# Patient Record
Sex: MALE | Race: WHITE | NOT HISPANIC OR LATINO | Employment: OTHER | URBAN - NONMETROPOLITAN AREA
[De-identification: names, ages, dates, MRNs, and addresses within clinical notes are randomized per-mention and may not be internally consistent; named-entity substitution may affect disease eponyms.]

---

## 2021-01-28 ENCOUNTER — IMMUNIZATION (OUTPATIENT)
Dept: VACCINE CLINIC | Facility: HOSPITAL | Age: 86
End: 2021-01-28

## 2021-01-28 PROCEDURE — 0001A: CPT | Performed by: FAMILY MEDICINE

## 2021-01-28 PROCEDURE — 91300 HC SARSCOV02 VAC 30MCG/0.3ML IM: CPT | Performed by: FAMILY MEDICINE

## 2021-02-19 ENCOUNTER — IMMUNIZATION (OUTPATIENT)
Dept: VACCINE CLINIC | Facility: HOSPITAL | Age: 86
End: 2021-02-19

## 2021-02-19 PROCEDURE — 0002A: CPT | Performed by: INTERNAL MEDICINE

## 2021-02-19 PROCEDURE — 91300 HC SARSCOV02 VAC 30MCG/0.3ML IM: CPT | Performed by: INTERNAL MEDICINE

## 2021-04-12 ENCOUNTER — OFFICE VISIT (OUTPATIENT)
Dept: FAMILY MEDICINE CLINIC | Facility: CLINIC | Age: 86
End: 2021-04-12

## 2021-04-12 VITALS
HEIGHT: 72 IN | TEMPERATURE: 97.7 F | SYSTOLIC BLOOD PRESSURE: 120 MMHG | OXYGEN SATURATION: 98 % | HEART RATE: 81 BPM | BODY MASS INDEX: 28.15 KG/M2 | DIASTOLIC BLOOD PRESSURE: 78 MMHG | WEIGHT: 207.8 LBS

## 2021-04-12 DIAGNOSIS — R35.0 URINARY FREQUENCY: ICD-10-CM

## 2021-04-12 DIAGNOSIS — Z13.6 ENCOUNTER FOR LIPID SCREENING FOR CARDIOVASCULAR DISEASE: ICD-10-CM

## 2021-04-12 DIAGNOSIS — E03.8 OTHER SPECIFIED HYPOTHYROIDISM: ICD-10-CM

## 2021-04-12 DIAGNOSIS — J43.8 OTHER EMPHYSEMA (HCC): Primary | ICD-10-CM

## 2021-04-12 DIAGNOSIS — Z12.5 ENCOUNTER FOR SPECIAL SCREENING EXAMINATION FOR NEOPLASM OF PROSTATE: ICD-10-CM

## 2021-04-12 DIAGNOSIS — Z13.220 ENCOUNTER FOR LIPID SCREENING FOR CARDIOVASCULAR DISEASE: ICD-10-CM

## 2021-04-12 PROCEDURE — 99204 OFFICE O/P NEW MOD 45 MIN: CPT | Performed by: FAMILY MEDICINE

## 2021-04-12 RX ORDER — BUDESONIDE AND FORMOTEROL FUMARATE DIHYDRATE 160; 4.5 UG/1; UG/1
2 AEROSOL RESPIRATORY (INHALATION)
COMMUNITY
End: 2021-04-20 | Stop reason: SDUPTHER

## 2021-04-12 RX ORDER — LEVOTHYROXINE SODIUM 0.07 MG/1
75 TABLET ORAL DAILY
COMMUNITY
End: 2021-06-01 | Stop reason: SDUPTHER

## 2021-04-12 RX ORDER — ALBUTEROL SULFATE 90 UG/1
AEROSOL, METERED RESPIRATORY (INHALATION)
COMMUNITY
Start: 2021-02-27 | End: 2021-08-03 | Stop reason: SDUPTHER

## 2021-04-12 RX ORDER — LANOLIN ALCOHOL/MO/W.PET/CERES
3 CREAM (GRAM) TOPICAL NIGHTLY
COMMUNITY
End: 2022-06-13

## 2021-04-12 RX ORDER — MULTIPLE VITAMINS W/ MINERALS TAB 9MG-400MCG
1 TAB ORAL DAILY
COMMUNITY
End: 2022-04-18

## 2021-04-13 ENCOUNTER — TELEPHONE (OUTPATIENT)
Dept: FAMILY MEDICINE CLINIC | Facility: CLINIC | Age: 86
End: 2021-04-13

## 2021-04-13 LAB
ALBUMIN SERPL-MCNC: 4.5 G/DL (ref 3.5–5.2)
ALBUMIN/GLOB SERPL: 1.8 G/DL
ALP SERPL-CCNC: 62 U/L (ref 39–117)
ALT SERPL-CCNC: 18 U/L (ref 1–41)
AST SERPL-CCNC: 24 U/L (ref 1–40)
BASOPHILS # BLD AUTO: 0.08 10*3/MM3 (ref 0–0.2)
BASOPHILS NFR BLD AUTO: 0.8 % (ref 0–1.5)
BILIRUB SERPL-MCNC: 0.3 MG/DL (ref 0–1.2)
BUN SERPL-MCNC: 19 MG/DL (ref 8–23)
BUN/CREAT SERPL: 15.6 (ref 7–25)
CALCIUM SERPL-MCNC: 9.3 MG/DL (ref 8.6–10.5)
CHLORIDE SERPL-SCNC: 104 MMOL/L (ref 98–107)
CHOLEST SERPL-MCNC: 139 MG/DL (ref 0–200)
CO2 SERPL-SCNC: 28.1 MMOL/L (ref 22–29)
CREAT SERPL-MCNC: 1.22 MG/DL (ref 0.76–1.27)
EOSINOPHIL # BLD AUTO: 0.21 10*3/MM3 (ref 0–0.4)
EOSINOPHIL NFR BLD AUTO: 2 % (ref 0.3–6.2)
ERYTHROCYTE [DISTWIDTH] IN BLOOD BY AUTOMATED COUNT: 12.5 % (ref 12.3–15.4)
GLOBULIN SER CALC-MCNC: 2.5 GM/DL
GLUCOSE SERPL-MCNC: 94 MG/DL (ref 65–99)
HCT VFR BLD AUTO: 46.4 % (ref 37.5–51)
HDLC SERPL-MCNC: 49 MG/DL (ref 40–60)
HGB BLD-MCNC: 15.1 G/DL (ref 13–17.7)
IMM GRANULOCYTES # BLD AUTO: 0.04 10*3/MM3 (ref 0–0.05)
IMM GRANULOCYTES NFR BLD AUTO: 0.4 % (ref 0–0.5)
IMP & REVIEW OF LAB RESULTS: NORMAL
LDLC SERPL CALC-MCNC: 62 MG/DL (ref 0–100)
LYMPHOCYTES # BLD AUTO: 2.86 10*3/MM3 (ref 0.7–3.1)
LYMPHOCYTES NFR BLD AUTO: 27.6 % (ref 19.6–45.3)
MCH RBC QN AUTO: 30.8 PG (ref 26.6–33)
MCHC RBC AUTO-ENTMCNC: 32.5 G/DL (ref 31.5–35.7)
MCV RBC AUTO: 94.5 FL (ref 79–97)
MONOCYTES # BLD AUTO: 1.27 10*3/MM3 (ref 0.1–0.9)
MONOCYTES NFR BLD AUTO: 12.2 % (ref 5–12)
NEUTROPHILS # BLD AUTO: 5.92 10*3/MM3 (ref 1.7–7)
NEUTROPHILS NFR BLD AUTO: 57 % (ref 42.7–76)
NRBC BLD AUTO-RTO: 0 /100 WBC (ref 0–0.2)
PLATELET # BLD AUTO: 273 10*3/MM3 (ref 140–450)
POTASSIUM SERPL-SCNC: 4.6 MMOL/L (ref 3.5–5.2)
PROT SERPL-MCNC: 7 G/DL (ref 6–8.5)
PSA SERPL-MCNC: 2 NG/ML (ref 0–4)
RBC # BLD AUTO: 4.91 10*6/MM3 (ref 4.14–5.8)
SODIUM SERPL-SCNC: 140 MMOL/L (ref 136–145)
T4 FREE SERPL-MCNC: 1.42 NG/DL (ref 0.93–1.7)
TRIGL SERPL-MCNC: 165 MG/DL (ref 0–150)
TSH SERPL DL<=0.005 MIU/L-ACNC: 1.75 UIU/ML (ref 0.27–4.2)
VLDLC SERPL CALC-MCNC: 28 MG/DL (ref 5–40)
WBC # BLD AUTO: 10.38 10*3/MM3 (ref 3.4–10.8)

## 2021-04-13 NOTE — TELEPHONE ENCOUNTER
Well, I'm glad he has one! Keep it for right now. We still might need him. We'll do the workup that I ordered and get it rolling and then decide if we need to do anything more. I want him to not cancel yet because it is so hard to get an appointment.

## 2021-04-13 NOTE — TELEPHONE ENCOUNTER
Caller: DILAN JOVEL    Relationship: Emergency Contact WIFE    Best call back number: [phone]    Additional notes: PATIENT'S WIFE WOULD LIKE TO KNOW IF PATIENT NEEDS TO KEEP APPOINTMENT WITH DR HELMS ON 5/5/21. [facility] HAD SUGGESTED PATIENT MAKE AN APPOINTMENT WITH DR HELMS WHEN PATIENT WAS SEEN AROUND 2/27/21 AND HAD A BLOOD OXYGEN TEST PERFORMED ON HIM DUE TO TROUBLE BREATHING AND OXYGEN WAS LOW. PATIENT HAS AN APPOINTMENT SCHEDULED FOR A BREATHING TEST AT THE [facility] ON 4/28/21 THAT PCP SCHEDULED; AT THE TIME PATIENT WAS SEEN AT [facility] IN February PATIENT'S PREVIOUS PCP DR GERI GOULD'S HAD LEFT HIS PRACTICE. PATIENT WOULD LIKE TO KNOW IF HE NEEDS TO KEEP BOTH APPOINTMENTS WITH THE HOSPITAL FOR THE BREATHING TEST AND WITH DR HELMS FOR THE BLOOD OXYGEN TEST OR JUST THE APPOINTMENT AT THE HOSPITAL THAT CURRENT PCP HAS SET UP.

## 2021-04-20 ENCOUNTER — TELEPHONE (OUTPATIENT)
Dept: FAMILY MEDICINE CLINIC | Facility: CLINIC | Age: 86
End: 2021-04-20

## 2021-04-20 NOTE — TELEPHONE ENCOUNTER
Caller: RaymundoKevin    Relationship: Self    Best call back number: [phone]     Medication needed:   Requested Prescriptions     Pending Prescriptions Disp Refills   • budesonide-formoterol (SYMBICORT) 160-4.5 MCG/ACT inhaler       Sig: Inhale 2 puffs 2 (Two) Times a Day.       When do you need the refill by: ASAP    What additional details did the patient provide when requesting the medication: ALMOST OUT    Does the patient have less than a 3 day supply:  [] Yes  [x] No    What is the patient's preferred pharmacy: [facility]       THE PATIENT STATES HE NORMALLY GETS THIS MEDICATION FROM [facility] AND STATES THIS GETS MAILED TO HIS HOME.     PLEASE CALL THE PATIENT AND ADVISE -[phone].

## 2021-04-23 RX ORDER — BUDESONIDE AND FORMOTEROL FUMARATE DIHYDRATE 160; 4.5 UG/1; UG/1
2 AEROSOL RESPIRATORY (INHALATION)
Qty: 30.6 G | Refills: 1 | Status: SHIPPED | OUTPATIENT
Start: 2021-04-23 | End: 2021-04-23 | Stop reason: SDUPTHER

## 2021-04-23 RX ORDER — BUDESONIDE AND FORMOTEROL FUMARATE DIHYDRATE 160; 4.5 UG/1; UG/1
2 AEROSOL RESPIRATORY (INHALATION)
Qty: 30.6 G | Refills: 1 | Status: SHIPPED | OUTPATIENT
Start: 2021-04-23 | End: 2021-06-01 | Stop reason: SDUPTHER

## 2021-04-23 NOTE — TELEPHONE ENCOUNTER
This will not let me e-scribe it to the [facility]. How can we send it there?      Print & sign & I will fax.

## 2021-04-28 ENCOUNTER — HOSPITAL ENCOUNTER (OUTPATIENT)
Dept: RESPIRATORY THERAPY | Facility: HOSPITAL | Age: 86
Discharge: HOME | End: 2021-04-28

## 2021-04-28 ENCOUNTER — LAB (OUTPATIENT)
Dept: LAB | Facility: HOSPITAL | Age: 86
End: 2021-04-28

## 2021-04-28 DIAGNOSIS — J43.8 OTHER EMPHYSEMA (HCC): ICD-10-CM

## 2021-04-28 LAB — SARS-COV-2 RDRP RESP QL NAA+PROBE: NORMAL

## 2021-04-28 PROCEDURE — 94729 DIFFUSING CAPACITY: CPT | Performed by: INTERNAL MEDICINE

## 2021-04-28 PROCEDURE — C9803 HOPD COVID-19 SPEC COLLECT: HCPCS

## 2021-04-28 PROCEDURE — 87635 SARS-COV-2 COVID-19 AMP PRB: CPT

## 2021-04-28 PROCEDURE — 94726 PLETHYSMOGRAPHY LUNG VOLUMES: CPT

## 2021-04-28 PROCEDURE — 94060 EVALUATION OF WHEEZING: CPT

## 2021-04-28 PROCEDURE — 94726 PLETHYSMOGRAPHY LUNG VOLUMES: CPT | Performed by: INTERNAL MEDICINE

## 2021-04-28 PROCEDURE — 94640 AIRWAY INHALATION TREATMENT: CPT

## 2021-04-28 PROCEDURE — 94060 EVALUATION OF WHEEZING: CPT | Performed by: INTERNAL MEDICINE

## 2021-04-28 PROCEDURE — 94729 DIFFUSING CAPACITY: CPT

## 2021-04-28 RX ORDER — ALBUTEROL SULFATE 2.5 MG/3ML
2.5 SOLUTION RESPIRATORY (INHALATION) ONCE
Status: COMPLETED | OUTPATIENT
Start: 2021-04-28 | End: 2021-04-28

## 2021-04-28 RX ADMIN — ALBUTEROL SULFATE 2.5 MG: 2.5 SOLUTION RESPIRATORY (INHALATION) at 15:40

## 2021-04-30 ENCOUNTER — TELEPHONE (OUTPATIENT)
Dept: FAMILY MEDICINE CLINIC | Facility: CLINIC | Age: 86
End: 2021-04-30

## 2021-04-30 NOTE — TELEPHONE ENCOUNTER
----- Message from Elissa Geronimo MD sent at 4/30/2021  4:13 AM EDT -----  Please call. Let him know negative.      Patient notified.

## 2021-05-03 ENCOUNTER — TELEPHONE (OUTPATIENT)
Dept: FAMILY MEDICINE CLINIC | Facility: CLINIC | Age: 86
End: 2021-05-03

## 2021-05-03 DIAGNOSIS — R30.0 DYSURIA: Primary | ICD-10-CM

## 2021-05-03 PROCEDURE — 81003 URINALYSIS AUTO W/O SCOPE: CPT | Performed by: FAMILY MEDICINE

## 2021-05-04 ENCOUNTER — TELEPHONE (OUTPATIENT)
Dept: FAMILY MEDICINE CLINIC | Facility: CLINIC | Age: 86
End: 2021-05-04

## 2021-05-04 LAB
BILIRUB BLD-MCNC: NEGATIVE MG/DL
CLARITY, POC: ABNORMAL
COLOR UR: YELLOW
GLUCOSE UR STRIP-MCNC: NEGATIVE MG/DL
KETONES UR QL: NEGATIVE
LEUKOCYTE EST, POC: NEGATIVE
NITRITE UR-MCNC: NEGATIVE MG/ML
PH UR: 6 [PH] (ref 5–8)
PROT UR STRIP-MCNC: ABNORMAL MG/DL
RBC # UR STRIP: NEGATIVE /UL
SP GR UR: 1.03 (ref 1–1.03)
UROBILINOGEN UR QL: NORMAL

## 2021-05-05 ENCOUNTER — OFFICE VISIT (OUTPATIENT)
Dept: PULMONOLOGY | Facility: CLINIC | Age: 86
End: 2021-05-05

## 2021-05-05 VITALS
SYSTOLIC BLOOD PRESSURE: 138 MMHG | BODY MASS INDEX: 28.15 KG/M2 | HEART RATE: 75 BPM | WEIGHT: 207.8 LBS | TEMPERATURE: 97.5 F | DIASTOLIC BLOOD PRESSURE: 74 MMHG | OXYGEN SATURATION: 94 % | HEIGHT: 72 IN

## 2021-05-05 DIAGNOSIS — J44.9 CHRONIC OBSTRUCTIVE PULMONARY DISEASE, UNSPECIFIED COPD TYPE (HCC): Primary | ICD-10-CM

## 2021-05-05 DIAGNOSIS — Z87.891 FORMER CIGARETTE SMOKER: ICD-10-CM

## 2021-05-05 PROCEDURE — 99203 OFFICE O/P NEW LOW 30 MIN: CPT | Performed by: INTERNAL MEDICINE

## 2021-05-05 NOTE — PROGRESS NOTES
Chief complaint:   Chief Complaint   Patient presents with   • COPD     new pt       History of present illness:   Mr. Fonseca is a very pleasant 86-year-old gentleman with a past medical history of COPD and hypothyroidism.  He is currently on albuterol inhaler and Symbicort inhaler.  He is currently saturating 94% on room air.  He presents to pulmonary outpatient clinic for further evaluation and management of his COPD.Pulmonary function test that was performed on 4/28/2021 showed: Flow volume loop was assessed.  Spirometry showed severe obstruction.  There is no significant bronchodilator response.  Diffusing capacity, uncorrected for hemoglobin, is moderately reduced.  Lung volumes are normal.  Significant air trapping noted.  Patient reports feeling shortness of breath from last 16 years.  Symptom of shortness of breath started gradually.  He denies any symptoms of chest pain, wheezing or coughing.  He denies any fever chills night sweats.  He reports shortness of breath on activity occasionally and shortness of breath decreases on rest.  He denies any history of blood clot in the leg or in the lung.  He is a former smoker.  He has smoking history of 66-pack-year and quit smoking 25 years ago.    Review of Systems:   Positive for shortness of breath on exertion.  Denies any cough, chest pain, palpitation, swelling of lower extremities    Past medical history, past surgical history, social history and family history:  •  has a past medical history of Emphysema lung (CMS/HCC), Gastritis, Heartburn, Macular degeneration, Reflux esophagitis, and Thyroid disease.  •  has a past surgical history that includes Intracapsular cataract extraction.  •  reports that he quit smoking about 25 years ago. His smoking use included cigarettes. He started smoking about 69 years ago. He has a 66.00 pack-year smoking history. He has never used smokeless tobacco. He reports current alcohol use. He reports that he does not use  "drugs.  • family history includes Asthma in his brother; Cancer in his brother and father; Hypertension in his mother; Other in his mother.     Medication and allergies:  • Active medication:  Current Outpatient Medications on File Prior to Visit   Medication Sig   • albuterol sulfate  (90 Base) MCG/ACT inhaler    • budesonide-formoterol (SYMBICORT) 160-4.5 MCG/ACT inhaler Inhale 2 puffs 2 (Two) Times a Day.   • levothyroxine (SYNTHROID, LEVOTHROID) 75 MCG tablet Take 75 mcg by mouth Daily.   • melatonin 3 MG tablet Take  by mouth.   • multivitamin with minerals tablet tablet Take 1 tablet by mouth Daily.     No current facility-administered medications on file prior to visit.        Allergies:    Patient has no known allergies.      Vital Signs    height is 182.9 cm (72\") and weight is 94.3 kg (207 lb 12.8 oz). His temperature is 97.5 °F (36.4 °C). His blood pressure is 138/74 and his pulse is 75. His oxygen saturation is 94%.      Physical Exam:  Patient is oriented to time place and person.  No respiratory distress.  Patient can talk in full sentences.  No use of accessory muscles.  Normal S1 and S2.  No murmur heard.  Bilateral air entry equal.  No wheezing.  No crackles.  No rhonchi.  Abdomen soft.  Bowel sounds are present.  No cranial nerve deficit.       Result review:   Flow volume loop was assessed.  Spirometry showed severe obstruction.  There is no significant bronchodilator response.  Diffusing capacity, uncorrected for hemoglobin, is moderately reduced.  Lung volumes are normal.  Significant air trapping noted.      Assessment and plan:   Diagnosis Plan   1. Chronic obstructive pulmonary disease, unspecified COPD type (CMS/[facility])  tiotropium bromide monohydrate (SPIRIVA RESPIMAT) 2.5 MCG/ACT aerosol solution inhaler  On albuterol inhaler  On Symbicort inhaler   2. Former cigarette smoker              Follow up in 3 months and as needed.       Thank you for involving us in the care of the " patient.  Hang Reed MD  Pulmonary and Critical Care Medicine

## 2021-06-01 ENCOUNTER — OFFICE VISIT (OUTPATIENT)
Dept: FAMILY MEDICINE CLINIC | Facility: CLINIC | Age: 86
End: 2021-06-01

## 2021-06-01 VITALS
SYSTOLIC BLOOD PRESSURE: 126 MMHG | BODY MASS INDEX: 27.98 KG/M2 | WEIGHT: 206.6 LBS | OXYGEN SATURATION: 95 % | HEART RATE: 73 BPM | TEMPERATURE: 97.9 F | HEIGHT: 72 IN | DIASTOLIC BLOOD PRESSURE: 70 MMHG

## 2021-06-01 DIAGNOSIS — J44.9 CHRONIC OBSTRUCTIVE PULMONARY DISEASE, UNSPECIFIED COPD TYPE (HCC): ICD-10-CM

## 2021-06-01 DIAGNOSIS — K21.9 GASTROESOPHAGEAL REFLUX DISEASE WITHOUT ESOPHAGITIS: ICD-10-CM

## 2021-06-01 DIAGNOSIS — R07.2 PRECORDIAL PAIN: Primary | ICD-10-CM

## 2021-06-01 DIAGNOSIS — E03.8 OTHER SPECIFIED HYPOTHYROIDISM: ICD-10-CM

## 2021-06-01 PROCEDURE — 93000 ELECTROCARDIOGRAM COMPLETE: CPT | Performed by: FAMILY MEDICINE

## 2021-06-01 PROCEDURE — 99214 OFFICE O/P EST MOD 30 MIN: CPT | Performed by: FAMILY MEDICINE

## 2021-06-01 RX ORDER — BUDESONIDE AND FORMOTEROL FUMARATE DIHYDRATE 160; 4.5 UG/1; UG/1
2 AEROSOL RESPIRATORY (INHALATION)
Qty: 30.6 G | Refills: 1 | Status: SHIPPED | OUTPATIENT
Start: 2021-06-01 | End: 2021-12-14

## 2021-06-01 RX ORDER — OMEPRAZOLE 40 MG/1
40 CAPSULE, DELAYED RELEASE ORAL DAILY
Qty: 90 CAPSULE | Refills: 1 | Status: SHIPPED | OUTPATIENT
Start: 2021-06-01 | End: 2022-06-13

## 2021-06-01 RX ORDER — LEVOTHYROXINE SODIUM 0.07 MG/1
75 TABLET ORAL DAILY
Qty: 90 TABLET | Refills: 1 | Status: SHIPPED | OUTPATIENT
Start: 2021-06-01 | End: 2022-08-19 | Stop reason: SDUPTHER

## 2021-06-01 NOTE — PATIENT INSTRUCTIONS
Stop the spiriva and symbicort. Try the trelegy by itself. When it runs out, try the bretrzi. When that runs out go back to the spiriva and symbicort. Which of the three options is best?    EKG is okay.     [facility] to [facility]

## 2021-06-01 NOTE — PROGRESS NOTES
"Kevin Fonseca     VITALS: Blood pressure 126/70, pulse 73, temperature 97.9 °F (36.6 °C), temperature source Temporal, height 182.9 cm (72.01\"), weight 93.7 kg (206 lb 9.6 oz), SpO2 95 %.    Subjective  Chief Complaint  Emphysema (6w FU) and COPD    Subjective          History of Present Illness:  Patient is a 86 y.o.  male with medical conditions significant for hypothyroidism who presents to clinic secondary to medical followup.  He is complaining of some chest pain today.  He denies any shortness of breath.  No nausea or vomiting.  No sweats or chills.    No complaints about any of the medications.    The following portions of the patient's history were reviewed and updated as appropriate: allergies, current medications, past family history, past medical history, past social history, past surgical history and problem list.    Past Medical History  Past Medical History:   Diagnosis Date   • Emphysema lung (CMS/HCC)    • Gastritis    • Heartburn    • Macular degeneration    • Reflux esophagitis    • Thyroid disease        Surgical History  Past Surgical History:   Procedure Laterality Date   • INTRACAPSULAR CATARACT EXTRACTION      Dr. Lesly Gray. Dr. Neto Light.       Family History  Family History   Problem Relation Age of Onset   • Hypertension Mother    • Other Mother    • Cancer Father    • Cancer Brother    • Asthma Brother        Social History  Social History     Socioeconomic History   • Marital status:      Spouse name: Not on file   • Number of children: Not on file   • Years of education: Not on file   • Highest education level: Not on file   Tobacco Use   • Smoking status: Former Smoker     Packs/day: 1.50     Years: 44.00     Pack years: 66.00     Types: Cigarettes     Start date:      Quit date:      Years since quittin.4   • Smokeless tobacco: Never Used   Substance and Sexual Activity   • Alcohol use: Yes     Comment: 2 week   • Drug use: Never   • Sexual activity: Defer " "      Objective   Vital Signs:   /70 (BP Location: Right arm, Patient Position: Sitting, Cuff Size: Adult)   Pulse 73   Temp 97.9 °F (36.6 °C) (Temporal)   Ht 182.9 cm (72.01\")   Wt 93.7 kg (206 lb 9.6 oz)   SpO2 95%   BMI 28.01 kg/m²     Physical Exam     Gen: Patient in NAD. Pleasant and answers appropriately. A&Ox3.    Skin: Warm and dry with normal turgor. No purpura, rashes, or unusual pigmentation noted. Hair is normal in appearance and distribution.    HEENT: NC/AT. No lesions noted. Conjunctiva clear, sclera nonicteric. PERRL. EOMI without nystagmus or strabismus. Fundi appear benign. No hemorrhages or exudates of eyes. Auditory canals are patent bilaterally without lesions. TMs intact,  nonerythematous, nonbulging without lesions. Nasal mucosa pink, nonerythematous, and nonedematous. Frontal and maxillary sinuses are nontender. O/P nonerythematous and moist without exudate.    Neck: Supple without lymph nodes palpated. FROM.     Lungs: Decreased B/L without rales, rhonchi, crackles, or wheezes.    Heart: RRR. S1 and S2 normal. No S3 or S4. No MRGT.    Abd: Soft, nontender,nondistended. (+)BSx4 quadrants.     Extrem: No CCE. Radial pulses 2+/4 and equal B/L. FROMx4. No bone, joint, or muscle tenderness noted.    Neuro: No focal motor/sensory deficits.      ECG 12 Lead    Date/Time: 6/1/2021 3:53 PM  Performed by: Elissa Geronimo MD  Authorized by: Elissa Geronimo MD   Comparison: not compared with previous ECG   Previous ECG: no previous ECG available  Rhythm: sinus rhythm  Rate: normal  Conduction: 1st degree AV block  ST Segments: ST segments normal  T Waves: T waves normal  QRS axis: left    Clinical impression: non-specific ECG  Comments: Sinus rhythm without any ST or T acute changes.  First-degree AV block.  LAD.                 Assessment and Plan    Kevin Fonseca is a 86 y.o. here for medical followup.    Problem List Items Addressed This Visit     None      Visit Diagnoses     " Precordial pain    -  Primary    Relevant Orders    ECG 12 Lead    Chronic obstructive pulmonary disease, unspecified COPD type (CMS/[facility])        Relevant Medications    tiotropium bromide monohydrate (SPIRIVA RESPIMAT) 2.5 MCG/ACT aerosol solution inhaler    budesonide-formoterol (SYMBICORT) 160-4.5 MCG/ACT inhaler    Other specified hypothyroidism        Relevant Medications    levothyroxine (SYNTHROID, LEVOTHROID) 75 MCG tablet    Gastroesophageal reflux disease without esophagitis        Relevant Medications    omeprazole (priLOSEC) 40 MG capsule            Patient's Body mass index is 28.01 kg/m². indicating that he is overweight (BMI 25-29.9). Obesity-related health conditions include the following: GERD. Obesity is unchanged. BMI is is above average; BMI management plan is completed. We discussed portion control and increasing exercise..              Follow Up   Return in about 2 months (around 8/1/2021).  Findings and plans discussed with patient who verbalizes understanding and agreement. Will followup with patient once results are in. Patient was given instructions and counseling regarding his condition or for health maintenance advice. Please see specific information pulled into the AVS if appropriate.       Scribed for Elissa Geronimo MD by Merrill Saeed.  06/01/21   16:52 EDT    I have personally performed the services described in this document as scribed by the above individual, and it is both accurate and complete.  Elissa Geronimo MD  6/21/2021  06:11 EDT

## 2021-06-03 DIAGNOSIS — K21.9 GASTROESOPHAGEAL REFLUX DISEASE WITHOUT ESOPHAGITIS: ICD-10-CM

## 2021-06-03 RX ORDER — OMEPRAZOLE 40 MG/1
40 CAPSULE, DELAYED RELEASE ORAL DAILY
Qty: 90 CAPSULE | Refills: 1 | OUTPATIENT
Start: 2021-06-03

## 2021-06-03 NOTE — TELEPHONE ENCOUNTER
Caller: Kevin Fonseca    Relationship: Self    Best call back number: [phone]  Medication needed:   Requested Prescriptions     Pending Prescriptions Disp Refills   • omeprazole (priLOSEC) 40 MG capsule 90 capsule 1     Sig: Take 1 capsule by mouth Daily.       When do you need the refill by: ASAP    What additional details did the patient provide when requesting the medication:   Does the patient have less than a 3 day supply:  [x] Yes  [] No    What is the patient's preferred pharmacy:      [facility] [address] [address] [facility] [address] AT [facility] [address] - [phone] [facility] [phone] FX

## 2021-08-02 ENCOUNTER — OFFICE VISIT (OUTPATIENT)
Dept: FAMILY MEDICINE CLINIC | Facility: CLINIC | Age: 86
End: 2021-08-02

## 2021-08-02 VITALS
SYSTOLIC BLOOD PRESSURE: 126 MMHG | OXYGEN SATURATION: 94 % | BODY MASS INDEX: 28.04 KG/M2 | TEMPERATURE: 97.1 F | HEIGHT: 72 IN | DIASTOLIC BLOOD PRESSURE: 64 MMHG | HEART RATE: 72 BPM | WEIGHT: 207 LBS

## 2021-08-02 DIAGNOSIS — Z13.220 ENCOUNTER FOR LIPID SCREENING FOR CARDIOVASCULAR DISEASE: ICD-10-CM

## 2021-08-02 DIAGNOSIS — E03.8 OTHER SPECIFIED HYPOTHYROIDISM: ICD-10-CM

## 2021-08-02 DIAGNOSIS — Z13.6 ENCOUNTER FOR LIPID SCREENING FOR CARDIOVASCULAR DISEASE: ICD-10-CM

## 2021-08-02 DIAGNOSIS — J43.8 OTHER EMPHYSEMA (HCC): ICD-10-CM

## 2021-08-02 DIAGNOSIS — K21.9 GASTROESOPHAGEAL REFLUX DISEASE WITHOUT ESOPHAGITIS: ICD-10-CM

## 2021-08-02 DIAGNOSIS — R07.2 PRECORDIAL PAIN: ICD-10-CM

## 2021-08-02 DIAGNOSIS — J44.9 CHRONIC OBSTRUCTIVE PULMONARY DISEASE, UNSPECIFIED COPD TYPE (HCC): ICD-10-CM

## 2021-08-02 DIAGNOSIS — Z00.00 ENCOUNTER FOR SUBSEQUENT ANNUAL WELLNESS VISIT IN MEDICARE PATIENT: Primary | ICD-10-CM

## 2021-08-02 PROCEDURE — 1160F RVW MEDS BY RX/DR IN RCRD: CPT | Performed by: FAMILY MEDICINE

## 2021-08-02 PROCEDURE — 96160 PT-FOCUSED HLTH RISK ASSMT: CPT | Performed by: FAMILY MEDICINE

## 2021-08-02 PROCEDURE — 1170F FXNL STATUS ASSESSED: CPT | Performed by: FAMILY MEDICINE

## 2021-08-02 PROCEDURE — G0439 PPPS, SUBSEQ VISIT: HCPCS | Performed by: FAMILY MEDICINE

## 2021-08-02 PROCEDURE — 99214 OFFICE O/P EST MOD 30 MIN: CPT | Performed by: FAMILY MEDICINE

## 2021-08-02 NOTE — PROGRESS NOTES
"Kevin Fonseca     VITALS: Blood pressure 126/64, pulse 72, temperature 97.1 °F (36.2 °C), height 182.9 cm (72.01\"), weight 93.9 kg (207 lb), SpO2 94 %.    Subjective  Chief Complaint  Emphysema and Medicare Wellness-subsequent    Subjective          History of Present Illness:  Patient is a 86 y.o.  male with medical conditions significant for GERD, emphysema, and hypothyroidism who presents to clinic secondary to medical followup.     The patient is accompanied by his wife, Faby, today. At his last visit, he was evaluated for precordial pain. He also was started on Spiriva and Symbicort for his COPD. Since his last visit, he has seen Dr. Mistry. He states that the supplemental oxygen is helping his breathing at nighttime and he will occasionally have a minor \"tinge.\" He states that his energy level is pretty well and he denies having difficulty breathing during the day. Faby states that the patient is doing pretty well; however, he does have a significant amount of anxiety regarding his breathing as well as his vision. Faby states that the patient sees Dr. Mistry on 08/06/2021 or 08/09/2021 regarding his right eye. She reports that he had surgery on his right eye in 09/2020 and it helped to restore his vision enough to maneuver around. He states that he is unable to read the newspaper now. He reports at one time that he went into a walk-in clinic in [place], [place] secondary to having difficulty breathing and he was prescribed albuterol at that time.     He states that Dr. Blanton's name was on his Synthroid bottle with no refills [facility]. Faby states that the [facility] sent the [facility] a letter that he needs to follow up with Dr. Blanton as well as [facility]. [facility]    He reports that he continues to have sleep issues. He states that he utilizes the supplemental oxygen, and goes to bed around 9:00 PM. He then wakes up at " "approximately 12:00 to 3:00 AM and is unable to return to sleep. He denies taking the melatonin.     No complaints about any of the medications.    The following portions of the patient's history were reviewed and updated as appropriate: allergies, current medications, past family history, past medical history, past social history, past surgical history and problem list.    Past Medical History  Past Medical History:   Diagnosis Date   • Emphysema lung (CMS/HCC)    • Gastritis    • Heartburn    • Macular degeneration    • Reflux esophagitis    • Thyroid disease        Surgical History  Past Surgical History:   Procedure Laterality Date   • INTRACAPSULAR CATARACT EXTRACTION      Dr. Lesly Gray. Dr. Neto Light.       Family History  Family History   Problem Relation Age of Onset   • Hypertension Mother    • Other Mother    • Cancer Father    • Cancer Brother    • Asthma Brother        Social History  Social History     Socioeconomic History   • Marital status:      Spouse name: Not on file   • Number of children: Not on file   • Years of education: Not on file   • Highest education level: Not on file   Tobacco Use   • Smoking status: Former Smoker     Packs/day: 1.50     Years: 44.00     Pack years: 66.00     Types: Cigarettes     Start date:      Quit date:      Years since quittin.6   • Smokeless tobacco: Never Used   Substance and Sexual Activity   • Alcohol use: Yes     Comment: 2 week   • Drug use: Never   • Sexual activity: Defer       Objective   Vital Signs:   /64 (BP Location: Right arm, Patient Position: Sitting, Cuff Size: Adult)   Pulse 72   Temp 97.1 °F (36.2 °C)   Ht 182.9 cm (72.01\")   Wt 93.9 kg (207 lb)   SpO2 94%   BMI 28.07 kg/m²     Physical Exam     Gen: Patient in NAD. Pleasant and answers appropriately. A&Ox3.    Skin: Warm and dry with normal turgor. No purpura, rashes, or unusual pigmentation noted. Hair is normal in appearance and " distribution.    HEENT: NC/AT. No lesions noted. Conjunctiva clear, sclera nonicteric. PERRL. EOMI without nystagmus or strabismus. Fundi appear benign. No hemorrhages or exudates of eyes. Auditory canals are patent bilaterally without lesions. TMs intact,  nonerythematous, nonbulging without lesions. Nasal mucosa pink, nonerythematous, and nonedematous. Frontal and maxillary sinuses are nontender. O/P nonerythematous and moist without exudate.    Neck: Supple without lymph nodes palpated. FROM.     Lungs: Decreased B/L without rales, rhonchi, crackles, or wheezes.    Heart: RRR. S1 and S2 normal. No S3 or S4. No MRGT.    Abd: Soft, nontender,nondistended. (+)BSx4 quadrants.     Extrem: No CCE. Radial pulses 2+/4 and equal B/L. FROMx4. No bone, joint, or muscle tenderness noted.    Neuro: No focal motor/sensory deficits.    Procedures       Assessment and Plan    Kevin Fonseca is a 86 y.o. here for medical followup.    Problem List Items Addressed This Visit     None      Visit Diagnoses     Other specified hypothyroidism    -  Primary    Relevant Orders    T4, Free    TSH    Comprehensive Metabolic Panel    Chronic obstructive pulmonary disease, unspecified COPD type (CMS/HCC)        Relevant Orders    T4, Free    TSH    Comprehensive Metabolic Panel  I advised him to take 3 to 5 mg melatonin at night.    Precordial pain        Relevant Orders    T4, Free    TSH    Comprehensive Metabolic Panel    Gastroesophageal reflux disease without esophagitis        Relevant Orders    T4, Free    TSH    Comprehensive Metabolic Panel    Other emphysema (CMS/HCC)        Relevant Orders    T4, Free    TSH    Comprehensive Metabolic Panel    Encounter for lipid screening for cardiovascular disease        Relevant Orders    T4, Free    TSH    Comprehensive Metabolic Panel            Patient's Body mass index is 28.07 kg/m². indicating that he is overweight (BMI 25-29.9). Obesity-related health conditions include the following: none.  Obesity is unchanged. BMI is is above average; BMI management plan is completed. We discussed portion control and increasing exercise..         Follow Up   Return in about 3 months (around 11/2/2021).  Findings and plans discussed with patient who verbalizes understanding and agreement. Will followup with patient once results are in. Patient was given instructions and counseling regarding his condition or for health maintenance advice. Please see specific information pulled into the AVS if appropriate.       Scribed for Elissa Geronimo MD by Merrill Saeed.  08/02/21   18:20 EDT    I have personally performed the services described in this document as transcribed by the above individual, and it is both accurate and complete.  Elissa Geronimo MD  8/23/2021  04:52 EDT

## 2021-08-03 ENCOUNTER — OFFICE VISIT (OUTPATIENT)
Dept: PULMONOLOGY | Facility: CLINIC | Age: 86
End: 2021-08-03

## 2021-08-03 VITALS
BODY MASS INDEX: 28.04 KG/M2 | TEMPERATURE: 97.7 F | SYSTOLIC BLOOD PRESSURE: 115 MMHG | HEART RATE: 75 BPM | DIASTOLIC BLOOD PRESSURE: 72 MMHG | OXYGEN SATURATION: 95 % | WEIGHT: 207 LBS | HEIGHT: 72 IN

## 2021-08-03 DIAGNOSIS — G47.34 NOCTURNAL HYPOXIA: ICD-10-CM

## 2021-08-03 DIAGNOSIS — J44.9 CHRONIC OBSTRUCTIVE PULMONARY DISEASE, UNSPECIFIED COPD TYPE (HCC): Primary | ICD-10-CM

## 2021-08-03 DIAGNOSIS — Z87.891 FORMER CIGARETTE SMOKER: ICD-10-CM

## 2021-08-03 LAB
ALBUMIN SERPL-MCNC: 4.1 G/DL (ref 3.5–5.2)
ALBUMIN/GLOB SERPL: 1.5 G/DL
ALP SERPL-CCNC: 61 U/L (ref 39–117)
ALT SERPL-CCNC: 15 U/L (ref 1–41)
AST SERPL-CCNC: 22 U/L (ref 1–40)
BILIRUB SERPL-MCNC: 0.3 MG/DL (ref 0–1.2)
BUN SERPL-MCNC: 19 MG/DL (ref 8–23)
BUN/CREAT SERPL: 16.8 (ref 7–25)
CALCIUM SERPL-MCNC: 9.1 MG/DL (ref 8.6–10.5)
CHLORIDE SERPL-SCNC: 103 MMOL/L (ref 98–107)
CO2 SERPL-SCNC: 26 MMOL/L (ref 22–29)
CREAT SERPL-MCNC: 1.13 MG/DL (ref 0.76–1.27)
GLOBULIN SER CALC-MCNC: 2.8 GM/DL
GLUCOSE SERPL-MCNC: 97 MG/DL (ref 65–99)
POTASSIUM SERPL-SCNC: 4.4 MMOL/L (ref 3.5–5.2)
PROT SERPL-MCNC: 6.9 G/DL (ref 6–8.5)
SODIUM SERPL-SCNC: 138 MMOL/L (ref 136–145)
T4 FREE SERPL-MCNC: 1.37 NG/DL (ref 0.93–1.7)
TSH SERPL DL<=0.005 MIU/L-ACNC: 1.62 UIU/ML (ref 0.27–4.2)

## 2021-08-03 PROCEDURE — 94618 PULMONARY STRESS TESTING: CPT | Performed by: PHYSICIAN ASSISTANT

## 2021-08-03 PROCEDURE — 99214 OFFICE O/P EST MOD 30 MIN: CPT | Performed by: PHYSICIAN ASSISTANT

## 2021-08-03 RX ORDER — ALBUTEROL SULFATE 90 UG/1
2 AEROSOL, METERED RESPIRATORY (INHALATION) EVERY 4 HOURS PRN
Qty: 18 G | Refills: 6 | Status: SHIPPED | OUTPATIENT
Start: 2021-08-03 | End: 2022-04-18 | Stop reason: SDUPTHER

## 2021-08-03 NOTE — PROGRESS NOTES
"Chief Complaint  COPD    Subjective          Kevin Fonseca presents to [facility]  History of Present Illness    Patient presents today for follow-up of COPD, nocturnal hypoxia, and former smoking history.  History includes 66 pack-year use with cessation approximately 25 years ago.  He reported that several months ago, he had an episode of shortness of breath and concerned if his oxygen level had dropped, and thus sought urgent care evaluation.  He states that he was prescribed and albuterol rescue inhaler at that time.  He notes occasional episodes of shortness of breath, reduced in frequency since starting the Symbicort and Spiriva inhalers.  He has some difficulty with \"catching\" the medication at times.  His PCP attempted to obtain a spacer device, but was not available at the time.  He continues to use Symbicort and Spiriva as prescribed.  He reports very rarely requiring the rescue inhaler since the previous visit.  Primary care team completed overnight oxygen assessment, and has started him on 2 L/min at nighttime for nocturnal hypoxia.  Denies any acute worsening of symptoms currently.  Has not noticed any specific aggravating factors.      Objective   Vital Signs:   /72   Pulse 75   Temp 97.7 °F (36.5 °C) (Temporal)   Ht 182.9 cm (72\")   Wt 93.9 kg (207 lb)   SpO2 95%   BMI 28.07 kg/m²         Physical Exam  Vitals reviewed.   Constitutional:       General: He is not in acute distress.     Appearance: He is well-developed. He is not diaphoretic.   HENT:      Head: Normocephalic and atraumatic.   Neck:      Thyroid: No thyromegaly.   Cardiovascular:      Rate and Rhythm: Normal rate and regular rhythm.      Heart sounds: Normal heart sounds, S1 normal and S2 normal.   Pulmonary:      Effort: Pulmonary effort is normal.      Breath sounds: No wheezing, rhonchi or rales.   Musculoskeletal:         General: Swelling (1+ lower extremity edema " bilaterally) present. Normal range of motion.   Skin:     General: Skin is warm and dry.   Neurological:      Mental Status: He is alert and oriented to person, place, and time.   Psychiatric:         Behavior: Behavior normal.            Result Review :   The following data was reviewed by: Nereyda Velasquez PA-C on 08/03/2021:  Data reviewed: Recent PFT.   No imaging available.     PFTreport from April 2021:       Assessment and Plan    Diagnoses and all orders for this visit:    1. Chronic obstructive pulmonary disease, unspecified COPD type (CMS/HCC) (Primary)    2. Former cigarette smoker    3. Nocturnal hypoxia    Other orders  -     albuterol sulfate  (90 Base) MCG/ACT inhaler; Inhale 2 puffs Every 4 (Four) Hours As Needed for Wheezing or Shortness of Air.  Dispense: 18 g; Refill: 6         COPD, Former smoker:  · Continue albuterol inhaler 2 puffs every 4 hours as needed for shortness of breath, wheezing.  · Continue Symbicort 160 mcg 2 puffs twice daily  · Continue Spiriva Respimat 2.5 mcg 2 puffs daily  · Provided a spacer device, and instructions for use.  · Completed walk oximetry test in office.  Saturation initially noted at 95% on room air.  Saturation maintained in the 90's on room air.   Does not qualify for ambulatory supplemental oxygen at this time.      Nocturnal hypoxia:  · Compliant with 2 L/min at nighttime        Follow Up   Return in about 4 months (around 12/3/2021), or as needed, for Next scheduled follow up.  Patient was given instructions and counseling regarding his condition or for health maintenance advice. Please see specific information pulled into the AVS if appropriate.

## 2021-08-23 NOTE — PROGRESS NOTES
The ABCs of the Annual Wellness Visit  Subsequent Medicare Wellness Visit    Chief Complaint   Patient presents with   • Medicare Wellness-subsequent       Subjective   History of Present Illness:  Kevin Fonseca is a 86 y.o. male who presents for a Subsequent Medicare Wellness Visit.    HEALTH RISK ASSESSMENT    Recent Hospitalizations:  No hospitalization(s) within the last year.    Current Medical Providers:  Patient Care Team:  Elissa Geronimo MD as PCP - General (Family Medicine)    Smoking Status:  Social History     Tobacco Use   Smoking Status Former Smoker   • Packs/day: 1.50   • Types: Cigarettes   • Start date:    • Quit date:    • Years since quittin.6   Smokeless Tobacco Never Used       Alcohol Consumption:  Social History     Substance and Sexual Activity   Alcohol Use Yes    Comment: 2 week       Depression Screen:   PHQ-2/PHQ-9 Depression Screening 2021   Little interest or pleasure in doing things 0   Feeling down, depressed, or hopeless 0   Total Score 0       Fall Risk Screen:  MELY Fall Risk Assessment was completed, and patient is at MODERATE risk for falls. Assessment completed on:2021    Health Habits and Functional and Cognitive Screening:  Functional & Cognitive Status 2021   Do you have difficulty preparing food and eating? No   Do you have difficulty bathing yourself, getting dressed or grooming yourself? No   Do you have difficulty using the toilet? No   Do you have difficulty moving around from place to place? No   Do you have trouble with steps or getting out of a bed or a chair? Yes   Current Diet Well Balanced Diet   Dental Exam Not up to date   Eye Exam Up to date   Exercise (times per week) 0 times per week   Current Exercises Include No Regular Exercise   Do you need help using the phone?  No   Are you deaf or do you have serious difficulty hearing?  No   Do you need help with transportation? No   Do you need help shopping? No   Do you need help  preparing meals?  No   Do you need help with housework?  Yes   Do you need help with laundry? Yes   Do you need help taking your medications? No   Do you need help managing money? No   Do you ever drive or ride in a car without wearing a seat belt? No         Does the patient have evidence of cognitive impairment? No    Asprin use counseling:Does not need ASA (and currently is not on it)    Age-appropriate Screening Schedule:  Refer to the list below for future screening recommendations based on patient's age, sex and/or medical conditions. Orders for these recommended tests are listed in the plan section. The patient has been provided with a written plan.    Health Maintenance   Topic Date Due   • TDAP/TD VACCINES (1 - Tdap) Never done   • ZOSTER VACCINE (1 of 2) Never done   • INFLUENZA VACCINE  10/01/2021          The following portions of the patient's history were reviewed and updated as appropriate: allergies, current medications, past family history, past medical history, past social history, past surgical history and problem list.    Outpatient Medications Prior to Visit   Medication Sig Dispense Refill   • budesonide-formoterol (SYMBICORT) 160-4.5 MCG/ACT inhaler Inhale 2 puffs 2 (Two) Times a Day. 30.6 g 1   • levothyroxine (SYNTHROID, LEVOTHROID) 75 MCG tablet Take 1 tablet by mouth Daily. 90 tablet 1   • melatonin 3 MG tablet Take  by mouth.     • omeprazole (priLOSEC) 40 MG capsule Take 1 capsule by mouth Daily. 90 capsule 1   • tiotropium bromide monohydrate (SPIRIVA RESPIMAT) 2.5 MCG/ACT aerosol solution inhaler Inhale 2 puffs Daily. 12 g 1   • multivitamin with minerals tablet tablet Take 1 tablet by mouth Daily.     • albuterol sulfate  (90 Base) MCG/ACT inhaler        No facility-administered medications prior to visit.       Patient Active Problem List   Diagnosis   • Chronic obstructive pulmonary disease (CMS/HCC)   • Former cigarette smoker   • Nocturnal hypoxia       Advanced Care  "Planning:  ACP discussion was held with the patient during this visit. Patient does not have an advance directive, information provided.    Compared to one year ago, the patient feels his physical health is the same.  Compared to one year ago, the patient feels his mental health is the same.    Reviewed chart for potential of high risk medication in the elderly: yes  Reviewed chart for potential of harmful drug interactions in the elderly:yes    Objective         Vitals:    08/02/21 1534   BP: 126/64   BP Location: Right arm   Patient Position: Sitting   Cuff Size: Adult   Pulse: 72   Temp: 97.1 °F (36.2 °C)   SpO2: 94%   Weight: 93.9 kg (207 lb)   Height: 182.9 cm (72.01\")       Body mass index is 28.07 kg/m².  Discussed the patient's BMI with him. The BMI is above average; BMI management plan is completed.    Physical Exam  See previous note.  Lab Results   Component Value Date    GLU 97 08/02/2021        Assessment/Plan   Medicare Risks and Personalized Health Plan  CMS Preventative Services Quick Reference  Advance Directive Discussion  Cardiovascular risk  Depression/Dysphoria  Fall Risk  Immunizations Discussed/Encouraged (specific immunizations; COVID19 )  Prostate Cancer Screening     The above risks/problems have been discussed with the patient.  Pertinent information has been shared with the patient in the After Visit Summary.  Follow up plans and orders are seen below in the Assessment/Plan Section.    Diagnoses and all orders for this visit:    1. Encounter for subsequent annual wellness visit in Medicare patient (Primary)    2. Other specified hypothyroidism  -     Cancel: Comprehensive Metabolic Panel; Future  -     Cancel: TSH; Future  -     Cancel: T4, Free; Future  -     T4, Free  -     TSH  -     Cancel: Comprehensive Metabolic Panel; Future  -     Comprehensive Metabolic Panel    3. Chronic obstructive pulmonary disease, unspecified COPD type (CMS/[facility])  -     T4, Free  -     TSH  -     Cancel: " Comprehensive Metabolic Panel; Future  -     Comprehensive Metabolic Panel    4. Precordial pain  -     T4, Free  -     TSH  -     Cancel: Comprehensive Metabolic Panel; Future  -     Comprehensive Metabolic Panel    5. Gastroesophageal reflux disease without esophagitis  -     T4, Free  -     TSH  -     Cancel: Comprehensive Metabolic Panel; Future  -     Comprehensive Metabolic Panel    6. Other emphysema (CMS/HCC)  -     T4, Free  -     TSH  -     Cancel: Comprehensive Metabolic Panel; Future  -     Comprehensive Metabolic Panel    7. Encounter for lipid screening for cardiovascular disease  -     T4, Free  -     TSH  -     Cancel: Comprehensive Metabolic Panel; Future  -     Comprehensive Metabolic Panel      Follow Up:  Return in about 3 months (around 11/2/2021).     An After Visit Summary and PPPS were given to the patient.

## 2021-09-22 ENCOUNTER — TELEPHONE (OUTPATIENT)
Dept: FAMILY MEDICINE CLINIC | Facility: CLINIC | Age: 86
End: 2021-09-22

## 2021-09-22 NOTE — TELEPHONE ENCOUNTER
It is a good thing. I wonder if it is the anticholinergic though that is doing it. See how it goes for the next couple of days. If it continues to happen or gets worse, stop it and see if it gets better. If it gets better then we have to weigh how worth it is for him (plus I may have some other things up my sleeve). If it doesn't get better, then it's not the sample and he can continue the medication.

## 2021-09-22 NOTE — TELEPHONE ENCOUNTER
Caller: DILAN JOVEL    Relationship: Emergency Contact    Best call back number: [phone]    What medications are you currently taking:   Current Outpatient Medications on File Prior to Visit   Medication Sig Dispense Refill   • albuterol sulfate  (90 Base) MCG/ACT inhaler Inhale 2 puffs Every 4 (Four) Hours As Needed for Wheezing or Shortness of Air. 18 g 6   • budesonide-formoterol (SYMBICORT) 160-4.5 MCG/ACT inhaler Inhale 2 puffs 2 (Two) Times a Day. 30.6 g 1   • levothyroxine (SYNTHROID, LEVOTHROID) 75 MCG tablet Take 1 tablet by mouth Daily. 90 tablet 1   • melatonin 3 MG tablet Take  by mouth.     • multivitamin with minerals tablet tablet Take 1 tablet by mouth Daily.     • omeprazole (priLOSEC) 40 MG capsule Take 1 capsule by mouth Daily. 90 capsule 1   • tiotropium bromide monohydrate (SPIRIVA RESPIMAT) 2.5 MCG/ACT aerosol solution inhaler Inhale 2 puffs Daily. 12 g 1     No current facility-administered medications on file prior to visit.          When did you start taking these medications: NA    Which medication are you concerned about:   tiotropium bromide monohydrate (SPIRIVA RESPIMAT) 2.5 MCG/ACT aerosol solution inhaler         Who prescribed you this medication: TORIN MARCOS    What are your concerns: PATIENT STATED THAT THE SIDE EFFECTS COULD CAUSE PROBLEMS WITH URINATION.  PATIENT URINATED THIS MORNING AND IT STOPPED 6-7 TIMES.     PATIENT WOULD LIKE TO KNOW IF HE SHOULD CONTINUE TO USE IT.    PATIENT IS SCHEDULED TO SEE DOCTOR HEMANT     9/23/2021 Status: [facility]    Time: 9:30 AM Length: 15   Visit Type: OFFICE VISIT [1004] Copay: $0.00   Provider: Elissa Marcos MD Department: [facility]       CSN:  [id number]   Referral Number:   Referral Status:     Notes: URINATION STOPPED STARTED 6-7 TIMES, MEDICATION CONCERN       How long have you had these concerns: NA

## 2021-09-22 NOTE — TELEPHONE ENCOUNTER
He says he cant say for sure he has nothing to compare it to but he does feel like it is. After he takes a dose of it the phlegm comes out of the lung which he feels is a good thing.

## 2021-09-23 ENCOUNTER — OFFICE VISIT (OUTPATIENT)
Dept: FAMILY MEDICINE CLINIC | Facility: CLINIC | Age: 86
End: 2021-09-23

## 2021-09-23 VITALS
OXYGEN SATURATION: 96 % | BODY MASS INDEX: 28.25 KG/M2 | DIASTOLIC BLOOD PRESSURE: 62 MMHG | SYSTOLIC BLOOD PRESSURE: 120 MMHG | HEART RATE: 72 BPM | TEMPERATURE: 96.9 F | WEIGHT: 208.6 LBS | HEIGHT: 72 IN

## 2021-09-23 DIAGNOSIS — J44.9 CHRONIC OBSTRUCTIVE PULMONARY DISEASE, UNSPECIFIED COPD TYPE (HCC): ICD-10-CM

## 2021-09-23 DIAGNOSIS — R39.198 DIFFICULTY URINATING: Primary | ICD-10-CM

## 2021-09-23 LAB
BILIRUB BLD-MCNC: NEGATIVE MG/DL
CLARITY, POC: CLEAR
COLOR UR: YELLOW
GLUCOSE UR STRIP-MCNC: NEGATIVE MG/DL
KETONES UR QL: NEGATIVE
LEUKOCYTE EST, POC: NEGATIVE
NITRITE UR-MCNC: NEGATIVE MG/ML
PH UR: 6 [PH] (ref 5–8)
PROT UR STRIP-MCNC: NEGATIVE MG/DL
RBC # UR STRIP: NEGATIVE /UL
SP GR UR: 1.02 (ref 1–1.03)
UROBILINOGEN UR QL: NORMAL

## 2021-09-23 PROCEDURE — 99214 OFFICE O/P EST MOD 30 MIN: CPT | Performed by: FAMILY MEDICINE

## 2021-09-23 PROCEDURE — 81003 URINALYSIS AUTO W/O SCOPE: CPT | Performed by: FAMILY MEDICINE

## 2021-10-05 ENCOUNTER — TELEPHONE (OUTPATIENT)
Dept: FAMILY MEDICINE CLINIC | Facility: CLINIC | Age: 86
End: 2021-10-05

## 2021-10-05 NOTE — TELEPHONE ENCOUNTER
Wife called reports you wanted patient to try a new inhaler when you got some in,she thinks it started with a B,is it Breo?

## 2021-10-11 ENCOUNTER — FLU SHOT (OUTPATIENT)
Dept: FAMILY MEDICINE CLINIC | Facility: CLINIC | Age: 86
End: 2021-10-11

## 2021-10-11 DIAGNOSIS — Z23 IMMUNIZATION DUE: Primary | ICD-10-CM

## 2021-10-11 PROCEDURE — G0008 ADMIN INFLUENZA VIRUS VAC: HCPCS | Performed by: FAMILY MEDICINE

## 2021-10-11 PROCEDURE — 90662 IIV NO PRSV INCREASED AG IM: CPT | Performed by: FAMILY MEDICINE

## 2021-10-11 NOTE — TELEPHONE ENCOUNTER
Yes, but we do not have any, do we? Is there any in the sample drawer that someone has not picked up?

## 2021-10-11 NOTE — TELEPHONE ENCOUNTER
Yes, but we do not have any, do we? Is there any in the sample drawer that someone has not picked up?      I found one that had not alejandro picked up,left a detailed message for them to pick it up.

## 2021-10-25 ENCOUNTER — TELEPHONE (OUTPATIENT)
Dept: FAMILY MEDICINE CLINIC | Facility: CLINIC | Age: 86
End: 2021-10-25

## 2021-10-25 NOTE — TELEPHONE ENCOUNTER
Caller: DILAN JOVEL    Relationship: Emergency Contact    Best call back number: [phone]    What is the best time to reach you: anytime    Who are you requesting to speak with (clinical staff, provider,  specific staff member): Dr. Geronimo    Do you know the name of the person who called:     What was the call regarding: patient has 3 puffs left of the Breo and would like to know if there are any more samples that they can  until his next appointment     Do you require a callback: YES

## 2021-10-25 NOTE — TELEPHONE ENCOUNTER
Caller: FABY JOVEL    Relationship: Emergency Contact    Best call back number: [phone]    What is the best time to reach you: anytime    Who are you requesting to speak with (clinical staff, provider,  specific staff member): Dr. Geronimo    Do you know the name of the person who called:     What was the call regarding: patient has 3 puffs left of the Breo and would like to know if there are any more samples that they can  until his next appointment     Do you require a callback: YES    Spoke with Faby,we are out at present of the sample,he has 3 days left ,she will check back & if not available then will see what the provider suggests.

## 2021-10-28 ENCOUNTER — TELEPHONE (OUTPATIENT)
Dept: FAMILY MEDICINE CLINIC | Facility: CLINIC | Age: 86
End: 2021-10-28

## 2021-11-04 ENCOUNTER — OFFICE VISIT (OUTPATIENT)
Dept: FAMILY MEDICINE CLINIC | Facility: CLINIC | Age: 86
End: 2021-11-04

## 2021-11-04 VITALS
TEMPERATURE: 97.1 F | WEIGHT: 212.2 LBS | SYSTOLIC BLOOD PRESSURE: 120 MMHG | OXYGEN SATURATION: 96 % | HEIGHT: 72 IN | HEART RATE: 72 BPM | DIASTOLIC BLOOD PRESSURE: 60 MMHG | BODY MASS INDEX: 28.74 KG/M2

## 2021-11-04 DIAGNOSIS — R07.2 PRECORDIAL PAIN: ICD-10-CM

## 2021-11-04 DIAGNOSIS — R39.198 DIFFICULTY URINATING: Primary | ICD-10-CM

## 2021-11-04 DIAGNOSIS — J44.9 CHRONIC OBSTRUCTIVE PULMONARY DISEASE, UNSPECIFIED COPD TYPE (HCC): ICD-10-CM

## 2021-11-04 DIAGNOSIS — E03.8 OTHER SPECIFIED HYPOTHYROIDISM: ICD-10-CM

## 2021-11-04 LAB
BILIRUB BLD-MCNC: NEGATIVE MG/DL
CLARITY, POC: CLEAR
COLOR UR: YELLOW
EXPIRATION DATE: ABNORMAL
GLUCOSE UR STRIP-MCNC: NEGATIVE MG/DL
KETONES UR QL: NEGATIVE
LEUKOCYTE EST, POC: NEGATIVE
Lab: ABNORMAL
NITRITE UR-MCNC: NEGATIVE MG/ML
PH UR: 6 [PH] (ref 5–8)
PROT UR STRIP-MCNC: ABNORMAL MG/DL
RBC # UR STRIP: NEGATIVE /UL
SP GR UR: 1.02 (ref 1–1.03)
UROBILINOGEN UR QL: NORMAL

## 2021-11-04 PROCEDURE — 99214 OFFICE O/P EST MOD 30 MIN: CPT | Performed by: FAMILY MEDICINE

## 2021-11-04 PROCEDURE — 81003 URINALYSIS AUTO W/O SCOPE: CPT | Performed by: FAMILY MEDICINE

## 2021-11-04 NOTE — PROGRESS NOTES
"Kevin Fonseca     VITALS: Blood pressure 120/60, pulse 72, temperature 97.1 °F (36.2 °C), height 182.9 cm (72.01\"), weight 96.3 kg (212 lb 3.2 oz), SpO2 96 %.    Subjective  Chief Complaint  Shortness of Breath, Fatigue, and Difficulty Urinating    Subjective          History of Present Illness:  Patient is a 86 y.o.  male with medical conditions significant for COPD and hypothyroidism who presents to clinic secondary to medical followup. He continues to have shortness of breath, fatigue, and also difficulty urinating.    The patient states that he has tried 3 new medications, but they did not help with his current symptoms. He reports that he has been having intermittent swelling in his bilateral lower extremities and ankles. He states that his left lower extremity is the worst it has ever been. He states that his feet will occasionally be blue.     He reports that he has been using lotion on his body after bathing and occasionally throughout the day for dry skin. He states that it is mainly on his back and it does help somewhat.     He states that he is experiencing itching on his scrotum and in quires what he can do to improve this symptom.     He has had his influenza vaccine this year.     He states that his COPD is doing well; however, he does suspect that the inhalers are triggering anxiety. He is currently on Wixela inhaler and not using Symbicort.     No complaints about any of the medications.    The following portions of the patient's history were reviewed and updated as appropriate: allergies, current medications, past family history, past medical history, past social history, past surgical history and problem list.    Past Medical History  Past Medical History:   Diagnosis Date   • Emphysema lung (HCC)    • Gastritis    • Heartburn    • Macular degeneration    • Reflux esophagitis    • Thyroid disease        Surgical History  Past Surgical History:   Procedure Laterality Date   • INTRACAPSULAR CATARACT " "EXTRACTION      Dr. Lesly Gray. Dr. Neto Light.       Family History  Family History   Problem Relation Age of Onset   • Hypertension Mother    • Other Mother    • Cancer Father    • Cancer Brother    • Asthma Brother        Social History  Social History     Socioeconomic History   • Marital status:    Tobacco Use   • Smoking status: Former Smoker     Packs/day: 1.50     Types: Cigarettes     Start date:      Quit date:      Years since quittin.8   • Smokeless tobacco: Never Used   Vaping Use   • Vaping Use: Never used   Substance and Sexual Activity   • Alcohol use: Yes     Comment: 2 week   • Drug use: Never   • Sexual activity: Defer       Objective   Vital Signs:   /60 (BP Location: Right arm, Patient Position: Sitting, Cuff Size: Adult)   Pulse 72   Temp 97.1 °F (36.2 °C)   Ht 182.9 cm (72.01\")   Wt 96.3 kg (212 lb 3.2 oz)   SpO2 96%   BMI 28.77 kg/m²     Physical Exam     Gen: Patient in NAD. Pleasant and answers appropriately. A&Ox3.    Skin: Warm and dry with normal turgor. No purpura, rashes, or unusual pigmentation noted. Hair is normal in appearance and distribution.    HEENT: NC/AT. No lesions noted. Conjunctiva clear, sclera nonicteric. PERRL. EOMI without nystagmus or strabismus. Fundi appear benign. No hemorrhages or exudates of eyes. Auditory canals are patent bilaterally without lesions. TMs intact,  nonerythematous, nonbulging without lesions. Nasal mucosa pink, nonerythematous, and nonedematous. Frontal and maxillary sinuses are nontender. O/P nonerythematous and moist without exudate.    Neck: Supple without lymph nodes palpated. FROM.     Lungs: Decreased B/L without rales, rhonchi, crackles, or wheezes.    Heart: RRR. S1 and S2 normal. No S3 or S4. No MRGT.    Abd: Soft, nontender,nondistended. (+)BSx4 quadrants.     Extrem: No CC.  Trace edema bilateral lower extremities.  Radial pulses 2+/4 and equal B/L. FROMx4. No bone, joint, or muscle tenderness " noted.    Neuro: No focal motor/sensory deficits.    Procedures       Assessment and Plan    Kevin Fonseca is a 86 y.o. here for medical followup.    Problem List Items Addressed This Visit        Pulmonary and Pneumonias    Chronic obstructive pulmonary disease (HCC)    Relevant Orders    COVID-19,APTIMA PANTHER([facility]),[facility], NP/OP SWAB IN UTM/VTM/[facility] TRANSPORT MEDIA,24 HR TAT - Swab, Nasal Cavity    Pulmonary Function Test      Other Visit Diagnoses     Difficulty urinating    -  Primary    Relevant Orders    POCT urinalysis dipstick, automated (Completed)    T4, Free    TSH    Comprehensive Metabolic Panel    Other specified hypothyroidism        Relevant Orders    T4, Free    TSH    Comprehensive Metabolic Panel    Precordial pain        Relevant Orders    Holter Monitor - 72 Hour Up To 15 Days    Ambulatory Referral to Cardiology            Patient's Body mass index is 28.77 kg/m². indicating that he is overweight (BMI 25-29.9). Obesity-related health conditions include the following: GERD. Obesity is unchanged. BMI is is above average; BMI management plan is completed. We discussed portion control and increasing exercise..              Follow Up   Return in about 2 months (around 1/4/2022).  Findings and plans discussed with patient who verbalizes understanding and agreement. Will followup with patient once results are in. Patient was given instructions and counseling regarding his condition or for health maintenance advice. Please see specific information pulled into the AVS if appropriate.       Transcribed from ambient dictation for Elissa Geronimo MD by Nickie Larson.   11/04/21   18:37 EDT    Patient verbalized consent to the visit recording.  I have personally performed the services described in this document as transcribed by the above individual, and it is both accurate and complete.  Elissa Geronimo MD  11/22/2021  07:53 EST

## 2021-11-05 ENCOUNTER — HOSPITAL ENCOUNTER (OUTPATIENT)
Dept: RESPIRATORY THERAPY | Facility: HOSPITAL | Age: 86
Discharge: HOME | End: 2021-11-05

## 2021-11-05 DIAGNOSIS — R07.2 PRECORDIAL PAIN: ICD-10-CM

## 2021-11-05 LAB
ALBUMIN SERPL-MCNC: 4.2 G/DL (ref 3.5–5.2)
ALBUMIN/GLOB SERPL: 1.6 G/DL
ALP SERPL-CCNC: 59 U/L (ref 39–117)
ALT SERPL-CCNC: 14 U/L (ref 1–41)
AST SERPL-CCNC: 20 U/L (ref 1–40)
BILIRUB SERPL-MCNC: 0.3 MG/DL (ref 0–1.2)
BUN SERPL-MCNC: 27 MG/DL (ref 8–23)
BUN/CREAT SERPL: 19.4 (ref 7–25)
CALCIUM SERPL-MCNC: 9.5 MG/DL (ref 8.6–10.5)
CHLORIDE SERPL-SCNC: 101 MMOL/L (ref 98–107)
CO2 SERPL-SCNC: 27.1 MMOL/L (ref 22–29)
CREAT SERPL-MCNC: 1.39 MG/DL (ref 0.76–1.27)
GLOBULIN SER CALC-MCNC: 2.6 GM/DL
GLUCOSE SERPL-MCNC: 100 MG/DL (ref 65–99)
POTASSIUM SERPL-SCNC: 4.5 MMOL/L (ref 3.5–5.2)
PROT SERPL-MCNC: 6.8 G/DL (ref 6–8.5)
SODIUM SERPL-SCNC: 137 MMOL/L (ref 136–145)
T4 FREE SERPL-MCNC: 1.37 NG/DL (ref 0.93–1.7)
TSH SERPL DL<=0.005 MIU/L-ACNC: 2.87 UIU/ML (ref 0.27–4.2)

## 2021-11-11 ENCOUNTER — TELEPHONE (OUTPATIENT)
Dept: FAMILY MEDICINE CLINIC | Facility: CLINIC | Age: 86
End: 2021-11-11

## 2021-11-11 NOTE — TELEPHONE ENCOUNTER
Caller: DILAN JOVEL    Relationship: Emergency Contact    Best call back number:    [phone]     What is the best time to reach you:     ANY TIME     Who are you requesting to speak with (clinical staff, provider,  specific staff member):     DR MARCOS OR NURSE    Do you know the name of the person who called:     N/A    What was the call regarding:     CALLER STATED PATIENT/ HAD APPOINTMENT WITH DR MARCOS ON 11/4/21 WHEN PATIENT WAS ASKED TO STOP USING ALL INHALERS    11/5/21 - PATIENT WAS GIVEN HEART MONITOR TO WEAR UNTIL Monday, 11/8/21 11/8/21 - DR MARCOS GAVE PATIENT A SAMPLE BREZTRI INHALER TO USE    BREZTRI INHALER WILL BE FINISHED ON Monday, 11/15/21    CALLER ASKED IF PATIENT WILL RECEIVE ANOTHER SAMPLE BREZTRI INHALER OR IF HE MAY USE A SYMBICORT INHALER [facility]    11/24/21 - PATIENT HAS AN APPOINTMENT WITH PULMONOLOGIST    PATIENT ASKED WHAT IS THE REASON FOR THIS APPOINTMENT?    IS IT FOR COPD OR ARRHYTHMIA    Do you require a callback:     YES, PLEASE CONTACT CALLER AT THE TELEPHONE NUMBER INCLUDED ABOVE    DR MARCOS

## 2021-11-11 NOTE — TELEPHONE ENCOUNTER
Caller: DILAN JOVEL    Relationship: Emergency Contact    Best call back number:    [phone]     What is the best time to reach you:     ANY TIME     Who are you requesting to speak with (clinical staff, provider,  specific staff member):     DR MARCOS OR NURSE    Do you know the name of the person who called:     N/A    What was the call regarding:     CALLER STATED PATIENT/ HAD APPOINTMENT WITH DR MARCOS ON 11/4/21 WHEN PATIENT WAS ASKED TO STOP USING ALL INHALERS    11/5/21 - PATIENT WAS GIVEN HEART MONITOR TO WEAR UNTIL Monday, 11/8/21 11/8/21 - DR MARCOS GAVE PATIENT A SAMPLE BREZTRI INHALER TO USE    BREZTRI INHALER WILL BE FINISHED ON Monday, 11/15/21    CALLER ASKED IF PATIENT WILL RECEIVE ANOTHER SAMPLE BREZTRI INHALER OR IF HE MAY USE A SYMBICORT INHALER [facility]    11/24/21 - PATIENT HAS AN APPOINTMENT WITH PULMONOLOGIST    PATIENT ASKED WHAT IS THE REASON FOR THIS APPOINTMENT?    IS IT FOR COPD OR ARRHYTHMIA    Do you require a callback:     YES, PLEASE CONTACT CALLER AT THE TELEPHONE NUMBER INCLUDED ABOVE    DR MARCOS          Left a message to return call.      Samples x 2 provided to wife.

## 2021-11-21 DIAGNOSIS — R79.89 ELEVATED SERUM CREATININE: Primary | ICD-10-CM

## 2021-11-22 ENCOUNTER — TELEPHONE (OUTPATIENT)
Dept: FAMILY MEDICINE CLINIC | Facility: CLINIC | Age: 86
End: 2021-11-22

## 2021-11-22 NOTE — TELEPHONE ENCOUNTER
----- Message from Elissa Geronimo MD sent at 11/21/2021 11:02 PM EST -----  Please call Kevin. Labs are okay except his kidney function is a little stressed out. Is he taking at least 64 oz of water a day? Coffee? Pop? I'd like him to stay really hydrated and get his kidney function rechecked sometime in the 2nd or 3rd week of December. No fasting necessary. Order already in the chart.      Let patients wife (eladio) know. Said she would tell him about drinking more water and he would be in to get labs.-Thanks AB

## 2021-11-22 NOTE — TELEPHONE ENCOUNTER
Patients spouse verbalized understanding. Patient says that he will be out of his Breztri Inhaler on Monday. Patient is wanting to know if he can come back to the office to get more samples. Please advise.

## 2021-11-22 NOTE — TELEPHONE ENCOUNTER
----- Message from Elissa Geronimo MD sent at 11/21/2021 11:27 PM EST -----  Please call Kevin. Holter monitor does show some irregularities in the first beat of his heart. Keep the 12/16 appointment with Dr. Blanton - he is going to continue the workup.

## 2021-11-22 NOTE — TELEPHONE ENCOUNTER
Is the [facility] sample helping? I'm not sure this is pulmonary anymore.

## 2021-11-24 ENCOUNTER — APPOINTMENT (OUTPATIENT)
Dept: RESPIRATORY THERAPY | Facility: HOSPITAL | Age: 86
End: 2021-11-24

## 2021-12-06 ENCOUNTER — HOSPITAL ENCOUNTER (OUTPATIENT)
Dept: RESPIRATORY THERAPY | Facility: HOSPITAL | Age: 86
Discharge: HOME | End: 2021-12-06

## 2021-12-06 ENCOUNTER — LAB (OUTPATIENT)
Dept: LAB | Facility: HOSPITAL | Age: 86
End: 2021-12-06

## 2021-12-06 DIAGNOSIS — J44.9 CHRONIC OBSTRUCTIVE PULMONARY DISEASE, UNSPECIFIED COPD TYPE (HCC): ICD-10-CM

## 2021-12-06 LAB
FLUAV RNA RESP QL NAA+PROBE: NOT DETECTED
FLUBV RNA RESP QL NAA+PROBE: NOT DETECTED
SARS-COV-2 RNA RESP QL NAA+PROBE: NOT DETECTED

## 2021-12-06 PROCEDURE — 87636 SARSCOV2 & INF A&B AMP PRB: CPT | Performed by: FAMILY MEDICINE

## 2021-12-06 PROCEDURE — 94010 BREATHING CAPACITY TEST: CPT | Performed by: INTERNAL MEDICINE

## 2021-12-06 PROCEDURE — C9803 HOPD COVID-19 SPEC COLLECT: HCPCS | Performed by: FAMILY MEDICINE

## 2021-12-06 PROCEDURE — 94010 BREATHING CAPACITY TEST: CPT

## 2021-12-07 ENCOUNTER — LAB (OUTPATIENT)
Dept: FAMILY MEDICINE CLINIC | Facility: CLINIC | Age: 86
End: 2021-12-07

## 2021-12-07 DIAGNOSIS — J44.9 CHRONIC OBSTRUCTIVE PULMONARY DISEASE, UNSPECIFIED COPD TYPE (HCC): Primary | ICD-10-CM

## 2021-12-08 LAB
BUN SERPL-MCNC: 21 MG/DL (ref 8–23)
BUN/CREAT SERPL: 14.7 (ref 7–25)
CALCIUM SERPL-MCNC: 9 MG/DL (ref 8.6–10.5)
CHLORIDE SERPL-SCNC: 101 MMOL/L (ref 98–107)
CO2 SERPL-SCNC: 26.6 MMOL/L (ref 22–29)
CREAT SERPL-MCNC: 1.43 MG/DL (ref 0.76–1.27)
GLUCOSE SERPL-MCNC: 95 MG/DL (ref 65–99)
POTASSIUM SERPL-SCNC: 5.3 MMOL/L (ref 3.5–5.2)
SODIUM SERPL-SCNC: 136 MMOL/L (ref 136–145)

## 2021-12-14 ENCOUNTER — OFFICE VISIT (OUTPATIENT)
Dept: PULMONOLOGY | Facility: CLINIC | Age: 86
End: 2021-12-14

## 2021-12-14 VITALS
OXYGEN SATURATION: 98 % | DIASTOLIC BLOOD PRESSURE: 76 MMHG | SYSTOLIC BLOOD PRESSURE: 142 MMHG | HEIGHT: 72 IN | WEIGHT: 212 LBS | TEMPERATURE: 97.5 F | HEART RATE: 65 BPM | BODY MASS INDEX: 28.71 KG/M2

## 2021-12-14 DIAGNOSIS — G47.34 NOCTURNAL HYPOXIA: ICD-10-CM

## 2021-12-14 DIAGNOSIS — J44.9 CHRONIC OBSTRUCTIVE PULMONARY DISEASE, UNSPECIFIED COPD TYPE (HCC): Primary | ICD-10-CM

## 2021-12-14 DIAGNOSIS — Z87.891 FORMER CIGARETTE SMOKER: ICD-10-CM

## 2021-12-14 PROCEDURE — 99214 OFFICE O/P EST MOD 30 MIN: CPT | Performed by: PHYSICIAN ASSISTANT

## 2021-12-14 NOTE — PROGRESS NOTES
"Chief Complaint  COPD    Subjective          Kevin Fonseca presents to [facility]  History of Present Illness     Patient presents today for follow-up of COPD.  He completed walk oximetry test at the previous visit but did not qualify for supplemental oxygen.  He notes chronic shortness of breath.  He has tried multiple inhalers without success.  This includes Symbicort, Wixela, Breo, Spiriva Respimat.  He was unable to tolerate Spiriva due to decreased urination.  He does not have this issue with the breztri inhaler, which has the anticholinergic agent.  He seems to do best with breztri, but has currently been receiving samples due to [facility].  No acute worsening of symptoms today.  I answered several questions today regarding use of the spacer device.    Objective   Vital Signs:   /76   Pulse 65   Temp 97.5 °F (36.4 °C) (Temporal)   Ht 182.9 cm (72\")   Wt 96.2 kg (212 lb)   SpO2 98%   BMI 28.75 kg/m²     Physical Exam  Vitals reviewed.   Constitutional:       General: He is not in acute distress.     Appearance: He is well-developed. He is not diaphoretic.   HENT:      Head: Normocephalic and atraumatic.   Neck:      Thyroid: No thyromegaly.   Cardiovascular:      Rate and Rhythm: Normal rate and regular rhythm.      Heart sounds: Normal heart sounds, S1 normal and S2 normal.   Pulmonary:      Effort: Pulmonary effort is normal.      Breath sounds: No wheezing, rhonchi or rales.   Neurological:      Mental Status: He is alert and oriented to person, place, and time.   Psychiatric:         Behavior: Behavior normal.        Result Review :   The following data was reviewed by: Nereyda Velasquez PA-C on 12/14/2021:    Data reviewed: Radiologic studies PFT April 202, PFT December 2021   FEV1 previously 42%, now 40%.      Assessment and Plan    Diagnoses and all orders for this visit:    1. Chronic obstructive pulmonary disease, unspecified COPD " type (HCC) (Primary)    2. Former cigarette smoker    3. Nocturnal hypoxia      COPD, Former smoker:  · Continue albuterol inhaler 2 puffs every 4 hours as needed for shortness of breath, wheezing.  · Continues on breztri 2 puffs daily, provided by samples   [facility] would likely not cover this inhaler.  · Reviewed spacer device instructions.    · Does not qualify for low dose CT screening due to years since cessation.      [facility] if [facility] medications would be a better option.   Advised to contact our office of PCP as needed if more samples are needed.        Nocturnal hypoxia:  · Compliant with 2 L/min at nighttime        Follow Up   Return in about 4 months (around 4/14/2022), or as needed, for Next scheduled follow up.  Patient was given instructions and counseling regarding his condition or for health maintenance advice. Please see specific information pulled into the AVS if appropriate.

## 2021-12-16 ENCOUNTER — OFFICE VISIT (OUTPATIENT)
Dept: CARDIOLOGY | Facility: CLINIC | Age: 86
End: 2021-12-16

## 2021-12-16 VITALS
WEIGHT: 204.6 LBS | HEIGHT: 72 IN | HEART RATE: 63 BPM | SYSTOLIC BLOOD PRESSURE: 129 MMHG | TEMPERATURE: 96.8 F | BODY MASS INDEX: 27.71 KG/M2 | RESPIRATION RATE: 16 BRPM | DIASTOLIC BLOOD PRESSURE: 68 MMHG

## 2021-12-16 DIAGNOSIS — R06.09 DYSPNEA ON EXERTION: ICD-10-CM

## 2021-12-16 DIAGNOSIS — G47.33 OBSTRUCTIVE SLEEP APNEA SYNDROME: ICD-10-CM

## 2021-12-16 DIAGNOSIS — I49.5 SICK SINUS SYNDROME (HCC): ICD-10-CM

## 2021-12-16 DIAGNOSIS — I45.5 SINUS PAUSE: Primary | ICD-10-CM

## 2021-12-16 PROCEDURE — 93000 ELECTROCARDIOGRAM COMPLETE: CPT | Performed by: INTERNAL MEDICINE

## 2021-12-16 PROCEDURE — 99204 OFFICE O/P NEW MOD 45 MIN: CPT | Performed by: INTERNAL MEDICINE

## 2021-12-16 RX ORDER — CHLORAL HYDRATE 500 MG
1576 CAPSULE ORAL
COMMUNITY

## 2021-12-16 NOTE — PROGRESS NOTES
Elissa Geronimo MD  Kevin Fonseca  1934  2021    Patient Active Problem List   Diagnosis   • Chronic obstructive pulmonary disease (HCC)   • Former cigarette smoker   • Nocturnal hypoxia       Dear Elissa Geronimo MD:    Subjective     Kevin Fonseca is a 87 y.o. male with the problems as listed above, presents    Chief complaint: Referred for cardiac evaluation due to some pauses noted on the recent Holter monitor and reportedly some chest pains.    History of Present Illness: Ms. Fonseca is a pleasant 87-year-old gentleman with no history of known coronary artery disease, has history of COPD, has been referred to us apparently for complaints of chest pains.  However on further questioning Mr. Fonseca denies any complaints of chest pains.  He has chronic dyspnea with mild exertion which he attributes to COPD.  He denies any PND or significant orthopnea.  He has some intermittent bilateral leg edema.  He had a Holter monitor done recently on 2021 that revealed predominantly sinus rhythm with rare PACs and PVCs.  He had sinus pauses up to 3.17 seconds occurring mostly during the sleeping hours.  Patient did not report any symptoms during the monitoring.  On further questioning he denies any complaints of dizziness or syncope at any time in the recent or remote past.  Patient has history of snoring and briefly quits breathing according to his wife.    Kevin Fonseca  HOLTER MONITOR >7 DAYS UP TO 15 DAYS HOOK-UP & INTERP (CLINIC)  Order# [id number]  Reading physician: Pilar Lino MD Ordering physician: Elissa Geronimo MD Study date: 21     Patient Name   Kevin Fonseca MRN   [id number] Legal Sex   Male  (Age)   1934 (87 y.o.)     Interpretation Summary    · The predominant rhythm noted during the testing period was sinus rhythm with first-degree AV block.  · Average HR: 67. Min HR: 59. Max HR: 91.  · Premature atrial contractions occured rarely. There was no evidence of atrial  arrhythmias.  · Premature ventricular contractions occured rarely.  · Multiple sinus pauses were seen. Longest =3.17 seconds..  · No symptoms reported during the monitoring period       No Known Allergies:      Current Outpatient Medications:   •  albuterol sulfate  (90 Base) MCG/ACT inhaler, Inhale 2 puffs Every 4 (Four) Hours As Needed for Wheezing or Shortness of Air., Disp: 18 g, Rfl: 6  •  Budeson-Glycopyrrol-Formoterol (BREZTRI) 160-9-4.8 MCG/ACT aerosol inhaler, Inhale 2 puffs 2 (Two) Times a Day., Disp: , Rfl:   •  levothyroxine (SYNTHROID, LEVOTHROID) 75 MCG tablet, Take 1 tablet by mouth Daily., Disp: 90 tablet, Rfl: 1  •  melatonin 3 MG tablet, Take 3 mg by mouth Every Night., Disp: , Rfl:   •  Omega-3 Fatty Acids (fish oil) 1000 MG capsule capsule, Take 1,576 mg by mouth Daily With Breakfast., Disp: , Rfl:   •  omeprazole (priLOSEC) 40 MG capsule, Take 1 capsule by mouth Daily. (Patient taking differently: Take 40 mg by mouth As Needed.), Disp: 90 capsule, Rfl: 1  •  vitamin D3 125 MCG (5000 UT) capsule capsule, Take 5,000 Units by mouth Daily., Disp: , Rfl:   •  multivitamin with minerals tablet tablet, Take 1 tablet by mouth Daily., Disp: , Rfl:     Past Medical History:   Diagnosis Date   • Emphysema lung (HCC)    • Gastritis    • Heartburn    • Macular degeneration    • Reflux esophagitis    • Thyroid disease      Past Surgical History:   Procedure Laterality Date   • INTRACAPSULAR CATARACT EXTRACTION      Dr. Lesly Gray. Dr. Neto Light.     Family History   Problem Relation Age of Onset   • Hypertension Mother    • Other Mother    • Cancer Father    • Cancer Brother    • Asthma Brother      Social History     Tobacco Use   • Smoking status: Former Smoker     Packs/day: 1.50     Types: Cigarettes     Start date:      Quit date:      Years since quittin.9   • Smokeless tobacco: Never Used   Vaping Use   • Vaping Use: Never used   Substance Use Topics   • Alcohol use: Yes  "    Comment: 2 week   • Drug use: Never       Review of Systems   Constitutional: Positive for weight loss.   HENT: Negative.    Eyes: Positive for visual disturbance.   Cardiovascular: Positive for leg swelling.   Respiratory: Positive for cough, shortness of breath and wheezing.    Endocrine: Positive for cold intolerance and polyuria.   Musculoskeletal: Positive for neck pain and stiffness.   Gastrointestinal: Positive for constipation and heartburn.   Genitourinary: Positive for urgency.   Psychiatric/Behavioral: The patient has insomnia and is nervous/anxious.      Objective   Blood pressure 129/68, pulse 63, temperature 96.8 °F (36 °C), resp. rate 16, height 182.9 cm (72\"), weight 92.8 kg (204 lb 9.6 oz).  Body mass index is 27.75 kg/m².    Vitals reviewed.   Constitutional:       Appearance: Well-developed.   Eyes:      Conjunctiva/sclera: Conjunctivae normal.   HENT:      Head: Normocephalic.   Neck:      Thyroid: No thyromegaly.      Vascular: No JVD.      Trachea: No tracheal deviation.   Pulmonary:      Effort: No respiratory distress.      Breath sounds: Normal breath sounds. No wheezing. No rales.   Cardiovascular:      PMI at left midclavicular line. Normal rate. Regular rhythm. Normal S1. Normal S2.      Murmurs: There is no murmur.      No gallop. No click. No rub.   Pulses:     Intact distal pulses.   Edema:     Peripheral edema absent.   Abdominal:      General: Bowel sounds are normal.      Palpations: Abdomen is soft. There is no abdominal mass.      Tenderness: There is no abdominal tenderness.   Musculoskeletal:      Cervical back: Normal range of motion and neck supple. Skin:     General: Skin is warm and dry.   Neurological:      Mental Status: Alert and oriented to person, place, and time.      Cranial Nerves: No cranial nerve deficit.       Lab Results   Component Value Date     12/07/2021    K 5.3 (H) 12/07/2021     12/07/2021    CO2 26.6 12/07/2021    BUN 21 12/07/2021    " CREATININE 1.43 (H) 12/07/2021    GLUCOSE 95 12/07/2021    CALCIUM 9.0 12/07/2021    AST 20 11/04/2021    ALT 14 11/04/2021    ALKPHOS 59 11/04/2021    LABIL2 1.6 11/04/2021     No results found for: CKTOTAL  Lab Results   Component Value Date    WBC 10.38 04/12/2021    HGB 15.1 04/12/2021    HCT 46.4 04/12/2021     04/12/2021     No results found for: INR  No results found for: MG  Lab Results   Component Value Date    TSH 2.870 11/04/2021    PSA 2.000 04/12/2021    CHLPL 139 04/12/2021    TRIG 165 (H) 04/12/2021    HDL 49 04/12/2021    LDL 62 04/12/2021          ECG 12 Lead    Date/Time: 12/16/2021 2:15 PM  Performed by: Raffy Blanton MD  Authorized by: Raffy Blanton MD   Comparison: compared with previous ECG from 11/5/2021  Comparison to previous ECG: Patient was in junctional rhythm on the previous EKG but is currently in sinus rhythm.  Rhythm: sinus rhythm  Conduction: left anterior fascicular block and 1st degree AV block              Assessment/Plan :   Diagnosis Plan   1. Sinus pause     2. Sick sinus syndrome (HCC)     3. Dyspnea on exertion  Adult Transthoracic Echo    4. Obstructive sleep apnea syndrome  Home Sleep Study     Recommendations:  Orders Placed This Encounter   Procedures   • ECG 12 Lead   • Adult Transthoracic Echo Complete w/ Color, Spectral and Contrast if necessary per protocol   • Home Sleep Study      1. With regards to sinus pauses, since patient is totally asymptomatic, we will continue to monitor this clinically.  2. Patient potentially may have some sleep apnea.  Hence will order a home sleep study.  3. We will obtain an echo Doppler study to evaluate his LV function and function to rule out any cardiac cause for his dyspnea.  4. Since patient is denying any chest pains, will not do any ischemic evaluation at this time.    Return in about 4 weeks (around 1/13/2022).    As always, I appreciate very much the opportunity to participate in the cardiovascular care of your  patients.      With Best Regards,    Raffy Blanton MD, [facility]    Dragon disclaimer:  Much of this encounter note is an electronic transcription/translation of spoken language to printed text. The electronic translation of spoken language may permit erroneous, or at times, nonsensical words or phrases to be inadvertently transcribed; Although I have reviewed the note for such errors, some may still exist.

## 2021-12-20 ENCOUNTER — TELEPHONE (OUTPATIENT)
Dept: FAMILY MEDICINE CLINIC | Facility: CLINIC | Age: 86
End: 2021-12-20

## 2021-12-20 NOTE — TELEPHONE ENCOUNTER
[facility] called after I had faxed the rX for the Breztri it is non formulary,cannot be Paed ,they cannot get it,i talked to you about their recommendations which were not feasible for him due to previous side effects.

## 2021-12-28 ENCOUNTER — TELEPHONE (OUTPATIENT)
Dept: FAMILY MEDICINE CLINIC | Facility: CLINIC | Age: 86
End: 2021-12-28

## 2021-12-28 NOTE — TELEPHONE ENCOUNTER
Caller: DILAN JOVEL    Relationship: Emergency Contact    Best call back number: [phone]    Which medication are you concerned about: Budeson-Glycopyrrol-Formoterol (BREZTRI) 160-9-4.8 MCG/ACT aerosol inhaler    Who prescribed you this medication: DR. MARCOS    What are your concerns:   DILAN STATED THIS MEDICATION IS NOT COVERED BY THE PATIENT'S INSURANCE AND SHE WOULD LIKE TO KNOW IF ANY SAMPLES ARE AVAILABLE THAT THE PATIENT COULD  AT THE OFFICE. PATIENT WILL RUN OUT OF THIS MEDICATION ON 1/3/21

## 2021-12-28 NOTE — TELEPHONE ENCOUNTER
Caller: FABY JOVEL    Relationship: Emergency Contact    Best call back number: [phone]    Which medication are you concerned about: Budeson-Glycopyrrol-Formoterol (BREZTRI) 160-9-4.8 MCG/ACT aerosol inhaler    Who prescribed you this medication: DR. MARCOS    What are your concerns:   FABY STATED THIS MEDICATION IS NOT COVERED BY THE PATIENT'S INSURANCE AND SHE WOULD LIKE TO KNOW IF ANY SAMPLES ARE AVAILABLE THAT THE PATIENT COULD  AT THE OFFICE. PATIENT WILL RUN OUT OF THIS MEDICATION ON 1/3/21    Faby notified that 2 samples are available to .

## 2022-01-07 ENCOUNTER — APPOINTMENT (OUTPATIENT)
Dept: CARDIOLOGY | Facility: HOSPITAL | Age: 87
End: 2022-01-07

## 2022-01-11 ENCOUNTER — OFFICE VISIT (OUTPATIENT)
Dept: FAMILY MEDICINE CLINIC | Facility: CLINIC | Age: 87
End: 2022-01-11

## 2022-01-11 VITALS
HEIGHT: 72 IN | TEMPERATURE: 97.3 F | OXYGEN SATURATION: 98 % | HEART RATE: 78 BPM | SYSTOLIC BLOOD PRESSURE: 120 MMHG | BODY MASS INDEX: 28.01 KG/M2 | DIASTOLIC BLOOD PRESSURE: 76 MMHG | WEIGHT: 206.8 LBS

## 2022-01-11 DIAGNOSIS — R79.89 ELEVATED SERUM CREATININE: ICD-10-CM

## 2022-01-11 DIAGNOSIS — E55.9 VITAMIN D DEFICIENCY: Primary | ICD-10-CM

## 2022-01-11 DIAGNOSIS — E03.8 OTHER SPECIFIED HYPOTHYROIDISM: ICD-10-CM

## 2022-01-11 PROCEDURE — 99214 OFFICE O/P EST MOD 30 MIN: CPT | Performed by: FAMILY MEDICINE

## 2022-01-11 NOTE — PROGRESS NOTES
"Kevin Fonseca     VITALS: Blood pressure 120/76, pulse 78, temperature 97.3 °F (36.3 °C), height 182.9 cm (72.01\"), weight 93.8 kg (206 lb 12.8 oz), SpO2 98 %.    Subjective  Chief Complaint  Thyroid Problem    Subjective          History of Present Illness:  Patient is a 87 y.o.  male with medical conditions significant for COPD and hypothyroidism who presents to clinic secondary to medical follow-up.    No complaints about any of the medications.    The following portions of the patient's history were reviewed and updated as appropriate: allergies, current medications, past family history, past medical history, past social history, past surgical history and problem list.    Past Medical History  Past Medical History:   Diagnosis Date   • Emphysema lung (HCC)    • Gastritis    • Heartburn    • Macular degeneration    • Reflux esophagitis    • Thyroid disease        Surgical History  Past Surgical History:   Procedure Laterality Date   • INTRACAPSULAR CATARACT EXTRACTION      Dr. Lesly Gray. Dr. Neto Light.       Family History  Family History   Problem Relation Age of Onset   • Hypertension Mother    • Other Mother    • Cancer Father    • Cancer Brother    • Asthma Brother        Social History  Social History     Socioeconomic History   • Marital status:    Tobacco Use   • Smoking status: Former Smoker     Packs/day: 1.50     Types: Cigarettes     Start date:      Quit date:      Years since quittin.0   • Smokeless tobacco: Never Used   Vaping Use   • Vaping Use: Never used   Substance and Sexual Activity   • Alcohol use: Yes     Comment: 2 week   • Drug use: Never   • Sexual activity: Defer       Objective   Vital Signs:   /76 (BP Location: Right arm, Patient Position: Sitting, Cuff Size: Adult)   Pulse 78   Temp 97.3 °F (36.3 °C)   Ht 182.9 cm (72.01\")   Wt 93.8 kg (206 lb 12.8 oz)   SpO2 98%   BMI 28.04 kg/m²     Physical Exam     Gen: Patient in NAD. Pleasant and answers " appropriately. A&Ox3.    Skin: Warm and dry with normal turgor. No purpura, rashes, or unusual pigmentation noted. Hair is normal in appearance and distribution.    HEENT: NC/AT. No lesions noted. Conjunctiva clear, sclera nonicteric. PERRL. EOMI without nystagmus or strabismus. Fundi appear benign. No hemorrhages or exudates of eyes. Auditory canals are patent bilaterally without lesions. TMs intact,  nonerythematous, nonbulging without lesions. Nasal mucosa pink, nonerythematous, and nonedematous. Frontal and maxillary sinuses are nontender. O/P nonerythematous and moist without exudate.    Neck: Supple without lymph nodes palpated. FROM.     Lungs: Decreased B/L without rales, rhonchi, crackles, or wheezes.    Heart: RRR. S1 and S2 normal. No S3 or S4. No MRGT.    Abd: Soft, nontender,nondistended. (+)BSx4 quadrants.     Extrem: No CCE. Radial pulses 2+/4 and equal B/L. FROMx4.     Neuro: No focal motor/sensory deficits.    Procedures       Assessment and Plan    Kevin Fonseca is a 87 y.o. here for medical followup.    Problem List Items Addressed This Visit     None      Visit Diagnoses     Vitamin D deficiency    -  Primary    Relevant Orders    Vitamin D 25 Hydroxy    Elevated serum creatinine        Relevant Orders    Comprehensive Metabolic Panel      Hypothyroidism  Patient currently on 75 mcg orally of Synthroid daily.    Patient's Body mass index is 28.04 kg/m². indicating that he is overweight (BMI 25-29.9). Obesity-related health conditions include the following: GERD. Obesity is unchanged. BMI is is above average; BMI management plan is completed. We discussed portion control and increasing exercise..              Follow Up   Return in about 3 months (around 4/11/2022).  Findings and plans discussed with patient who verbalizes understanding and agreement. Will followup with patient once results are in. Patient was given instructions and counseling regarding his condition or for health maintenance advice.  Please see specific information pulled into the AVS if appropriate.     Transcribed from ambient dictation for Elissa Geronimo MD by Lachelle Aviles  01/11/22   18:10 EST    Patient verbalized consent to the visit recording.  I have personally performed the services described in this document as transcribed by the above individual, and it is both accurate and complete.  Elissa Geronimo MD  1/31/2022  06:16 EST

## 2022-01-19 ENCOUNTER — TELEPHONE (OUTPATIENT)
Dept: FAMILY MEDICINE CLINIC | Facility: CLINIC | Age: 87
End: 2022-01-19

## 2022-01-19 ENCOUNTER — LAB (OUTPATIENT)
Dept: FAMILY MEDICINE CLINIC | Facility: CLINIC | Age: 87
End: 2022-01-19

## 2022-01-19 DIAGNOSIS — E55.9 VITAMIN D DEFICIENCY: Primary | ICD-10-CM

## 2022-01-19 NOTE — TELEPHONE ENCOUNTER
Caller: DILAN JOVEL    Relationship to patient: Emergency Contact    Best call back number: [phone]    Patient is needing: DILAN STATED THAT SHE WANTED TO SEE ABOUT GETTING PATIENT'S INHALER FROM OFFICE    PLEASE ADVISE       Spoke with Wife she will  samples.

## 2022-01-19 NOTE — TELEPHONE ENCOUNTER
Caller: DILAN JOVEL    Relationship to patient: Emergency Contact    Best call back number: [phone]    Patient is needing: DILAN STATED THAT SHE WANTED TO SEE ABOUT GETTING PATIENT'S INHALER FROM OFFICE    PLEASE ADVISE

## 2022-01-20 LAB
25(OH)D3+25(OH)D2 SERPL-MCNC: 35.5 NG/ML (ref 30–100)
ALBUMIN SERPL-MCNC: 4 G/DL (ref 3.5–5.2)
ALBUMIN/GLOB SERPL: 1.4 G/DL
ALP SERPL-CCNC: 64 U/L (ref 39–117)
ALT SERPL-CCNC: 16 U/L (ref 1–41)
AST SERPL-CCNC: 21 U/L (ref 1–40)
BILIRUB SERPL-MCNC: 0.4 MG/DL (ref 0–1.2)
BUN SERPL-MCNC: 18 MG/DL (ref 8–23)
BUN/CREAT SERPL: 15.4 (ref 7–25)
CALCIUM SERPL-MCNC: 9.1 MG/DL (ref 8.6–10.5)
CHLORIDE SERPL-SCNC: 101 MMOL/L (ref 98–107)
CO2 SERPL-SCNC: 27.3 MMOL/L (ref 22–29)
CREAT SERPL-MCNC: 1.17 MG/DL (ref 0.76–1.27)
GLOBULIN SER CALC-MCNC: 2.8 GM/DL
GLUCOSE SERPL-MCNC: 99 MG/DL (ref 65–99)
POTASSIUM SERPL-SCNC: 5 MMOL/L (ref 3.5–5.2)
PROT SERPL-MCNC: 6.8 G/DL (ref 6–8.5)
SODIUM SERPL-SCNC: 137 MMOL/L (ref 136–145)

## 2022-01-21 ENCOUNTER — HOSPITAL ENCOUNTER (OUTPATIENT)
Dept: CARDIOLOGY | Facility: HOSPITAL | Age: 87
Discharge: HOME | End: 2022-01-21
Admitting: INTERNAL MEDICINE

## 2022-01-21 DIAGNOSIS — R06.09 DYSPNEA ON EXERTION: ICD-10-CM

## 2022-01-21 PROCEDURE — 93306 TTE W/DOPPLER COMPLETE: CPT | Performed by: INTERNAL MEDICINE

## 2022-01-21 PROCEDURE — 93306 TTE W/DOPPLER COMPLETE: CPT

## 2022-01-23 LAB
BH CV ECHO MEAS - ACS: 1.3 CM
BH CV ECHO MEAS - AO MAX PG (FULL): 16.9 MMHG
BH CV ECHO MEAS - AO MAX PG: 21.5 MMHG
BH CV ECHO MEAS - AO MEAN PG (FULL): 9.7 MMHG
BH CV ECHO MEAS - AO MEAN PG: 12.7 MMHG
BH CV ECHO MEAS - AO ROOT AREA (BSA CORRECTED): 1.3
BH CV ECHO MEAS - AO ROOT AREA: 5.7 CM^2
BH CV ECHO MEAS - AO ROOT DIAM: 2.7 CM
BH CV ECHO MEAS - AO V2 MAX: 232 CM/SEC
BH CV ECHO MEAS - AO V2 MEAN: 169.7 CM/SEC
BH CV ECHO MEAS - AO V2 VTI: 62.9 CM
BH CV ECHO MEAS - AVA(I,A): 2 CM^2
BH CV ECHO MEAS - AVA(I,D): 2 CM^2
BH CV ECHO MEAS - AVA(V,A): 1.9 CM^2
BH CV ECHO MEAS - AVA(V,D): 1.9 CM^2
BH CV ECHO MEAS - BSA(HAYCOCK): 2.2 M^2
BH CV ECHO MEAS - BSA: 2.2 M^2
BH CV ECHO MEAS - BZI_BMI: 27.9 KILOGRAMS/M^2
BH CV ECHO MEAS - BZI_METRIC_HEIGHT: 182.9 CM
BH CV ECHO MEAS - BZI_METRIC_WEIGHT: 93.4 KG
BH CV ECHO MEAS - EDV(CUBED): 91.1 ML
BH CV ECHO MEAS - EDV(MOD-SP4): 64.5 ML
BH CV ECHO MEAS - EDV(TEICH): 92.4 ML
BH CV ECHO MEAS - EF(CUBED): 24.4 %
BH CV ECHO MEAS - EF(MOD-SP4): 47.3 %
BH CV ECHO MEAS - EF(TEICH): 19.7 %
BH CV ECHO MEAS - ESV(CUBED): 68.9 ML
BH CV ECHO MEAS - ESV(MOD-SP4): 34 ML
BH CV ECHO MEAS - ESV(TEICH): 74.2 ML
BH CV ECHO MEAS - FS: 8.9 %
BH CV ECHO MEAS - IVS/LVPW: 0.83
BH CV ECHO MEAS - IVSD: 1 CM
BH CV ECHO MEAS - LA DIMENSION: 4 CM
BH CV ECHO MEAS - LA/AO: 1.5
BH CV ECHO MEAS - LV DIASTOLIC VOL/BSA (35-75): 29.9 ML/M^2
BH CV ECHO MEAS - LV MASS(C)D: 175 GRAMS
BH CV ECHO MEAS - LV MASS(C)DI: 81.1 GRAMS/M^2
BH CV ECHO MEAS - LV MAX PG: 4.7 MMHG
BH CV ECHO MEAS - LV MEAN PG: 3 MMHG
BH CV ECHO MEAS - LV SYSTOLIC VOL/BSA (12-30): 15.8 ML/M^2
BH CV ECHO MEAS - LV V1 MAX: 108 CM/SEC
BH CV ECHO MEAS - LV V1 MEAN: 75.9 CM/SEC
BH CV ECHO MEAS - LV V1 VTI: 29.7 CM
BH CV ECHO MEAS - LVIDD: 4.5 CM
BH CV ECHO MEAS - LVIDS: 4.1 CM
BH CV ECHO MEAS - LVLD AP4: 8.2 CM
BH CV ECHO MEAS - LVLS AP4: 7.6 CM
BH CV ECHO MEAS - LVOT AREA (M): 4.2 CM^2
BH CV ECHO MEAS - LVOT AREA: 4.2 CM^2
BH CV ECHO MEAS - LVOT DIAM: 2.3 CM
BH CV ECHO MEAS - LVPWD: 1.2 CM
BH CV ECHO MEAS - MV A MAX VEL: 39.2 CM/SEC
BH CV ECHO MEAS - MV E MAX VEL: 181 CM/SEC
BH CV ECHO MEAS - MV E/A: 4.6
BH CV ECHO MEAS - PA ACC TIME: 0.13 SEC
BH CV ECHO MEAS - PA PR(ACCEL): 21.9 MMHG
BH CV ECHO MEAS - RAP SYSTOLE: 10 MMHG
BH CV ECHO MEAS - RVSP: 37.5 MMHG
BH CV ECHO MEAS - SI(AO): 167 ML/M^2
BH CV ECHO MEAS - SI(CUBED): 10.3 ML/M^2
BH CV ECHO MEAS - SI(LVOT): 57.2 ML/M^2
BH CV ECHO MEAS - SI(MOD-SP4): 14.1 ML/M^2
BH CV ECHO MEAS - SI(TEICH): 8.4 ML/M^2
BH CV ECHO MEAS - SV(AO): 360.3 ML
BH CV ECHO MEAS - SV(CUBED): 22.2 ML
BH CV ECHO MEAS - SV(LVOT): 123.4 ML
BH CV ECHO MEAS - SV(MOD-SP4): 30.5 ML
BH CV ECHO MEAS - SV(TEICH): 18.2 ML
BH CV ECHO MEAS - TR MAX VEL: 262 CM/SEC
MAXIMAL PREDICTED HEART RATE: 133 BPM
STRESS TARGET HR: 113 BPM

## 2022-01-25 ENCOUNTER — OFFICE VISIT (OUTPATIENT)
Dept: CARDIOLOGY | Facility: CLINIC | Age: 87
End: 2022-01-25

## 2022-01-25 VITALS
BODY MASS INDEX: 27.9 KG/M2 | WEIGHT: 206 LBS | DIASTOLIC BLOOD PRESSURE: 65 MMHG | OXYGEN SATURATION: 94 % | HEIGHT: 72 IN | SYSTOLIC BLOOD PRESSURE: 126 MMHG | TEMPERATURE: 97 F | HEART RATE: 68 BPM

## 2022-01-25 DIAGNOSIS — I45.5 SINUS PAUSE: Primary | ICD-10-CM

## 2022-01-25 PROCEDURE — 99213 OFFICE O/P EST LOW 20 MIN: CPT | Performed by: PHYSICIAN ASSISTANT

## 2022-01-25 NOTE — PROGRESS NOTES
Elissa Geronimo MD  Kevin Fonseca  1934  01/25/2022    Patient Active Problem List   Diagnosis   • Chronic obstructive pulmonary disease (HCC)   • Former cigarette smoker   • Nocturnal hypoxia   • Sinus pause       Dear Elissa Geronimo MD:    Subjective     History of Present Illness:    Chief Complaint   Patient presents with   • Med Management     verbal.    • Results     echo. sleep study.    • Shortness of Breath   • Dizziness       Kevin Fonseca is a pleasant 87 y.o. male with a past medical history significant for no known coronary artery disease but does have history of COPD secondary to tobacco abuse in the past but no longer smokes tobacco.  He was originally referred to our office for chest pains.  He comes in today for follow-up regarding echocardiogram and sleep study.    Jack's echocardiogram revealed normal LVEF with only mild aortic stenosis and mild mitral valve regurgitation.  Sleep study did reveal possible mild/borderline sleep apnea.  Clinically he still denies any further episodes of chest pains since he was last seen he reports his last episode was roughly 4 months ago and believes this was GI related.  He does still deny any dizziness, syncope, or near syncope.  He did have monitor showing short 3-second pauses but has been asymptomatic.    No Known Allergies:      Current Outpatient Medications:   •  albuterol sulfate  (90 Base) MCG/ACT inhaler, Inhale 2 puffs Every 4 (Four) Hours As Needed for Wheezing or Shortness of Air., Disp: 18 g, Rfl: 6  •  Budeson-Glycopyrrol-Formoterol (BREZTRI) 160-9-4.8 MCG/ACT aerosol inhaler, Inhale 2 puffs 2 (Two) Times a Day., Disp: 10.7 g, Rfl: 1  •  levothyroxine (SYNTHROID, LEVOTHROID) 75 MCG tablet, Take 1 tablet by mouth Daily., Disp: 90 tablet, Rfl: 1  •  melatonin 3 MG tablet, Take 3 mg by mouth Every Night., Disp: , Rfl:   •  multivitamin with minerals tablet tablet, Take 1 tablet by mouth Daily., Disp: , Rfl:   •  Omega-3 Fatty Acids (fish  "oil) 1000 MG capsule capsule, Take 1,576 mg by mouth Daily With Breakfast., Disp: , Rfl:   •  omeprazole (priLOSEC) 40 MG capsule, Take 1 capsule by mouth Daily. (Patient taking differently: Take 40 mg by mouth As Needed.), Disp: 90 capsule, Rfl: 1  •  vitamin D3 125 MCG (5000 UT) capsule capsule, Take 5,000 Units by mouth Daily., Disp: , Rfl:     The following portions of the patient's history were reviewed and updated as appropriate: allergies, current medications, past family history, past medical history, past social history, past surgical history and problem list.    Social History     Tobacco Use   • Smoking status: Former Smoker     Packs/day: 1.50     Types: Cigarettes     Start date:      Quit date:      Years since quittin.0   • Smokeless tobacco: Never Used   Vaping Use   • Vaping Use: Never used   Substance Use Topics   • Alcohol use: Yes     Comment: 2 week   • Drug use: Never         Objective   Vitals:    22 1418   BP: 126/65   BP Location: Left arm   Patient Position: Sitting   Cuff Size: Adult   Pulse: 68   Temp: 97 °F (36.1 °C)   TempSrc: Infrared   SpO2: 94%   Weight: 93.4 kg (206 lb)   Height: 182.9 cm (72\")     Body mass index is 27.94 kg/m².    Constitutional:       General: Not in acute distress.     Appearance: Healthy appearance. Well-developed and not in distress. Not diaphoretic.   Eyes:      Conjunctiva/sclera: Conjunctivae normal.      Pupils: Pupils are equal, round, and reactive to light.   HENT:      Head: Normocephalic and atraumatic.   Neck:      Vascular: No carotid bruit or JVD.   Pulmonary:      Effort: Pulmonary effort is normal. No respiratory distress.      Breath sounds: Normal breath sounds.   Cardiovascular:      Normal rate. Regular rhythm.   Skin:     General: Skin is cool.   Neurological:      Mental Status: Alert, oriented to person, place, and time and oriented to person, place and time.         Lab Results   Component Value Date     2022 "    K 5.0 01/19/2022     01/19/2022    CO2 27.3 01/19/2022    BUN 18 01/19/2022    CREATININE 1.17 01/19/2022    GLUCOSE 99 01/19/2022    CALCIUM 9.1 01/19/2022    AST 21 01/19/2022    ALT 16 01/19/2022    ALKPHOS 64 01/19/2022    LABIL2 1.4 01/19/2022     No results found for: CKTOTAL  Lab Results   Component Value Date    WBC 10.38 04/12/2021    HGB 15.1 04/12/2021    HCT 46.4 04/12/2021     04/12/2021     No results found for: INR  No results found for: MG  Lab Results   Component Value Date    TSH 2.870 11/04/2021    PSA 2.000 04/12/2021    CHLPL 139 04/12/2021    TRIG 165 (H) 04/12/2021    HDL 49 04/12/2021    LDL 62 04/12/2021      No results found for: BNP    During this visit the following were done:  Labs Reviewed []    Labs Ordered []    Radiology Reports Reviewed []    Radiology Ordered []    PCP Records Reviewed []    Referring Provider Records Reviewed []    ER Records Reviewed []    Hospital Records Reviewed []    History Obtained From Family []    Radiology Images Reviewed []    Other Reviewed []    Records Requested []       Procedures    Assessment/Plan    Diagnosis Plan   1. Sinus pause              Recommendations:  1. Precordial pain  1. Patient still reports to be asymptomatic with last episode chest pain being 4 months ago.  We will not proceed with ischemic evaluation at this time however if he does develop symptoms I did ask him to contact our office and we can certainly start ischemic work-up.  2. Sinus pauses  1. Completely asymptomatic thus no treatment necessary at this time.  However, with his borderline sleep apnea I do think he could have some potential benefit in BiPAP/CPAP. I did offer referral to sleep medicine specialist however he like to speak with PCP first which I do think is reasonable given sleep apnea was only found to be mild.  He did report that he normally wears oxygen at night however during the testing he did not wear his oxygen.      Return in about 3  months (around 4/25/2022).    As always, I appreciate very much the opportunity to participate in the cardiovascular care of your patients.      With Best Regards,    Benjamin Montez PA-C

## 2022-02-07 ENCOUNTER — TELEPHONE (OUTPATIENT)
Dept: FAMILY MEDICINE CLINIC | Facility: CLINIC | Age: 87
End: 2022-02-07

## 2022-02-07 NOTE — TELEPHONE ENCOUNTER
----- Message from Elissa Geronimo MD sent at 2/6/2022 11:41 PM EST -----  Labs stable. Okay to either call or send letter to patient. Thanks.

## 2022-02-08 ENCOUNTER — TELEPHONE (OUTPATIENT)
Dept: FAMILY MEDICINE CLINIC | Facility: CLINIC | Age: 87
End: 2022-02-08

## 2022-02-08 NOTE — TELEPHONE ENCOUNTER
Caller: DILAN JOVEL    Relationship: Emergency Contact    Best call back number: [phone]    What is the best time to reach you: ANY TIME    Who are you requesting to speak with (clinical staff, provider,  specific staff member): MARQUES    Do you know the name of the person who called: DILAN    What was the call regarding: PATIENT NEEDS MORE SAMPLES OF THE BREZTRI. PLEASE CALL DILAN BACK     Do you require a callback: YES

## 2022-03-10 ENCOUNTER — TELEPHONE (OUTPATIENT)
Dept: FAMILY MEDICINE CLINIC | Facility: CLINIC | Age: 87
End: 2022-03-10

## 2022-03-10 NOTE — TELEPHONE ENCOUNTER
Caller: DILAN JOVEL    Relationship: Emergency Contact    Best call back number: [phone]    What is the best time to reach you: ANY    Who are you requesting to speak with (clinical staff, provider,  specific staff member): CLINICAL     What was the call regarding: PATIENT RECEIVED A SAMPLE OF THE MEDICATION BREZTRI. THEY WOULD LIKE TO GET ANOTHER SAMPLE OF THIS. PLEASE CALL PATIENT TO CONFIRM IF THIS IS POSSIBLE.    Do you require a callback: YES

## 2022-03-10 NOTE — TELEPHONE ENCOUNTER
Caller: DILAN JOVEL    Relationship: Emergency Contact    Best call back number: [phone]    What is the best time to reach you: ANY    Who are you requesting to speak with (clinical staff, provider,  specific staff member): CLINICAL     What was the call regarding: PATIENT RECEIVED A SAMPLE OF THE MEDICATION BREZTRI. THEY WOULD LIKE TO GET ANOTHER SAMPLE OF THIS. PLEASE CALL PATIENT TO CONFIRM IF THIS IS POSSIBLE.    Do you require a callback: YES      Left a detailed message that samples are available.

## 2022-03-28 ENCOUNTER — TELEPHONE (OUTPATIENT)
Dept: FAMILY MEDICINE CLINIC | Facility: CLINIC | Age: 87
End: 2022-03-28

## 2022-03-28 NOTE — TELEPHONE ENCOUNTER
THE PATIENTS WIFE IS CALLING IS SHE WOULD LIKE TO KNOW IF THE OFFICE HAS ANY SAMPLES OF BREZTRI THAT THE PATIENT CAN HAVE SHE WOULD LIKE A CALL BACK FROM MARQUES AT THE OFFICE       CALL [phone]

## 2022-04-12 ENCOUNTER — OFFICE VISIT (OUTPATIENT)
Dept: FAMILY MEDICINE CLINIC | Facility: CLINIC | Age: 87
End: 2022-04-12

## 2022-04-12 VITALS
HEIGHT: 72 IN | SYSTOLIC BLOOD PRESSURE: 128 MMHG | TEMPERATURE: 97.3 F | WEIGHT: 202.6 LBS | HEART RATE: 68 BPM | OXYGEN SATURATION: 99 % | DIASTOLIC BLOOD PRESSURE: 64 MMHG | BODY MASS INDEX: 27.44 KG/M2

## 2022-04-12 DIAGNOSIS — Z13.6 ENCOUNTER FOR LIPID SCREENING FOR CARDIOVASCULAR DISEASE: ICD-10-CM

## 2022-04-12 DIAGNOSIS — Z13.220 ENCOUNTER FOR LIPID SCREENING FOR CARDIOVASCULAR DISEASE: ICD-10-CM

## 2022-04-12 DIAGNOSIS — E03.8 OTHER SPECIFIED HYPOTHYROIDISM: ICD-10-CM

## 2022-04-12 DIAGNOSIS — R79.89 ELEVATED SERUM CREATININE: Primary | ICD-10-CM

## 2022-04-12 PROCEDURE — 99214 OFFICE O/P EST MOD 30 MIN: CPT | Performed by: FAMILY MEDICINE

## 2022-04-18 ENCOUNTER — OFFICE VISIT (OUTPATIENT)
Dept: PULMONOLOGY | Facility: CLINIC | Age: 87
End: 2022-04-18

## 2022-04-18 VITALS
OXYGEN SATURATION: 98 % | BODY MASS INDEX: 27.36 KG/M2 | DIASTOLIC BLOOD PRESSURE: 62 MMHG | HEIGHT: 72 IN | SYSTOLIC BLOOD PRESSURE: 108 MMHG | TEMPERATURE: 97.5 F | HEART RATE: 62 BPM | WEIGHT: 202 LBS

## 2022-04-18 DIAGNOSIS — G47.34 NOCTURNAL HYPOXIA: ICD-10-CM

## 2022-04-18 DIAGNOSIS — J44.9 CHRONIC OBSTRUCTIVE PULMONARY DISEASE, UNSPECIFIED COPD TYPE: Primary | ICD-10-CM

## 2022-04-18 DIAGNOSIS — Z87.891 FORMER CIGARETTE SMOKER: ICD-10-CM

## 2022-04-18 PROCEDURE — 99214 OFFICE O/P EST MOD 30 MIN: CPT | Performed by: PHYSICIAN ASSISTANT

## 2022-04-18 RX ORDER — ALBUTEROL SULFATE 90 UG/1
2 AEROSOL, METERED RESPIRATORY (INHALATION) EVERY 4 HOURS PRN
Qty: 18 G | Refills: 8 | Status: SHIPPED | OUTPATIENT
Start: 2022-04-18

## 2022-04-18 NOTE — PROGRESS NOTES
"Chief Complaint  COPD    Subjective          Kevin Fonseca presents to [facility] PULMONARY & CRITICAL CARE MEDICINE  History of Present Illness     Patient presents today for follow-up of COPD, former smoking history, nocturnal hypoxemia  He remains compliant with nocturnal supplemental oxygen use.  He has been notable for the most benefit from breztri inhaler, but was [facility] and requires supply via samples.  Seems to be doing fairly stable from a respiratory standpoint at this time, and has no acute complaints.  Does not often require the albuterol.  He states that he has been doing well with the spacer device for assistance, and has been clean spacer regularly with assistance from his wife.      Objective   Vital Signs:   /62 (BP Location: Left arm, Patient Position: Sitting)   Pulse 62   Temp 97.5 °F (36.4 °C)   Ht 182.9 cm (72.01\")   Wt 91.6 kg (202 lb)   SpO2 98%   BMI 27.39 kg/m²     BMI is above normal parameters. Recommendations: none.       Physical Exam  Vitals reviewed.   Constitutional:       General: He is not in acute distress.     Appearance: He is well-developed. He is not diaphoretic.   HENT:      Head: Normocephalic and atraumatic.   Cardiovascular:      Rate and Rhythm: Normal rate and regular rhythm.      Heart sounds: Normal heart sounds, S1 normal and S2 normal.   Pulmonary:      Effort: Pulmonary effort is normal.      Breath sounds: No wheezing, rhonchi or rales.   Neurological:      Mental Status: He is alert and oriented to person, place, and time.   Psychiatric:         Behavior: Behavior normal.        Result Review :   The following data was reviewed by: Nereyda Velasquez PA-C on 04/18/2022:    Reviewed previous PFT from December 2021.    Reviewed the echocardiogram from January 2020.      Assessment and Plan    Diagnoses and all orders for this visit:    1. Chronic obstructive pulmonary disease, unspecified COPD type (HCC) " (Primary)    2. Nocturnal hypoxia    3. Former cigarette smoker    Other orders  -     albuterol sulfate  (90 Base) MCG/ACT inhaler; Inhale 2 puffs Every 4 (Four) Hours As Needed for Wheezing or Shortness of Air.  Dispense: 18 g; Refill: 8  -     Budeson-Glycopyrrol-Formoterol (BREZTRI) 160-9-4.8 MCG/ACT aerosol inhaler; Inhale 2 puffs 2 (Two) Times a Day.  Dispense: 10.7 g; Refill: 1          COPD, Former smoker:  · Continue albuterol inhaler 2 puffs every 4 hours as needed for shortness of breath, wheezing.  · Continue breztri 2 puffs daily, provided by samples   pre[facility] not [facility], however they are now allowing for prior authorization request completed by the pharmacy.  I [facility].  Patient has been unable to tolerate the preferred options and others after attempted efforts. These were difficult to use, and did not provide appropriate relief.   Failed options include Stiolto, Breo ellipta, Spiriva, Wixela. Mometasone is not an appropriate alternative.   He has proven that Breztri is effective and able to maintain compliance with use.   · Does not qualify for low dose CT screening due to years since cessation.   · Awaiting imaging at this time as symptoms remain stable, will reconsider as needed.       Patient's symptoms were not appropriately maintained with previous inhalers of Symbicort and Spiriva respimat (similar to that of Stiolto).   Powder inhalers are not well tolerated, due to poor inspiratory effort. Thus, he may not be receiving the full medication dose and can further lead to recurrent exacerbations.   As Breztri provides alternative ICS/LAMA/LABA options in comparison to those of the formularies, and thus was better tolerated during previous trial.   Of the many tried options, Breztri is the most beneficial thus far.     Through PCP, he is going to start a trial of Stiolto and Mometasone (by Dr. Geronimo).   If not well tolerated, can apply for PA  for Breztri (through [facility] provider).         Nocturnal hypoxia:  · Compliant with 2 L/min at nighttime        Follow Up   Return in about 4 months (around 8/18/2022), or if symptoms worsen or fail to improve, for Next scheduled follow up.  Patient was given instructions and counseling regarding his condition or for health maintenance advice. Please see specific information pulled into the AVS if appropriate.

## 2022-04-20 ENCOUNTER — LAB (OUTPATIENT)
Dept: FAMILY MEDICINE CLINIC | Facility: CLINIC | Age: 87
End: 2022-04-20

## 2022-04-20 DIAGNOSIS — E03.9 HYPOTHYROIDISM, UNSPECIFIED TYPE: ICD-10-CM

## 2022-04-20 DIAGNOSIS — Z13.220 ENCOUNTER FOR LIPID SCREENING FOR CARDIOVASCULAR DISEASE: Primary | ICD-10-CM

## 2022-04-20 DIAGNOSIS — Z13.6 ENCOUNTER FOR LIPID SCREENING FOR CARDIOVASCULAR DISEASE: Primary | ICD-10-CM

## 2022-04-20 DIAGNOSIS — J43.8 OTHER EMPHYSEMA: ICD-10-CM

## 2022-04-20 DIAGNOSIS — Z00.00 ENCOUNTER FOR SUBSEQUENT ANNUAL WELLNESS VISIT IN MEDICARE PATIENT: ICD-10-CM

## 2022-04-20 DIAGNOSIS — Z87.891 FORMER CIGARETTE SMOKER: ICD-10-CM

## 2022-04-21 LAB
ALBUMIN SERPL-MCNC: 4.4 G/DL (ref 3.5–5.2)
ALBUMIN/GLOB SERPL: 1.8 G/DL
ALP SERPL-CCNC: 59 U/L (ref 39–117)
ALT SERPL-CCNC: 20 U/L (ref 1–41)
AST SERPL-CCNC: 24 U/L (ref 1–40)
BASOPHILS # BLD AUTO: 0.06 10*3/MM3 (ref 0–0.2)
BASOPHILS NFR BLD AUTO: 0.8 % (ref 0–1.5)
BILIRUB SERPL-MCNC: 0.6 MG/DL (ref 0–1.2)
BUN SERPL-MCNC: 18 MG/DL (ref 8–23)
BUN/CREAT SERPL: 17.8 (ref 7–25)
CALCIUM SERPL-MCNC: 8.8 MG/DL (ref 8.6–10.5)
CHLORIDE SERPL-SCNC: 99 MMOL/L (ref 98–107)
CHOLEST SERPL-MCNC: 133 MG/DL (ref 0–200)
CO2 SERPL-SCNC: 24.4 MMOL/L (ref 22–29)
CREAT SERPL-MCNC: 1.01 MG/DL (ref 0.76–1.27)
EGFRCR SERPLBLD CKD-EPI 2021: 72 ML/MIN/1.73
EOSINOPHIL # BLD AUTO: 0.22 10*3/MM3 (ref 0–0.4)
EOSINOPHIL NFR BLD AUTO: 2.8 % (ref 0.3–6.2)
ERYTHROCYTE [DISTWIDTH] IN BLOOD BY AUTOMATED COUNT: 13 % (ref 12.3–15.4)
GLOBULIN SER CALC-MCNC: 2.5 GM/DL
GLUCOSE SERPL-MCNC: 82 MG/DL (ref 65–99)
HCT VFR BLD AUTO: 39.5 % (ref 37.5–51)
HDLC SERPL-MCNC: 54 MG/DL (ref 40–60)
HGB BLD-MCNC: 12.8 G/DL (ref 13–17.7)
IMM GRANULOCYTES # BLD AUTO: 0.02 10*3/MM3 (ref 0–0.05)
IMM GRANULOCYTES NFR BLD AUTO: 0.3 % (ref 0–0.5)
IMP & REVIEW OF LAB RESULTS: NORMAL
LDLC SERPL CALC-MCNC: 67 MG/DL (ref 0–100)
LYMPHOCYTES # BLD AUTO: 2.28 10*3/MM3 (ref 0.7–3.1)
LYMPHOCYTES NFR BLD AUTO: 29.5 % (ref 19.6–45.3)
MCH RBC QN AUTO: 29.2 PG (ref 26.6–33)
MCHC RBC AUTO-ENTMCNC: 32.4 G/DL (ref 31.5–35.7)
MCV RBC AUTO: 90 FL (ref 79–97)
MONOCYTES # BLD AUTO: 0.92 10*3/MM3 (ref 0.1–0.9)
MONOCYTES NFR BLD AUTO: 11.9 % (ref 5–12)
NEUTROPHILS # BLD AUTO: 4.24 10*3/MM3 (ref 1.7–7)
NEUTROPHILS NFR BLD AUTO: 54.7 % (ref 42.7–76)
NRBC BLD AUTO-RTO: 0 /100 WBC (ref 0–0.2)
PLATELET # BLD AUTO: 261 10*3/MM3 (ref 140–450)
POTASSIUM SERPL-SCNC: 4.7 MMOL/L (ref 3.5–5.2)
PROT SERPL-MCNC: 6.9 G/DL (ref 6–8.5)
RBC # BLD AUTO: 4.39 10*6/MM3 (ref 4.14–5.8)
SODIUM SERPL-SCNC: 138 MMOL/L (ref 136–145)
T4 FREE SERPL-MCNC: 1.71 NG/DL (ref 0.93–1.7)
TRIGL SERPL-MCNC: 53 MG/DL (ref 0–150)
TSH SERPL DL<=0.005 MIU/L-ACNC: 2.13 UIU/ML (ref 0.27–4.2)
VLDLC SERPL CALC-MCNC: 12 MG/DL (ref 5–40)
WBC # BLD AUTO: 7.74 10*3/MM3 (ref 3.4–10.8)

## 2022-04-25 ENCOUNTER — OFFICE VISIT (OUTPATIENT)
Dept: CARDIOLOGY | Facility: CLINIC | Age: 87
End: 2022-04-25

## 2022-04-25 VITALS
WEIGHT: 202 LBS | HEIGHT: 72 IN | TEMPERATURE: 97 F | HEART RATE: 65 BPM | SYSTOLIC BLOOD PRESSURE: 120 MMHG | DIASTOLIC BLOOD PRESSURE: 63 MMHG | OXYGEN SATURATION: 95 % | BODY MASS INDEX: 27.36 KG/M2

## 2022-04-25 DIAGNOSIS — I45.5 SINUS PAUSE: Primary | ICD-10-CM

## 2022-04-25 PROCEDURE — 99213 OFFICE O/P EST LOW 20 MIN: CPT | Performed by: PHYSICIAN ASSISTANT

## 2022-04-25 NOTE — PROGRESS NOTES
Elissa Geronimo MD  Kevin Fonseca  1934  04/25/2022    Patient Active Problem List   Diagnosis   • Chronic obstructive pulmonary disease (HCC)   • Former cigarette smoker   • Nocturnal hypoxia   • Sinus pause       Dear Elissa Geronimo MD:    Subjective     History of Present Illness:    Chief Complaint   Patient presents with   • Med Management     Verbal.        Kevin Fonseca is a pleasant 87 y.o. male with a past medical history significant for sinus pauses with longest being 3.17 seconds. He denies history of CAD. He does have history of COPD secondary to tobacco abuse but hasn't smoked in 25 years. He comes in today for cardiology follow up.     Jack reports he has done well since he was last seen with no complaints with open questions.  Upon further questioning he still denies any symptoms concerning for sick sinus syndrome such as syncope, near syncope, weakness, fatigue.  He reports to [facility] for well visit ADLs without any limitations.  He also reports his breathing has been stable and denies any chest pains today.    No Known Allergies:      Current Outpatient Medications:   •  albuterol sulfate  (90 Base) MCG/ACT inhaler, Inhale 2 puffs Every 4 (Four) Hours As Needed for Wheezing or Shortness of Air., Disp: 18 g, Rfl: 8  •  Budeson-Glycopyrrol-Formoterol (BREZTRI) 160-9-4.8 MCG/ACT aerosol inhaler, Inhale 2 puffs 2 (Two) Times a Day., Disp: 10.7 g, Rfl: 1  •  levothyroxine (SYNTHROID, LEVOTHROID) 75 MCG tablet, Take 1 tablet by mouth Daily., Disp: 90 tablet, Rfl: 1  •  melatonin 3 MG tablet, Take 3 mg by mouth Every Night., Disp: , Rfl:   •  Omega-3 Fatty Acids (fish oil) 1000 MG capsule capsule, Take 1,576 mg by mouth Daily With Breakfast., Disp: , Rfl:   •  omeprazole (priLOSEC) 40 MG capsule, Take 1 capsule by mouth Daily. (Patient taking differently: Take 40 mg by mouth As Needed.), Disp: 90 capsule, Rfl: 1  •  vitamin D3 125 MCG (5000 UT) capsule capsule, Take 5,000 Units by mouth Daily.,  "Disp: , Rfl:     The following portions of the patient's history were reviewed and updated as appropriate: allergies, current medications, past family history, past medical history, past social history, past surgical history and problem list.    Social History     Tobacco Use   • Smoking status: Former Smoker     Packs/day: 1.50     Types: Cigarettes     Start date:      Quit date:      Years since quittin.3   • Smokeless tobacco: Never Used   Vaping Use   • Vaping Use: Never used   Substance Use Topics   • Alcohol use: Not Currently     Comment: 2 week   • Drug use: Never         Objective   Vitals:    22 1407   BP: 120/63   BP Location: Left arm   Patient Position: Sitting   Cuff Size: Adult   Pulse: 65   Temp: 97 °F (36.1 °C)   TempSrc: Infrared   SpO2: 95%   Weight: 91.6 kg (202 lb)   Height: 182.9 cm (72\")     Body mass index is 27.4 kg/m².    Constitutional:       General: Not in acute distress.     Appearance: Healthy appearance. Well-developed and not in distress. Not diaphoretic.   Eyes:      Conjunctiva/sclera: Conjunctivae normal.      Pupils: Pupils are equal, round, and reactive to light.   HENT:      Head: Normocephalic and atraumatic.   Neck:      Vascular: No carotid bruit or JVD.   Pulmonary:      Effort: Pulmonary effort is normal. No respiratory distress.      Breath sounds: Normal breath sounds.   Cardiovascular:      Normal rate. Regular rhythm.   Skin:     General: Skin is cool.   Neurological:      Mental Status: Alert, oriented to person, place, and time and oriented to person, place and time.         Lab Results   Component Value Date     2022    K 4.7 2022    CL 99 2022    CO2 24.4 2022    BUN 18 2022    CREATININE 1.01 2022    GLUCOSE 82 2022    CALCIUM 8.8 2022    AST 24 2022    ALT 20 2022    ALKPHOS 59 2022    LABIL2 1.8 2022     No results found for: CKTOTAL  Lab Results   Component Value " Date    WBC 7.74 04/20/2022    HGB 12.8 (L) 04/20/2022    HCT 39.5 04/20/2022     04/20/2022     No results found for: INR  No results found for: MG  Lab Results   Component Value Date    TSH 2.130 04/20/2022    PSA 2.000 04/12/2021    CHLPL 133 04/20/2022    TRIG 53 04/20/2022    HDL 54 04/20/2022    LDL 67 04/20/2022      No results found for: BNP    During this visit the following were done:  Labs Reviewed []    Labs Ordered []    Radiology Reports Reviewed []    Radiology Ordered []    PCP Records Reviewed []    Referring Provider Records Reviewed []    ER Records Reviewed []    Hospital Records Reviewed []    History Obtained From Family []    Radiology Images Reviewed []    Other Reviewed []    Records Requested []       Procedures    Assessment/Plan    Diagnosis Plan   1. Sinus pause              Recommendations:  1. Sinus pauses  1. Patient completely asymptomatic able to form all ADLs without any limitations.  He denies any syncope, near syncope, or fatigue or weakness.  Unless patient develops symptoms no further work-up is indicated.  2. COPD  1. Follows with pulmonology no longer smokes.      Return in about 1 year (around 4/25/2023).  Or sooner if he develops any symptoms whatsoever.    As always, I appreciate very much the opportunity to participate in the cardiovascular care of your patients.      With Best Regards,    Benjamin Montez PA-C

## 2022-04-27 ENCOUNTER — TELEPHONE (OUTPATIENT)
Dept: FAMILY MEDICINE CLINIC | Facility: CLINIC | Age: 87
End: 2022-04-27

## 2022-04-27 NOTE — TELEPHONE ENCOUNTER
----- Message from Elissa Geronimo MD sent at 4/27/2022  1:48 PM EDT -----  Please call Kevin. Labs are great except he has lost some blood from this year compared to last year. Has he noticed darker stools? Will he do a Hemeoccult for me?      Spoke with Faby she discussed with him he denies any dark tarry stools or BRBPR,mailed Hemeoccult cards & directions for collection.

## 2022-05-10 ENCOUNTER — CLINICAL SUPPORT (OUTPATIENT)
Dept: FAMILY MEDICINE CLINIC | Facility: CLINIC | Age: 87
End: 2022-05-10

## 2022-05-10 DIAGNOSIS — Z12.11 COLON CANCER SCREENING: Primary | ICD-10-CM

## 2022-05-10 LAB
DEVELOPER EXPIRATION DATE: ABNORMAL
DEVELOPER LOT NUMBER: ABNORMAL
EXPIRATION DATE: ABNORMAL
FECAL OCCULT BLOOD SCREEN, POC: NEGATIVE
Lab: ABNORMAL
NEGATIVE CONTROL: NEGATIVE
POSITIVE CONTROL: POSITIVE

## 2022-05-10 PROCEDURE — 82270 OCCULT BLOOD FECES: CPT | Performed by: FAMILY MEDICINE

## 2022-05-12 ENCOUNTER — TELEPHONE (OUTPATIENT)
Dept: FAMILY MEDICINE CLINIC | Facility: CLINIC | Age: 87
End: 2022-05-12

## 2022-05-12 NOTE — TELEPHONE ENCOUNTER
Caller: FABY JOVEL    Relationship: Emergency Contact    Best call back number: [phone]    What was the call regarding:   PATIENT'S (WIFE) FABY STATED THAT PATIENT FINISHED HIS TWO WEEK SUPPLY OF SAMPLES OF MEDICATION (MYRBETRIQ) AND PATIENT (WIFE) FABY WOULD LIKE A CALL BACK REGARDING WHAT THE NEXT STEP WOULD BE     Do you require a callback: YES       Spoke with wife Faby he reports he cannot tell a big difference may have made his night trips to the bathroom a little less but only for a couple of nights.   details independent negative

## 2022-05-12 NOTE — TELEPHONE ENCOUNTER
Caller: DILAN JOVEL    Relationship: Emergency Contact    Best call back number: [phone]    What was the call regarding:   PATIENT'S (WIFE) DILAN STATED THAT PATIENT FINISHED HIS TWO WEEK SUPPLY OF SAMPLES OF MEDICATION (MYRBETRIQ) AND PATIENT (WIFE) DILAN WOULD LIKE A CALL BACK REGARDING WHAT THE NEXT STEP WOULD BE     Do you require a callback: YES

## 2022-05-17 ENCOUNTER — TELEPHONE (OUTPATIENT)
Dept: FAMILY MEDICINE CLINIC | Facility: CLINIC | Age: 87
End: 2022-05-17

## 2022-05-17 NOTE — TELEPHONE ENCOUNTER
Did it give him any side effects? I gave him the lowest dose; he might need the next dose up for any effect. Can try some samples of the 50 mg to see if it will work.    Spoke with Faby he reports no side effects will  new samples.

## 2022-06-13 ENCOUNTER — OFFICE VISIT (OUTPATIENT)
Dept: FAMILY MEDICINE CLINIC | Facility: CLINIC | Age: 87
End: 2022-06-13

## 2022-06-13 VITALS
WEIGHT: 202.8 LBS | HEIGHT: 72 IN | TEMPERATURE: 97.5 F | DIASTOLIC BLOOD PRESSURE: 54 MMHG | OXYGEN SATURATION: 97 % | BODY MASS INDEX: 27.47 KG/M2 | HEART RATE: 69 BPM | SYSTOLIC BLOOD PRESSURE: 125 MMHG

## 2022-06-13 DIAGNOSIS — R35.0 URINARY FREQUENCY: Primary | ICD-10-CM

## 2022-06-13 DIAGNOSIS — I73.89 OTHER SPECIFIED PERIPHERAL VASCULAR DISEASES: ICD-10-CM

## 2022-06-13 DIAGNOSIS — I49.5 SICK SINUS SYNDROME: ICD-10-CM

## 2022-06-13 DIAGNOSIS — E03.9 HYPOTHYROIDISM, UNSPECIFIED TYPE: ICD-10-CM

## 2022-06-13 DIAGNOSIS — J44.9 CHRONIC OBSTRUCTIVE PULMONARY DISEASE, UNSPECIFIED COPD TYPE: ICD-10-CM

## 2022-06-13 DIAGNOSIS — H35.3233 EXUDATIVE AGE-RELATED MACULAR DEGENERATION, BILATERAL, WITH INACTIVE SCAR: ICD-10-CM

## 2022-06-13 PROCEDURE — 99214 OFFICE O/P EST MOD 30 MIN: CPT | Performed by: FAMILY MEDICINE

## 2022-06-13 RX ORDER — TAMSULOSIN HYDROCHLORIDE 0.4 MG/1
1 CAPSULE ORAL DAILY
Qty: 30 CAPSULE | Refills: 2 | Status: SHIPPED | OUTPATIENT
Start: 2022-06-13 | End: 2022-11-03

## 2022-06-24 ENCOUNTER — TELEPHONE (OUTPATIENT)
Dept: FAMILY MEDICINE CLINIC | Facility: CLINIC | Age: 87
End: 2022-06-24

## 2022-06-24 NOTE — TELEPHONE ENCOUNTER
Caller: DILAN JOVEL    Relationship: Emergency Contact    Best call back number:     What is the best time to reach you: ANYTIME    Who are you requesting to speak with (clinical staff, provider,  specific staff member):     Do you know the name of the person who called: DILAN    What was the call regarding: PATIENTS SPOUSE WAS INQUIRING ABOUT SAMPLES OF Budeson-Glycopyrrol-Formoterol (BREZTRI) 160-9-4.8 MCG/ACT aerosol inhaler    Do you require a callback: YES

## 2022-07-04 PROBLEM — I73.89 OTHER SPECIFIED PERIPHERAL VASCULAR DISEASES: Status: ACTIVE | Noted: 2022-07-04

## 2022-07-04 PROBLEM — E03.9 HYPOTHYROIDISM: Status: ACTIVE | Noted: 2022-07-04

## 2022-07-04 PROBLEM — G47.33 OSA (OBSTRUCTIVE SLEEP APNEA): Status: ACTIVE | Noted: 2022-07-04

## 2022-07-04 PROBLEM — H35.3233 EXUDATIVE AGE-RELATED MACULAR DEGENERATION, BILATERAL, WITH INACTIVE SCAR: Status: ACTIVE | Noted: 2022-07-04

## 2022-07-04 PROBLEM — I49.5 SICK SINUS SYNDROME: Status: ACTIVE | Noted: 2022-07-04

## 2022-08-17 ENCOUNTER — OFFICE VISIT (OUTPATIENT)
Dept: PULMONOLOGY | Facility: CLINIC | Age: 87
End: 2022-08-17

## 2022-08-17 VITALS
SYSTOLIC BLOOD PRESSURE: 110 MMHG | WEIGHT: 205 LBS | BODY MASS INDEX: 27.77 KG/M2 | HEIGHT: 72 IN | DIASTOLIC BLOOD PRESSURE: 66 MMHG | TEMPERATURE: 97.5 F | HEART RATE: 65 BPM | OXYGEN SATURATION: 96 %

## 2022-08-17 DIAGNOSIS — Z87.891 FORMER CIGARETTE SMOKER: ICD-10-CM

## 2022-08-17 DIAGNOSIS — J44.9 CHRONIC OBSTRUCTIVE PULMONARY DISEASE, UNSPECIFIED COPD TYPE: Primary | ICD-10-CM

## 2022-08-17 DIAGNOSIS — G47.34 NOCTURNAL HYPOXIA: ICD-10-CM

## 2022-08-17 PROCEDURE — 99214 OFFICE O/P EST MOD 30 MIN: CPT | Performed by: PHYSICIAN ASSISTANT

## 2022-08-17 NOTE — PROGRESS NOTES
"Chief Complaint  COPD    Subjective        Kevin Fonseca presents to [facility] PULMONARY & CRITICAL CARE MEDICINE  History of Present Illness    Mr. Fonseca presents today for follow up of COPD, former smoking history, and nocturnal hypoxia. He has not yet qualified for daytime supplies. He had previously switched back to use of the [facility] required medications of Stiolto and asthmanex, but again notes overall symptom worsening as compared to use of Breztri. He is only able to resume near regular use Breztri by the use of samples, which he has received recently from his PCP. However, symptom worsening could be entirely reduced with uninterrupted use.  With Breztri, he still notes some shortness of breath at baseline, primarily on exertion, but otherwise notes significant benefit with use. He has minimal phlegm production at baseline.   He continues with oxygen use at nighttime.   He is accompanied by his wife today.       Objective   Vital Signs:  /66 (BP Location: Left arm, Patient Position: Sitting)   Pulse 65   Temp 97.5 °F (36.4 °C)   Ht 182.9 cm (72\")   Wt 93 kg (205 lb)   SpO2 96%   BMI 27.80 kg/m²   Estimated body mass index is 27.8 kg/m² as calculated from the following:    Height as of this encounter: 182.9 cm (72\").    Weight as of this encounter: 93 kg (205 lb).        Physical Exam  Vitals reviewed.   Constitutional:       General: He is not in acute distress.     Appearance: He is well-developed. He is not diaphoretic.   HENT:      Head: Normocephalic and atraumatic.   Cardiovascular:      Rate and Rhythm: Normal rate and regular rhythm.      Heart sounds: Normal heart sounds, S1 normal and S2 normal.   Pulmonary:      Effort: Pulmonary effort is normal.      Breath sounds: No wheezing, rhonchi or rales.   Neurological:      Mental Status: He is alert and oriented to person, place, and time.   Psychiatric:         Behavior: Behavior normal.        Result Review :  The following data " was reviewed by: Nereyda Velasquez PA-C on 08/17/2022:    Reviewed the PFT from 2021.     Reviewed previous echocardiogram.        Assessment and Plan   Diagnoses and all orders for this visit:    1. Chronic obstructive pulmonary disease, unspecified COPD type (HCC) (Primary)    2. Nocturnal hypoxia    3. Former cigarette smoker      COPD, Former smoker:  · Continue albuterol inhaler 2 puffs every 4 hours as needed for shortness of breath, wheezing.  · Continue breztri 2 puffs daily, provided by samples  Spacer used for inhalation assistance.   When unavailable - resume stiolto/asthmanex combination.     Patient has been unable to tolerate the preferred options and others after attempted efforts. These were difficult to use, and did not provide appropriate relief.   Failed options include Stiolto, Breo ellipta, Spiriva, Wixela. Mometasone is not an appropriate alternative.   He has proven that Breztri is effective and able to maintain compliance with use.     · Does not qualify for low dose CT screening due to years since cessation.   · Awaiting imaging at this time as symptoms remain stable, will reconsider as needed.  He agreed with this plan.        Request for prescription approval:  Patient's symptoms were not appropriately maintained with previous inhalers of Symbicort, Spiriva respimat (similar to that of Stiolto).   Then, due to [facility] preference, he did completed trials of both Stiolto and high dose asthmanex.  When using this combination, he notes increased shortness of breath at baseline and more difficulty tolerating daily activities. This can also lead to increased anxiety.     He has tried Breztri intermittently over the past several months as provided by samples from his local PCP's office and our local pulmonology office as well.   When using this 3 in 1 option (which is also MDI form), he is able to appropriately inhale the medication, and notes improvement of baseline symptoms. This allows him to better  tolerate mild-moderate exertion, and other tasks of daily living.   I am requesting that this be approved and filled by the [facility], so that he may obtain it and use it regularly.   This will further avoid waxing/waning symptoms that could lead to a severe exacerbation. We often run out of Breztri samples, which does not allow for the regular use that he needs.   As Breztri provides alternative ICS/LAMA/LABA options in comparison to the preferred formularies, which can also explain why one inhaler may be more beneficial than another.    Of the many tried options, Breztri is the most beneficial thus far.      [facility] at [phone] (Attn: Efren)        Nocturnal hypoxia:  · Compliant with 2 L/min at nighttime        Follow Up   Return in about 5 months (around 1/17/2023), or if symptoms worsen or fail to improve, for Next scheduled follow up.  Patient was given instructions and counseling regarding his condition or for health maintenance advice. Please see specific information pulled into the AVS if appropriate.

## 2022-08-19 ENCOUNTER — OFFICE VISIT (OUTPATIENT)
Dept: FAMILY MEDICINE CLINIC | Facility: CLINIC | Age: 87
End: 2022-08-19

## 2022-08-19 VITALS
TEMPERATURE: 97.1 F | SYSTOLIC BLOOD PRESSURE: 122 MMHG | OXYGEN SATURATION: 97 % | HEIGHT: 72 IN | HEART RATE: 64 BPM | WEIGHT: 197.6 LBS | BODY MASS INDEX: 26.76 KG/M2 | DIASTOLIC BLOOD PRESSURE: 76 MMHG

## 2022-08-19 DIAGNOSIS — Z00.00 ENCOUNTER FOR SUBSEQUENT ANNUAL WELLNESS VISIT (AWV) IN MEDICARE PATIENT: Primary | ICD-10-CM

## 2022-08-19 DIAGNOSIS — E03.8 OTHER SPECIFIED HYPOTHYROIDISM: ICD-10-CM

## 2022-08-19 PROCEDURE — G0439 PPPS, SUBSEQ VISIT: HCPCS | Performed by: FAMILY MEDICINE

## 2022-08-19 PROCEDURE — 84439 ASSAY OF FREE THYROXINE: CPT | Performed by: FAMILY MEDICINE

## 2022-08-19 PROCEDURE — 96160 PT-FOCUSED HLTH RISK ASSMT: CPT | Performed by: FAMILY MEDICINE

## 2022-08-19 PROCEDURE — 80053 COMPREHEN METABOLIC PANEL: CPT | Performed by: FAMILY MEDICINE

## 2022-08-19 PROCEDURE — 84443 ASSAY THYROID STIM HORMONE: CPT | Performed by: FAMILY MEDICINE

## 2022-08-19 PROCEDURE — 85025 COMPLETE CBC W/AUTO DIFF WBC: CPT | Performed by: FAMILY MEDICINE

## 2022-08-19 RX ORDER — LEVOTHYROXINE SODIUM 0.07 MG/1
75 TABLET ORAL DAILY
Qty: 90 TABLET | Refills: 1 | Status: SHIPPED | OUTPATIENT
Start: 2022-08-19 | End: 2022-08-30 | Stop reason: SDUPTHER

## 2022-08-20 LAB
ALBUMIN SERPL-MCNC: 4.3 G/DL (ref 3.5–5.2)
ALBUMIN/GLOB SERPL: 1.9 G/DL
ALP SERPL-CCNC: 55 U/L (ref 39–117)
ALT SERPL W P-5'-P-CCNC: 12 U/L (ref 1–41)
ANION GAP SERPL CALCULATED.3IONS-SCNC: 10 MMOL/L (ref 5–15)
AST SERPL-CCNC: 17 U/L (ref 1–40)
BASOPHILS # BLD AUTO: 0.06 10*3/MM3 (ref 0–0.2)
BASOPHILS NFR BLD AUTO: 0.8 % (ref 0–1.5)
BILIRUB SERPL-MCNC: 0.4 MG/DL (ref 0–1.2)
BUN SERPL-MCNC: 17 MG/DL (ref 8–23)
BUN/CREAT SERPL: 19.8 (ref 7–25)
CALCIUM SPEC-SCNC: 9.1 MG/DL (ref 8.6–10.5)
CHLORIDE SERPL-SCNC: 97 MMOL/L (ref 98–107)
CO2 SERPL-SCNC: 26 MMOL/L (ref 22–29)
CREAT SERPL-MCNC: 0.86 MG/DL (ref 0.76–1.27)
DEPRECATED RDW RBC AUTO: 39.5 FL (ref 37–54)
EGFRCR SERPLBLD CKD-EPI 2021: 83.8 ML/MIN/1.73
EOSINOPHIL # BLD AUTO: 0.3 10*3/MM3 (ref 0–0.4)
EOSINOPHIL NFR BLD AUTO: 3.8 % (ref 0.3–6.2)
ERYTHROCYTE [DISTWIDTH] IN BLOOD BY AUTOMATED COUNT: 12.3 % (ref 12.3–15.4)
GLOBULIN UR ELPH-MCNC: 2.3 GM/DL
GLUCOSE SERPL-MCNC: 81 MG/DL (ref 65–99)
HCT VFR BLD AUTO: 37.6 % (ref 37.5–51)
HGB BLD-MCNC: 12.5 G/DL (ref 13–17.7)
IMM GRANULOCYTES # BLD AUTO: 0.03 10*3/MM3 (ref 0–0.05)
IMM GRANULOCYTES NFR BLD AUTO: 0.4 % (ref 0–0.5)
LYMPHOCYTES # BLD AUTO: 2.25 10*3/MM3 (ref 0.7–3.1)
LYMPHOCYTES NFR BLD AUTO: 28.7 % (ref 19.6–45.3)
MCH RBC QN AUTO: 29.1 PG (ref 26.6–33)
MCHC RBC AUTO-ENTMCNC: 33.2 G/DL (ref 31.5–35.7)
MCV RBC AUTO: 87.6 FL (ref 79–97)
MONOCYTES # BLD AUTO: 0.92 10*3/MM3 (ref 0.1–0.9)
MONOCYTES NFR BLD AUTO: 11.7 % (ref 5–12)
NEUTROPHILS NFR BLD AUTO: 4.27 10*3/MM3 (ref 1.7–7)
NEUTROPHILS NFR BLD AUTO: 54.6 % (ref 42.7–76)
NRBC BLD AUTO-RTO: 0 /100 WBC (ref 0–0.2)
PLATELET # BLD AUTO: 265 10*3/MM3 (ref 140–450)
PMV BLD AUTO: 10.5 FL (ref 6–12)
POTASSIUM SERPL-SCNC: 4.6 MMOL/L (ref 3.5–5.2)
PROT SERPL-MCNC: 6.6 G/DL (ref 6–8.5)
RBC # BLD AUTO: 4.29 10*6/MM3 (ref 4.14–5.8)
SODIUM SERPL-SCNC: 133 MMOL/L (ref 136–145)
T4 FREE SERPL-MCNC: 1.5 NG/DL (ref 0.93–1.7)
TSH SERPL DL<=0.05 MIU/L-ACNC: 1.38 UIU/ML (ref 0.27–4.2)
WBC NRBC COR # BLD: 7.83 10*3/MM3 (ref 3.4–10.8)

## 2022-08-25 NOTE — PROGRESS NOTES
Breztri approved [facility] as he could not tolerate the other preferred options. Prescription sent.     This was mailed on the 27th, and should be received soon.

## 2022-08-30 DIAGNOSIS — E03.8 OTHER SPECIFIED HYPOTHYROIDISM: ICD-10-CM

## 2022-08-30 RX ORDER — LEVOTHYROXINE SODIUM 0.07 MG/1
75 TABLET ORAL DAILY
Qty: 90 TABLET | Refills: 1 | Status: SHIPPED | OUTPATIENT
Start: 2022-08-30 | End: 2023-02-06 | Stop reason: SDUPTHER

## 2022-08-30 RX ORDER — LEVOTHYROXINE SODIUM 0.07 MG/1
75 TABLET ORAL DAILY
Qty: 90 TABLET | Refills: 1 | Status: SHIPPED | OUTPATIENT
Start: 2022-08-30 | End: 2022-08-30 | Stop reason: SDUPTHER

## 2022-08-30 NOTE — TELEPHONE ENCOUNTER
Caller: DILAN JOVEL    Relationship: Emergency Contact    Best call back number: [phone]    Requested Prescriptions:   Requested Prescriptions     Pending Prescriptions Disp Refills   • levothyroxine (SYNTHROID, LEVOTHROID) 75 MCG tablet 90 tablet 1     Sig: Take 1 tablet by mouth Daily.        Pharmacy where request should be sent: [facility]     Additional details provided by patient: THEY REQUESTED THIS PRESCRIPTION ON 8/19 AND STILL HAS NOT RECEIVED IT. PLEASE CALL AND UPDATE. IT WILL TAKE 10 DAYS FOR HIM TO RECEIVE IT ONCE THEY GET THE PRESCRIPTION AND HE ONLY HAS 12 DAYS LEFT.    Does the patient have less than a 3 day supply:  [] Yes  [] No    Jerry Trujillo, PCT   08/30/22 11:26 EDT

## 2022-08-30 NOTE — TELEPHONE ENCOUNTER
Caller: DILAN JOVEL    Relationship: Emergency Contact    Best call back number: [phone]    Requested Prescriptions:   Requested Prescriptions     Signed Prescriptions Disp Refills   • levothyroxine (SYNTHROID, LEVOTHROID) 75 MCG tablet 90 tablet 1     Sig: Take 1 tablet by mouth Daily.     Authorizing Provider: GOMEZ MARCOS     Ordering User: MARQUES MCKEON        Pharmacy where request should be sent: [facility]     Additional details provided by patient: THEY REQUESTED THIS PRESCRIPTION ON 8/19 AND STILL HAS NOT RECEIVED IT. PLEASE CALL AND UPDATE. IT WILL TAKE 10 DAYS FOR HIM TO RECEIVE IT ONCE THEY GET THE PRESCRIPTION AND HE ONLY HAS 12 DAYS LEFT.    Does the patient have less than a 3 day supply:  [] Yes  [] No    Marques Mckeon LPN   08/30/22 12:10 EDT       [facility] & [facility] notified.

## 2022-11-03 ENCOUNTER — OFFICE VISIT (OUTPATIENT)
Dept: FAMILY MEDICINE CLINIC | Facility: CLINIC | Age: 87
End: 2022-11-03

## 2022-11-03 ENCOUNTER — TELEPHONE (OUTPATIENT)
Dept: FAMILY MEDICINE CLINIC | Facility: CLINIC | Age: 87
End: 2022-11-03

## 2022-11-03 VITALS
BODY MASS INDEX: 27.25 KG/M2 | SYSTOLIC BLOOD PRESSURE: 116 MMHG | HEIGHT: 72 IN | WEIGHT: 201.2 LBS | HEART RATE: 91 BPM | DIASTOLIC BLOOD PRESSURE: 72 MMHG | TEMPERATURE: 97.7 F | OXYGEN SATURATION: 97 %

## 2022-11-03 DIAGNOSIS — E03.9 HYPOTHYROIDISM, UNSPECIFIED TYPE: ICD-10-CM

## 2022-11-03 DIAGNOSIS — A69.20 ERYTHEMA MIGRANS (LYME DISEASE): ICD-10-CM

## 2022-11-03 DIAGNOSIS — R60.0 LOCALIZED EDEMA: ICD-10-CM

## 2022-11-03 DIAGNOSIS — R01.1 NEWLY RECOGNIZED HEART MURMUR: ICD-10-CM

## 2022-11-03 DIAGNOSIS — R73.09 ELEVATED GLUCOSE: ICD-10-CM

## 2022-11-03 DIAGNOSIS — L03.115 CELLULITIS OF RIGHT LOWER EXTREMITY: Primary | ICD-10-CM

## 2022-11-03 PROCEDURE — 99214 OFFICE O/P EST MOD 30 MIN: CPT | Performed by: NURSE PRACTITIONER

## 2022-11-03 PROCEDURE — 84443 ASSAY THYROID STIM HORMONE: CPT | Performed by: NURSE PRACTITIONER

## 2022-11-03 PROCEDURE — 83880 ASSAY OF NATRIURETIC PEPTIDE: CPT | Performed by: NURSE PRACTITIONER

## 2022-11-03 PROCEDURE — 85025 COMPLETE CBC W/AUTO DIFF WBC: CPT | Performed by: NURSE PRACTITIONER

## 2022-11-03 PROCEDURE — 80053 COMPREHEN METABOLIC PANEL: CPT | Performed by: NURSE PRACTITIONER

## 2022-11-03 PROCEDURE — 83036 HEMOGLOBIN GLYCOSYLATED A1C: CPT | Performed by: NURSE PRACTITIONER

## 2022-11-03 RX ORDER — DOXYCYCLINE HYCLATE 100 MG/1
100 CAPSULE ORAL 2 TIMES DAILY
Qty: 20 CAPSULE | Refills: 0 | Status: SHIPPED | OUTPATIENT
Start: 2022-11-03 | End: 2022-11-13

## 2022-11-03 NOTE — PROGRESS NOTES
Chief Complaint  foot neuropathy (Feet swelling)    Subjective          Kevin Fonseca is a 87 y.o. male who presents today to [facility] for follow up    HPI:   History of Present Illness    Presents to clinic for c/o swelling in feet X 2-3 years.  Denies swelling elsewhere.  Also complaining of tick bite and sore to left shin x2 weeks and wound to bottom of right foot which he noticed today.  No associated symptoms.  No treatments.  No other issues or concerns at this time.      The following portions of the patient's history were reviewed and updated as appropriate: allergies, current medications, past family history, past medical history, past social history, past surgical history and problem list.    Objective     Allergy:   No Known Allergies     Current Medications:   Current Outpatient Medications   Medication Sig Dispense Refill   • albuterol sulfate  (90 Base) MCG/ACT inhaler Inhale 2 puffs Every 4 (Four) Hours As Needed for Wheezing or Shortness of Air. 18 g 8   • Budeson-Glycopyrrol-Formoterol (BREZTRI) 160-9-4.8 MCG/ACT aerosol inhaler Inhale 2 puffs 2 (Two) Times a Day. 10.7 g 18   • levothyroxine (SYNTHROID, LEVOTHROID) 75 MCG tablet Take 1 tablet by mouth Daily. 90 tablet 1   • Omega-3 Fatty Acids (fish oil) 1000 MG capsule capsule Take 1,576 mg by mouth Daily With Breakfast.     • vitamin D3 125 MCG (5000 UT) capsule capsule Take 5,000 Units by mouth Daily.     • doxycycline (VIBRAMYCIN) 100 MG capsule Take 1 capsule by mouth 2 (Two) Times a Day for 10 days. 20 capsule 0     No current facility-administered medications for this visit.       Past Medical History:  Past Medical History:   Diagnosis Date   • Emphysema lung (HCC)    • Gastritis    • Heartburn    • Macular degeneration    • Reflux esophagitis    • Thyroid disease        Past Surgical History:  Past Surgical History:   Procedure Laterality Date   • INTRACAPSULAR CATARACT EXTRACTION      Dr. Lesly ALMANZAR  History  Chief Complaint   Patient presents with    Tailbone Pain     Patient states he slipped on the icy steps this AM and landed on his tailbone and hit his middle back  Patient states he hasn't been able to pass gas or have a BM sincwe  Patient states his rectum is swollen and he has some swelling in his middle back where he hit his back     Patient is a 40 y/o M with a h/o chronic back pain due to herniated discs presents to the ED with low back pain after falling down 3-4 icy steps around 6 AM   He denies hitting head or LOC  No other injuries  He denies bowel/bladder dysfunction  Patient states he had a BM right before the fall  He denies groin numbness  He does have pain that radiates down b/l leg, right worse than left  No new numbness or weakness  Pain is worse with lying flat on back  He states he has trouble passing gas  He feels the urge to pass gas, but states "it gets sucked back in "  He took vicodin for the pain, but states vicodin normally does not help his pain  He does see pain management for his chronic back pain  History provided by:  Patient  Back Pain   Location:  Lumbar spine  Quality:  Aching  Radiates to:  R thigh and L thigh  Pain severity:  Moderate  Onset quality:  Sudden  Duration:  1 day  Timing:  Constant  Progression:  Worsening  Chronicity:  Chronic  Context: falling    Relieved by:  Nothing  Worsened by:  Lying down  Ineffective treatments: vicodin  Associated symptoms: leg pain    Associated symptoms: no abdominal pain, no abdominal swelling, no bladder incontinence, no bowel incontinence, no chest pain, no dysuria, no fever, no numbness, no paresthesias, no perianal numbness and no weakness    Risk factors: obesity        Prior to Admission Medications   Prescriptions Last Dose Informant Patient Reported? Taking?    HYDROcodone-acetaminophen (NORCO) 7 5-325 mg per tablet   Yes Yes   Sig: Take 1 tablet by mouth every 8 (eight) hours as needed for pain "Marina. Dr. Neto Light.       Social History:  Social History     Socioeconomic History   • Marital status:    Tobacco Use   • Smoking status: Former     Packs/day: 1.50     Types: Cigarettes     Start date:      Quit date:      Years since quittin.8   • Smokeless tobacco: Never   Vaping Use   • Vaping Use: Never used   Substance and Sexual Activity   • Alcohol use: Not Currently     Comment: 2 week   • Drug use: Never   • Sexual activity: Defer       Family History:  Family History   Problem Relation Age of Onset   • Hypertension Mother    • Other Mother    • Cancer Father    • Cancer Brother    • Asthma Brother        Review of Systems:  Review of Systems   Constitutional: Negative for unexpected weight change.   Respiratory: Negative for shortness of breath.        Vital Signs:   /72 (BP Location: Right arm, Patient Position: Sitting, Cuff Size: Adult)   Pulse 91   Temp 97.7 °F (36.5 °C)   Ht 182.9 cm (72\")   Wt 91.3 kg (201 lb 3.2 oz)   SpO2 97%   BMI 27.29 kg/m²  RR: 15    Physical Exam:  Physical Exam  Vitals and nursing note reviewed.   Constitutional:       General: He is not in acute distress.     Appearance: Normal appearance. He is not ill-appearing or toxic-appearing.   HENT:      Head: Normocephalic.   Neck:      Vascular: No carotid bruit or JVD.   Cardiovascular:      Rate and Rhythm: Normal rate and regular rhythm.      Heart sounds: Murmur heard.   Pulmonary:      Effort: Pulmonary effort is normal. No respiratory distress.      Breath sounds: Normal breath sounds.   Abdominal:      General: Bowel sounds are normal. There is no distension.      Palpations: Abdomen is soft.   Musculoskeletal:         General: Swelling (Bilateral lower legs, ankles, feet.  1+ pitting edema to left lower extremity and 2+ pitting edema to right lower extremity) present.   Skin:     General: Skin is warm and dry.      Coloration: Skin is not pale.      Comments: Bull's-eye rash noted " LINZESS 145 MCG CAPS   Yes Yes   Si (one) Capsule daily on an empty stomach, at least 30 mn before first meal   amLODIPine (NORVASC) 2 5 mg tablet   Yes Yes   cloNIDine (CATAPRES) 0 1 mg tablet Not Taking at Unknown time  Yes No   Si (one) Tablet three times daily, as needed   fluticasone (VERAMYST) 27 5 MCG/SPRAY nasal spray   Yes Yes   Sig: into each nostril   gabapentin (NEURONTIN) 300 mg capsule Not Taking at Unknown time  Yes No   Sig: Take by mouth   levETIRAcetam (KEPPRA) 250 mg tablet   No Yes   Sig: Take before bedtime   oxyCODONE (OXYCONTIN) 20 mg 12 hr tablet Not Taking at Unknown time  Yes No   Sig: 10 mg OxyCONTIN 20 MG TB12 take 1 Po TID  Refills: 0  Active    oxyCODONE (ROXICODONE) 30 MG immediate release tablet Not Taking at Unknown time  Yes No   Sig: 15 mg 4 (four) times a day OxyCODONE HCl TABS (15 mg) take 1 tab po qid  Refills: 0  Active    oxyCODONE (ROXICODONE) 5 mg immediate release tablet Not Taking at Unknown time  Yes No   Sig: Take 5 mg by mouth 2 (two) times a day as needed   tiZANidine (ZANAFLEX) 2 mg tablet   Yes Yes   Sig: Take 4 mg by mouth 4 (four) times a day as needed for muscle spasms   venlafaxine (EFFEXOR-XR) 37 5 mg 24 hr capsule   No Yes   Sig: Take 1 capsule (37 5 mg total) by mouth daily      Facility-Administered Medications: None       Past Medical History:   Diagnosis Date    Arthritis     Chronic pain     DJD (degenerative joint disease)     Herniated disc, cervical     Narcolepsy     Seizures (HCC)     Sleep apnea     Sleep apnea        Past Surgical History:   Procedure Laterality Date    IMPLANTATION / PLACEMENT EPIDURAL NEUROSTIMULATOR ELECTRODES      RHINOPLASTY      TONSILLECTOMY         History reviewed  No pertinent family history  I have reviewed and agree with the history as documented      Social History   Substance Use Topics    Smoking status: Never Smoker    Smokeless tobacco: Never Used    Alcohol use No        Review of Systems Constitutional: Negative for chills and fever  Cardiovascular: Negative for chest pain  Gastrointestinal: Negative for abdominal pain, bowel incontinence, diarrhea, nausea and vomiting  Genitourinary: Negative for bladder incontinence and dysuria  Musculoskeletal: Positive for back pain  Negative for neck pain  Skin: Negative for color change, rash and wound  Neurological: Negative for dizziness, weakness, numbness and paresthesias  Psychiatric/Behavioral: Negative for confusion and decreased concentration  All other systems reviewed and are negative  Physical Exam  Physical Exam   Constitutional: He is oriented to person, place, and time  He appears well-developed and well-nourished  He is cooperative  He does not appear ill  No distress  Patient obese with BMI of 35 7  HENT:   Head: Normocephalic and atraumatic  Nose: Nose normal    Mouth/Throat: Oropharynx is clear and moist    Eyes: Pupils are equal, round, and reactive to light  Conjunctivae are normal    Neck: Normal range of motion  No spinous process tenderness and no muscular tenderness present  Cardiovascular: Normal rate, regular rhythm and normal heart sounds  No murmur heard  Pulmonary/Chest: Effort normal and breath sounds normal  He has no wheezes  He has no rhonchi  He has no rales  Abdominal: Soft  Normal appearance and bowel sounds are normal  There is no tenderness  Genitourinary: Rectum normal  Rectal exam shows anal tone normal    Musculoskeletal:        Cervical back: Normal         Thoracic back: Normal         Lumbar back: He exhibits tenderness and bony tenderness  He exhibits no swelling, no edema and no deformity  Negative SLR B/L  Neurological: He is alert and oriented to person, place, and time  He has normal strength and normal reflexes  No sensory deficit  Gait normal    Skin: Skin is warm and dry  No abrasion, no bruising, no ecchymosis and no rash noted  He is not diaphoretic   No to anterior portion of left lower extremity. Small open wound noted to distal plantar aspect of right foot, yellow wound base, surrounding erythema, warmth, tenderness that extends up top of foot to just above right ankle.  No streaking, no drainage.   Neurological:      Mental Status: He is alert and oriented to person, place, and time.   Psychiatric:         Mood and Affect: Mood normal.         Behavior: Behavior normal.         Thought Content: Thought content normal.         Judgment: Judgment normal.                  Assessment and Plan   Diagnoses and all orders for this visit:    1. Cellulitis of right lower extremity (Primary)  -     CBC & Differential  -     Comprehensive metabolic panel; Future  -     Hemoglobin A1c; Future  -     BNP  -     doxycycline (VIBRAMYCIN) 100 MG capsule; Take 1 capsule by mouth 2 (Two) Times a Day for 10 days.  Dispense: 20 capsule; Refill: 0  -     Comprehensive metabolic panel  -     Hemoglobin A1c    2. Localized edema  -     CBC & Differential  -     Comprehensive metabolic panel; Future  -     Hemoglobin A1c; Future  -     BNP  -     Comprehensive metabolic panel  -     Hemoglobin A1c  -     TSH; Future    3. Elevated glucose  -     CBC & Differential  -     Comprehensive metabolic panel; Future  -     Hemoglobin A1c; Future  -     BNP  -     Comprehensive metabolic panel  -     Hemoglobin A1c    4. Erythema migrans (Lyme disease)  -     doxycycline (VIBRAMYCIN) 100 MG capsule; Take 1 capsule by mouth 2 (Two) Times a Day for 10 days.  Dispense: 20 capsule; Refill: 0    5. Newly recognized heart murmur  -     Adult Transthoracic Echo Complete W/ Cont if Necessary Per Protocol; Future  -     TSH; Future    6. Hypothyroidism, unspecified type  -     TSH; Future    1. 1.  Initiate regimen as above.  Skin care as directed.  2. Low-sodium DASH diet and lifestyle modifications to control condition.  Keep extremities elevated.  Compression therapy as directed.  3.  Screening lab  erythema  No pallor  Psychiatric: He has a normal mood and affect  Nursing note and vitals reviewed  Vital Signs  ED Triage Vitals   Temperature Pulse Respirations Blood Pressure SpO2   01/08/19 1619 01/08/19 1619 01/08/19 1619 01/08/19 1619 01/08/19 1619   97 9 °F (36 6 °C) 82 20 137/76 97 %      Temp Source Heart Rate Source Patient Position - Orthostatic VS BP Location FiO2 (%)   01/08/19 1916 01/08/19 1619 01/08/19 1916 01/08/19 1916 --   Temporal Monitor Lying Right arm       Pain Score       01/08/19 1619       9           Vitals:    01/08/19 1619 01/08/19 1916   BP: 137/76 149/87   Pulse: 82 80   Patient Position - Orthostatic VS:  Lying       Visual Acuity      ED Medications  Medications   lidocaine (LIDODERM) 5 % patch 1 patch (1 patch Topical Medication Applied 1/8/19 1925)   ketorolac (TORADOL) injection 30 mg (30 mg Intramuscular Given 1/8/19 1750)   cyclobenzaprine (FLEXERIL) tablet 10 mg (10 mg Oral Given 1/8/19 1750)       Diagnostic Studies  Results Reviewed     None                 CT lumbar spine without contrast   Final Result by Gloria Ford MD (01/08 1838)   1  Right central disc extrusion with narrowing of the right lateral recess at L5-S1 and mild enlargement of the right S1 nerve root concerning for persistent impingement  There has likely been partial resorption of extruded disc material noted on prior    study  Findings are also likely chronic with rim calcification of the residual disc herniation at this level as above  2   Stable bilateral L4 spondylolysis and stable L4 on L5 anterolisthesis  Circumferential disc bulge, unroofing of the disc and facet hypertrophy all resulting in moderate left and moderate to severe right foraminal stenosis with mild central canal    encroachment  Correlate clinically for right greater than left L4 radiculopathy        Workstation performed: [id number]                    Procedures  Procedures       Phone Contacts  ED Phone Contact    ED ordered.  4.  Initiate regimen as above.  Skin care as directed.  5. Patient and his spouse deny him having a history of heart murmur and no documented history of murmurs.  Due to new onset heart murmur and swelling of extremities, will proceed with echo.      Discussed possible differential diagnoses, testing, treatment, recommended non-pharmacological interventions, risks, warning signs to monitor for that would indicate need for follow-up in clinic or ER. If no improvement with these regimens or you have new or worsening symptoms follow-up. Patient verbalizes understanding and agreement with plan of care. Denies further needs or concerns.     Patient was given instructions and counseling regarding her condition and for health maintenance advised.    BMI is >= 25 and <30. (Overweight) The following options were offered after discussion;: weight loss educational material (shared in after visit summary), exercise counseling/recommendations and nutrition counseling/recommendations       I spent 30 minutes caring for patient on this date of service. This time includes time spent by me in the following activities: preparing for the visit, reviewing tests, obtaining and/or reviewing a separately obtained history, performing a medically appropriate examination and/or evaluation, counseling and educating the patient/family/caregiver, ordering medications, tests, or procedures and documenting information in the medical record    Meds ordered during this visit:  New Medications Ordered This Visit   Medications   • doxycycline (VIBRAMYCIN) 100 MG capsule     Sig: Take 1 capsule by mouth 2 (Two) Times a Day for 10 days.     Dispense:  20 capsule     Refill:  0       Patient Instructions:  Patient instructions given for the following visit diagnosis:    ICD-10-CM ICD-9-CM   1. Cellulitis of right lower extremity  L03.115 682.6   2. Localized edema  R60.0 782.3   3. Elevated glucose  R73.09 790.29   4. Erythema migrans (Lyme  Course                               MDM  Number of Diagnoses or Management Options  Fall: new and requires workup  Low back pain: new and requires workup  Diagnosis management comments: Patient with low back pain after a fall, will order CT back since patient has had prior back injuries  No new  neuro symptoms  No new abnormalities on CT scan  ADvised patient to continue current medications and f/u with pain management  Amount and/or Complexity of Data Reviewed  Tests in the radiology section of CPT®: ordered and reviewed    Patient Progress  Patient progress: stable    CritCare Time    Disposition  Final diagnoses:   Fall   Low back pain     Time reflects when diagnosis was documented in both MDM as applicable and the Disposition within this note     Time User Action Codes Description Comment    1/8/2019  7:13 PM Barber Brown Add [E98  XXXA] Fall     1/8/2019  7:13 PM Barber Brown Add [M54 5] Low back pain       ED Disposition     ED Disposition Condition Comment    Discharge  Cletianna Herkimer discharge to home/self care      Condition at discharge: Stable        Follow-up Information     Follow up With Specialties Details Why Contact Info    pain management  In 2 days For recheck           Discharge Medication List as of 1/8/2019  7:15 PM      CONTINUE these medications which have NOT CHANGED    Details   amLODIPine (NORVASC) 2 5 mg tablet Starting Mon 10/22/2018, Historical Med      fluticasone (VERAMYST) 27 5 MCG/SPRAY nasal spray into each nostril, Starting Wed 8/24/2011, Historical Med      HYDROcodone-acetaminophen ([address]) 7 5-325 mg per tablet Take 1 tablet by mouth every 8 (eight) hours as needed for pain, Historical Med      levETIRAcetam (KEPPRA) 250 mg tablet Take before bedtime, Normal      LINZESS 145 MCG CAPS 1 (one) Capsule daily on an empty stomach, at least 30 mn before first meal, Historical Med      tiZANidine (ZANAFLEX) 2 mg tablet Take 4 mg by mouth 4 (four) times a day as disease)  A69.20 088.81   5. Newly recognized heart murmur  R01.1 785.2   6. Hypothyroidism, unspecified type  E03.9 244.9       Follow Up   Return in 1 week (on 11/10/2022) for Recheck, Sooner if needed.        This document has been electronically signed by DENICE Lazaro  November 4, 2022 07:43 EDT    Patient was given instructions and counseling regarding his condition or for health maintenance advice. Please see specific information pulled into the AVS if appropriate.     Part of this note may be an electronic transcription/translation of spoken language to printed text using the Dragon Dictation System.     needed for muscle spasms, Historical Med      venlafaxine (EFFEXOR-XR) 37 5 mg 24 hr capsule Take 1 capsule (37 5 mg total) by mouth daily, Starting Thu 12/20/2018, Normal      cloNIDine (CATAPRES) 0 1 mg tablet 1 (one) Tablet three times daily, as needed, Historical Med      gabapentin (NEURONTIN) 300 mg capsule Take by mouth, Starting Fri 9/9/2011, Historical Med      oxyCODONE (OXYCONTIN) 20 mg 12 hr tablet 10 mg OxyCONTIN 20 MG TB12 take 1 Po TID  Refills: 0  Active , Historical Med      !! oxyCODONE (ROXICODONE) 30 MG immediate release tablet 15 mg 4 (four) times a day OxyCODONE HCl TABS (15 mg) take 1 tab po qid  Refills: 0  Active , Historical Med      !! oxyCODONE (ROXICODONE) 5 mg immediate release tablet Take 5 mg by mouth 2 (two) times a day as needed, Starting Tue 12/11/2018, Historical Med       !! - Potential duplicate medications found  Please discuss with provider  No discharge procedures on file      ED Provider  Electronically Signed by           Dayna Martini PA-C  01/08/19 1892

## 2022-11-03 NOTE — TELEPHONE ENCOUNTER
Caller: DILAN JOVEL     Relationship:      Best call back number: [phone]    What is your medical concern?     PATIENT'S FEET ARE BADLY SWOLLEN  LOWER PART OF ONE LEG AND BOTTOM OF OTHER FOOT THERE IS A SMELL.  THERE IS A SMALL LESION ON FOOT AND LEG. CHEESY SMELL

## 2022-11-04 ENCOUNTER — TELEPHONE (OUTPATIENT)
Dept: FAMILY MEDICINE CLINIC | Facility: CLINIC | Age: 87
End: 2022-11-04

## 2022-11-04 LAB
ALBUMIN SERPL-MCNC: 3.9 G/DL (ref 3.5–5.2)
ALBUMIN/GLOB SERPL: 1.4 G/DL
ALP SERPL-CCNC: 56 U/L (ref 39–117)
ALT SERPL W P-5'-P-CCNC: 14 U/L (ref 1–41)
ANION GAP SERPL CALCULATED.3IONS-SCNC: 11.5 MMOL/L (ref 5–15)
AST SERPL-CCNC: 18 U/L (ref 1–40)
BASOPHILS # BLD AUTO: 0.07 10*3/MM3 (ref 0–0.2)
BASOPHILS NFR BLD AUTO: 0.8 % (ref 0–1.5)
BILIRUB SERPL-MCNC: 0.3 MG/DL (ref 0–1.2)
BUN SERPL-MCNC: 15 MG/DL (ref 8–23)
BUN/CREAT SERPL: 16.1 (ref 7–25)
CALCIUM SPEC-SCNC: 9.4 MG/DL (ref 8.6–10.5)
CHLORIDE SERPL-SCNC: 96 MMOL/L (ref 98–107)
CO2 SERPL-SCNC: 25.5 MMOL/L (ref 22–29)
CREAT SERPL-MCNC: 0.93 MG/DL (ref 0.76–1.27)
DEPRECATED RDW RBC AUTO: 38.5 FL (ref 37–54)
EGFRCR SERPLBLD CKD-EPI 2021: 79.5 ML/MIN/1.73
EOSINOPHIL # BLD AUTO: 0.38 10*3/MM3 (ref 0–0.4)
EOSINOPHIL NFR BLD AUTO: 4.2 % (ref 0.3–6.2)
ERYTHROCYTE [DISTWIDTH] IN BLOOD BY AUTOMATED COUNT: 12 % (ref 12.3–15.4)
GLOBULIN UR ELPH-MCNC: 2.7 GM/DL
GLUCOSE SERPL-MCNC: 71 MG/DL (ref 65–99)
HBA1C MFR BLD: 5.9 % (ref 4.8–5.6)
HCT VFR BLD AUTO: 36.2 % (ref 37.5–51)
HGB BLD-MCNC: 12.3 G/DL (ref 13–17.7)
IMM GRANULOCYTES # BLD AUTO: 0.04 10*3/MM3 (ref 0–0.05)
IMM GRANULOCYTES NFR BLD AUTO: 0.4 % (ref 0–0.5)
LYMPHOCYTES # BLD AUTO: 1.77 10*3/MM3 (ref 0.7–3.1)
LYMPHOCYTES NFR BLD AUTO: 19.8 % (ref 19.6–45.3)
MCH RBC QN AUTO: 29.5 PG (ref 26.6–33)
MCHC RBC AUTO-ENTMCNC: 34 G/DL (ref 31.5–35.7)
MCV RBC AUTO: 86.8 FL (ref 79–97)
MONOCYTES # BLD AUTO: 1.25 10*3/MM3 (ref 0.1–0.9)
MONOCYTES NFR BLD AUTO: 14 % (ref 5–12)
NEUTROPHILS NFR BLD AUTO: 5.45 10*3/MM3 (ref 1.7–7)
NEUTROPHILS NFR BLD AUTO: 60.8 % (ref 42.7–76)
NRBC BLD AUTO-RTO: 0 /100 WBC (ref 0–0.2)
NT-PROBNP SERPL-MCNC: 796 PG/ML (ref 0–1800)
PLATELET # BLD AUTO: 306 10*3/MM3 (ref 140–450)
PMV BLD AUTO: 9.7 FL (ref 6–12)
POTASSIUM SERPL-SCNC: 4.5 MMOL/L (ref 3.5–5.2)
PROT SERPL-MCNC: 6.6 G/DL (ref 6–8.5)
RBC # BLD AUTO: 4.17 10*6/MM3 (ref 4.14–5.8)
SODIUM SERPL-SCNC: 133 MMOL/L (ref 136–145)
TSH SERPL DL<=0.05 MIU/L-ACNC: 1.36 UIU/ML (ref 0.27–4.2)
WBC NRBC COR # BLD: 8.96 10*3/MM3 (ref 3.4–10.8)

## 2022-11-04 NOTE — TELEPHONE ENCOUNTER
----- Message from DENICE Lazaro sent at 11/4/2022  1:15 PM EDT -----  Please call patient regarding results.     Labs stable      Wife notified & verbalized understanding.

## 2022-11-09 ENCOUNTER — OFFICE VISIT (OUTPATIENT)
Dept: FAMILY MEDICINE CLINIC | Facility: CLINIC | Age: 87
End: 2022-11-09

## 2022-11-09 ENCOUNTER — TELEPHONE (OUTPATIENT)
Dept: FAMILY MEDICINE CLINIC | Facility: CLINIC | Age: 87
End: 2022-11-09

## 2022-11-09 VITALS
SYSTOLIC BLOOD PRESSURE: 122 MMHG | TEMPERATURE: 97.3 F | WEIGHT: 197.8 LBS | DIASTOLIC BLOOD PRESSURE: 56 MMHG | RESPIRATION RATE: 16 BRPM | HEART RATE: 67 BPM | HEIGHT: 72 IN | OXYGEN SATURATION: 98 % | BODY MASS INDEX: 26.79 KG/M2

## 2022-11-09 DIAGNOSIS — G62.9 NEUROPATHY: ICD-10-CM

## 2022-11-09 DIAGNOSIS — R60.0 LOCALIZED EDEMA: ICD-10-CM

## 2022-11-09 DIAGNOSIS — L03.115 CELLULITIS OF RIGHT LOWER EXTREMITY: Primary | ICD-10-CM

## 2022-11-09 DIAGNOSIS — A69.20 ERYTHEMA MIGRANS (LYME DISEASE): ICD-10-CM

## 2022-11-09 DIAGNOSIS — S91.301D OPEN WOUND OF RIGHT FOOT, SUBSEQUENT ENCOUNTER: ICD-10-CM

## 2022-11-09 DIAGNOSIS — I73.9 PERIPHERAL VASCULAR DISEASE, UNSPECIFIED: ICD-10-CM

## 2022-11-09 PROCEDURE — 99214 OFFICE O/P EST MOD 30 MIN: CPT | Performed by: NURSE PRACTITIONER

## 2022-11-09 RX ORDER — FUROSEMIDE 20 MG/1
20 TABLET ORAL DAILY
Qty: 3 TABLET | Refills: 0 | Status: SHIPPED | OUTPATIENT
Start: 2022-11-09 | End: 2022-11-17 | Stop reason: SDUPTHER

## 2022-11-09 RX ORDER — MULTIVIT WITH MINERALS/LUTEIN
1000 TABLET ORAL DAILY
COMMUNITY

## 2022-11-09 RX ORDER — AMOXICILLIN AND CLAVULANATE POTASSIUM 875; 125 MG/1; MG/1
1 TABLET, FILM COATED ORAL 2 TIMES DAILY
Qty: 20 TABLET | Refills: 0 | Status: SHIPPED | OUTPATIENT
Start: 2022-11-09 | End: 2022-11-19

## 2022-11-09 NOTE — PROGRESS NOTES
Chief Complaint  Lower Extremity Issue (Edema with Lesions)    Subjective          Kevin Fonseca is a 88 y.o. male who presents today to [facility] for follow up    HPI:   History of Present Illness    Presents to clinic for follow-up.  Was evaluated on 11/3/2022 for complaint of swelling in feet, tick bite, wound to bottom of right foot.  Patient was treated with doxycycline at that time.    Today reports there is some some improvement in his wounds and they are no longer oozing.  Has 6 antibiotic pills left to take.  Reports swelling is better in left leg but worse in right leg. Having increased redness to RLE. No associated symptoms. Has not been elevating legs but has cut back on salt.  Patient does report his neuropathy in his feet has been worse for the past 3 weeks.  Complaining of burning and aching pain in feet that comes and goes.  No associated symptoms.  No other issues or concerns at this time.      The following portions of the patient's history were reviewed and updated as appropriate: allergies, current medications, past family history, past medical history, past social history, past surgical history and problem list.    Objective     Allergy:   No Known Allergies     Current Medications:   Current Outpatient Medications   Medication Sig Dispense Refill   • albuterol sulfate  (90 Base) MCG/ACT inhaler Inhale 2 puffs Every 4 (Four) Hours As Needed for Wheezing or Shortness of Air. 18 g 8   • ascorbic acid (VITAMIN C) 1000 MG tablet Take 1 tablet by mouth Daily.     • Budeson-Glycopyrrol-Formoterol (BREZTRI) 160-9-4.8 MCG/ACT aerosol inhaler Inhale 2 puffs 2 (Two) Times a Day. 10.7 g 18   • doxycycline (VIBRAMYCIN) 100 MG capsule Take 1 capsule by mouth 2 (Two) Times a Day for 10 days. 20 capsule 0   • levothyroxine (SYNTHROID, LEVOTHROID) 75 MCG tablet Take 1 tablet by mouth Daily. 90 tablet 1   • Omega-3 Fatty Acids (fish oil) 1000 MG capsule capsule Take 1,576  "mg by mouth Daily With Breakfast.     • vitamin D3 125 MCG (5000 UT) capsule capsule Take 5,000 Units by mouth Daily.     • amoxicillin-clavulanate (Augmentin) 875-125 MG per tablet Take 1 tablet by mouth 2 (Two) Times a Day for 10 days. 20 tablet 0   • furosemide (Lasix) 20 MG tablet Take 1 tablet by mouth Daily for 3 days. 3 tablet 0     No current facility-administered medications for this visit.       Past Medical History:  Past Medical History:   Diagnosis Date   • Emphysema lung (HCC)    • Gastritis    • Heartburn    • Macular degeneration    • Reflux esophagitis    • Thyroid disease        Past Surgical History:  Past Surgical History:   Procedure Laterality Date   • INTRACAPSULAR CATARACT EXTRACTION      Dr. Lesly Gray. Dr. Neto Light.       Social History:  Social History     Socioeconomic History   • Marital status:    Tobacco Use   • Smoking status: Former     Packs/day: 1.50     Years: 40.00     Pack years: 60.00     Types: Cigarettes     Start date:      Quit date:      Years since quittin.8   • Smokeless tobacco: Never   Vaping Use   • Vaping Use: Never used   Substance and Sexual Activity   • Alcohol use: Not Currently     Comment: 2 week   • Drug use: Never   • Sexual activity: Defer       Family History:  Family History   Problem Relation Age of Onset   • Hypertension Mother    • Other Mother    • Cancer Father    • Cancer Brother    • Asthma Brother        Review of Systems:  Review of Systems   Constitutional: Negative for chills and fever.   Cardiovascular: Negative for palpitations.   Gastrointestinal: Negative for nausea and vomiting.       Vital Signs:   /56 (BP Location: Right arm, Patient Position: Sitting, Cuff Size: Adult)   Pulse 67   Temp 97.3 °F (36.3 °C) (Temporal)   Resp 16   Ht 182.9 cm (72\")   Wt 89.7 kg (197 lb 12.8 oz)   SpO2 98%   BMI 26.83 kg/m²      Physical Exam:  Physical Exam  Vitals and nursing note reviewed.   Constitutional:       " General: He is not in acute distress.     Appearance: Normal appearance. He is not ill-appearing or toxic-appearing.   HENT:      Head: Normocephalic.   Cardiovascular:      Rate and Rhythm: Normal rate and regular rhythm.      Pulses: Normal pulses.      Heart sounds: Murmur heard.   Pulmonary:      Effort: Pulmonary effort is normal. No respiratory distress.      Breath sounds: Normal breath sounds.   Abdominal:      General: There is no distension.      Tenderness: There is no abdominal tenderness.   Musculoskeletal:         General: Swelling present.      Comments: -homans sign   Skin:     General: Skin is warm and dry.      Capillary Refill: Capillary refill takes less than 2 seconds.      Coloration: Skin is not pale.      Comments: Bull's-eye rash resolving. Edema in LLE improved, mild nonpitting edema to left ankle     2+ edema to right foot, 1+ edema to right lower leg.  Erythema, warmth, tenderness to right foot extending up approximately two thirds of the right lower leg.  Wound to distal plantar aspect of right foot has slightly increased in size, yellow wound base. No Streaking, no drainage.    Neurological:      Mental Status: He is alert and oriented to person, place, and time.   Psychiatric:         Mood and Affect: Mood normal.         Behavior: Behavior normal.         Thought Content: Thought content normal.         Judgment: Judgment normal.                  Assessment and Plan   Diagnoses and all orders for this visit:    1. Cellulitis of right lower extremity (Primary)  -     Wound Culture - Wound, Foot, Right  -     Cancel: CBC & Differential  -     amoxicillin-clavulanate (Augmentin) 875-125 MG per tablet; Take 1 tablet by mouth 2 (Two) Times a Day for 10 days.  Dispense: 20 tablet; Refill: 0  -     CBC w AUTO Differential    2. Erythema migrans (Lyme disease)  -     CBC w AUTO Differential    3. Open wound of right foot, subsequent encounter  -     Wound Culture - Wound, Foot, Right  -      Cancel: CBC & Differential  -     Ambulatory Referral to Podiatry  -     US Ankle / Brachial Indices Extremity Complete; Future  -     CBC w AUTO Differential    4. Localized edema  -     furosemide (Lasix) 20 MG tablet; Take 1 tablet by mouth Daily for 3 days.  Dispense: 3 tablet; Refill: 0  -     US Ankle / Brachial Indices Extremity Complete; Future  -     CBC w AUTO Differential    5. Neuropathy  -     US Ankle / Brachial Indices Extremity Complete; Future  -     Vitamin B12  -     Calcium, Ionized; Future  -     Magnesium; Future    6. Peripheral vascular disease, unspecified (HCC)  -     US Ankle / Brachial Indices Extremity Complete; Future    Other orders  -     Cancel: Wound Culture - Wound, Foot, Right; Future  -     Cancel: Ambulatory Referral to Wound Clinic    1.  Augmentin added to doxycycline.    2.  Continue doxycycline  3.  Wound culture obtained. Wound and skin care as directed.  4.  Lasix for edema.  Elevate extremities.  Low-sodium DASH diet.    5.  Work-up for worsening neuropathy.  6.  Work-up for vascular disease.    Discussed possible differential diagnoses, testing, treatment, recommended non-pharmacological interventions, risks, warning signs to monitor for that would indicate need for follow-up in clinic or ER. If no improvement with these regimens or you have new or worsening symptoms follow-up. Patient verbalizes understanding and agreement with plan of care. Denies further needs or concerns.     Patient was given instructions and counseling regarding her condition and for health maintenance advised.    BMI is >= 25 and <30. (Overweight) The following options were offered after discussion;: weight loss educational material (shared in after visit summary), exercise counseling/recommendations, nutrition counseling/recommendations and pharmacological intervention options       I spent 30 minutes caring for patient on this date of service. This time includes time spent by me in the following  activities: preparing for the visit, reviewing tests, obtaining and/or reviewing a separately obtained history, performing a medically appropriate examination and/or evaluation, counseling and educating the patient/family/caregiver, ordering medications, tests, or procedures and documenting information in the medical record    Meds ordered during this visit:  New Medications Ordered This Visit   Medications   • amoxicillin-clavulanate (Augmentin) 875-125 MG per tablet     Sig: Take 1 tablet by mouth 2 (Two) Times a Day for 10 days.     Dispense:  20 tablet     Refill:  0   • furosemide (Lasix) 20 MG tablet     Sig: Take 1 tablet by mouth Daily for 3 days.     Dispense:  3 tablet     Refill:  0       Patient Instructions:  Patient instructions given for the following visit diagnosis:    ICD-10-CM ICD-9-CM   1. Cellulitis of right lower extremity  L03.115 682.6   2. Erythema migrans (Lyme disease)  A69.20 088.81   3. Open wound of right foot, subsequent encounter  S91.301D V58.89     892.0   4. Localized edema  R60.0 782.3   5. Neuropathy  G62.9 355.9   6. Peripheral vascular disease, unspecified (HCC)  I73.9 443.9       Follow Up   Return for Recheck in 2 days, sooner if needed .        This document has been electronically signed by DENICE Lazaro  November 10, 2022 13:04 EST    Patient was given instructions and counseling regarding his condition or for health maintenance advice. Please see specific information pulled into the AVS if appropriate.     Part of this note may be an electronic transcription/translation of spoken language to printed text using the Dragon Dictation System.

## 2022-11-09 NOTE — TELEPHONE ENCOUNTER
Caller: DILAN JOVEL    Relationship: Emergency Contact    Best call back number: [phone]    What is the best time to reach you: ANYTIME    Who are you requesting to speak with (clinical staff, provider,  specific staff member): NURSE    What was the call regarding: PATIENT'S VISIT LAST WEEK. DID NOT CARE TO SPECIFY    Do you require a callback: YES

## 2022-11-09 NOTE — TELEPHONE ENCOUNTER
Caller: BECKAJENNIEE    Relationship: Emergency Contact    Best call back number: [phone]    What is the best time to reach you: ANYTIME    Who are you requesting to speak with (clinical staff, provider,  specific staff member): NURSE    What was the call regarding: PATIENT'S VISIT LAST WEEK. DID NOT CARE TO SPECIFY    Do you require a callback: YES        Spoke with Faby she reports there is a slight improvement in his wounds but are still oozing some,has 6 antibiotic pills left to take,reports the swelling in his lower extremities & feet is really bad,is questioning should she just wait until he finishes the antibiotic & FU with Dr. Geronimo on the 17 Th or what more should they do?

## 2022-11-10 LAB
BASOPHILS # BLD AUTO: 0.06 10*3/MM3 (ref 0–0.2)
BASOPHILS NFR BLD AUTO: 0.7 % (ref 0–1.5)
EOSINOPHIL # BLD AUTO: 0.22 10*3/MM3 (ref 0–0.4)
EOSINOPHIL NFR BLD AUTO: 2.7 % (ref 0.3–6.2)
ERYTHROCYTE [DISTWIDTH] IN BLOOD BY AUTOMATED COUNT: 12.2 % (ref 12.3–15.4)
HCT VFR BLD AUTO: 37.2 % (ref 37.5–51)
HGB BLD-MCNC: 12.8 G/DL (ref 13–17.7)
IMM GRANULOCYTES # BLD AUTO: 0.04 10*3/MM3 (ref 0–0.05)
IMM GRANULOCYTES NFR BLD AUTO: 0.5 % (ref 0–0.5)
LYMPHOCYTES # BLD AUTO: 2.09 10*3/MM3 (ref 0.7–3.1)
LYMPHOCYTES NFR BLD AUTO: 25.4 % (ref 19.6–45.3)
MCH RBC QN AUTO: 29.2 PG (ref 26.6–33)
MCHC RBC AUTO-ENTMCNC: 34.4 G/DL (ref 31.5–35.7)
MCV RBC AUTO: 84.7 FL (ref 79–97)
MONOCYTES # BLD AUTO: 1.04 10*3/MM3 (ref 0.1–0.9)
MONOCYTES NFR BLD AUTO: 12.6 % (ref 5–12)
NEUTROPHILS # BLD AUTO: 4.78 10*3/MM3 (ref 1.7–7)
NEUTROPHILS NFR BLD AUTO: 58.1 % (ref 42.7–76)
NRBC BLD AUTO-RTO: 0 /100 WBC (ref 0–0.2)
PLATELET # BLD AUTO: 332 10*3/MM3 (ref 140–450)
RBC # BLD AUTO: 4.39 10*6/MM3 (ref 4.14–5.8)
WBC # BLD AUTO: 8.23 10*3/MM3 (ref 3.4–10.8)

## 2022-11-11 ENCOUNTER — OFFICE VISIT (OUTPATIENT)
Dept: FAMILY MEDICINE CLINIC | Facility: CLINIC | Age: 87
End: 2022-11-11

## 2022-11-11 VITALS
TEMPERATURE: 97.1 F | OXYGEN SATURATION: 98 % | BODY MASS INDEX: 26.93 KG/M2 | HEIGHT: 72 IN | HEART RATE: 67 BPM | RESPIRATION RATE: 16 BRPM | DIASTOLIC BLOOD PRESSURE: 58 MMHG | SYSTOLIC BLOOD PRESSURE: 118 MMHG | WEIGHT: 198.8 LBS

## 2022-11-11 DIAGNOSIS — L03.115 CELLULITIS OF RIGHT LOWER EXTREMITY: Primary | ICD-10-CM

## 2022-11-11 DIAGNOSIS — G62.9 NEUROPATHY: ICD-10-CM

## 2022-11-11 DIAGNOSIS — A69.20 ERYTHEMA MIGRANS (LYME DISEASE): ICD-10-CM

## 2022-11-11 DIAGNOSIS — S91.301D OPEN WOUND OF RIGHT FOOT, SUBSEQUENT ENCOUNTER: ICD-10-CM

## 2022-11-11 PROCEDURE — 99213 OFFICE O/P EST LOW 20 MIN: CPT | Performed by: NURSE PRACTITIONER

## 2022-11-11 NOTE — PROGRESS NOTES
Chief Complaint  Cellulitis of the Lower Extremity    Subjective          Kevin Fonseca is a 88 y.o. male who presents today to [facility] for follow up    HPI:   History of Present Illness    Presents to clinic for follow-up on cellulitis and wound check.  Reports legs and wounds are looking a lot better. Had previously referred patient to podiatry but today patient reports he has an appointment scheduled with Dr. Orta at [facility] on 12/27/22.  No other issues or concerns at this time.      The following portions of the patient's history were reviewed and updated as appropriate: allergies, current medications, past family history, past medical history, past social history, past surgical history and problem list.    Objective     Allergy:   No Known Allergies     Current Medications:   Current Outpatient Medications   Medication Sig Dispense Refill   • albuterol sulfate  (90 Base) MCG/ACT inhaler Inhale 2 puffs Every 4 (Four) Hours As Needed for Wheezing or Shortness of Air. 18 g 8   • amoxicillin-clavulanate (Augmentin) 875-125 MG per tablet Take 1 tablet by mouth 2 (Two) Times a Day for 10 days. 20 tablet 0   • Budeson-Glycopyrrol-Formoterol (BREZTRI) 160-9-4.8 MCG/ACT aerosol inhaler Inhale 2 puffs 2 (Two) Times a Day. 10.7 g 18   • doxycycline (VIBRAMYCIN) 100 MG capsule Take 1 capsule by mouth 2 (Two) Times a Day for 10 days. 20 capsule 0   • furosemide (Lasix) 20 MG tablet Take 1 tablet by mouth Daily for 3 days. 3 tablet 0   • levothyroxine (SYNTHROID, LEVOTHROID) 75 MCG tablet Take 1 tablet by mouth Daily. 90 tablet 1   • ascorbic acid (VITAMIN C) 1000 MG tablet Take 1 tablet by mouth Daily.     • Omega-3 Fatty Acids (fish oil) 1000 MG capsule capsule Take 1,576 mg by mouth Daily With Breakfast.     • vitamin D3 125 MCG (5000 UT) capsule capsule Take 5,000 Units by mouth Daily.       No current facility-administered medications for this visit.  "      Past Medical History:  Past Medical History:   Diagnosis Date   • Emphysema lung (HCC)    • Gastritis    • Heartburn    • Macular degeneration    • Reflux esophagitis    • Thyroid disease        Past Surgical History:  Past Surgical History:   Procedure Laterality Date   • INTRACAPSULAR CATARACT EXTRACTION      Dr. Lesly Gray. Dr. Neto Light.       Social History:  Social History     Socioeconomic History   • Marital status:    Tobacco Use   • Smoking status: Former     Packs/day: 1.50     Years: 40.00     Pack years: 60.00     Types: Cigarettes     Start date:      Quit date:      Years since quittin.8   • Smokeless tobacco: Never   Vaping Use   • Vaping Use: Never used   Substance and Sexual Activity   • Alcohol use: Not Currently     Comment: 2 week   • Drug use: Never   • Sexual activity: Defer       Family History:  Family History   Problem Relation Age of Onset   • Hypertension Mother    • Other Mother    • Cancer Father    • Cancer Brother    • Asthma Brother        Review of Systems:  Review of Systems   Constitutional: Negative for fever.   Skin: Positive for wound.       Vital Signs:   /58 (BP Location: Right arm, Patient Position: Sitting, Cuff Size: Adult)   Pulse 67   Temp 97.1 °F (36.2 °C) (Temporal)   Resp 16   Ht 182.9 cm (72\")   Wt 90.2 kg (198 lb 12.8 oz)   SpO2 98%   BMI 26.96 kg/m²      Physical Exam:  Physical Exam  Vitals and nursing note reviewed.   Constitutional:       General: He is not in acute distress.     Appearance: Normal appearance. He is not ill-appearing or toxic-appearing.   HENT:      Head: Normocephalic.   Cardiovascular:      Rate and Rhythm: Normal rate and regular rhythm.      Pulses: Normal pulses.      Heart sounds: Murmur heard.   Pulmonary:      Effort: Pulmonary effort is normal. No respiratory distress.      Breath sounds: Normal breath sounds.   Skin:     General: Skin is warm and dry.      Capillary Refill: Capillary " refill takes less than 2 seconds.      Coloration: Skin is not pale.      Comments: Erythema and also rash resolved on left lower extremity.  Marked improvement in erythema to right lower extremity.  Minimal localized edema to right foot.  Wound on plantar surface of right foot has decreased in size, yellow wound base, no drainage.   Neurological:      General: No focal deficit present.      Mental Status: He is alert and oriented to person, place, and time.   Psychiatric:         Mood and Affect: Mood normal.         Behavior: Behavior normal.         Thought Content: Thought content normal.         Judgment: Judgment normal.                  Assessment and Plan   Diagnoses and all orders for this visit:    1. Cellulitis of right lower extremity (Primary)    2. Neuropathy  -     Cancel: Calcium, Ionized  -     Cancel: Magnesium  -     Magnesium  -     Calcium, Ionized  -     Vitamin B12    3. Open wound of right foot, subsequent encounter    4. Erythema migrans (Lyme disease)    1, 3.  Skin and wound care as directed.  Finish antibiotics as prescribed.  Keep follow-up with podiatry.  2.  Further work-up regarding patient's complaint of worsening neuropathy in feet.  4.  Finish antibiotics as prescribed.    Discussed possible differential diagnoses, testing, treatment, recommended non-pharmacological interventions, risks, warning signs to monitor for that would indicate need for follow-up in clinic or ER. If no improvement with these regimens or you have new or worsening symptoms follow-up. Patient verbalizes understanding and agreement with plan of care. Denies further needs or concerns.     Patient was given instructions and counseling regarding her condition and for health maintenance advised.      I spent 20 minutes caring for patient on this date of service. This time includes time spent by me in the following activities: preparing for the visit, reviewing tests, obtaining and/or reviewing a separately obtained  history, performing a medically appropriate examination and/or evaluation, counseling and educating the patient/family/caregiver, ordering medications, tests, or procedures and documenting information in the medical record    Meds ordered during this visit:  No orders of the defined types were placed in this encounter.      Patient Instructions:  Patient instructions given for the following visit diagnosis:    ICD-10-CM ICD-9-CM   1. Cellulitis of right lower extremity  L03.115 682.6   2. Neuropathy  G62.9 355.9   3. Open wound of right foot, subsequent encounter  S91.301D V58.89     892.0   4. Erythema migrans (Lyme disease)  A69.20 088.81       Follow Up   Return for Recheck, Next scheduled follow up with Dr. Geronimo on 11/17/22, Sooner if needed.        This document has been electronically signed by DENICE Lazaro  November 11, 2022 15:25 EST    Patient was given instructions and counseling regarding his condition or for health maintenance advice. Please see specific information pulled into the AVS if appropriate.     Part of this note may be an electronic transcription/translation of spoken language to printed text using the Dragon Dictation System.

## 2022-11-12 LAB
BACTERIA SPEC CULT: ABNORMAL
BACTERIA SPEC CULT: ABNORMAL
CA-I SERPL ISE-MCNC: 5.1 MG/DL (ref 4.5–5.6)
MAGNESIUM SERPL-MCNC: 2.1 MG/DL (ref 1.6–2.4)
MICROORGANISM/AGENT SPEC: ABNORMAL
OTHER ANTIBIOTIC SUSC ISLT: ABNORMAL

## 2022-11-15 ENCOUNTER — TELEPHONE (OUTPATIENT)
Dept: FAMILY MEDICINE CLINIC | Facility: CLINIC | Age: 87
End: 2022-11-15

## 2022-11-15 NOTE — TELEPHONE ENCOUNTER
----- Message from DENICE Lazaro sent at 11/15/2022  7:38 AM EST -----  Please call or send letter to patient regarding results.     Wound culture shows infection. Already treated with antibiotics.  How is he doing?      Spoke with wife she reports the wound is looking better.

## 2022-11-17 ENCOUNTER — OFFICE VISIT (OUTPATIENT)
Dept: FAMILY MEDICINE CLINIC | Facility: CLINIC | Age: 87
End: 2022-11-17

## 2022-11-17 VITALS
TEMPERATURE: 97.8 F | OXYGEN SATURATION: 97 % | DIASTOLIC BLOOD PRESSURE: 54 MMHG | RESPIRATION RATE: 16 BRPM | BODY MASS INDEX: 27.25 KG/M2 | HEART RATE: 77 BPM | SYSTOLIC BLOOD PRESSURE: 104 MMHG | HEIGHT: 72 IN | WEIGHT: 201.2 LBS

## 2022-11-17 DIAGNOSIS — L03.115 CELLULITIS OF RIGHT LOWER EXTREMITY: Primary | ICD-10-CM

## 2022-11-17 DIAGNOSIS — S91.301D OPEN WOUND OF RIGHT FOOT, SUBSEQUENT ENCOUNTER: ICD-10-CM

## 2022-11-17 DIAGNOSIS — R60.0 LOCALIZED EDEMA: ICD-10-CM

## 2022-11-17 PROCEDURE — 99214 OFFICE O/P EST MOD 30 MIN: CPT | Performed by: NURSE PRACTITIONER

## 2022-11-17 RX ORDER — FUROSEMIDE 20 MG/1
20 TABLET ORAL DAILY
Qty: 5 TABLET | Refills: 0 | Status: SHIPPED | OUTPATIENT
Start: 2022-11-17 | End: 2023-01-17

## 2022-11-17 NOTE — PROGRESS NOTES
Chief Complaint  Problems with Feet    Subjective          Kevin Fonseca is a 88 y.o. male who presents today to [facility] for follow up    HPI:   History of Present Illness      Presents to clinic for follow-up on cellulitis and wound check.  Reports legs and wounds are looking a lot better.  Wound to left lower extremity healed.  Wound to bottom of right foot improving.  Erythema, tenderness, and warmth to BLE resolved.  Reports he is still having a little swelling in right foot but that has improved.  Reports took 2 doses of Lasix but did not take the third dose as he dropped the pill.  No other issues or concerns at this time.      The following portions of the patient's history were reviewed and updated as appropriate: allergies, current medications, past family history, past medical history, past social history, past surgical history and problem list.    Objective     Allergy:   No Known Allergies     Current Medications:   Current Outpatient Medications   Medication Sig Dispense Refill   • albuterol sulfate  (90 Base) MCG/ACT inhaler Inhale 2 puffs Every 4 (Four) Hours As Needed for Wheezing or Shortness of Air. 18 g 8   • amoxicillin-clavulanate (Augmentin) 875-125 MG per tablet Take 1 tablet by mouth 2 (Two) Times a Day for 10 days. 20 tablet 0   • ascorbic acid (VITAMIN C) 1000 MG tablet Take 1 tablet by mouth Daily.     • Budeson-Glycopyrrol-Formoterol (BREZTRI) 160-9-4.8 MCG/ACT aerosol inhaler Inhale 2 puffs 2 (Two) Times a Day. 10.7 g 18   • furosemide (Lasix) 20 MG tablet Take 1 tablet by mouth Daily for 5 days. 5 tablet 0   • levothyroxine (SYNTHROID, LEVOTHROID) 75 MCG tablet Take 1 tablet by mouth Daily. 90 tablet 1   • Omega-3 Fatty Acids (fish oil) 1000 MG capsule capsule Take 1,576 mg by mouth Daily With Breakfast.     • vitamin D3 125 MCG (5000 UT) capsule capsule Take 5,000 Units by mouth Daily.       No current facility-administered medications for this  "visit.       Past Medical History:  Past Medical History:   Diagnosis Date   • Emphysema lung (HCC)    • Gastritis    • Heartburn    • Macular degeneration    • Reflux esophagitis    • Thyroid disease        Past Surgical History:  Past Surgical History:   Procedure Laterality Date   • INTRACAPSULAR CATARACT EXTRACTION      Dr. Lesly Gray. Dr. Neto Light.       Social History:  Social History     Socioeconomic History   • Marital status:    Tobacco Use   • Smoking status: Former     Packs/day: 1.50     Years: 40.00     Pack years: 60.00     Types: Cigarettes     Start date:      Quit date:      Years since quittin.8   • Smokeless tobacco: Never   Vaping Use   • Vaping Use: Never used   Substance and Sexual Activity   • Alcohol use: Not Currently     Comment: 2 week   • Drug use: Never   • Sexual activity: Defer       Family History:  Family History   Problem Relation Age of Onset   • Hypertension Mother    • Other Mother    • Cancer Father    • Cancer Brother    • Asthma Brother        Review of Systems:  Review of Systems   Constitutional: Negative for fever.   Skin: Positive for wound. Negative for color change.       Vital Signs:   /54 (BP Location: Right arm, Patient Position: Sitting, Cuff Size: Adult)   Pulse 77   Temp 97.8 °F (36.6 °C) (Temporal)   Resp 16   Ht 182.9 cm (72\")   Wt 91.3 kg (201 lb 3.2 oz)   SpO2 97%   BMI 27.29 kg/m²      Physical Exam:  Physical Exam  Vitals and nursing note reviewed.   Constitutional:       General: He is not in acute distress.     Appearance: Normal appearance. He is not ill-appearing or toxic-appearing.   HENT:      Head: Normocephalic.   Cardiovascular:      Rate and Rhythm: Normal rate and regular rhythm.      Heart sounds: Normal heart sounds.   Pulmonary:      Effort: Pulmonary effort is normal. No respiratory distress.      Breath sounds: Normal breath sounds.   Musculoskeletal:         General: Swelling (1+ edema to right " foot ) present.   Skin:     General: Skin is warm and dry.      Coloration: Skin is not pale.      Comments: Wound to bottom of right foot greatly improved   Neurological:      Mental Status: He is alert and oriented to person, place, and time.   Psychiatric:         Mood and Affect: Mood normal.         Behavior: Behavior normal.         Thought Content: Thought content normal.         Judgment: Judgment normal.                  Assessment and Plan   Diagnoses and all orders for this visit:    1. Cellulitis of right lower extremity (Primary)    2. Localized edema  -     furosemide (Lasix) 20 MG tablet; Take 1 tablet by mouth Daily for 5 days.  Dispense: 5 tablet; Refill: 0    3. Open wound of right foot, subsequent encounter    1.  Resolved  2.  Low-sodium DASH diet.  Keep legs elevated.  Lasix 20 mg once daily PRN swelling x5 doses.  3.  Wound care as directed.  Keep follow-up appointment as scheduled with Dr. Orta at [facility] on 12/27/22.        Discussed possible differential diagnoses, testing, treatment, recommended non-pharmacological interventions, risks, warning signs to monitor for that would indicate need for follow-up in clinic or ER. If no improvement with these regimens or you have new or worsening symptoms follow-up. Patient verbalizes understanding and agreement with plan of care. Denies further needs or concerns.     Patient was given instructions and counseling regarding her condition and for health maintenance advised.    BMI is >= 25 and <30. (Overweight) The following options were offered after discussion;: weight loss educational material (shared in after visit summary), exercise counseling/recommendations and nutrition counseling/recommendations       I spent 30 minutes caring for patient on this date of service. This time includes time spent by me in the following activities: preparing for the visit, reviewing tests, obtaining and/or reviewing a separately obtained history,  performing a medically appropriate examination and/or evaluation, counseling and educating the patient/family/caregiver, ordering medications, tests, or procedures and documenting information in the medical record    Meds ordered during this visit:  New Medications Ordered This Visit   Medications   • furosemide (Lasix) 20 MG tablet     Sig: Take 1 tablet by mouth Daily for 5 days.     Dispense:  5 tablet     Refill:  0       Patient Instructions:  Patient instructions given for the following visit diagnosis:    ICD-10-CM ICD-9-CM   1. Cellulitis of right lower extremity  L03.115 682.6   2. Localized edema  R60.0 782.3   3. Open wound of right foot, subsequent encounter  S91.301D V58.89     892.0       Follow Up   Return for Next scheduled follow up with PCP on 12/6/22 , Sooner if needed.        This document has been electronically signed by DENICE Lazaro  November 17, 2022 15:33 EST    Patient was given instructions and counseling regarding his condition or for health maintenance advice. Please see specific information pulled into the AVS if appropriate.     Part of this note may be an electronic transcription/translation of spoken language to printed text using the Dragon Dictation System.

## 2022-11-29 ENCOUNTER — APPOINTMENT (OUTPATIENT)
Dept: ULTRASOUND IMAGING | Facility: HOSPITAL | Age: 87
End: 2022-11-29

## 2022-12-06 ENCOUNTER — OFFICE VISIT (OUTPATIENT)
Dept: FAMILY MEDICINE CLINIC | Facility: CLINIC | Age: 87
End: 2022-12-06

## 2022-12-06 VITALS
HEIGHT: 72 IN | HEART RATE: 66 BPM | TEMPERATURE: 96.9 F | DIASTOLIC BLOOD PRESSURE: 64 MMHG | OXYGEN SATURATION: 100 % | SYSTOLIC BLOOD PRESSURE: 128 MMHG | BODY MASS INDEX: 27.28 KG/M2 | WEIGHT: 201.4 LBS

## 2022-12-06 DIAGNOSIS — J44.9 CHRONIC OBSTRUCTIVE PULMONARY DISEASE, UNSPECIFIED COPD TYPE: ICD-10-CM

## 2022-12-06 DIAGNOSIS — D22.9 CHANGE IN SKIN MOLE: Primary | ICD-10-CM

## 2022-12-06 DIAGNOSIS — L03.115 CELLULITIS OF RIGHT LOWER EXTREMITY: ICD-10-CM

## 2022-12-06 PROCEDURE — 99214 OFFICE O/P EST MOD 30 MIN: CPT | Performed by: FAMILY MEDICINE

## 2022-12-06 NOTE — PROGRESS NOTES
"Kevin Fonseca     VITALS: Blood pressure 128/64, pulse 66, temperature 96.9 °F (36.1 °C), height 182.9 cm (72\"), weight 91.4 kg (201 lb 6.4 oz), SpO2 100 %.    Subjective  Chief Complaint  Cellulitis of right lower extremity    Subjective          History of Present Illness:  Patient is a 88 y.o.  male with medical conditions significant for sick sinus syndrome, hypothyroidism, and COPD who presents to clinic secondary to medical followup.  He is doing better.    The patient states that his breathing has been doing well. He reports that [facility]. He notes he has been experiencing swelling in his feet and ankles for a while. His wife admits when he first came in to see DENICE Lazaro, his right foot was significantly swollen from the knee down and there was no definition. His wife adds the right lower extremity was covered in redness, blisters, and a few raw sores. His wife reports that the bottom of his foot had big white skin and it was open. He states they are better now. His wife reports he has redressed and dressed them twice daily. He admits he has sensation in his feet. He had a meeting with Dr. Blanton for his yearly visit via telephone. He is unable to drive due to his eyesight.       No complaints about any of the medications.    The following portions of the patient's history were reviewed and updated as appropriate: allergies, current medications, past family history, past medical history, past social history, past surgical history and problem list.    Past Medical History  Past Medical History:   Diagnosis Date   • Emphysema lung (HCC)    • Gastritis    • Heartburn    • Macular degeneration    • Reflux esophagitis    • Thyroid disease        Surgical History  Past Surgical History:   Procedure Laterality Date   • INTRACAPSULAR CATARACT EXTRACTION      Dr. Lesly Gray. Dr. Neto Light.       Family History  Family History   Problem Relation Age of " "Onset   • Hypertension Mother    • Other Mother    • Cancer Father    • Cancer Brother    • Asthma Brother        Social History  Social History     Socioeconomic History   • Marital status:    Tobacco Use   • Smoking status: Former     Packs/day: 1.50     Years: 40.00     Pack years: 60.00     Types: Cigarettes     Start date:      Quit date:      Years since quittin.9   • Smokeless tobacco: Never   Vaping Use   • Vaping Use: Never used   Substance and Sexual Activity   • Alcohol use: Not Currently     Comment: 2 week   • Drug use: Never   • Sexual activity: Defer       Objective   Vital Signs:   /64 (BP Location: Right arm, Patient Position: Sitting, Cuff Size: Adult)   Pulse 66   Temp 96.9 °F (36.1 °C)   Ht 182.9 cm (72\")   Wt 91.4 kg (201 lb 6.4 oz)   SpO2 100%   BMI 27.31 kg/m²     Physical Exam     Gen: Patient in NAD. Pleasant and answers appropriately. A&Ox3.    Skin: Warm and dry with normal turgor. No purpura, rashes, or unusual pigmentation noted. Hair is normal in appearance and distribution.  Several seborrheic dermatoses scattered over body.    HEENT: NC/AT. No lesions noted. Conjunctiva clear, sclera nonicteric. PERRL. EOMI without nystagmus or strabismus. Fundi appear benign. No hemorrhages or exudates of eyes. Auditory canals are patent bilaterally without lesions. TMs intact,  nonerythematous, nonbulging without lesions. Nasal mucosa pink, nonerythematous, and nonedematous. Frontal and maxillary sinuses are nontender. O/P nonerythematous and moist without exudate.    Neck: Supple without lymph nodes palpated. FROM.     Lungs: Slightly decreased B/L without rales, rhonchi, crackles, or wheezes.    Heart: Distant.  RRR. S1 and S2 normal. No S3 or S4. No MRGT.    Abd: Soft, nontender,nondistended. (+)BSx4 quadrants.     Extrem: No CC.  +1/4 slightly erythematous edema over right lower extremity.  Radial pulses 2+/4 and equal B/L. FROMx4. No bone, joint, or muscle " "tenderness noted.    Neuro: No focal motor/sensory deficits.    Cryotherapy, Skin Lesion    Date/Time: 12/6/2022 5:30 PM  Performed by: Elissa Geronimo MD  Authorized by: Elissa Geronimo MD   Consent: Verbal consent obtained.  Risks and benefits: risks, benefits and alternatives were discussed  Consent given by: patient  Patient understanding: patient states understanding of the procedure being performed  Patient consent: the patient's understanding of the procedure matches consent given  Procedure consent: procedure consent matches procedure scheduled  Patient identity confirmed: verbally with patient  Time out: Immediately prior to procedure a \"time out\" was called to verify the correct patient, procedure, equipment, support staff and site/side marked as required.  Local anesthesia used: no    Anesthesia:  Local anesthesia used: no    Sedation:  Patient sedated: no    Patient tolerance: patient tolerated the procedure well with no immediate complications  Comments: After discussion of the risks, benefits, and alternative therapies available, the patient elected to proceed.  The lesions were treated using liquid nitrogen spray gun for 6 second per cycle, 3 cycles total. The patient tolerated the procedure well and there were no complications.  Wound hygiene discussed.                Assessment and Plan    Kevin Fonseca is a 88 y.o. here for medical followup.    Diagnoses and all orders for this visit:    1. Change in skin mole (Primary)  See above.    2. Chronic obstructive pulmonary disease, unspecified COPD type (HCC)  Patient to continue his Breztri.    3. Cellulitis of right lower extremity  Much improved.      BMI is >= 25 and <30. (Overweight) The following options were offered after discussion;: exercise counseling/recommendations and nutrition counseling/recommendations                 Follow Up   Return in about 2 months (around 2/6/2023).  Findings and plans discussed with patient who verbalizes " understanding and agreement. Will followup with patient once results are in. Patient was given instructions and counseling regarding his condition or for health maintenance advice. Please see specific information pulled into the AVS if appropriate.       Elissa Geronimo MD     Transcribed from ambient dictation for Elissa Geronimo MD by Emelyn Farrell.  12/06/22   18:06 EST    Patient or patient representative verbalized consent to the visit recording.  I have personally performed the services described in this document as transcribed by the above individual, and it is both accurate and complete.

## 2022-12-13 ENCOUNTER — HOSPITAL ENCOUNTER (OUTPATIENT)
Dept: ULTRASOUND IMAGING | Facility: HOSPITAL | Age: 87
Discharge: HOME | End: 2022-12-13
Admitting: NURSE PRACTITIONER

## 2022-12-13 DIAGNOSIS — S91.301D OPEN WOUND OF RIGHT FOOT, SUBSEQUENT ENCOUNTER: ICD-10-CM

## 2022-12-13 DIAGNOSIS — G62.9 NEUROPATHY: ICD-10-CM

## 2022-12-13 DIAGNOSIS — R60.0 LOCALIZED EDEMA: ICD-10-CM

## 2022-12-13 DIAGNOSIS — I73.9 PERIPHERAL VASCULAR DISEASE, UNSPECIFIED: ICD-10-CM

## 2022-12-13 PROCEDURE — 93923 UPR/LXTR ART STDY 3+ LVLS: CPT

## 2022-12-13 PROCEDURE — 93923 UPR/LXTR ART STDY 3+ LVLS: CPT | Performed by: RADIOLOGY

## 2022-12-14 ENCOUNTER — TELEPHONE (OUTPATIENT)
Dept: FAMILY MEDICINE CLINIC | Facility: CLINIC | Age: 87
End: 2022-12-14

## 2022-12-14 NOTE — TELEPHONE ENCOUNTER
----- Message from DENICE Lazaro sent at 12/14/2022 11:37 AM EST -----  Please call patient or mail letter regarding results.     SAULO results okay      Patient notified.

## 2023-01-17 ENCOUNTER — OFFICE VISIT (OUTPATIENT)
Dept: PULMONOLOGY | Facility: CLINIC | Age: 88
End: 2023-01-17
Payer: OTHER GOVERNMENT

## 2023-01-17 VITALS
TEMPERATURE: 97.8 F | SYSTOLIC BLOOD PRESSURE: 122 MMHG | HEART RATE: 65 BPM | HEIGHT: 72 IN | OXYGEN SATURATION: 98 % | WEIGHT: 200 LBS | DIASTOLIC BLOOD PRESSURE: 62 MMHG | BODY MASS INDEX: 27.09 KG/M2

## 2023-01-17 DIAGNOSIS — Z87.891 FORMER CIGARETTE SMOKER: ICD-10-CM

## 2023-01-17 DIAGNOSIS — J44.9 CHRONIC OBSTRUCTIVE PULMONARY DISEASE, UNSPECIFIED COPD TYPE: Primary | ICD-10-CM

## 2023-01-17 DIAGNOSIS — G47.34 NOCTURNAL HYPOXIA: ICD-10-CM

## 2023-01-17 PROCEDURE — 99214 OFFICE O/P EST MOD 30 MIN: CPT | Performed by: PHYSICIAN ASSISTANT

## 2023-02-06 ENCOUNTER — OFFICE VISIT (OUTPATIENT)
Dept: FAMILY MEDICINE CLINIC | Facility: CLINIC | Age: 88
End: 2023-02-06
Payer: MEDICARE

## 2023-02-06 VITALS
TEMPERATURE: 97.1 F | SYSTOLIC BLOOD PRESSURE: 126 MMHG | OXYGEN SATURATION: 96 % | WEIGHT: 195.2 LBS | HEIGHT: 72 IN | BODY MASS INDEX: 26.44 KG/M2 | DIASTOLIC BLOOD PRESSURE: 68 MMHG | HEART RATE: 70 BPM

## 2023-02-06 DIAGNOSIS — K21.9 GASTROESOPHAGEAL REFLUX DISEASE WITHOUT ESOPHAGITIS: ICD-10-CM

## 2023-02-06 DIAGNOSIS — H35.3233 EXUDATIVE AGE-RELATED MACULAR DEGENERATION, BILATERAL, WITH INACTIVE SCAR: ICD-10-CM

## 2023-02-06 DIAGNOSIS — J43.8 OTHER EMPHYSEMA: ICD-10-CM

## 2023-02-06 DIAGNOSIS — I73.89 OTHER SPECIFIED PERIPHERAL VASCULAR DISEASES: ICD-10-CM

## 2023-02-06 DIAGNOSIS — E03.8 OTHER SPECIFIED HYPOTHYROIDISM: Primary | ICD-10-CM

## 2023-02-06 DIAGNOSIS — I49.5 SICK SINUS SYNDROME: ICD-10-CM

## 2023-02-06 PROCEDURE — 99214 OFFICE O/P EST MOD 30 MIN: CPT | Performed by: FAMILY MEDICINE

## 2023-02-06 RX ORDER — LEVOTHYROXINE SODIUM 0.07 MG/1
75 TABLET ORAL DAILY
Qty: 90 TABLET | Refills: 1 | Status: SHIPPED | OUTPATIENT
Start: 2023-02-06

## 2023-02-06 RX ORDER — LEVOTHYROXINE SODIUM 0.07 MG/1
75 TABLET ORAL DAILY
Qty: 90 TABLET | Refills: 1 | Status: SHIPPED | OUTPATIENT
Start: 2023-02-06 | End: 2023-02-06 | Stop reason: SDUPTHER

## 2023-02-06 NOTE — PROGRESS NOTES
"Kevin Fonseca     VITALS: Blood pressure 126/68, pulse 70, temperature 97.1 °F (36.2 °C), height 182.9 cm (72\"), weight 88.5 kg (195 lb 3.2 oz), SpO2 96 %.    Subjective  Chief Complaint  Cellulitis of right lower extremity    Subjective          History of Present Illness:      Patient is a 88-year-old male with medical conditions significant for sick sinus syndrome, hypothyroidism, and COPD who presents to clinic secondary to medical follow-up. He is accompanied by an adult female today.    The adult female has concerns about the patient's ankle and feet staying swollen, and his skin is so thin that he gets injured all the time. She has noticed anxiety as well. She reports that the patient is using his inhaler and takes 2  puffs 2 times a day. The adult female explains that the patient's wounds do not heal really fast on him, and he has a hole in the ball of his right foot. She explains it has been approximately 3 months since he has been treated. The adult female has noticed that the patient has a lot of dead skin that has been falling  off his wound, and there was some edema. The patient complains of spasms in the wound, and is intermittent. The adult female has noticed that the patient has a lot of trouble rinsing his mouth when he is taking his inhalers. The patient will gargle and rinse for approximately an hour, and he feels like he is not doing it properly.      No complaints about any of the medications.    The following portions of the patient's history were reviewed and updated as appropriate: allergies, current medications, past family history, past medical history, past social history, past surgical history and problem list.    Past Medical History  Past Medical History:   Diagnosis Date   • Emphysema lung (HCC)    • Gastritis    • Heartburn    • Macular degeneration    • Reflux esophagitis    • Thyroid disease        Surgical History  Past Surgical History:   Procedure Laterality Date   • INTRACAPSULAR " "CATARACT EXTRACTION      Dr. Lesly Gray. Dr. Neto Light.       Family History  Family History   Problem Relation Age of Onset   • Hypertension Mother    • Other Mother    • Cancer Father    • Cancer Brother    • Asthma Brother        Social History  Social History     Socioeconomic History   • Marital status:    Tobacco Use   • Smoking status: Former     Packs/day: 1.50     Years: 40.00     Pack years: 60.00     Types: Cigarettes     Start date:      Quit date:      Years since quittin.1   • Smokeless tobacco: Never   Vaping Use   • Vaping Use: Never used   Substance and Sexual Activity   • Alcohol use: Not Currently     Comment: 2 week   • Drug use: Never   • Sexual activity: Defer       Objective   Vital Signs:   /68 (BP Location: Right arm, Patient Position: Sitting, Cuff Size: Adult)   Pulse 70   Temp 97.1 °F (36.2 °C)   Ht 182.9 cm (72\")   Wt 88.5 kg (195 lb 3.2 oz)   SpO2 96%   BMI 26.47 kg/m²     Physical Exam     Gen: Patient in NAD. Pleasant and answers appropriately. A&Ox3.    Skin: Warm and dry with normal turgor. No purpura, rashes, or unusual pigmentation noted. Hair is normal in appearance and distribution.    HEENT: NC/AT. No lesions noted. Conjunctiva clear, sclera nonicteric. PERRL. EOMI without nystagmus or strabismus. Fundi appear benign. No hemorrhages or exudates of eyes. Auditory canals are patent bilaterally without lesions. TMs intact,  nonerythematous, nonbulging without lesions. Nasal mucosa pink, nonerythematous, and nonedematous. Frontal and maxillary sinuses are nontender. O/P nonerythematous and moist without exudate.    Neck: Supple without lymph nodes palpated. FROM.     Lungs: Decreased B/L without rales, rhonchi, crackles, or wheezes.    Heart: Irregularly irregular. S1 and S2 normal. No S3 or S4. No MRGT.    Abd: Soft, nontender,nondistended. (+)BSx4 quadrants.     Extrem: No CCE. Radial pulses 2+/4 and equal B/L. FROMx4. No bone, joint, or " muscle tenderness noted.  Right foot: Small open wound open to the subcutaneous tissue under the right big toe.  The area is nonerythematous, nonedematous without any discharge or increased warmth.    Neuro: No focal motor/sensory deficits.    Procedures       Assessment and Plan    Kevin Fonseca is a 88 y.o. here for medical followup.    Diagnoses and all orders for this visit:    1. Other specified hypothyroidism (Primary)  -     Discontinue: levothyroxine (SYNTHROID, LEVOTHROID) 75 MCG tablet; Take 1 tablet by mouth Daily.  Dispense: 90 tablet; Refill: 1  -     levothyroxine (SYNTHROID, LEVOTHROID) 75 MCG tablet; Take 1 tablet by mouth Daily.  Dispense: 90 tablet; Refill: 1    2. Gastroesophageal reflux disease without esophagitis  No current medications.    3. Other emphysema (HCC)  -     Budeson-Glycopyrrol-Formoterol (BREZTRI) 160-9-4.8 MCG/ACT aerosol inhaler; Inhale 2 puffs 2 (Two) Times a Day.  Dispense: 10.7 g; Refill: 18    4. Exudative age-related macular degeneration, bilateral, with inactive scar (HCC)  Per ophthalmology.    5. Other specified peripheral vascular diseases (HCC)  Encouraged exercise.      6. Sick sinus syndrome (HCC)  Continue to monitor.        BMI is >= 25 and <30. (Overweight) The following options were offered after discussion;: exercise counseling/recommendations and nutrition counseling/recommendations           Follow Up   Return in about 3 months (around 5/6/2023).  Findings and plans discussed with patient who verbalizes understanding and agreement. Will followup with patient once results are in. Patient was given instructions and counseling regarding his condition or for health maintenance advice. Please see specific information pulled into the AVS if appropriate.       Elissa Geronimo MD      Transcribed from ambient dictation for Elissa Geronimo MD by Emelyn Farrell.  02/06/23   16:37 EST    Patient or patient representative verbalized consent to the visit recording.  I  have personally performed the services described in this document as transcribed by the above individual, and it is both accurate and complete.

## 2023-04-17 ENCOUNTER — OFFICE VISIT (OUTPATIENT)
Dept: FAMILY MEDICINE CLINIC | Facility: CLINIC | Age: 88
End: 2023-04-17
Payer: MEDICARE

## 2023-04-17 ENCOUNTER — HOSPITAL ENCOUNTER (OUTPATIENT)
Dept: GENERAL RADIOLOGY | Facility: HOSPITAL | Age: 88
Discharge: HOME | End: 2023-04-17
Admitting: FAMILY MEDICINE
Payer: MEDICARE

## 2023-04-17 VITALS
HEART RATE: 66 BPM | TEMPERATURE: 96.8 F | DIASTOLIC BLOOD PRESSURE: 64 MMHG | BODY MASS INDEX: 25.33 KG/M2 | HEIGHT: 72 IN | SYSTOLIC BLOOD PRESSURE: 102 MMHG | WEIGHT: 187 LBS | OXYGEN SATURATION: 97 %

## 2023-04-17 DIAGNOSIS — H35.3233 EXUDATIVE AGE-RELATED MACULAR DEGENERATION, BILATERAL, WITH INACTIVE SCAR: ICD-10-CM

## 2023-04-17 DIAGNOSIS — M54.50 ACUTE BILATERAL LOW BACK PAIN WITHOUT SCIATICA: Primary | ICD-10-CM

## 2023-04-17 DIAGNOSIS — E03.8 OTHER SPECIFIED HYPOTHYROIDISM: ICD-10-CM

## 2023-04-17 LAB
BILIRUB BLD-MCNC: NEGATIVE MG/DL
CLARITY, POC: CLEAR
COLOR UR: YELLOW
EXPIRATION DATE: ABNORMAL
GLUCOSE UR STRIP-MCNC: NEGATIVE MG/DL
KETONES UR QL: NEGATIVE
LEUKOCYTE EST, POC: NEGATIVE
Lab: ABNORMAL
NITRITE UR-MCNC: NEGATIVE MG/ML
PH UR: 6 [PH] (ref 5–8)
PROT UR STRIP-MCNC: NEGATIVE MG/DL
RBC # UR STRIP: NEGATIVE /UL
SP GR UR: 1.01 (ref 1–1.03)
UROBILINOGEN UR QL: ABNORMAL

## 2023-04-17 PROCEDURE — 72170 X-RAY EXAM OF PELVIS: CPT | Performed by: RADIOLOGY

## 2023-04-17 PROCEDURE — 85025 COMPLETE CBC W/AUTO DIFF WBC: CPT | Performed by: FAMILY MEDICINE

## 2023-04-17 PROCEDURE — 72100 X-RAY EXAM L-S SPINE 2/3 VWS: CPT

## 2023-04-17 PROCEDURE — 72170 X-RAY EXAM OF PELVIS: CPT

## 2023-04-17 PROCEDURE — 36415 COLL VENOUS BLD VENIPUNCTURE: CPT | Performed by: FAMILY MEDICINE

## 2023-04-17 PROCEDURE — 81003 URINALYSIS AUTO W/O SCOPE: CPT | Performed by: FAMILY MEDICINE

## 2023-04-17 PROCEDURE — 72100 X-RAY EXAM L-S SPINE 2/3 VWS: CPT | Performed by: RADIOLOGY

## 2023-04-17 PROCEDURE — 99214 OFFICE O/P EST MOD 30 MIN: CPT | Performed by: FAMILY MEDICINE

## 2023-04-17 RX ORDER — LEVOTHYROXINE SODIUM 0.07 MG/1
75 TABLET ORAL DAILY
COMMUNITY
Start: 2023-02-06 | End: 2023-04-17

## 2023-04-17 RX ORDER — METHOCARBAMOL 500 MG/1
500 TABLET, FILM COATED ORAL 2 TIMES DAILY
Qty: 30 TABLET | Refills: 0 | Status: SHIPPED | OUTPATIENT
Start: 2023-04-17

## 2023-04-17 NOTE — PROGRESS NOTES
"Kevin Fonseca     VITALS: Blood pressure 102/64, pulse 66, temperature 96.8 °F (36 °C), height 182.9 cm (72\"), weight 84.8 kg (187 lb), SpO2 97 %.    Subjective  Chief Complaint  Fall    Subjective          History of Present Illness:  The patient is a 88 y.o.  male with medical conditions significant for chronic obstructive pulmonary disease and hypothyroidism who presents to the clinic for medical follow-up. He recently had a fall. He is accompanied by an adult female on 04/17/2023.     He was standing up and there was a step and then he went down to the floor of the garage. He was on the step and he was going out and something fell. He started to grab it and then another thing fell over, therefore he reached down to pick it up when he went right off on his head. He landed on his side, head first. He did not lose consciousness and denies any memory changes, nausea, vomiting, slurring of his speech, or stumbling. He has experienced pain in his back and side. He was unable to get down with his leg and then he was unable to get up. It took him a while to get really to hear him. He got back up on the step and sat there and finally raised himself to where he could sit on the step when they came out and helped him get up into the house. He is sore and bruised. He is not dizzy and he is steady. He experienced blisters this past week that happened on 04/09/2023. He took a couple of aspirin and while he was resting, he looked up and he was supposed to put ice on it. He would like a muscle relaxer.    No complaints about any of the medications currently prescribed.      The following portions of the patient's history were reviewed and updated as appropriate: allergies, current medications, past family history, past medical history, past social history, past surgical history and problem list.    Past Medical History  Past Medical History:   Diagnosis Date   • Emphysema lung    • Gastritis    • Heartburn    • Macular " "degeneration    • Reflux esophagitis    • Thyroid disease        Surgical History  Past Surgical History:   Procedure Laterality Date   • INTRACAPSULAR CATARACT EXTRACTION      Dr. Lesly Gray. Dr. Neto Light.       Family History  Family History   Problem Relation Age of Onset   • Hypertension Mother    • Other Mother    • Cancer Father    • Cancer Brother    • Asthma Brother        Social History  Social History     Socioeconomic History   • Marital status:    Tobacco Use   • Smoking status: Former     Packs/day: 1.50     Years: 40.00     Pack years: 60.00     Types: Cigarettes     Start date:      Quit date:      Years since quittin.3   • Smokeless tobacco: Never   Vaping Use   • Vaping Use: Never used   Substance and Sexual Activity   • Alcohol use: Not Currently     Comment: 2 week   • Drug use: Never   • Sexual activity: Defer       Objective   Vital Signs:   /64 (BP Location: Right arm, Patient Position: Sitting, Cuff Size: Adult)   Pulse 66   Temp 96.8 °F (36 °C)   Ht 182.9 cm (72\")   Wt 84.8 kg (187 lb)   SpO2 97%   BMI 25.36 kg/m²     Physical Exam     Gen: Patient in NAD. Pleasant and answers appropriately. A&Ox3.    Skin: Warm and dry with normal turgor. No purpura, rashes, or unusual pigmentation noted. Hair is normal in appearance and distribution.  Several diffuse bruising.    HEENT: NC/AT. No lesions noted. Conjunctiva clear, sclera nonicteric. PERRL. EOMI without nystagmus or strabismus. Fundi appear benign. No hemorrhages or exudates of eyes. Auditory canals are patent bilaterally without lesions. TMs intact,  nonerythematous, nonbulging without lesions. Nasal mucosa pink, nonerythematous, and nonedematous. Frontal and maxillary sinuses are nontender. O/P nonerythematous and moist without exudate.    Neck: Supple without lymph nodes palpated. FROM.     Lungs: Decreased B/L without rales, rhonchi, crackles, or wheezes.    Heart: RRR. S1 and S2 normal. No S3 or " S4. No MRGT.    Abd: Soft, nontender,nondistended. (+)BSx4 quadrants.     Extrem: No CCE. Radial pulses 2+/4 and equal B/L. FROMx4.  Positive bone and joint tenderness noted.    Neuro: No focal motor/sensory deficits.    Procedures         Assessment and Plan    Kevin Fonseca is a 88 y.o. here for medical followup.    Diagnoses and all orders for this visit:    1. Acute bilateral low back pain without sciatica (Primary)  -     XR Spine Lumbar 2 or 3 View  -     XR Pelvis 1 or 2 View  -     methocarbamol (Robaxin) 500 MG tablet; Take 1 tablet by mouth 2 (Two) Times a Day.  Dispense: 30 tablet; Refill: 0  -     CBC Auto Differential; Future  -     POCT urinalysis dipstick, automated  -     CBC Auto Differential    2. Exudative age-related macular degeneration, bilateral, with inactive scar    3. Other specified hypothyroidism    Continues on Synthroid 75 mcg orally daily.    BMI is >= 25 and <30. (Overweight) The following options were offered after discussion;: exercise counseling/recommendations and nutrition counseling/recommendations              Follow Up   Return (already has appt)LABS.  Findings and plans discussed with patient who verbalizes understanding and agreement. Will followup with patient once results are in. Patient was given instructions and counseling regarding his condition or for health maintenance advice. Please see specific information pulled into the AVS if appropriate.         Transcribed from ambient dictation for Elissa Geronimo MD by Theodora Hammer.  04/17/23   16:14 EDT        Elissa Geronimo MD

## 2023-04-18 LAB
BASOPHILS # BLD AUTO: 0.08 10*3/MM3 (ref 0–0.2)
BASOPHILS NFR BLD AUTO: 0.9 % (ref 0–1.5)
DEPRECATED RDW RBC AUTO: 38.5 FL (ref 37–54)
EOSINOPHIL # BLD AUTO: 0.3 10*3/MM3 (ref 0–0.4)
EOSINOPHIL NFR BLD AUTO: 3.3 % (ref 0.3–6.2)
ERYTHROCYTE [DISTWIDTH] IN BLOOD BY AUTOMATED COUNT: 12.1 % (ref 12.3–15.4)
HCT VFR BLD AUTO: 32.6 % (ref 37.5–51)
HGB BLD-MCNC: 11.1 G/DL (ref 13–17.7)
IMM GRANULOCYTES # BLD AUTO: 0.06 10*3/MM3 (ref 0–0.05)
IMM GRANULOCYTES NFR BLD AUTO: 0.7 % (ref 0–0.5)
LYMPHOCYTES # BLD AUTO: 2.33 10*3/MM3 (ref 0.7–3.1)
LYMPHOCYTES NFR BLD AUTO: 25.6 % (ref 19.6–45.3)
MCH RBC QN AUTO: 29.4 PG (ref 26.6–33)
MCHC RBC AUTO-ENTMCNC: 34 G/DL (ref 31.5–35.7)
MCV RBC AUTO: 86.5 FL (ref 79–97)
MONOCYTES # BLD AUTO: 1.21 10*3/MM3 (ref 0.1–0.9)
MONOCYTES NFR BLD AUTO: 13.3 % (ref 5–12)
NEUTROPHILS NFR BLD AUTO: 5.11 10*3/MM3 (ref 1.7–7)
NEUTROPHILS NFR BLD AUTO: 56.2 % (ref 42.7–76)
NRBC BLD AUTO-RTO: 0 /100 WBC (ref 0–0.2)
PLATELET # BLD AUTO: 366 10*3/MM3 (ref 140–450)
PMV BLD AUTO: 10 FL (ref 6–12)
RBC # BLD AUTO: 3.77 10*6/MM3 (ref 4.14–5.8)
WBC NRBC COR # BLD: 9.09 10*3/MM3 (ref 3.4–10.8)

## 2023-04-25 ENCOUNTER — OFFICE VISIT (OUTPATIENT)
Dept: CARDIOLOGY | Facility: CLINIC | Age: 88
End: 2023-04-25
Payer: MEDICARE

## 2023-04-25 VITALS
RESPIRATION RATE: 16 BRPM | SYSTOLIC BLOOD PRESSURE: 112 MMHG | DIASTOLIC BLOOD PRESSURE: 54 MMHG | HEIGHT: 71 IN | WEIGHT: 187.4 LBS | BODY MASS INDEX: 26.23 KG/M2 | OXYGEN SATURATION: 97 % | HEART RATE: 65 BPM

## 2023-04-25 DIAGNOSIS — R06.09 DYSPNEA ON EXERTION: ICD-10-CM

## 2023-04-25 DIAGNOSIS — I45.5 SINUS PAUSE: ICD-10-CM

## 2023-04-25 DIAGNOSIS — I71.40 ABDOMINAL AORTIC ANEURYSM (AAA) WITHOUT RUPTURE, UNSPECIFIED PART: Primary | ICD-10-CM

## 2023-04-25 PROCEDURE — 93000 ELECTROCARDIOGRAM COMPLETE: CPT | Performed by: PHYSICIAN ASSISTANT

## 2023-04-25 PROCEDURE — 99214 OFFICE O/P EST MOD 30 MIN: CPT | Performed by: PHYSICIAN ASSISTANT

## 2023-04-25 NOTE — PROGRESS NOTES
Elissa Geronimo MD  Kevin Fonseca  1934  04/25/2023    Patient Active Problem List   Diagnosis   • Chronic obstructive pulmonary disease   • Former cigarette smoker   • Nocturnal hypoxia   • Sinus pause   • Hypothyroidism   • Exudative age-related macular degeneration, bilateral, with inactive scar   • Other specified peripheral vascular diseases   • Sick sinus syndrome   • BENNIE (obstructive sleep apnea)       Dear Elissa Geronimo MD:    Subjective     History of Present Illness:    Chief Complaint   Patient presents with   • Follow-up     1 year   • Edema     Feet/ankles   • Med Management     verbal       Kevin Fonseca is a pleasant 88 y.o. male with a past medical history significant for sinus pauses with longest being 3.17 seconds but completely asymptomatic. He denies history of CAD. He does have history of COPD secondary to tobacco abuse but hasn't smoked in 25 years. He comes in today for cardiology follow up.     Jack did recently suffer a fall he had  walked down 1 step in his garage when something on a shelf had fallen when he tried to catch this he had lost his balance and fell on his buttocks.  He did have significant bruising that is now improving.  He did 1 week later see his PCP who did order x-rays which thankfully did not show any hip fracture but did reveal an L2 vertebral body central superior endplate compression deformity of indeterminate age as well as an incidental finding of an aortic aneurysm measuring 4.4 cm.  He does deny any loss of consciousness or syncopal episodes.    No Known Allergies:      Current Outpatient Medications:   •  albuterol sulfate  (90 Base) MCG/ACT inhaler, Inhale 2 puffs Every 4 (Four) Hours As Needed for Wheezing or Shortness of Air., Disp: 18 g, Rfl: 8  •  ascorbic acid (VITAMIN C) 1000 MG tablet, Take 1 tablet by mouth Daily., Disp: , Rfl:   •  Budeson-Glycopyrrol-Formoterol (BREZTRI) 160-9-4.8 MCG/ACT aerosol inhaler, Inhale 2 puffs 2 (Two) Times a  "Day., Disp: 10.7 g, Rfl: 18  •  levothyroxine (SYNTHROID, LEVOTHROID) 75 MCG tablet, Take 1 tablet by mouth Daily., Disp: 90 tablet, Rfl: 1  •  methocarbamol (Robaxin) 500 MG tablet, Take 1 tablet by mouth 2 (Two) Times a Day., Disp: 30 tablet, Rfl: 0  •  Omega-3 Fatty Acids (fish oil) 1000 MG capsule capsule, Take 1,576 mg by mouth Daily With Breakfast., Disp: , Rfl:   •  vitamin D3 125 MCG (5000 UT) capsule capsule, Take 1 capsule by mouth Daily., Disp: , Rfl:     The following portions of the patient's history were reviewed and updated as appropriate: allergies, current medications, past family history, past medical history, past social history, past surgical history and problem list.    Social History     Tobacco Use   • Smoking status: Former     Packs/day: 1.50     Years: 40.00     Pack years: 60.00     Types: Cigarettes     Start date:      Quit date:      Years since quittin.3   • Smokeless tobacco: Never   Vaping Use   • Vaping Use: Never used   Substance Use Topics   • Alcohol use: Not Currently     Comment: 2 week   • Drug use: Never         Objective   Vitals:    23 1351   BP: 112/54   Pulse: 65   Resp: 16   SpO2: 97%   Weight: 85 kg (187 lb 6.4 oz)   Height: 180.3 cm (71\")     Body mass index is 26.14 kg/m².    Constitutional:       General: Not in acute distress.     Appearance: Healthy appearance. Well-developed and not in distress. Not diaphoretic.   Eyes:      Conjunctiva/sclera: Conjunctivae normal.      Pupils: Pupils are equal, round, and reactive to light.   HENT:      Head: Normocephalic and atraumatic.   Neck:      Vascular: No carotid bruit or JVD.   Pulmonary:      Effort: Pulmonary effort is normal. No respiratory distress.      Breath sounds: Normal breath sounds.   Cardiovascular:      Normal rate. Regular rhythm.      Murmurs: There is a grade 3/6 harsh midsystolic murmur at the URSB, radiating to the neck.   Skin:     General: Skin is cool.   Neurological:      Mental " Status: Alert, oriented to person, place, and time and oriented to person, place and time.         Lab Results   Component Value Date     (L) 11/03/2022    K 4.5 11/03/2022    CL 96 (L) 11/03/2022    CO2 25.5 11/03/2022    BUN 15 11/03/2022    CREATININE 0.93 11/03/2022    GLUCOSE 71 11/03/2022    CALCIUM 9.4 11/03/2022    AST 18 11/03/2022    ALT 14 11/03/2022    ALKPHOS 56 11/03/2022    LABIL2 1.8 04/20/2022     No results found for: CKTOTAL  Lab Results   Component Value Date    WBC 9.09 04/17/2023    HGB 11.1 (L) 04/17/2023    HCT 32.6 (L) 04/17/2023     04/17/2023     No results found for: INR  Lab Results   Component Value Date    MG 2.1 11/11/2022     Lab Results   Component Value Date    TSH 1.360 11/03/2022    PSA 2.000 04/12/2021    CHLPL 133 04/20/2022    TRIG 53 04/20/2022    HDL 54 04/20/2022    LDL 67 04/20/2022      No results found for: BNP    During this visit the following were done:  Labs Reviewed []    Labs Ordered []    Radiology Reports Reviewed []    Radiology Ordered []    PCP Records Reviewed []    Referring Provider Records Reviewed []    ER Records Reviewed []    Hospital Records Reviewed []    History Obtained From Family []    Radiology Images Reviewed []    Other Reviewed []    Records Requested []         ECG 12 Lead    Date/Time: 4/25/2023 1:53 PM  Performed by: Benjamin Montez PA-C  Authorized by: Benjamin Montez PA-C   Comparison: compared with previous ECG   Similar to previous ECG  Rhythm: sinus rhythm  Conduction: 1st degree AV block  Conduction comments: Profound 1st degree AV block  ST Segments: ST segments normal    Clinical impression: abnormal EKG            Assessment & Plan    Diagnosis Plan   1. Abdominal aortic aneurysm (AAA) without rupture, unspecified part  US aaa screen limited    Holter Monitor - 72 Hour Up To 15 Days    Adult Transthoracic Echo Complete W/ Cont if Necessary Per Protocol      2. Sinus pause  Holter Monitor - 72 Hour Up To 15 Days     Adult Transthoracic Echo Complete W/ Cont if Necessary Per Protocol      3. Dyspnea on exertion  Holter Monitor - 72 Hour Up To 15 Days    Adult Transthoracic Echo Complete W/ Cont if Necessary Per Protocol               Recommendations:  1. Recent fall with known sinus pauses and first-degree AV block  a. We will evaluate further with Holter monitor as well as echocardiogram given known aortic stenosis.  2. Aortic aneurysm  a. Incidental finding of aortic aneurysm on pelvic x-ray will order abdominal ultrasound.    Return in about 3 months (around 7/25/2023).    As always, I appreciate very much the opportunity to participate in the cardiovascular care of your patients.      With Best Regards,    Benjamin Montez PA-C

## 2023-04-26 ENCOUNTER — OFFICE VISIT (OUTPATIENT)
Dept: PULMONOLOGY | Facility: CLINIC | Age: 88
End: 2023-04-26
Payer: OTHER GOVERNMENT

## 2023-04-26 VITALS
HEART RATE: 62 BPM | WEIGHT: 187 LBS | DIASTOLIC BLOOD PRESSURE: 54 MMHG | RESPIRATION RATE: 18 BRPM | OXYGEN SATURATION: 98 % | BODY MASS INDEX: 25.33 KG/M2 | SYSTOLIC BLOOD PRESSURE: 122 MMHG | TEMPERATURE: 98 F | HEIGHT: 72 IN

## 2023-04-26 DIAGNOSIS — Z87.891 FORMER CIGARETTE SMOKER: ICD-10-CM

## 2023-04-26 DIAGNOSIS — J44.9 CHRONIC OBSTRUCTIVE PULMONARY DISEASE, UNSPECIFIED COPD TYPE: Primary | ICD-10-CM

## 2023-04-26 DIAGNOSIS — G47.34 NOCTURNAL HYPOXIA: ICD-10-CM

## 2023-04-26 PROCEDURE — 99214 OFFICE O/P EST MOD 30 MIN: CPT | Performed by: PHYSICIAN ASSISTANT

## 2023-04-26 NOTE — PROGRESS NOTES
"Chief Complaint  COPD    Subjective        Kevin Fonseca presents to [facility]  History of Present Illness    Mr. Fonseca presents today for follow up of COPD, former smoking history, and nocturnal hypoxia. Also notable for  exposures during his time of services.   Continues with use of Breztri. He is concerned if he has been rinsing his mouth out properly after use. Discussed that he will swish by counting to 5 for 2-3 rounds, but sometimes has difficulty with the last round. He does still have the spacer device for use as needed. Some shortness of breath still noted chronically on exertion, otherwise stable at this time. Continues with nightly oxygen use. Accompanied by his wife today.       Objective   Vital Signs:  /54   Pulse 62   Temp 98 °F (36.7 °C) (Temporal)   Resp 18   Ht 182.9 cm (72\")   Wt 84.8 kg (187 lb)   SpO2 98%   BMI 25.36 kg/m²   Estimated body mass index is 25.36 kg/m² as calculated from the following:    Height as of this encounter: 182.9 cm (72\").    Weight as of this encounter: 84.8 kg (187 lb).         Physical Exam  Vitals reviewed.   Constitutional:       General: He is not in acute distress.     Appearance: He is well-developed. He is not diaphoretic.   HENT:      Head: Normocephalic and atraumatic.   Cardiovascular:      Rate and Rhythm: Normal rate and regular rhythm.      Heart sounds: Normal heart sounds, S1 normal and S2 normal.   Pulmonary:      Effort: Pulmonary effort is normal.      Breath sounds: No wheezing, rhonchi or rales.   Neurological:      Mental Status: He is alert and oriented to person, place, and time.   Psychiatric:         Behavior: Behavior normal.        Result Review :  The following data was reviewed by: Nereyda Velasquez PA-C on 04/26/2023:    Reviewed previous PFT.   Reviewed last echo results.       Assessment and Plan   Diagnoses and all orders for this visit:    1. Chronic obstructive " pulmonary disease, unspecified COPD type (Primary)    2. Nocturnal hypoxia    3. Former cigarette smoker        COPD, Former smoker:  • Continue albuterol inhaler 2 puffs every 4 hours as needed for shortness of breath, wheezing  • Continue Breztri 2 puffs, twice daily  [facility]  Counseled that he could shorted down his swishing/rinsing regimen to avoid becoming choked, and that this should still be adequate.   • Spacer device used for inhalation assistance as needed.   • Previous PFT suggested severe COPD - no urgent indication for repeat due to age/underlying status.      • Does not qualify for low dose CT screening due to years since cessation.  Imaging considered on an as needed basis.            Nocturnal hypoxia:  • Remains compliant with 2 L/min at nighttime  Saturation appropriate on room air.         Follow Up   Return in about 3 months (around 7/27/2023) for Next scheduled follow up.  Patient was given instructions and counseling regarding his condition or for health maintenance advice. Please see specific information pulled into the AVS if appropriate.

## 2023-05-08 ENCOUNTER — OFFICE VISIT (OUTPATIENT)
Dept: FAMILY MEDICINE CLINIC | Facility: CLINIC | Age: 88
End: 2023-05-08
Payer: OTHER GOVERNMENT

## 2023-05-08 VITALS
BODY MASS INDEX: 24.6 KG/M2 | HEART RATE: 71 BPM | TEMPERATURE: 97.3 F | DIASTOLIC BLOOD PRESSURE: 68 MMHG | OXYGEN SATURATION: 98 % | HEIGHT: 72 IN | SYSTOLIC BLOOD PRESSURE: 118 MMHG | WEIGHT: 181.6 LBS

## 2023-05-08 DIAGNOSIS — J44.9 CHRONIC OBSTRUCTIVE PULMONARY DISEASE, UNSPECIFIED COPD TYPE: ICD-10-CM

## 2023-05-08 DIAGNOSIS — M54.40 CHRONIC BILATERAL LOW BACK PAIN WITH SCIATICA, SCIATICA LATERALITY UNSPECIFIED: Primary | ICD-10-CM

## 2023-05-08 DIAGNOSIS — E03.8 OTHER SPECIFIED HYPOTHYROIDISM: ICD-10-CM

## 2023-05-08 DIAGNOSIS — G89.29 CHRONIC BILATERAL LOW BACK PAIN WITH SCIATICA, SCIATICA LATERALITY UNSPECIFIED: Primary | ICD-10-CM

## 2023-05-08 PROCEDURE — 80053 COMPREHEN METABOLIC PANEL: CPT | Performed by: FAMILY MEDICINE

## 2023-05-08 PROCEDURE — 36415 COLL VENOUS BLD VENIPUNCTURE: CPT | Performed by: FAMILY MEDICINE

## 2023-05-08 PROCEDURE — 84443 ASSAY THYROID STIM HORMONE: CPT | Performed by: FAMILY MEDICINE

## 2023-05-08 PROCEDURE — 84439 ASSAY OF FREE THYROXINE: CPT | Performed by: FAMILY MEDICINE

## 2023-05-08 NOTE — PROGRESS NOTES
"Kevin Fonseca     VITALS: Blood pressure 118/68, pulse 71, temperature 97.3 °F (36.3 °C), height 182.9 cm (72\"), weight 82.4 kg (181 lb 9.6 oz), SpO2 98 %.    Subjective  Chief Complaint  Other specified hypothyroidism    Subjective          History of Present Illness:  Patient is a 88 y.o.  male with medical conditions significant for chronic COPD and hypothyroidism who presents to clinic secondary to medical follow-up. He is accompanied by an adult female today.    The patient complains his back pain has not improved since his last visit. The adult female states that the patient is not taking the Robaxin. The patient states he was discouraged as he has tried several medications throughout the years, and nothing has ever worked for his pain. He has continued to use a heating pad on his back, although the one he is using currently has not been working well. They went to buy a new heating pad and were told if a prescription were written, it would be covered, and he would not have to pay out of pocket for it. The adult female states that the patient never goes to sleep without the heating pad. She notes the patient's bruise has resolved but the lump is still present.    The adult female is concerned about the patient's BREZTRI inhaler. The adult female states that the patient has a spacer that he attaches to the inhaler, then after use, brushes his teeth for 15 to 20 minutes, and then he swishes and gargles with water for about 1 hour. The patient mentions he has a sensitive gag reflex.    The adult female states that the patient is wearing a Holter monitor currently and is scheduled to return it tomorrow, 05/09/2023. The adult female states that the patient is scheduled for an echocardiogram.    No complaints about any of the medications.    The following portions of the patient's history were reviewed and updated as appropriate: allergies, current medications, past family history, past medical history, past social " "history, past surgical history and problem list.    Past Medical History  Past Medical History:   Diagnosis Date   • Emphysema lung    • Gastritis    • Heartburn    • Macular degeneration    • Reflux esophagitis    • Thyroid disease        Surgical History  Past Surgical History:   Procedure Laterality Date   • INTRACAPSULAR CATARACT EXTRACTION      Dr. Lesly Gray. Dr. Neto Light.       Family History  Family History   Problem Relation Age of Onset   • Hypertension Mother    • Other Mother    • Cancer Father    • Cancer Brother    • Asthma Brother        Social History  Social History     Socioeconomic History   • Marital status:    Tobacco Use   • Smoking status: Former     Packs/day: 1.50     Years: 40.00     Pack years: 60.00     Types: Cigarettes     Start date:      Quit date:      Years since quittin.3   • Smokeless tobacco: Never   Vaping Use   • Vaping Use: Never used   Substance and Sexual Activity   • Alcohol use: Not Currently     Comment: 2 week   • Drug use: Never   • Sexual activity: Defer       Objective   Vital Signs:   /68 (BP Location: Right arm, Patient Position: Sitting, Cuff Size: Adult)   Pulse 71   Temp 97.3 °F (36.3 °C)   Ht 182.9 cm (72\")   Wt 82.4 kg (181 lb 9.6 oz)   SpO2 98%   BMI 24.63 kg/m²     Physical Exam     Gen: Patient in NAD. Pleasant and answers appropriately. A&Ox3.    Skin: Warm and dry with normal turgor. No purpura, rashes, or unusual pigmentation noted. Hair is normal in appearance and distribution.  Bruising noted diffusely.    HEENT: NC/AT. No lesions noted. Conjunctiva clear, sclera nonicteric. PERRL. EOMI without nystagmus or strabismus. Fundi appear benign. No hemorrhages or exudates of eyes. Auditory canals are patent bilaterally without lesions. TMs intact,  nonerythematous, nonbulging without lesions. Nasal mucosa pink, nonerythematous, and nonedematous. Frontal and maxillary sinuses are nontender. O/P nonerythematous and " moist without exudate.    Neck: Supple without lymph nodes palpated. FROM.     Lungs: Decreased B/L without rales, rhonchi, crackles, or wheezes.    Heart: Distant.  RRR. S1 and S2 normal. No S3 or S4. No MRGT.    Abd: Soft, nontender,nondistended. (+)BSx4 quadrants.     Extrem: No CCE. Radial pulses 2+/4 and equal B/L. FROMx4.  Positive joint tenderness noted.    Back: Decreased range of motion with increased tenderness to the entire back.  Patient is humpbacked    Neuro: No focal motor/sensory deficits.    Procedures    Result Review :   The following data was reviewed by: Elissa Geronimo MD on 05/08/2023:                Assessment and Plan    Kevin Fonseca is a 88 y.o. here for medical follow-up.    Problem List Items Addressed This Visit        Endocrine and Metabolic    Hypothyroidism - Primary    Relevant Orders    Comprehensive Metabolic Panel    T4, Free    TSH       Pulmonary and Pneumonias    Chronic obstructive pulmonary disease   Other Visit Diagnoses     Chronic bilateral low back pain with sciatica, sciatica laterality unspecified          - The patient will be provided with a prescription for a heat pad.    COPD  - The patient was instructed after using his BREZTRI inhaler with the spacer, he can brush his teeth as normal, brush his tongue, and then swish and gargle for 5 to 7 minutes total.      BMI is within normal parameters. No other follow-up for BMI required.              Follow Up   Return in about 3 months (around 8/8/2023), or LABS.  Findings and plans discussed with patient who verbalizes understanding and agreement. Will followup with patient once results are in. Patient was given instructions and counseling regarding his condition or for health maintenance advice. Please see specific information pulled into the AVS if appropriate.       Transcribed from ambient dictation for Elissa Geronimo MD by Melissa Guerrier.  05/08/23   17:14 EDT    Patient or patient representative verbalized  consent to the visit recording.  I have personally performed the services described in this document as transcribed by the above individual, and it is both accurate and complete.

## 2023-05-09 LAB
ALBUMIN SERPL-MCNC: 4.1 G/DL (ref 3.5–5.2)
ALBUMIN/GLOB SERPL: 1.4 G/DL
ALP SERPL-CCNC: 101 U/L (ref 39–117)
ALT SERPL W P-5'-P-CCNC: 12 U/L (ref 1–41)
ANION GAP SERPL CALCULATED.3IONS-SCNC: 7.7 MMOL/L (ref 5–15)
AST SERPL-CCNC: 21 U/L (ref 1–40)
BILIRUB SERPL-MCNC: 0.5 MG/DL (ref 0–1.2)
BUN SERPL-MCNC: 15 MG/DL (ref 8–23)
BUN/CREAT SERPL: 14.6 (ref 7–25)
CALCIUM SPEC-SCNC: 9.5 MG/DL (ref 8.6–10.5)
CHLORIDE SERPL-SCNC: 97 MMOL/L (ref 98–107)
CO2 SERPL-SCNC: 25.3 MMOL/L (ref 22–29)
CREAT SERPL-MCNC: 1.03 MG/DL (ref 0.76–1.27)
EGFRCR SERPLBLD CKD-EPI 2021: 69.9 ML/MIN/1.73
GLOBULIN UR ELPH-MCNC: 2.9 GM/DL
GLUCOSE SERPL-MCNC: 99 MG/DL (ref 65–99)
POTASSIUM SERPL-SCNC: 4.8 MMOL/L (ref 3.5–5.2)
PROT SERPL-MCNC: 7 G/DL (ref 6–8.5)
SODIUM SERPL-SCNC: 130 MMOL/L (ref 136–145)
T4 FREE SERPL-MCNC: 1.57 NG/DL (ref 0.93–1.7)
TSH SERPL DL<=0.05 MIU/L-ACNC: 1.81 UIU/ML (ref 0.27–4.2)

## 2023-05-11 ENCOUNTER — HOSPITAL ENCOUNTER (OUTPATIENT)
Dept: CARDIOLOGY | Facility: HOSPITAL | Age: 88
Discharge: HOME | End: 2023-05-11
Admitting: PHYSICIAN ASSISTANT
Payer: MEDICARE

## 2023-05-11 DIAGNOSIS — R06.09 DYSPNEA ON EXERTION: ICD-10-CM

## 2023-05-11 DIAGNOSIS — I45.5 SINUS PAUSE: ICD-10-CM

## 2023-05-11 DIAGNOSIS — I71.40 ABDOMINAL AORTIC ANEURYSM (AAA) WITHOUT RUPTURE, UNSPECIFIED PART: ICD-10-CM

## 2023-05-11 PROCEDURE — 93306 TTE W/DOPPLER COMPLETE: CPT

## 2023-05-11 PROCEDURE — 93306 TTE W/DOPPLER COMPLETE: CPT | Performed by: INTERNAL MEDICINE

## 2023-05-12 ENCOUNTER — HOSPITAL ENCOUNTER (OUTPATIENT)
Dept: ULTRASOUND IMAGING | Facility: HOSPITAL | Age: 88
Discharge: HOME | End: 2023-05-12
Admitting: PHYSICIAN ASSISTANT
Payer: OTHER GOVERNMENT

## 2023-05-12 PROCEDURE — 76706 US ABDL AORTA SCREEN AAA: CPT

## 2023-05-12 PROCEDURE — 76706 US ABDL AORTA SCREEN AAA: CPT | Performed by: RADIOLOGY

## 2023-05-14 LAB
BH CV ECHO MEAS - AO MAX PG: 21.7 MMHG
BH CV ECHO MEAS - AO MEAN PG: 13 MMHG
BH CV ECHO MEAS - AO V2 MAX: 233 CM/SEC
BH CV ECHO MEAS - AO V2 VTI: 52.1 CM
BH CV ECHO MEAS - AVA(I,D): 1.47 CM2
BH CV ECHO MEAS - EDV(MOD-SP4): 57.6 ML
BH CV ECHO MEAS - EF(MOD-SP4): 68.8 %
BH CV ECHO MEAS - ESV(MOD-SP4): 18 ML
BH CV ECHO MEAS - LAT PEAK E' VEL: 16.9 CM/SEC
BH CV ECHO MEAS - LV DIASTOLIC VOL/BSA (35-75): 28.2 CM2
BH CV ECHO MEAS - LV MAX PG: 6.6 MMHG
BH CV ECHO MEAS - LV MEAN PG: 4 MMHG
BH CV ECHO MEAS - LV SYSTOLIC VOL/BSA (12-30): 8.8 CM2
BH CV ECHO MEAS - LV V1 MAX: 128 CM/SEC
BH CV ECHO MEAS - LV V1 VTI: 24.4 CM
BH CV ECHO MEAS - LVOT AREA: 3.1 CM2
BH CV ECHO MEAS - LVOT DIAM: 2 CM
BH CV ECHO MEAS - MED PEAK E' VEL: 14.4 CM/SEC
BH CV ECHO MEAS - MV A MAX VEL: 32.7 CM/SEC
BH CV ECHO MEAS - MV E MAX VEL: 197 CM/SEC
BH CV ECHO MEAS - MV E/A: 6
BH CV ECHO MEAS - PA ACC TIME: 0.1 SEC
BH CV ECHO MEAS - PA PR(ACCEL): 36.3 MMHG
BH CV ECHO MEAS - RAP SYSTOLE: 10 MMHG
BH CV ECHO MEAS - RVSP: 39.4 MMHG
BH CV ECHO MEAS - SI(MOD-SP4): 19.4 ML/M2
BH CV ECHO MEAS - SV(LVOT): 76.7 ML
BH CV ECHO MEAS - SV(MOD-SP4): 39.6 ML
BH CV ECHO MEAS - TAPSE (>1.6): 1.68 CM
BH CV ECHO MEAS - TR MAX PG: 29.4 MMHG
BH CV ECHO MEAS - TR MAX VEL: 270.5 CM/SEC
BH CV ECHO MEASUREMENTS AVERAGE E/E' RATIO: 12.59
LEFT ATRIUM VOLUME INDEX: 27.5 ML/M2
MAXIMAL PREDICTED HEART RATE: 132 BPM
STRESS TARGET HR: 112 BPM

## 2023-05-22 ENCOUNTER — TELEPHONE (OUTPATIENT)
Dept: FAMILY MEDICINE CLINIC | Facility: CLINIC | Age: 88
End: 2023-05-22
Payer: OTHER GOVERNMENT

## 2023-05-22 NOTE — TELEPHONE ENCOUNTER
PATIENTS WIFE CALLED AND IS UNSURE IF DR. MARCOS WOULD LIKE FOR HER  TO CONTINUE WITH THIS MEDICATION? PATIENT HAS COMPLETED THE CURRENT BOTTLE AND IF NEEDED TO CONTINUE THEY WILL NEED A REFILL SENT TO THE PHARMACY.   methocarbamol (Robaxin) 500 MG tablet

## 2023-05-23 NOTE — TELEPHONE ENCOUNTER
Did it help his muscle pain? Is he still having any pain? (This was given secondary to his fall in the garage)    Spoke with wife & patient he reports he really could not tell that it helped that much if there is something better,still having back pain.

## 2023-05-23 NOTE — TELEPHONE ENCOUNTER
Did it help his muscle pain? Is he still having any pain? (This was given secondary to his fall in the garage)

## 2023-05-24 DIAGNOSIS — G89.29 CHRONIC BILATERAL LOW BACK PAIN WITH SCIATICA, SCIATICA LATERALITY UNSPECIFIED: Primary | ICD-10-CM

## 2023-05-24 DIAGNOSIS — M54.40 CHRONIC BILATERAL LOW BACK PAIN WITH SCIATICA, SCIATICA LATERALITY UNSPECIFIED: Primary | ICD-10-CM

## 2023-05-24 RX ORDER — CYCLOBENZAPRINE HCL 5 MG
5 TABLET ORAL 2 TIMES DAILY PRN
Qty: 60 TABLET | Refills: 0 | Status: SHIPPED | OUTPATIENT
Start: 2023-05-24

## 2023-05-24 NOTE — TELEPHONE ENCOUNTER
I sent him in flexeril 5 mg BID which is stronger. Have him take at night first to make sure it doesn't make him groggy. He can then take it in the AM too if it doesn't give him side effects. If it doesn't work, he can increase to 10 mg BID.

## 2023-05-24 NOTE — TELEPHONE ENCOUNTER
Pt's wife notified and verbalized understanding.    Wife asked if physical therapy would help him?

## 2023-05-30 ENCOUNTER — TREATMENT (OUTPATIENT)
Dept: CARDIOLOGY | Facility: CLINIC | Age: 88
End: 2023-05-30

## 2023-05-30 ENCOUNTER — TELEPHONE (OUTPATIENT)
Dept: FAMILY MEDICINE CLINIC | Facility: CLINIC | Age: 88
End: 2023-05-30

## 2023-05-30 DIAGNOSIS — I45.5 SINUS PAUSE: ICD-10-CM

## 2023-05-30 DIAGNOSIS — I71.40 ABDOMINAL AORTIC ANEURYSM (AAA) WITHOUT RUPTURE, UNSPECIFIED PART: ICD-10-CM

## 2023-05-30 DIAGNOSIS — R06.09 DYSPNEA ON EXERTION: ICD-10-CM

## 2023-05-30 NOTE — TELEPHONE ENCOUNTER
Caller: DILAN JOVEL    Relationship: Emergency Contact    Best call back number: [phone]    What medications are you currently taking:   Current Outpatient Medications on File Prior to Visit   Medication Sig Dispense Refill   • albuterol sulfate  (90 Base) MCG/ACT inhaler Inhale 2 puffs Every 4 (Four) Hours As Needed for Wheezing or Shortness of Air. 18 g 8   • ascorbic acid (VITAMIN C) 1000 MG tablet Take 1 tablet by mouth Daily.     • Budeson-Glycopyrrol-Formoterol (BREZTRI) 160-9-4.8 MCG/ACT aerosol inhaler Inhale 2 puffs 2 (Two) Times a Day. 10.7 g 18   • cyclobenzaprine (FLEXERIL) 5 MG tablet Take 1 tablet by mouth 2 (Two) Times a Day As Needed for Muscle Spasms. 60 tablet 0   • levothyroxine (SYNTHROID, LEVOTHROID) 75 MCG tablet Take 1 tablet by mouth Daily. 90 tablet 1   • methocarbamol (Robaxin) 500 MG tablet Take 1 tablet by mouth 2 (Two) Times a Day. 30 tablet 0   • Omega-3 Fatty Acids (fish oil) 1000 MG capsule capsule Take 1,576 mg by mouth Daily With Breakfast.     • vitamin D3 125 MCG (5000 UT) capsule capsule Take 1 capsule by mouth Daily.       No current facility-administered medications on file prior to visit.          When did you start taking these medications: A FEW DAYS AGO    Which medication are you concerned about: CYCLOBENZAPRINE    Who prescribed you this medication: [facility]    What are your concerns: TIRED, CAN'T KEEP EYES OPEN    How long have you had these concerns: YESTERDAY

## 2023-05-30 NOTE — TELEPHONE ENCOUNTER
Caller: BECKAJENNIEE    Relationship: Emergency Contact    Best call back number: [phone]    What medications are you currently taking:   Current Outpatient Medications on File Prior to Visit   Medication Sig Dispense Refill   • albuterol sulfate  (90 Base) MCG/ACT inhaler Inhale 2 puffs Every 4 (Four) Hours As Needed for Wheezing or Shortness of Air. 18 g 8   • ascorbic acid (VITAMIN C) 1000 MG tablet Take 1 tablet by mouth Daily.     • Budeson-Glycopyrrol-Formoterol (BREZTRI) 160-9-4.8 MCG/ACT aerosol inhaler Inhale 2 puffs 2 (Two) Times a Day. 10.7 g 18   • cyclobenzaprine (FLEXERIL) 5 MG tablet Take 1 tablet by mouth 2 (Two) Times a Day As Needed for Muscle Spasms. 60 tablet 0   • levothyroxine (SYNTHROID, LEVOTHROID) 75 MCG tablet Take 1 tablet by mouth Daily. 90 tablet 1   • methocarbamol (Robaxin) 500 MG tablet Take 1 tablet by mouth 2 (Two) Times a Day. 30 tablet 0   • Omega-3 Fatty Acids (fish oil) 1000 MG capsule capsule Take 1,576 mg by mouth Daily With Breakfast.     • vitamin D3 125 MCG (5000 UT) capsule capsule Take 1 capsule by mouth Daily.       No current facility-administered medications on file prior to visit.          When did you start taking these medications: A FEW DAYS AGO    Which medication are you concerned about: CYCLOBENZAPRINE    Who prescribed you this medication: GAMBREL    What are your concerns: TIRED, CAN'T KEEP EYES OPEN    How long have you had these concerns: YESTERDAY      Spoke with Branford she has been giving it BID,instructed it is twice a day only as needed,she did not understand that ,she will try just giving it at night & see how he does.

## 2023-05-31 NOTE — TELEPHONE ENCOUNTER
Also would recommend splitting the tablet in half. I gave him the lowest dose. Please check to make sure he's not taking the robaxin. If he's still pretty tired on it, just do it at night. Does he think it is helping?

## 2023-05-31 NOTE — TELEPHONE ENCOUNTER
Also would recommend splitting the tablet in half. I gave him the lowest dose. Please check to make sure he's not taking the robaxin. If he's still pretty tired on it, just do it at night. Does he think it is helping?    Spoke with Faby he is not taking the Robaxin also,he does think it is helping some,will try the 1/2 tab or just the nighttime dose depending on what he needs.

## 2023-06-08 ENCOUNTER — TELEPHONE (OUTPATIENT)
Dept: FAMILY MEDICINE CLINIC | Facility: CLINIC | Age: 88
End: 2023-06-08
Payer: OTHER GOVERNMENT

## 2023-06-08 NOTE — TELEPHONE ENCOUNTER
Caller: DILAN JOVEL    Relationship: Emergency Contact    Best call back number: [phone]    What medications are you currently taking:   Current Outpatient Medications on File Prior to Visit   Medication Sig Dispense Refill    albuterol sulfate  (90 Base) MCG/ACT inhaler Inhale 2 puffs Every 4 (Four) Hours As Needed for Wheezing or Shortness of Air. 18 g 8    ascorbic acid (VITAMIN C) 1000 MG tablet Take 1 tablet by mouth Daily.      Budeson-Glycopyrrol-Formoterol (BREZTRI) 160-9-4.8 MCG/ACT aerosol inhaler Inhale 2 puffs 2 (Two) Times a Day. 10.7 g 18    cyclobenzaprine (FLEXERIL) 5 MG tablet Take 1 tablet by mouth 2 (Two) Times a Day As Needed for Muscle Spasms. 60 tablet 0    levothyroxine (SYNTHROID, LEVOTHROID) 75 MCG tablet Take 1 tablet by mouth Daily. 90 tablet 1    methocarbamol (Robaxin) 500 MG tablet Take 1 tablet by mouth 2 (Two) Times a Day. 30 tablet 0    Omega-3 Fatty Acids (fish oil) 1000 MG capsule capsule Take 1,576 mg by mouth Daily With Breakfast.      vitamin D3 125 MCG (5000 UT) capsule capsule Take 1 capsule by mouth Daily.       No current facility-administered medications on file prior to visit.          When did you start taking these medications:      Which medication are you concerned about: cyclobenzaprine (FLEXERIL) 5 MG tablet     Who prescribed you this medication: GAMBREL    What are your concerns:  THIS IS MAKING HIM UNSTEADY, AND GROGGY,     How long have you had these concerns: 1 DAY

## 2023-06-08 NOTE — TELEPHONE ENCOUNTER
Caller: DILAN JOVEL    Relationship: Emergency Contact    Best call back number: [phone]    What medications are you currently taking:   Current Outpatient Medications on File Prior to Visit   Medication Sig Dispense Refill    albuterol sulfate  (90 Base) MCG/ACT inhaler Inhale 2 puffs Every 4 (Four) Hours As Needed for Wheezing or Shortness of Air. 18 g 8    ascorbic acid (VITAMIN C) 1000 MG tablet Take 1 tablet by mouth Daily.      Budeson-Glycopyrrol-Formoterol (BREZTRI) 160-9-4.8 MCG/ACT aerosol inhaler Inhale 2 puffs 2 (Two) Times a Day. 10.7 g 18    cyclobenzaprine (FLEXERIL) 5 MG tablet Take 1 tablet by mouth 2 (Two) Times a Day As Needed for Muscle Spasms. 60 tablet 0    levothyroxine (SYNTHROID, LEVOTHROID) 75 MCG tablet Take 1 tablet by mouth Daily. 90 tablet 1    methocarbamol (Robaxin) 500 MG tablet Take 1 tablet by mouth 2 (Two) Times a Day. 30 tablet 0    Omega-3 Fatty Acids (fish oil) 1000 MG capsule capsule Take 1,576 mg by mouth Daily With Breakfast.      vitamin D3 125 MCG (5000 UT) capsule capsule Take 1 capsule by mouth Daily.       No current facility-administered medications on file prior to visit.          When did you start taking these medications:      Which medication are you concerned about: cyclobenzaprine (FLEXERIL) 5 MG tablet     Who prescribed you this medication: GAMBREL    What are your concerns:  THIS IS MAKING HIM UNSTEADY, AND GROGGY,     How long have you had these concerns: 1 DAY       Even the 1/2 tab is too much for him.

## 2023-06-09 NOTE — TELEPHONE ENCOUNTER
That is totally fine. And they can add whatever cream they want to the flexeril also. It is not going to interfere with each other.

## 2023-06-09 NOTE — TELEPHONE ENCOUNTER
He probably cannot take the flexeril then. All the other muscle relaxers are stronger than flexeril. The only one weaker is robaxin. He's taken that before. Does he want to try it? Otherwise I would stick with pain gels like aspercreme or alejandro perez to his back. It won't give him the sedating side effects.Does he think he could go without anything?

## 2023-06-09 NOTE — TELEPHONE ENCOUNTER
He probably cannot take the flexeril then. All the other muscle relaxers are stronger than flexeril. The only one weaker is robaxin. He's taken that before. Does he want to try it? Otherwise I would stick with pain gels like aspercreme or alejandro perez to his back. It won't give him the sedating side effects.Does he think he could go without anything?          I spoke with Faby, she said that they can't remember about the robaxin, but they gave him only 1/2 a flexeril last night and that worked better for him, none during the day. She wants to know if that is ok? And they are willing to try the Aspercreme or alejandro perez to see if he can tolerate that without medication.

## 2023-06-26 ENCOUNTER — TELEPHONE (OUTPATIENT)
Dept: FAMILY MEDICINE CLINIC | Facility: CLINIC | Age: 88
End: 2023-06-26

## 2023-07-26 ENCOUNTER — OFFICE VISIT (OUTPATIENT)
Dept: CARDIOLOGY | Facility: CLINIC | Age: 88
End: 2023-07-26
Payer: OTHER GOVERNMENT

## 2023-07-26 VITALS
HEIGHT: 72 IN | DIASTOLIC BLOOD PRESSURE: 57 MMHG | BODY MASS INDEX: 23.57 KG/M2 | HEART RATE: 68 BPM | SYSTOLIC BLOOD PRESSURE: 134 MMHG | OXYGEN SATURATION: 99 % | WEIGHT: 174 LBS

## 2023-07-26 DIAGNOSIS — I45.5 SINUS PAUSE: Primary | ICD-10-CM

## 2023-07-26 PROCEDURE — 99214 OFFICE O/P EST MOD 30 MIN: CPT | Performed by: PHYSICIAN ASSISTANT

## 2023-07-26 NOTE — PROGRESS NOTES
Elissa Geronimo MD  Kevin Fonseca  1934  07/26/2023    Patient Active Problem List   Diagnosis    Chronic obstructive pulmonary disease    Former cigarette smoker    Nocturnal hypoxia    Sinus pause    Hypothyroidism    Exudative age-related macular degeneration, bilateral, with inactive scar    Other specified peripheral vascular diseases    Sick sinus syndrome    BENNIE (obstructive sleep apnea)       Dear Elissa Geronimo MD:    Subjective     History of Present Illness:    Chief Complaint   Patient presents with    Med Management       Kevin Fonseca is a pleasant 88 y.o. male with a past medical history significant for sinus pauses with longest being 3.17 seconds as well as a second-degree type I AV block for which she has been asymptomatic with.  He does report history of tobacco abuse but has not smoked in over 25 years.  He comes in today for cardiology follow-up.    Jack comes in today after Holter monitor was performed this did show a second-degree AV block type I consistent with Mobitz type I.  Clinically he is not showing any overt symptoms of sick sinus syndrome such as syncope or near syncope. He does report to me in the last year he has had 3 falls. On each occasion he does describe what appears to be a mechanical fall with 1 instance occurring when he tripped over tree roots, the second instance occurred when he tried to been below his garage door which was set at a low height, and in the third instance occurred when trying to catch a falling object from a shelf that threw him off balance.       No Known Allergies:      Current Outpatient Medications:     Budeson-Glycopyrrol-Formoterol (BREZTRI) 160-9-4.8 MCG/ACT aerosol inhaler, Inhale 2 puffs 2 (Two) Times a Day., Disp: 10.7 g, Rfl: 5    levothyroxine (SYNTHROID, LEVOTHROID) 75 MCG tablet, Take 1 tablet by mouth Daily., Disp: 90 tablet, Rfl: 1    albuterol sulfate  (90 Base) MCG/ACT inhaler, Inhale 2 puffs Every 4 (Four) Hours As Needed for  "Wheezing or Shortness of Air., Disp: 18 g, Rfl: 8    ascorbic acid (VITAMIN C) 1000 MG tablet, Take 1 tablet by mouth Daily. (Patient not taking: Reported on 8/3/2023), Disp: , Rfl:     baclofen (LIORESAL) 10 MG tablet, Take 0.5 tablets by mouth 2 (Two) Times a Day. (Patient not taking: Reported on 8/3/2023), Disp: 30 tablet, Rfl: 0    cyclobenzaprine (FLEXERIL) 5 MG tablet, Take 1 tablet by mouth 2 (Two) Times a Day As Needed for Muscle Spasms. (Patient not taking: Reported on 8/3/2023), Disp: 60 tablet, Rfl: 0    Omega-3 Fatty Acids (fish oil) 1000 MG capsule capsule, Take 1,576 mg by mouth Daily With Breakfast. (Patient not taking: Reported on 8/3/2023), Disp: , Rfl:     vitamin D3 125 MCG (5000 UT) capsule capsule, Take 1 capsule by mouth Daily. (Patient not taking: Reported on 8/3/2023), Disp: , Rfl:     The following portions of the patient's history were reviewed and updated as appropriate: allergies, current medications, past family history, past medical history, past social history, past surgical history and problem list.    Social History     Tobacco Use    Smoking status: Former     Packs/day: 1.50     Years: 40.00     Pack years: 60.00     Types: Cigarettes     Start date:      Quit date:      Years since quittin.6    Smokeless tobacco: Never   Vaping Use    Vaping Use: Never used   Substance Use Topics    Alcohol use: Not Currently     Comment: 2 week    Drug use: Never         Objective   Vitals:    23 1350   BP: 134/57   Pulse: 68   SpO2: 99%   Weight: 78.9 kg (174 lb)   Height: 182.9 cm (72.01\")     Body mass index is 23.59 kg/mý.    Constitutional:       General: Not in acute distress.     Appearance: Healthy appearance. Well-developed and not in distress. Not diaphoretic.   Eyes:      Conjunctiva/sclera: Conjunctivae normal.      Pupils: Pupils are equal, round, and reactive to light.   HENT:      Head: Normocephalic and atraumatic.   Neck:      Vascular: No carotid bruit or JVD. "   Pulmonary:      Effort: Pulmonary effort is normal. No respiratory distress.      Breath sounds: Normal breath sounds.      Comments: Diminished breath sounds throuhgout  Cardiovascular:      Normal rate. Regular rhythm.   Edema:     Peripheral edema absent.   Skin:     General: Skin is cool.   Neurological:      Mental Status: Alert, oriented to person, place, and time and oriented to person, place and time.       Lab Results   Component Value Date     (L) 05/08/2023    K 4.8 05/08/2023    CL 97 (L) 05/08/2023    CO2 25.3 05/08/2023    BUN 15 05/08/2023    CREATININE 1.03 05/08/2023    GLUCOSE 99 05/08/2023    CALCIUM 9.5 05/08/2023    AST 21 05/08/2023    ALT 12 05/08/2023    ALKPHOS 101 05/08/2023    LABIL2 1.8 04/20/2022     No results found for: CKTOTAL  Lab Results   Component Value Date    WBC 9.09 04/17/2023    HGB 11.1 (L) 04/17/2023    HCT 32.6 (L) 04/17/2023     04/17/2023     No results found for: INR  Lab Results   Component Value Date    MG 2.1 11/11/2022     Lab Results   Component Value Date    TSH 1.810 05/08/2023    PSA 2.000 04/12/2021    CHLPL 133 04/20/2022    TRIG 53 04/20/2022    HDL 54 04/20/2022    LDL 67 04/20/2022      No results found for: BNP    During this visit the following were done:  Labs Reviewed []    Labs Ordered []    Radiology Reports Reviewed []    Radiology Ordered []    PCP Records Reviewed []    Referring Provider Records Reviewed []    ER Records Reviewed []    Hospital Records Reviewed []    History Obtained From Family []    Radiology Images Reviewed []    Other Reviewed []    Records Requested []       Procedures    Assessment & Plan    Diagnosis Plan   1. Sinus pause                 Recommendations:  Bradycardia/second-degree type I AV block  Reviewing telemetry monitor with the patient it does seem that he has at least some SA node dysfunction however no clear symptoms at the present moment for permant pacemaker indication. He has suffered 3 falls but  as describes above seems to be more mechanical related falls.  However, given his advanced age would have a very low threshold for consideration of permanent pacemaker implantation.  Will schedule patient back in 3 to 4 weeks with Dr. Blanton for reevaluation on whether or not to proceed with permanent pacemaker implantation.    Return in about 4 weeks (around 8/23/2023).    As always, I appreciate very much the opportunity to participate in the cardiovascular care of your patients.      With Best Regards,    Benjamin Montez PA-C

## 2023-08-03 ENCOUNTER — OFFICE VISIT (OUTPATIENT)
Dept: PULMONOLOGY | Facility: CLINIC | Age: 88
End: 2023-08-03
Payer: MEDICARE

## 2023-08-03 VITALS
HEART RATE: 67 BPM | TEMPERATURE: 97.5 F | HEIGHT: 72 IN | WEIGHT: 174 LBS | OXYGEN SATURATION: 97 % | RESPIRATION RATE: 18 BRPM | DIASTOLIC BLOOD PRESSURE: 68 MMHG | BODY MASS INDEX: 23.57 KG/M2 | SYSTOLIC BLOOD PRESSURE: 140 MMHG

## 2023-08-03 DIAGNOSIS — Z87.891 FORMER CIGARETTE SMOKER: ICD-10-CM

## 2023-08-03 DIAGNOSIS — G47.34 NOCTURNAL HYPOXIA: ICD-10-CM

## 2023-08-03 DIAGNOSIS — J44.9 CHRONIC OBSTRUCTIVE PULMONARY DISEASE, UNSPECIFIED COPD TYPE: Primary | ICD-10-CM

## 2023-08-03 PROCEDURE — 99214 OFFICE O/P EST MOD 30 MIN: CPT | Performed by: PHYSICIAN ASSISTANT

## 2023-08-03 PROCEDURE — 1160F RVW MEDS BY RX/DR IN RCRD: CPT | Performed by: PHYSICIAN ASSISTANT

## 2023-08-03 PROCEDURE — 1159F MED LIST DOCD IN RCRD: CPT | Performed by: PHYSICIAN ASSISTANT

## 2023-08-14 ENCOUNTER — OFFICE VISIT (OUTPATIENT)
Dept: FAMILY MEDICINE CLINIC | Facility: CLINIC | Age: 88
End: 2023-08-14
Payer: OTHER GOVERNMENT

## 2023-08-14 ENCOUNTER — TELEPHONE (OUTPATIENT)
Dept: FAMILY MEDICINE CLINIC | Facility: CLINIC | Age: 88
End: 2023-08-14

## 2023-08-14 VITALS
WEIGHT: 177.6 LBS | TEMPERATURE: 97.5 F | OXYGEN SATURATION: 99 % | HEIGHT: 72 IN | HEART RATE: 66 BPM | BODY MASS INDEX: 24.06 KG/M2 | DIASTOLIC BLOOD PRESSURE: 78 MMHG | SYSTOLIC BLOOD PRESSURE: 116 MMHG

## 2023-08-14 DIAGNOSIS — F41.9 ANXIETY: ICD-10-CM

## 2023-08-14 DIAGNOSIS — Z00.00 ENCOUNTER FOR SUBSEQUENT ANNUAL WELLNESS VISIT IN MEDICARE PATIENT: Primary | ICD-10-CM

## 2023-08-14 DIAGNOSIS — Z13.220 ENCOUNTER FOR LIPID SCREENING FOR CARDIOVASCULAR DISEASE: ICD-10-CM

## 2023-08-14 DIAGNOSIS — Z13.6 ENCOUNTER FOR LIPID SCREENING FOR CARDIOVASCULAR DISEASE: ICD-10-CM

## 2023-08-14 DIAGNOSIS — J43.8 OTHER EMPHYSEMA: ICD-10-CM

## 2023-08-14 DIAGNOSIS — Z12.5 ENCOUNTER FOR SCREENING FOR MALIGNANT NEOPLASM OF PROSTATE: ICD-10-CM

## 2023-08-14 DIAGNOSIS — E03.9 HYPOTHYROIDISM, UNSPECIFIED TYPE: ICD-10-CM

## 2023-08-14 RX ORDER — BUSPIRONE HYDROCHLORIDE 5 MG/1
5 TABLET ORAL 3 TIMES DAILY PRN
Qty: 3 TABLET | Refills: 0 | Status: SHIPPED | OUTPATIENT
Start: 2023-08-14

## 2023-08-14 NOTE — TELEPHONE ENCOUNTER
Pharmacy Name: [facility] [id number] - [place], [place] - [address] AT [facility] [address] [facility] [phone] [facility] [phone]      Pharmacy representative name: BENNY    Pharmacy representative phone number: [phone]     What medication are you calling in regards to: BUSPIRONE    What question does the pharmacy have: PHARMACY IS NEEDING CLARIFICATION ON THE QUANTITY, PRESCRIPTION SAYS 3 TIMES A DAY AS NEEDED BUT THE QUANTITY OF THE PRESCRIPTION WAS 3 AND WASN'T SURE IF PATIENT NEEDED MORE     Who is the provider that prescribed the medication:

## 2023-08-14 NOTE — PROGRESS NOTES
"Kevin Fonseca     VITALS: Blood pressure 116/78, pulse 66, temperature 97.5 øF (36.4 øC), temperature source Temporal, height 182.9 cm (72.01\"), weight 80.6 kg (177 lb 9.6 oz), SpO2 99 %.    The ABCs of the Annual Wellness Visit  Subsequent Medicare Wellness Visit    Subjective      Kevin Fonseca is a 88 y.o. male with medical conditions significant for chronic COPD and hypothyroidism who presents for a Subsequent Medicare Wellness Visit.     The patient reports his back pain has partially improved, but it is still painful. He is going to physical therapy. The adult female reports he is doing well with his exercise at home.     The patient reports he is going to find the dentist. He had an appointment with his dentist, Dr. Guerrier, but he cancelled his appointment due to anxiety. He has had a tooth that has been causing him pain for about 4 years.     Dr. Blanton's office wants to check the patient again because his heart rate is low. They want to evaluate him for a pacemaker.     The following portions of the patient's history were reviewed and   updated as appropriate: allergies, current medications, past family history, past medical history, past social history, past surgical history, and problem list.    Compared to one year ago, the patient feels his physical   health is the same.    Compared to one year ago, the patient feels his mental   health is the same.    Recent Hospitalizations:  He was not admitted to the hospital during the last year.       Current Medical Providers:  Patient Care Team:  Elissa Geronimo MD as PCP - General (Family Medicine)    Outpatient Medications Prior to Visit   Medication Sig Dispense Refill    albuterol sulfate  (90 Base) MCG/ACT inhaler Inhale 2 puffs Every 4 (Four) Hours As Needed for Wheezing or Shortness of Air. 18 g 8    Budeson-Glycopyrrol-Formoterol (BREZTRI) 160-9-4.8 MCG/ACT aerosol inhaler Inhale 2 puffs 2 (Two) Times a Day. 10.7 g 5    levothyroxine (SYNTHROID, " LEVOTHROID) 75 MCG tablet Take 1 tablet by mouth Daily. 90 tablet 1    ascorbic acid (VITAMIN C) 1000 MG tablet Take 1 tablet by mouth Daily. (Patient not taking: Reported on 8/3/2023)      baclofen (LIORESAL) 10 MG tablet Take 0.5 tablets by mouth 2 (Two) Times a Day. (Patient not taking: Reported on 8/3/2023) 30 tablet 0    cyclobenzaprine (FLEXERIL) 5 MG tablet Take 1 tablet by mouth 2 (Two) Times a Day As Needed for Muscle Spasms. (Patient not taking: Reported on 8/3/2023) 60 tablet 0    Omega-3 Fatty Acids (fish oil) 1000 MG capsule capsule Take 1,576 mg by mouth Daily With Breakfast. (Patient not taking: Reported on 8/3/2023)      vitamin D3 125 MCG (5000 UT) capsule capsule Take 1 capsule by mouth Daily. (Patient not taking: Reported on 8/3/2023)       No facility-administered medications prior to visit.       No opioid medication identified on active medication list. I have reviewed chart for other potential  high risk medication/s and harmful drug interactions in the elderly.        Aspirin is not on active medication list.  Aspirin use is not indicated based on review of current medical condition/s. Risk of harm outweighs potential benefits.  .    Patient Active Problem List   Diagnosis    Chronic obstructive pulmonary disease    Former cigarette smoker    Nocturnal hypoxia    Sinus pause    Hypothyroidism    Exudative age-related macular degeneration, bilateral, with inactive scar    Other specified peripheral vascular diseases    Sick sinus syndrome    BENNIE (obstructive sleep apnea)     Advance Care Planning   Advance Care Planning     Advance Directive is not on file.  ACP discussion was held with the patient during this visit. Patient does not have an advance directive, information provided.     Objective    Vitals:    08/14/23 1424   BP: 116/78   BP Location: Right arm   Patient Position: Sitting   Cuff Size: Adult   Pulse: 66   Temp: 97.5 øF (36.4 øC)   TempSrc: Temporal   SpO2: 99%   Weight: 80.6 kg  "(177 lb 9.6 oz)   Height: 182.9 cm (72.01\")   PainSc:   6   PainLoc: Back     Estimated body mass index is 24.08 kg/mý as calculated from the following:    Height as of this encounter: 182.9 cm (72.01\").    Weight as of this encounter: 80.6 kg (177 lb 9.6 oz).    BMI is within normal parameters. No other follow-up for BMI required.      Does the patient have evidence of cognitive impairment?   No    Gen: Patient in NAD. Pleasant and answers appropriately. A&Ox3.    Skin: Warm and dry with normal turgor. No purpura, rashes, or unusual pigmentation noted. Hair is normal in appearance and distribution.    HEENT: NC/AT. No lesions noted. Conjunctiva clear, sclera nonicteric. PERRL. EOMI without nystagmus or strabismus. Fundi appear benign. No hemorrhages or exudates of eyes. Auditory canals are patent bilaterally without lesions. TMs intact,  nonerythematous, nonbulging without lesions. Nasal mucosa pink, nonerythematous, and nonedematous. Frontal and maxillary sinuses are nontender. O/P nonerythematous and moist without exudate.    Neck: Supple without lymph nodes palpated. FROM.     Lungs: Decreased B/L without rales, rhonchi, crackles, or wheezes.    Heart: RRR. S1 and S2 normal. No S3 or S4. No MRGT.    Abd: Soft, nontender,nondistended. (+)BSx4 quadrants.     Extrem: No CCE. Radial pulses 2+/4 and equal B/L. FROMx4.  Positive joint tenderness noted.    Neuro: No focal motor/sensory deficits.    Lab Results   Component Value Date    TRIG 53 2023    HDL 59 2023    LDL 72 2023    VLDL 11 2023          HEALTH RISK ASSESSMENT    Smoking Status:  Social History     Tobacco Use   Smoking Status Former    Packs/day: 1.50    Years: 40.00    Pack years: 60.00    Types: Cigarettes    Start date:     Quit date:     Years since quittin.6   Smokeless Tobacco Never     Alcohol Consumption:  Social History     Substance and Sexual Activity   Alcohol Use Not Currently    Comment: 2 week     Fall " Risk Screen:    VICTORINOADI Fall Risk Assessment was completed, and patient is at HIGH risk for falls. Assessment completed on:2023    Depression Screenin/14/2023     2:26 PM   PHQ-2/PHQ-9 Depression Screening   Little Interest or Pleasure in Doing Things 0-->not at all   Feeling Down, Depressed or Hopeless 1-->several days   PHQ-9: Brief Depression Severity Measure Score 1       Health Habits and Functional and Cognitive Screenin/14/2023     2:27 PM   Functional & Cognitive Status   Do you have difficulty preparing food and eating? No   Do you have difficulty bathing yourself, getting dressed or grooming yourself? No   Do you have difficulty using the toilet? No   Do you have difficulty moving around from place to place? No   Do you have trouble with steps or getting out of a bed or a chair? Yes   Current Diet Well Balanced Diet   Dental Exam Not up to date   Eye Exam Not up to date   Exercise (times per week) 5 times per week        Exercise Comment PT   Do you need help using the phone?  Yes   Are you deaf or do you have serious difficulty hearing?  Yes   Do you need help to go to places out of walking distance? Yes   Do you need help shopping? Yes   Do you need help preparing meals?  Yes   Do you need help with housework?  Yes   Do you need help with laundry? Yes   Do you need help taking your medications? No   Do you need help managing money? Yes   Do you ever drive or ride in a car without wearing a seat belt? No   Have you felt unusual stress, anger or loneliness in the last month? No   Who do you live with? Spouse   If you need help, do you have trouble finding someone available to you? No   Do you have difficulty concentrating, remembering or making decisions? No       Age-appropriate Screening Schedule:  Refer to the list below for future screening recommendations based on patient's age, sex and/or medical conditions. Orders for these recommended tests are listed in the plan section. The  patient has been provided with a written plan.    Health Maintenance   Topic Date Due    Pneumococcal Vaccine 65+ (1 - PCV) Never done    TDAP/TD VACCINES (1 - Tdap) Never done    ZOSTER VACCINE (1 of 2) Never done    COVID-19 Vaccine (5 - Pfizer series) 01/09/2023    INFLUENZA VACCINE  10/01/2023    ANNUAL WELLNESS VISIT  08/14/2024                  CMS Preventative Services Quick Reference  Risk Factors Identified During Encounter:    None Identified    The above risks/problems have been discussed with the patient.  Pertinent information has been shared with the patient in the After Visit Summary.    Diagnoses and all orders for this visit:    1. Encounter for subsequent annual wellness visit in Medicare patient (Primary)  -     CBC Auto Differential; Future    2. Anxiety  -     Comprehensive Metabolic Panel; Future  -     CBC Auto Differential; Future  -     busPIRone (BUSPAR) 5 MG tablet; Take 1 tablet by mouth 3 (Three) Times a Day As Needed (anxiety).  Dispense: 3 tablet; Refill: 0    3. Other emphysema  -     Comprehensive Metabolic Panel; Future  -     CBC Auto Differential; Future    4. Hypothyroidism, unspecified type  -     Comprehensive Metabolic Panel; Future  -     TSH; Future  -     T4, Free; Future  -     CBC Auto Differential; Future    5. Encounter for lipid screening for cardiovascular disease  -     Lipid Panel; Future    6. Encounter for screening for malignant neoplasm of prostate  -     PSA Screen; Future        Follow Up:   Next Medicare Wellness visit to be scheduled in 1 year.      An After Visit Summary and PPPS were made available to the patient.

## 2023-08-24 ENCOUNTER — TELEPHONE (OUTPATIENT)
Dept: FAMILY MEDICINE CLINIC | Facility: CLINIC | Age: 88
End: 2023-08-24
Payer: OTHER GOVERNMENT

## 2023-08-24 NOTE — TELEPHONE ENCOUNTER
Faby called reports [facility] called them that he needs more visits approved thru the [facility] so they can continue seeing him.

## 2023-08-28 ENCOUNTER — TELEPHONE (OUTPATIENT)
Dept: FAMILY MEDICINE CLINIC | Facility: CLINIC | Age: 88
End: 2023-08-28
Payer: OTHER GOVERNMENT

## 2023-08-28 NOTE — TELEPHONE ENCOUNTER
Spoke to Faby about patients overdue lab work. She verbalized understanding and Kevin will be in soon to get them completed.

## 2023-08-30 ENCOUNTER — LAB (OUTPATIENT)
Dept: FAMILY MEDICINE CLINIC | Facility: CLINIC | Age: 88
End: 2023-08-30
Payer: OTHER GOVERNMENT

## 2023-08-30 DIAGNOSIS — Z13.6 ENCOUNTER FOR LIPID SCREENING FOR CARDIOVASCULAR DISEASE: ICD-10-CM

## 2023-08-30 DIAGNOSIS — J43.8 OTHER EMPHYSEMA: ICD-10-CM

## 2023-08-30 DIAGNOSIS — Z12.5 ENCOUNTER FOR SCREENING FOR MALIGNANT NEOPLASM OF PROSTATE: ICD-10-CM

## 2023-08-30 DIAGNOSIS — F41.9 ANXIETY: ICD-10-CM

## 2023-08-30 DIAGNOSIS — E03.9 HYPOTHYROIDISM, UNSPECIFIED TYPE: ICD-10-CM

## 2023-08-30 DIAGNOSIS — Z00.00 ENCOUNTER FOR SUBSEQUENT ANNUAL WELLNESS VISIT IN MEDICARE PATIENT: ICD-10-CM

## 2023-08-30 DIAGNOSIS — Z13.220 ENCOUNTER FOR LIPID SCREENING FOR CARDIOVASCULAR DISEASE: ICD-10-CM

## 2023-08-30 LAB
ALBUMIN SERPL-MCNC: 4 G/DL (ref 3.5–5.2)
ALBUMIN/GLOB SERPL: 1.5 G/DL
ALP SERPL-CCNC: 69 U/L (ref 39–117)
ALT SERPL W P-5'-P-CCNC: 11 U/L (ref 1–41)
ANION GAP SERPL CALCULATED.3IONS-SCNC: 9 MMOL/L (ref 5–15)
AST SERPL-CCNC: 16 U/L (ref 1–40)
BASOPHILS # BLD AUTO: 0.06 10*3/MM3 (ref 0–0.2)
BASOPHILS NFR BLD AUTO: 0.8 % (ref 0–1.5)
BILIRUB SERPL-MCNC: 0.7 MG/DL (ref 0–1.2)
BUN SERPL-MCNC: 12 MG/DL (ref 8–23)
BUN/CREAT SERPL: 12.4 (ref 7–25)
CALCIUM SPEC-SCNC: 9.1 MG/DL (ref 8.6–10.5)
CHLORIDE SERPL-SCNC: 97 MMOL/L (ref 98–107)
CHOLEST SERPL-MCNC: 142 MG/DL (ref 0–200)
CO2 SERPL-SCNC: 27 MMOL/L (ref 22–29)
CREAT SERPL-MCNC: 0.97 MG/DL (ref 0.76–1.27)
DEPRECATED RDW RBC AUTO: 41 FL (ref 37–54)
EGFRCR SERPLBLD CKD-EPI 2021: 75.1 ML/MIN/1.73
EOSINOPHIL # BLD AUTO: 0.26 10*3/MM3 (ref 0–0.4)
EOSINOPHIL NFR BLD AUTO: 3.6 % (ref 0.3–6.2)
ERYTHROCYTE [DISTWIDTH] IN BLOOD BY AUTOMATED COUNT: 13 % (ref 12.3–15.4)
GLOBULIN UR ELPH-MCNC: 2.7 GM/DL
GLUCOSE SERPL-MCNC: 85 MG/DL (ref 65–99)
HCT VFR BLD AUTO: 35.5 % (ref 37.5–51)
HDLC SERPL-MCNC: 59 MG/DL (ref 40–60)
HGB BLD-MCNC: 12.1 G/DL (ref 13–17.7)
IMM GRANULOCYTES # BLD AUTO: 0.02 10*3/MM3 (ref 0–0.05)
IMM GRANULOCYTES NFR BLD AUTO: 0.3 % (ref 0–0.5)
LDLC SERPL CALC-MCNC: 72 MG/DL (ref 0–100)
LDLC/HDLC SERPL: 1.23 {RATIO}
LYMPHOCYTES # BLD AUTO: 1.83 10*3/MM3 (ref 0.7–3.1)
LYMPHOCYTES NFR BLD AUTO: 25.1 % (ref 19.6–45.3)
MCH RBC QN AUTO: 29.6 PG (ref 26.6–33)
MCHC RBC AUTO-ENTMCNC: 34.1 G/DL (ref 31.5–35.7)
MCV RBC AUTO: 86.8 FL (ref 79–97)
MONOCYTES # BLD AUTO: 0.97 10*3/MM3 (ref 0.1–0.9)
MONOCYTES NFR BLD AUTO: 13.3 % (ref 5–12)
NEUTROPHILS NFR BLD AUTO: 4.16 10*3/MM3 (ref 1.7–7)
NEUTROPHILS NFR BLD AUTO: 56.9 % (ref 42.7–76)
NRBC BLD AUTO-RTO: 0 /100 WBC (ref 0–0.2)
PLATELET # BLD AUTO: 271 10*3/MM3 (ref 140–450)
PMV BLD AUTO: 10.6 FL (ref 6–12)
POTASSIUM SERPL-SCNC: 4.9 MMOL/L (ref 3.5–5.2)
PROT SERPL-MCNC: 6.7 G/DL (ref 6–8.5)
PSA SERPL-MCNC: 1.46 NG/ML (ref 0–4)
RBC # BLD AUTO: 4.09 10*6/MM3 (ref 4.14–5.8)
SODIUM SERPL-SCNC: 133 MMOL/L (ref 136–145)
T4 FREE SERPL-MCNC: 1.68 NG/DL (ref 0.93–1.7)
TRIGL SERPL-MCNC: 53 MG/DL (ref 0–150)
TSH SERPL DL<=0.05 MIU/L-ACNC: 1.99 UIU/ML (ref 0.27–4.2)
VLDLC SERPL-MCNC: 11 MG/DL (ref 5–40)
WBC NRBC COR # BLD: 7.3 10*3/MM3 (ref 3.4–10.8)

## 2023-08-30 PROCEDURE — G0103 PSA SCREENING: HCPCS | Performed by: FAMILY MEDICINE

## 2023-08-30 PROCEDURE — 36415 COLL VENOUS BLD VENIPUNCTURE: CPT

## 2023-08-30 PROCEDURE — 80053 COMPREHEN METABOLIC PANEL: CPT | Performed by: FAMILY MEDICINE

## 2023-08-30 PROCEDURE — 80061 LIPID PANEL: CPT | Performed by: FAMILY MEDICINE

## 2023-08-30 PROCEDURE — 85025 COMPLETE CBC W/AUTO DIFF WBC: CPT | Performed by: FAMILY MEDICINE

## 2023-08-30 PROCEDURE — 84443 ASSAY THYROID STIM HORMONE: CPT | Performed by: FAMILY MEDICINE

## 2023-08-30 PROCEDURE — 84439 ASSAY OF FREE THYROXINE: CPT | Performed by: FAMILY MEDICINE

## 2023-09-05 ENCOUNTER — OFFICE VISIT (OUTPATIENT)
Dept: CARDIOLOGY | Facility: CLINIC | Age: 88
End: 2023-09-05
Payer: OTHER GOVERNMENT

## 2023-09-05 VITALS
BODY MASS INDEX: 23.57 KG/M2 | HEART RATE: 47 BPM | HEIGHT: 72 IN | OXYGEN SATURATION: 97 % | SYSTOLIC BLOOD PRESSURE: 130 MMHG | WEIGHT: 174 LBS | DIASTOLIC BLOOD PRESSURE: 39 MMHG

## 2023-09-05 DIAGNOSIS — I49.5 SICK SINUS SYNDROME: Primary | ICD-10-CM

## 2023-09-05 DIAGNOSIS — I44.1 SECOND DEGREE AV BLOCK, MOBITZ TYPE I: ICD-10-CM

## 2023-09-05 PROCEDURE — 99214 OFFICE O/P EST MOD 30 MIN: CPT | Performed by: INTERNAL MEDICINE

## 2023-09-05 PROCEDURE — 93000 ELECTROCARDIOGRAM COMPLETE: CPT | Performed by: INTERNAL MEDICINE

## 2023-09-05 NOTE — LETTER
September 6, 2023     Elissa Geronimo MD   [address] [place] [address]    Patient: Kevin Fonseca   YOB: 1934   Date of Visit: 9/5/2023     Dear Elissa Geronimo MD:       Thank you for referring Kevin Fonseca to me for evaluation. Below are the relevant portions of my assessment and plan of care.    If you have questions, please do not hesitate to call me. I look forward to following Jack along with you.         Sincerely,        Raffy Blanton MD        CC: No Recipients    Raffy Blanton MD  09/06/23 1824  Sign when Signing Visit  Elissa Geronimo MD  Kevin Fonseca  1934  09/05/2023    Patient Active Problem List   Diagnosis   • Chronic obstructive pulmonary disease   • Former cigarette smoker   • Nocturnal hypoxia   • Sinus pause   • Hypothyroidism   • Exudative age-related macular degeneration, bilateral, with inactive scar   • Other specified peripheral vascular diseases   • Sick sinus syndrome   • BENNIE (obstructive sleep apnea)       Dear Elissa Geronimo MD:    Subjective    Kevin Fonseca is a 88 y.o. male with the problems as listed above, presents    Chief Complaint   Patient presents with   • Med Management     Verbal.    • Follow-up     To discuss about getting a pacemaker implanted.        History of Present Illness: Mr. Fonseca is a pleasant 88-year-old  male with history of intermittent bradycardia and ectopic atrial rhythm with heart rate of ranging from 34 to 90 bpm on recent Holter monitor as well as intermittent episodes of Mobitz type I second-degree AV block.  He is brought in today to discuss further about possible need for permanent pacemaker implantation.  On further questioning he states he is feeling fine overall and has occasional mild dizziness but never had syncope.        No Known Allergies:    Current Outpatient Medications:   •  albuterol sulfate  (90 Base) MCG/ACT inhaler, Inhale 2 puffs Every 4 (Four) Hours As Needed for Wheezing or Shortness of  Air., Disp: 18 g, Rfl: 8  •  ascorbic acid (VITAMIN C) 1000 MG tablet, Take 1 tablet by mouth Daily., Disp: , Rfl:   •  baclofen (LIORESAL) 10 MG tablet, Take 0.5 tablets by mouth 2 (Two) Times a Day., Disp: 30 tablet, Rfl: 0  •  Budeson-Glycopyrrol-Formoterol (BREZTRI) 160-9-4.8 MCG/ACT aerosol inhaler, Inhale 2 puffs 2 (Two) Times a Day., Disp: 10.7 g, Rfl: 5  •  busPIRone (BUSPAR) 5 MG tablet, Take 1 tablet by mouth 3 (Three) Times a Day As Needed (anxiety)., Disp: 3 tablet, Rfl: 0  •  cyclobenzaprine (FLEXERIL) 5 MG tablet, Take 1 tablet by mouth 2 (Two) Times a Day As Needed for Muscle Spasms., Disp: 60 tablet, Rfl: 0  •  levothyroxine (SYNTHROID, LEVOTHROID) 75 MCG tablet, Take 1 tablet by mouth Daily., Disp: 90 tablet, Rfl: 1  •  Omega-3 Fatty Acids (fish oil) 1000 MG capsule capsule, Take 1,576 mg by mouth Daily With Breakfast., Disp: , Rfl:   •  vitamin D3 125 MCG (5000 UT) capsule capsule, Take 1 capsule by mouth Daily., Disp: , Rfl:     The following portions of the patient's history were reviewed and updated as appropriate: allergies, current medications, past family history, past medical history, past social history, past surgical history and problem list.    Social History     Tobacco Use   • Smoking status: Former     Packs/day: 1.50     Years: 40.00     Pack years: 60.00     Types: Cigarettes     Start date:      Quit date:      Years since quittin.6   • Smokeless tobacco: Never   Vaping Use   • Vaping Use: Never used   Substance Use Topics   • Alcohol use: Not Currently     Comment: 2 week   • Drug use: Never     Review of Systems   Constitutional: Negative for chills and fever.   HENT:  Negative for nosebleeds and sore throat.    Respiratory:  Negative for cough, hemoptysis and wheezing.    Gastrointestinal:  Negative for abdominal pain, hematemesis, hematochezia, melena, nausea and vomiting.   Genitourinary:  Negative for dysuria and hematuria.   Neurological:  Positive for dizziness.  "Negative for headaches.   Objective  Vitals:    09/05/23 1433   BP: (!) 130/39   BP Location: Left arm   Patient Position: Sitting   Cuff Size: Adult   Pulse: (!) 47   SpO2: 97%   Weight: 78.9 kg (174 lb)   Height: 182.9 cm (72\")     Body mass index is 23.6 kg/m².    Vitals reviewed.   Constitutional:       Appearance: Well-developed.   Eyes:      Conjunctiva/sclera: Conjunctivae normal.   HENT:      Head: Normocephalic.   Neck:      Thyroid: No thyromegaly.      Vascular: No JVD.      Trachea: No tracheal deviation.   Pulmonary:      Effort: No respiratory distress.      Breath sounds: Normal breath sounds. No wheezing. No rales.   Cardiovascular:      PMI at left midclavicular line. Normal rate. Regular rhythm. Normal S1. Normal S2.       Murmurs: There is no murmur.      No gallop.  No click. No rub.   Pulses:     Intact distal pulses.   Edema:     Peripheral edema absent.   Abdominal:      General: Bowel sounds are normal.      Palpations: Abdomen is soft. There is no abdominal mass.      Tenderness: There is no abdominal tenderness.   Musculoskeletal:      Cervical back: Normal range of motion and neck supple. Skin:     General: Skin is warm and dry.   Neurological:      Mental Status: Alert and oriented to person, place, and time.      Cranial Nerves: No cranial nerve deficit.       Lab Results   Component Value Date     (L) 08/30/2023    K 4.9 08/30/2023    CL 97 (L) 08/30/2023    CO2 27.0 08/30/2023    BUN 12 08/30/2023    CREATININE 0.97 08/30/2023    GLUCOSE 85 08/30/2023    CALCIUM 9.1 08/30/2023    AST 16 08/30/2023    ALT 11 08/30/2023    ALKPHOS 69 08/30/2023    LABIL2 1.8 04/20/2022     No results found for: CKTOTAL  Lab Results   Component Value Date    WBC 7.30 08/30/2023    HGB 12.1 (L) 08/30/2023    HCT 35.5 (L) 08/30/2023     08/30/2023     No results found for: INR  Lab Results   Component Value Date    MG 2.1 11/11/2022     Lab Results   Component Value Date    TSH 1.990 08/30/2023 "    PSA 1.460 08/30/2023    CHLPL 133 04/20/2022    TRIG 53 08/30/2023    HDL 59 08/30/2023    LDL 72 08/30/2023      No results found for: BNP    ECG 12 Lead    Date/Time: 9/5/2023 2:33 PM  Performed by: Raffy Blanton MD  Authorized by: Raffy Blanton MD   Comparison: compared with previous ECG from 4/25/2023  Comparison to previous ECG: Patient is currently having wandering pacemaker with intermittent junctional rhythm.  Comments: Wandering pacemaker with intermittent junctional rhythm with heart rate about 48 bpm.          Assessment & Plan   Diagnosis Plan   1. Sick sinus syndrome        2. Second degree AV block, Mobitz type I          Recommendations  I have discussed with him about the Holter monitor findings and and possible need for permanent pacemaker implantation in the near future.  Patient states that he is feeling all right and has only occasional mild dizziness and would like to think about the pacemaker and let us know if he has more symptoms.  His wife accompanied him at the visit and expressed understanding of the discussion as well.  I have discussed the procedure of permanent pacemaker implantation, potential risk, benefits and alternatives.  Patient expressed understanding and wanting to think about it and let us know.      Return in about 3 months (around 12/5/2023).    As always, Elissa Geronimo MD  I appreciate very much the opportunity to participate in the cardiovascular care of your patients. Please do not hesitate to call me with any questions with regards to Kevin Fonseca's evaluation and management.       With Best Regards,        Raffy Blanton MD, [facility]    Please note that portions of this note were completed with a voice recognition program.

## 2023-09-05 NOTE — PROGRESS NOTES
Elissa Geronimo MD  Kevin Fonseca  1934  09/05/2023    Patient Active Problem List   Diagnosis    Chronic obstructive pulmonary disease    Former cigarette smoker    Nocturnal hypoxia    Sinus pause    Hypothyroidism    Exudative age-related macular degeneration, bilateral, with inactive scar    Other specified peripheral vascular diseases    Sick sinus syndrome    BENNIE (obstructive sleep apnea)       Dear Elissa Geronimo MD:    Subjective     Kevin Fonseca is a 88 y.o. male with the problems as listed above, presents    Chief Complaint   Patient presents with    Med Management     Verbal.     Follow-up     To discuss about getting a pacemaker implanted.        History of Present Illness: Mr. Fonseca is a pleasant 88-year-old  male with history of intermittent bradycardia and ectopic atrial rhythm with heart rate of ranging from 34 to 90 bpm on recent Holter monitor as well as intermittent episodes of Mobitz type I second-degree AV block.  He is brought in today to discuss further about possible need for permanent pacemaker implantation.  On further questioning he states he is feeling fine overall and has occasional mild dizziness but never had syncope.        No Known Allergies:    Current Outpatient Medications:     albuterol sulfate  (90 Base) MCG/ACT inhaler, Inhale 2 puffs Every 4 (Four) Hours As Needed for Wheezing or Shortness of Air., Disp: 18 g, Rfl: 8    ascorbic acid (VITAMIN C) 1000 MG tablet, Take 1 tablet by mouth Daily., Disp: , Rfl:     baclofen (LIORESAL) 10 MG tablet, Take 0.5 tablets by mouth 2 (Two) Times a Day., Disp: 30 tablet, Rfl: 0    Budeson-Glycopyrrol-Formoterol (BREZTRI) 160-9-4.8 MCG/ACT aerosol inhaler, Inhale 2 puffs 2 (Two) Times a Day., Disp: 10.7 g, Rfl: 5    busPIRone (BUSPAR) 5 MG tablet, Take 1 tablet by mouth 3 (Three) Times a Day As Needed (anxiety)., Disp: 3 tablet, Rfl: 0    cyclobenzaprine (FLEXERIL) 5 MG tablet, Take 1 tablet by mouth 2 (Two) Times a Day  "As Needed for Muscle Spasms., Disp: 60 tablet, Rfl: 0    levothyroxine (SYNTHROID, LEVOTHROID) 75 MCG tablet, Take 1 tablet by mouth Daily., Disp: 90 tablet, Rfl: 1    Omega-3 Fatty Acids (fish oil) 1000 MG capsule capsule, Take 1,576 mg by mouth Daily With Breakfast., Disp: , Rfl:     vitamin D3 125 MCG (5000 UT) capsule capsule, Take 1 capsule by mouth Daily., Disp: , Rfl:     The following portions of the patient's history were reviewed and updated as appropriate: allergies, current medications, past family history, past medical history, past social history, past surgical history and problem list.    Social History     Tobacco Use    Smoking status: Former     Packs/day: 1.50     Years: 40.00     Pack years: 60.00     Types: Cigarettes     Start date:      Quit date:      Years since quittin.6    Smokeless tobacco: Never   Vaping Use    Vaping Use: Never used   Substance Use Topics    Alcohol use: Not Currently     Comment: 2 week    Drug use: Never     Review of Systems   Constitutional: Negative for chills and fever.   HENT:  Negative for nosebleeds and sore throat.    Respiratory:  Negative for cough, hemoptysis and wheezing.    Gastrointestinal:  Negative for abdominal pain, hematemesis, hematochezia, melena, nausea and vomiting.   Genitourinary:  Negative for dysuria and hematuria.   Neurological:  Positive for dizziness. Negative for headaches.   Objective   Vitals:    23 1433   BP: (!) 130/39   BP Location: Left arm   Patient Position: Sitting   Cuff Size: Adult   Pulse: (!) 47   SpO2: 97%   Weight: 78.9 kg (174 lb)   Height: 182.9 cm (72\")     Body mass index is 23.6 kg/m².    Vitals reviewed.   Constitutional:       Appearance: Well-developed.   Eyes:      Conjunctiva/sclera: Conjunctivae normal.   HENT:      Head: Normocephalic.   Neck:      Thyroid: No thyromegaly.      Vascular: No JVD.      Trachea: No tracheal deviation.   Pulmonary:      Effort: No respiratory distress.      " Breath sounds: Normal breath sounds. No wheezing. No rales.   Cardiovascular:      PMI at left midclavicular line. Normal rate. Regular rhythm. Normal S1. Normal S2.       Murmurs: There is no murmur.      No gallop.  No click. No rub.   Pulses:     Intact distal pulses.   Edema:     Peripheral edema absent.   Abdominal:      General: Bowel sounds are normal.      Palpations: Abdomen is soft. There is no abdominal mass.      Tenderness: There is no abdominal tenderness.   Musculoskeletal:      Cervical back: Normal range of motion and neck supple. Skin:     General: Skin is warm and dry.   Neurological:      Mental Status: Alert and oriented to person, place, and time.      Cranial Nerves: No cranial nerve deficit.       Lab Results   Component Value Date     (L) 08/30/2023    K 4.9 08/30/2023    CL 97 (L) 08/30/2023    CO2 27.0 08/30/2023    BUN 12 08/30/2023    CREATININE 0.97 08/30/2023    GLUCOSE 85 08/30/2023    CALCIUM 9.1 08/30/2023    AST 16 08/30/2023    ALT 11 08/30/2023    ALKPHOS 69 08/30/2023    LABIL2 1.8 04/20/2022     No results found for: CKTOTAL  Lab Results   Component Value Date    WBC 7.30 08/30/2023    HGB 12.1 (L) 08/30/2023    HCT 35.5 (L) 08/30/2023     08/30/2023     No results found for: INR  Lab Results   Component Value Date    MG 2.1 11/11/2022     Lab Results   Component Value Date    TSH 1.990 08/30/2023    PSA 1.460 08/30/2023    CHLPL 133 04/20/2022    TRIG 53 08/30/2023    HDL 59 08/30/2023    LDL 72 08/30/2023      No results found for: BNP    ECG 12 Lead    Date/Time: 9/5/2023 2:33 PM  Performed by: Raffy Blanton MD  Authorized by: Raffy Blanton MD   Comparison: compared with previous ECG from 4/25/2023  Comparison to previous ECG: Patient is currently having wandering pacemaker with intermittent junctional rhythm.  Comments: Wandering pacemaker with intermittent junctional rhythm with heart rate about 48 bpm.          Assessment & Plan    Diagnosis Plan   1.  Sick sinus syndrome        2. Second degree AV block, Mobitz type I          Recommendations  I have discussed with him about the Holter monitor findings and and possible need for permanent pacemaker implantation in the near future.  Patient states that he is feeling all right and has only occasional mild dizziness and would like to think about the pacemaker and let us know if he has more symptoms.  His wife accompanied him at the visit and expressed understanding of the discussion as well.  I have discussed the procedure of permanent pacemaker implantation, potential risk, benefits and alternatives.  Patient expressed understanding and wanting to think about it and let us know.      Return in about 3 months (around 12/5/2023).    As always, Elissa Geronimo MD  I appreciate very much the opportunity to participate in the cardiovascular care of your patients. Please do not hesitate to call me with any questions with regards to Kevin Fonseca's evaluation and management.       With Best Regards,        Raffy Blanton MD, [facility]    Please note that portions of this note were completed with a voice recognition program.

## 2023-09-21 ENCOUNTER — TELEPHONE (OUTPATIENT)
Dept: FAMILY MEDICINE CLINIC | Facility: CLINIC | Age: 88
End: 2023-09-21

## 2023-09-21 NOTE — TELEPHONE ENCOUNTER
Caller: DILAN JOVEL    Relationship: Emergency Contact    Best call back number: [phone]    What is the best time to reach you:ALL DAY    Who are you requesting to speak with (clinical staff, provider,  specific staff member): CLINICAL STAFF    Do you know the name of the person who called: N/A    What was the call regarding: DILAN IS REQUESTING TO SPEAK WITH MARQUES ABOUT PATIENT PLEASE    Is it okay if the provider responds through MyChart: N/A

## 2023-09-21 NOTE — TELEPHONE ENCOUNTER
Caller: BECKAJENNIEE    Relationship: Emergency Contact    Best call back number: [phone]    What is the best time to reach you:ALL DAY    Who are you requesting to speak with (clinical staff, provider,  specific staff member): CLINICAL STAFF    Do you know the name of the person who called: N/A    What was the call regarding: DILAN IS REQUESTING TO SPEAK WITH MARQUES ABOUT PATIENT PLEASE    Is it okay if the provider responds through MyChart: N/A        Spoke with Dilan she reports he was & is still constipated,she gave him a 1/2 dose of MOM & he is just having some liquid stools so she thought she had caused him to have Diarrhea,explained that if he is constipated that liquid stool can come around the impacted stool,is wanting to know then if it would be ok to give him another dose of MOM,advised to try MOM & Prune juice warmed & drink,she verbalized understanding.

## 2023-09-29 ENCOUNTER — TELEPHONE (OUTPATIENT)
Dept: FAMILY MEDICINE CLINIC | Facility: CLINIC | Age: 88
End: 2023-09-29
Payer: OTHER GOVERNMENT

## 2023-09-29 NOTE — TELEPHONE ENCOUNTER
Caller: DILAN JOVEL    Relationship: Emergency Contact    Best call back number: [phone]    Who are you requesting to speak with (clinical staff, provider,  specific staff member): CLINICAL    What was the call regarding: LAST BOWEL MOVEMENT WAS  09.21.23, PLEASE ADVISE    Is it okay if the provider responds through MyChart: NO

## 2023-09-29 NOTE — TELEPHONE ENCOUNTER
Caller: DILAN JOVEL    Relationship: Emergency Contact    Best call back number: [phone]    Who are you requesting to speak with (clinical staff, provider,  specific staff member): CLINICAL    What was the call regarding: LAST BOWEL MOVEMENT WAS  09.21.23, PLEASE ADVISE    Is it okay if the provider responds through MyChart: NO      Spoke with Lishahunter she reports 4 days ago he took two stool softeners then 2 days later he took 2 more feels the urge but has not had a BM,denies any abdominal pain or distention,please advise.

## 2023-09-29 NOTE — TELEPHONE ENCOUNTER
Have him take fiber (metamucil or benefiber) twice a day along with miralax (twice a day). I have sent in linzess for him to try, but insurance may not cover. He can also do 1/2 prune juice, 1/2 MOM, mix, warm in microwave, and drink. See how those meds may help his BMs.

## 2023-09-29 NOTE — TELEPHONE ENCOUNTER
Oh no! Is he nauseous at all? Do they know about how much weight he's gained? Has he been taking any fiber or miralax?

## 2023-10-06 ENCOUNTER — CLINICAL SUPPORT (OUTPATIENT)
Dept: FAMILY MEDICINE CLINIC | Facility: CLINIC | Age: 88
End: 2023-10-06
Payer: OTHER GOVERNMENT

## 2023-10-06 DIAGNOSIS — Z23 NEED FOR VACCINATION AGAINST STREPTOCOCCUS PNEUMONIAE: Primary | ICD-10-CM

## 2023-10-16 ENCOUNTER — TELEPHONE (OUTPATIENT)
Dept: FAMILY MEDICINE CLINIC | Facility: CLINIC | Age: 88
End: 2023-10-16
Payer: OTHER GOVERNMENT

## 2023-10-16 NOTE — TELEPHONE ENCOUNTER
Caller: FABY JOVEL    Relationship to patient: Emergency Contact    PATIENT'S WIFE IS CALLING TO TALK ABOUT SOME MEDICATION THAT HE HAD BEEN GIVEN THAT NEED CLARIFICATION..  PLEASE HAVE SHINE CALL BACK.      Spoke with Faby she said initially the Buspar made his anxiety worse but after the 3 rd pill it seemed to help.

## 2023-10-16 NOTE — TELEPHONE ENCOUNTER
Caller: Kevin Fonseca    Relationship to patient: Self    PATIENT'S WIFE IS CALLING TO TALK ABOUT SOME MEDICATION THAT HE HAD BEEN GIVEN THAT NEED CLARIFICATION..  PLEASE HAVE SHINE CALL BACK.

## 2023-10-17 DIAGNOSIS — F41.9 ANXIETY: ICD-10-CM

## 2023-10-17 RX ORDER — BUSPIRONE HYDROCHLORIDE 5 MG/1
5 TABLET ORAL 3 TIMES DAILY PRN
Qty: 90 TABLET | Refills: 0 | Status: SHIPPED | OUTPATIENT
Start: 2023-10-17

## 2023-10-17 NOTE — TELEPHONE ENCOUNTER
Please let her know that it's sent in as three times a day as needed, but she can play with it. If she wants to start him on one daily in the AM, that's fine. And then she can add on the other 2 as needed if he is having a bad day.     Spoke with Faby & she verbalized understanding.

## 2023-10-17 NOTE — TELEPHONE ENCOUNTER
Ok. I would continue. He might need to take this regularly instead of PRN. Just warn her to watch out for dizziness in him as he is a fall risk.

## 2023-10-17 NOTE — TELEPHONE ENCOUNTER
Please let her know that it's sent in as three times a day as needed, but she can play with it. If she wants to start him on one daily in the AM, that's fine. And then she can add on the other 2 as needed if he is having a bad day.

## 2023-11-15 DIAGNOSIS — F41.9 ANXIETY: ICD-10-CM

## 2023-11-15 NOTE — TELEPHONE ENCOUNTER
Caller: DILAN JOVEL    Relationship: Emergency Contact    Best call back number: [phone]     Requested Prescriptions:   Requested Prescriptions     Pending Prescriptions Disp Refills    busPIRone (BUSPAR) 5 MG tablet 90 tablet 0     Sig: Take 1 tablet by mouth 3 (Three) Times a Day As Needed (anxiety).        Pharmacy where request should be sent: [facility] [phone] [place], [place] - [address] [address] AT [facility] [address] [facility] [phone] [facility] [phone] FX     Last office visit with prescribing clinician: 8/14/2023   Last telemedicine visit with prescribing clinician: Visit date not found   Next office visit with prescribing clinician: 11/28/2023     Additional details provided by patient: PATIENT HAS 9 DAYS REMAINING OF THIS MEDICATION      Does the patient have less than a 3 day supply:  [] Yes  [x] No    Would you like a call back once the refill request has been completed: [] Yes [x] No    If the office needs to give you a call back, can they leave a voicemail: [] Yes [x] No    Nancy Lopez Rep   11/15/23 11:06 EST   
Xray Lumbar Spine AP + Lateral

## 2023-11-16 RX ORDER — BUSPIRONE HYDROCHLORIDE 5 MG/1
5 TABLET ORAL 3 TIMES DAILY PRN
Qty: 90 TABLET | Refills: 5 | Status: SHIPPED | OUTPATIENT
Start: 2023-11-16

## 2023-11-28 ENCOUNTER — OFFICE VISIT (OUTPATIENT)
Dept: FAMILY MEDICINE CLINIC | Facility: CLINIC | Age: 88
End: 2023-11-28
Payer: OTHER GOVERNMENT

## 2023-11-28 VITALS
DIASTOLIC BLOOD PRESSURE: 64 MMHG | HEART RATE: 67 BPM | SYSTOLIC BLOOD PRESSURE: 122 MMHG | OXYGEN SATURATION: 99 % | BODY MASS INDEX: 23.27 KG/M2 | WEIGHT: 171.8 LBS | TEMPERATURE: 97.3 F | HEIGHT: 72 IN

## 2023-11-28 DIAGNOSIS — I49.5 SICK SINUS SYNDROME: ICD-10-CM

## 2023-11-28 DIAGNOSIS — L97.512 NON-PRESSURE CHRONIC ULCER OF OTHER PART OF RIGHT FOOT WITH FAT LAYER EXPOSED: ICD-10-CM

## 2023-11-28 DIAGNOSIS — J43.8 OTHER EMPHYSEMA: Primary | ICD-10-CM

## 2023-11-28 DIAGNOSIS — E03.8 OTHER SPECIFIED HYPOTHYROIDISM: ICD-10-CM

## 2023-11-28 PROCEDURE — 99214 OFFICE O/P EST MOD 30 MIN: CPT | Performed by: FAMILY MEDICINE

## 2023-11-28 PROCEDURE — 84439 ASSAY OF FREE THYROXINE: CPT | Performed by: FAMILY MEDICINE

## 2023-11-28 PROCEDURE — 80053 COMPREHEN METABOLIC PANEL: CPT | Performed by: FAMILY MEDICINE

## 2023-11-28 PROCEDURE — 36415 COLL VENOUS BLD VENIPUNCTURE: CPT | Performed by: FAMILY MEDICINE

## 2023-11-28 PROCEDURE — 84443 ASSAY THYROID STIM HORMONE: CPT | Performed by: FAMILY MEDICINE

## 2023-11-28 RX ORDER — LEVOTHYROXINE SODIUM 0.07 MG/1
75 TABLET ORAL DAILY
Qty: 90 TABLET | Refills: 1 | Status: SHIPPED | OUTPATIENT
Start: 2023-11-28

## 2023-11-29 LAB
ALBUMIN SERPL-MCNC: 4.1 G/DL (ref 3.5–5.2)
ALBUMIN/GLOB SERPL: 1.5 G/DL
ALP SERPL-CCNC: 70 U/L (ref 39–117)
ALT SERPL W P-5'-P-CCNC: 12 U/L (ref 1–41)
ANION GAP SERPL CALCULATED.3IONS-SCNC: 11.5 MMOL/L (ref 5–15)
AST SERPL-CCNC: 19 U/L (ref 1–40)
BILIRUB SERPL-MCNC: 0.4 MG/DL (ref 0–1.2)
BUN SERPL-MCNC: 16 MG/DL (ref 8–23)
BUN/CREAT SERPL: 17 (ref 7–25)
CALCIUM SPEC-SCNC: 9.1 MG/DL (ref 8.6–10.5)
CHLORIDE SERPL-SCNC: 98 MMOL/L (ref 98–107)
CO2 SERPL-SCNC: 24.5 MMOL/L (ref 22–29)
CREAT SERPL-MCNC: 0.94 MG/DL (ref 0.76–1.27)
EGFRCR SERPLBLD CKD-EPI 2021: 77.5 ML/MIN/1.73
GLOBULIN UR ELPH-MCNC: 2.8 GM/DL
GLUCOSE SERPL-MCNC: 86 MG/DL (ref 65–99)
POTASSIUM SERPL-SCNC: 4.7 MMOL/L (ref 3.5–5.2)
PROT SERPL-MCNC: 6.9 G/DL (ref 6–8.5)
SODIUM SERPL-SCNC: 134 MMOL/L (ref 136–145)
T4 FREE SERPL-MCNC: 1.56 NG/DL (ref 0.93–1.7)
TSH SERPL DL<=0.05 MIU/L-ACNC: 1.91 UIU/ML (ref 0.27–4.2)

## 2023-12-01 ENCOUNTER — TELEPHONE (OUTPATIENT)
Dept: FAMILY MEDICINE CLINIC | Facility: CLINIC | Age: 88
End: 2023-12-01
Payer: OTHER GOVERNMENT

## 2023-12-01 NOTE — TELEPHONE ENCOUNTER
Caller: DILAN JOVEL    Relationship: Emergency Contact    Best call back number: [phone]       Who are you requesting to speak with (clinical staff, provider,  specific staff member): CLINICAL       What was the call regarding: PT PROS  FEELS HE WOULD BENEFIT FROM ANOTHER ROUND OF PHYSICAL THERAPY    Is it okay if the provider responds through MyChart: NO

## 2023-12-04 DIAGNOSIS — M54.40 CHRONIC BILATERAL LOW BACK PAIN WITH SCIATICA, SCIATICA LATERALITY UNSPECIFIED: Primary | ICD-10-CM

## 2023-12-04 DIAGNOSIS — G89.29 CHRONIC BILATERAL LOW BACK PAIN WITH SCIATICA, SCIATICA LATERALITY UNSPECIFIED: Primary | ICD-10-CM

## 2023-12-05 ENCOUNTER — OFFICE VISIT (OUTPATIENT)
Dept: PULMONOLOGY | Facility: CLINIC | Age: 88
End: 2023-12-05
Payer: MEDICARE

## 2023-12-05 VITALS
BODY MASS INDEX: 23.16 KG/M2 | TEMPERATURE: 96.8 F | WEIGHT: 171 LBS | HEART RATE: 48 BPM | OXYGEN SATURATION: 94 % | DIASTOLIC BLOOD PRESSURE: 66 MMHG | HEIGHT: 72 IN | SYSTOLIC BLOOD PRESSURE: 124 MMHG

## 2023-12-05 DIAGNOSIS — J44.9 CHRONIC OBSTRUCTIVE PULMONARY DISEASE, UNSPECIFIED COPD TYPE: Primary | ICD-10-CM

## 2023-12-05 DIAGNOSIS — G47.34 NOCTURNAL HYPOXIA: ICD-10-CM

## 2023-12-05 PROCEDURE — 99214 OFFICE O/P EST MOD 30 MIN: CPT | Performed by: PHYSICIAN ASSISTANT

## 2023-12-05 NOTE — PROGRESS NOTES
"Chief Complaint  Chronic obstructive pulmonary disease, unspecified COPD type    Subjective        Kevin Fonseca presents to [facility]  History of Present Illness    Mr. Fonseca presents today along with his wife. No acute complaints today. Continues to use Breztri as scheduled with a spacer which helps him to more fully inhale it. Not acute concerns today. Continues with nocturnal oxygen use.       Objective   Vital Signs:  /66   Pulse (!) 48   Temp 96.8 °F (36 °C)   Ht 182.9 cm (72.01\")   Wt 77.6 kg (171 lb)   SpO2 94%   BMI 23.19 kg/m²   Estimated body mass index is 23.19 kg/m² as calculated from the following:    Height as of this encounter: 182.9 cm (72.01\").    Weight as of this encounter: 77.6 kg (171 lb).       BMI is within normal parameters. No other follow-up for BMI required.      Physical Exam  Vitals reviewed.   Constitutional:       General: He is not in acute distress.     Appearance: He is well-developed. He is not diaphoretic.   HENT:      Head: Normocephalic and atraumatic.   Cardiovascular:      Rate and Rhythm: Normal rate and regular rhythm.   Pulmonary:      Effort: Pulmonary effort is normal.      Breath sounds: No wheezing, rhonchi or rales.   Neurological:      Mental Status: He is alert and oriented to person, place, and time.   Psychiatric:         Behavior: Behavior normal.        Result Review :  The following data was reviewed by: Nereyda Velasquez PA-C on 12/05/2023:  Reviewed previous PFT.       Assessment and Plan   Diagnoses and all orders for this visit:    1. Chronic obstructive pulmonary disease, unspecified COPD type (Primary)    2. Nocturnal hypoxia        COPD:   Continues with albuterol inhaler use as needed  Continues breztri use as scheduled (decreased to 3 puffs per day, better tolerated (and helps to avoid side effect concerns)  Uses a spacer with inhaler use.   Former smoking history - doesn't qualify for LDCT "   No urgent need for respiratory testing as symptoms remain stable. Can consider as needed.       Nocturnal hypoxia:   Continues on 2 L at nighttime.         Follow Up   Return in about 4 months (around 4/5/2024).  Patient was given instructions and counseling regarding his condition or for health maintenance advice. Please see specific information pulled into the AVS if appropriate.

## 2023-12-07 ENCOUNTER — OFFICE VISIT (OUTPATIENT)
Dept: CARDIOLOGY | Facility: CLINIC | Age: 88
End: 2023-12-07
Payer: OTHER GOVERNMENT

## 2023-12-07 VITALS
OXYGEN SATURATION: 98 % | HEIGHT: 72 IN | DIASTOLIC BLOOD PRESSURE: 80 MMHG | HEART RATE: 56 BPM | SYSTOLIC BLOOD PRESSURE: 126 MMHG | WEIGHT: 169.4 LBS | BODY MASS INDEX: 22.94 KG/M2

## 2023-12-07 DIAGNOSIS — I49.5 SICK SINUS SYNDROME: Primary | ICD-10-CM

## 2023-12-07 PROCEDURE — 99213 OFFICE O/P EST LOW 20 MIN: CPT | Performed by: PHYSICIAN ASSISTANT

## 2023-12-07 NOTE — PROGRESS NOTES
Elissa Geronimo MD  Kevin Fonseca  1934  12/07/2023    Patient Active Problem List   Diagnosis    Chronic obstructive pulmonary disease    Former cigarette smoker    Nocturnal hypoxia    Sinus pause    Hypothyroidism    Exudative age-related macular degeneration, bilateral, with inactive scar    Other specified peripheral vascular diseases    Sick sinus syndrome    BENNIE (obstructive sleep apnea)       Dear Elissa Geronimo MD:    Subjective     History of Present Illness:    Chief Complaint   Patient presents with    Follow-up     routine       Kevin Fonseca is a pleasant 89 y.o. male with a past medical history significant for sinus bradycardia with second-degree Mobitz type II AV block seen on Holter monitor as well as sinus pauses up to 2.7 seconds.  He comes in today for cardiology follow-up.    Jack reports she has still been stable since he was last seen he denies any significant symptoms concerning for sick sinus syndrome such as syncope or near syncope.  Although he does still have some generalized fatigue but also believes this might be due to his age as he is 89 years of age.  He denies any falls but does report occasional dizziness but again has not fallen or had a syncopal episode.  He denies any chest pains or shortness of breath.    No Known Allergies:      Current Outpatient Medications:     albuterol sulfate  (90 Base) MCG/ACT inhaler, Inhale 2 puffs Every 4 (Four) Hours As Needed for Wheezing or Shortness of Air., Disp: 18 g, Rfl: 8    Budeson-Glycopyrrol-Formoterol (BREZTRI) 160-9-4.8 MCG/ACT aerosol inhaler, Inhale 2 puffs 2 (Two) Times a Day., Disp: 10.7 g, Rfl: 5    busPIRone (BUSPAR) 5 MG tablet, Take 1 tablet by mouth 3 (Three) Times a Day As Needed (anxiety)., Disp: 90 tablet, Rfl: 5    levothyroxine (SYNTHROID, LEVOTHROID) 75 MCG tablet, Take 1 tablet by mouth Daily., Disp: 90 tablet, Rfl: 1    The following portions of the patient's history were reviewed and updated as  "appropriate: allergies, current medications, past family history, past medical history, past social history, past surgical history and problem list.    Social History     Tobacco Use    Smoking status: Former     Packs/day: 1.50     Years: 40.00     Additional pack years: 0.00     Total pack years: 60.00     Types: Cigarettes     Start date:      Quit date:      Years since quittin.9    Smokeless tobacco: Never   Vaping Use    Vaping Use: Never used   Substance Use Topics    Alcohol use: Not Currently     Comment: 2 week    Drug use: Never         Objective   Vitals:    23 1455   BP: 126/80   Pulse: 56   SpO2: 98%   Weight: 76.8 kg (169 lb 6.4 oz)   Height: 182.9 cm (72\")     Body mass index is 22.97 kg/m².    ROS    Constitutional:       General: Not in acute distress.     Appearance: Healthy appearance. Well-developed and not in distress. Not diaphoretic.   Eyes:      Conjunctiva/sclera: Conjunctivae normal.      Pupils: Pupils are equal, round, and reactive to light.   HENT:      Head: Normocephalic and atraumatic.   Neck:      Vascular: No carotid bruit or JVD.   Pulmonary:      Effort: Pulmonary effort is normal. No respiratory distress.      Breath sounds: Normal breath sounds.   Cardiovascular:      Normal rate. Regular rhythm.   Edema:     Peripheral edema absent.   Skin:     General: Skin is cool.   Neurological:      Mental Status: Alert, oriented to person, place, and time and oriented to person, place and time.         Lab Results   Component Value Date     (L) 2023    K 4.7 2023    CL 98 2023    CO2 24.5 2023    BUN 16 2023    CREATININE 0.94 2023    GLUCOSE 86 2023    CALCIUM 9.1 2023    AST 19 2023    ALT 12 2023    ALKPHOS 70 2023    LABIL2 1.8 2022     No results found for: \"CKTOTAL\"  Lab Results   Component Value Date    WBC 7.30 2023    HGB 12.1 (L) 2023    HCT 35.5 (L) 2023    PLT " "271 08/30/2023     No results found for: \"INR\"  Lab Results   Component Value Date    MG 2.1 11/11/2022     Lab Results   Component Value Date    TSH 1.910 11/28/2023    PSA 1.460 08/30/2023    CHLPL 133 04/20/2022    TRIG 53 08/30/2023    HDL 59 08/30/2023    LDL 72 08/30/2023      No results found for: \"BNP\"    During this visit the following were done:  Labs Reviewed []    Labs Ordered []    Radiology Reports Reviewed []    Radiology Ordered []    PCP Records Reviewed []    Referring Provider Records Reviewed []    ER Records Reviewed []    Hospital Records Reviewed []    History Obtained From Family []    Radiology Images Reviewed []    Other Reviewed []    Records Requested []       Procedures    Assessment & Plan    Diagnosis Plan   1. Sick sinus syndrome                 Recommendations:  Second-degree AV block Mobitz type II  Once again discussed with patient about possible pacemaker implantation.  Although he denies any syncope or near syncope he does report some generalized fatigue and dizziness.  It has been recommended in the past for him to have permanent pacemaker implantation however he wishes to hold off for now which is still the case.  Will respect his wishes.  Sleep apnea  Recommend CPAP/APAP.  He is following with pulmonology.    Return in about 3 months (around 3/7/2024).    As always, I appreciate very much the opportunity to participate in the cardiovascular care of your patients.      With Best Regards,    Benjamin Montez PA-C          "

## 2023-12-18 NOTE — PROGRESS NOTES
"Kevin Fonseca     VITALS: Blood pressure 122/64, pulse 67, temperature 97.3 °F (36.3 °C), temperature source Temporal, height 182.9 cm (72\"), weight 77.9 kg (171 lb 12.8 oz), SpO2 99%.    Subjective  Chief Complaint:   Chief Complaint   Patient presents with    Hypothyroidism        History of Present Illness:  Patient is a 89 y.o.  male with medical conditions significant for sick sinus syndrome and COPD who presents to clinic secondary to medical followup.  He is doing well.  No new or acute concerns.    No complaints about any of the medications.    The following portions of the patient's history were reviewed and updated as appropriate: allergies, current medications, past family history, past medical history, past social history, past surgical history and problem list.    Past Medical History  Past Medical History:   Diagnosis Date    Emphysema lung     Gastritis     Heartburn     Macular degeneration     Reflux esophagitis     Thyroid disease        Surgical History  Past Surgical History:   Procedure Laterality Date    INTRACAPSULAR CATARACT EXTRACTION      Dr. Lesly Gray. Dr. Neto Light.       Family History  Family History   Problem Relation Age of Onset    Hypertension Mother     Other Mother     Cancer Father     Cancer Brother     Asthma Brother        Social History  Social History     Socioeconomic History    Marital status:    Tobacco Use    Smoking status: Former     Packs/day: 1.50     Years: 40.00     Additional pack years: 0.00     Total pack years: 60.00     Types: Cigarettes     Start date:      Quit date:      Years since quittin.9    Smokeless tobacco: Never   Vaping Use    Vaping Use: Never used   Substance and Sexual Activity    Alcohol use: Not Currently     Comment: 2 week    Drug use: Never    Sexual activity: Defer       Objective  Physical Exam    Gen: Patient in NAD. Pleasant and answers appropriately. A&Ox3.    Skin: Warm and dry with normal turgor. No " purpura, rashes, or unusual pigmentation noted. Hair is normal in appearance and distribution.    HEENT: NC/AT. No lesions noted. Conjunctiva clear, sclera nonicteric. PERRL. EOMI without nystagmus or strabismus. Fundi appear benign. No hemorrhages or exudates of eyes. Auditory canals are patent bilaterally without lesions. TMs intact,  nonerythematous, nonbulging without lesions. Nasal mucosa pink, nonerythematous, and nonedematous. Frontal and maxillary sinuses are nontender. O/P nonerythematous and moist without exudate.    Neck: Supple without lymph nodes palpated. FROM.     Lungs: Decreased B/L without rales, rhonchi, crackles, or wheezes.    Heart: Distant.  Irregularly irregular. S1 and S2 normal. No S3 or S4. No MRGT.    Abd: Soft, nontender,nondistended. (+)BSx4 quadrants.     Extrem: No CCE. Radial pulses 2+/4 and equal B/L. FROMx4.  Positive joint tenderness noted.    Neuro: No focal motor/sensory deficits.    Procedures    Assessment/Plan  Kevin Fonseca is a 89 y.o. here for medical followup.    Diagnoses and all orders for this visit:    1. Other emphysema (Primary)  No current exacerbation.  Continue to monitor.    2. Other specified hypothyroidism  -     levothyroxine (SYNTHROID, LEVOTHROID) 75 MCG tablet; Take 1 tablet by mouth Daily.  Dispense: 90 tablet; Refill: 1  -     TSH; Future  -     T4, Free; Future  -     Comprehensive Metabolic Panel; Future  -     TSH  -     T4, Free  -     Comprehensive Metabolic Panel    3. Sick sinus syndrome  Per cardiology.    4. Non-pressure chronic ulcer of other part of right foot with fat layer exposed  Discussed wound care.  Patient does see podiatry.      BMI is within normal parameters. No other follow-up for BMI required.           Findings and plans discussed with patient who verbalizes understanding and agreement. Will followup with patient once results are in. Patient to followup at clinic PRN or in 3 months for further medical followup.    Elissa Geronimo,  MD CORINNE Murphy/Transcription Disclaimer:  Much of this encounter note is an electronic transcription/translation of spoken language to printed text.  The electronic translation of spoken language may permit erroneous, or at times, nonsensical words or phrases to be inadvertently transcribed.  Although I have reviewed the note for such errors, some may still exist.

## 2024-03-01 ENCOUNTER — OFFICE VISIT (OUTPATIENT)
Dept: FAMILY MEDICINE CLINIC | Facility: CLINIC | Age: 89
End: 2024-03-01
Payer: OTHER GOVERNMENT

## 2024-03-01 VITALS
OXYGEN SATURATION: 97 % | HEART RATE: 68 BPM | BODY MASS INDEX: 23.24 KG/M2 | TEMPERATURE: 97.3 F | DIASTOLIC BLOOD PRESSURE: 66 MMHG | WEIGHT: 171.6 LBS | HEIGHT: 72 IN | SYSTOLIC BLOOD PRESSURE: 120 MMHG

## 2024-03-01 DIAGNOSIS — E03.8 OTHER SPECIFIED HYPOTHYROIDISM: ICD-10-CM

## 2024-03-01 DIAGNOSIS — R53.83 OTHER FATIGUE: ICD-10-CM

## 2024-03-01 DIAGNOSIS — J43.8 OTHER EMPHYSEMA: Primary | ICD-10-CM

## 2024-03-01 PROCEDURE — 36415 COLL VENOUS BLD VENIPUNCTURE: CPT | Performed by: FAMILY MEDICINE

## 2024-03-01 PROCEDURE — 84439 ASSAY OF FREE THYROXINE: CPT | Performed by: FAMILY MEDICINE

## 2024-03-01 PROCEDURE — 80053 COMPREHEN METABOLIC PANEL: CPT | Performed by: FAMILY MEDICINE

## 2024-03-01 PROCEDURE — 84443 ASSAY THYROID STIM HORMONE: CPT | Performed by: FAMILY MEDICINE

## 2024-03-01 RX ORDER — CYANOCOBALAMIN 1000 UG/ML
1000 INJECTION, SOLUTION INTRAMUSCULAR; SUBCUTANEOUS ONCE
Status: COMPLETED | OUTPATIENT
Start: 2024-03-01 | End: 2024-03-01

## 2024-03-01 RX ORDER — LEVOTHYROXINE SODIUM 0.07 MG/1
75 TABLET ORAL DAILY
Qty: 90 TABLET | Refills: 1 | Status: SHIPPED | OUTPATIENT
Start: 2024-03-01

## 2024-03-01 RX ADMIN — CYANOCOBALAMIN 1000 MCG: 1000 INJECTION, SOLUTION INTRAMUSCULAR; SUBCUTANEOUS at 15:48

## 2024-03-01 NOTE — PROGRESS NOTES
"Kevin Fonseca     VITALS: Blood pressure 120/66, pulse 68, temperature 97.3 °F (36.3 °C), temperature source Temporal, height 182.9 cm (72\"), weight 77.8 kg (171 lb 9.6 oz), SpO2 97%.    Subjective  Chief Complaint  Other specified hypothyroidism    Subjective          History of Present Illness:  The patient is an 89-year-old male with multiple medical conditions significant for sick sinus syndrome and COPD who presents to clinic today for follow-up. He is accompanied by an adult female who is his wife.    He is due for an updated pneumonia vaccine. He has 4 doses left of Breztri. He does inquire if he needs to eat or drink anything while taking his thyroid medication.    No complaints about any of the medications.    The following portions of the patient's history were reviewed and updated as appropriate: allergies, current medications, past family history, past medical history, past social history, past surgical history and problem list.    Past Medical History  Past Medical History:   Diagnosis Date    Emphysema lung     Gastritis     Heartburn     Macular degeneration     Reflux esophagitis     Thyroid disease        Surgical History  Past Surgical History:   Procedure Laterality Date    INTRACAPSULAR CATARACT EXTRACTION      Dr. Lesly Gray. Dr. Neto Light.       Family History  Family History   Problem Relation Age of Onset    Hypertension Mother     Other Mother     Cancer Father     Cancer Brother     Asthma Brother        Social History  Social History     Socioeconomic History    Marital status:    Tobacco Use    Smoking status: Former     Packs/day: 1.50     Years: 40.00     Additional pack years: 0.00     Total pack years: 60.00     Types: Cigarettes     Start date:      Quit date:      Years since quittin.1    Smokeless tobacco: Never   Vaping Use    Vaping Use: Never used   Substance and Sexual Activity    Alcohol use: Not Currently     Comment: 2 week    Drug use: Never    " "Sexual activity: Defer       Objective   Vital Signs:   /66 (BP Location: Right arm, Patient Position: Sitting, Cuff Size: Adult)   Pulse 68   Temp 97.3 °F (36.3 °C) (Temporal)   Ht 182.9 cm (72\")   Wt 77.8 kg (171 lb 9.6 oz)   SpO2 97%   BMI 23.27 kg/m²     Physical Exam     Gen: Patient in NAD. Pleasant and answers appropriately. A&Ox3.    Skin: Warm and dry with normal turgor. No purpura, rashes, or unusual pigmentation noted. Hair is normal in appearance and distribution.    HEENT: NC/AT. No lesions noted. Conjunctiva clear, sclera nonicteric. PERRL. EOMI without nystagmus or strabismus. Fundi appear benign. No hemorrhages or exudates of eyes. Auditory canals are patent bilaterally without lesions. TMs intact,  nonerythematous, nonbulging without lesions. Nasal mucosa pink, nonerythematous, and nonedematous. Frontal and maxillary sinuses are nontender. O/P nonerythematous and moist without exudate.    Neck: Supple without lymph nodes palpated. FROM.     Lungs: Decreased B/L without rales, rhonchi, crackles, or wheezes.    Heart: RRR. S1 and S2 normal. No S3 or S4. No MRGT.    Abd: Soft, nontender,nondistended. (+)BSx4 quadrants.     Extrem: No CCE. Radial pulses 2+/4 and equal B/L. FROMx4.  Positive joint tenderness noted.    Neuro: No focal motor/sensory deficits.    Procedures    Result Review :   The following data was reviewed by: Elissa Geronimo MD on 03/01/2024:                Assessment and Plan    Kevin Fonseca is a 89 y.o. here for medical followup.    Diagnoses and all orders for this visit:    1. Other emphysema (Primary)  -     Budeson-Glycopyrrol-Formoterol (BREZTRI) 160-9-4.8 MCG/ACT aerosol inhaler; Inhale 2 puffs 2 (Two) Times a Day.  Dispense: 10.7 g; Refill: 5  -     Comprehensive Metabolic Panel; Future  -     Comprehensive Metabolic Panel    2. Other specified hypothyroidism  -     levothyroxine (SYNTHROID, LEVOTHROID) 75 MCG tablet; Take 1 tablet by mouth Daily.  Dispense: 90 " tablet; Refill: 1  -     T4, Free; Future  -     TSH; Future  -     Comprehensive Metabolic Panel; Future  -     T4, Free  -     TSH  -     Comprehensive Metabolic Panel    3. Other fatigue  -     cyanocobalamin injection 1,000 mcg          Problem List Items Addressed This Visit          Endocrine and Metabolic    Hypothyroidism    Relevant Medications    levothyroxine (SYNTHROID, LEVOTHROID) 75 MCG tablet    Other Relevant Orders    T4, Free    TSH    Comprehensive Metabolic Panel       Pulmonary and Pneumonias    Chronic obstructive pulmonary disease - Primary    Relevant Medications    Budeson-Glycopyrrol-Formoterol (BREZTRI) 160-9-4.8 MCG/ACT aerosol inhaler    Other Relevant Orders    Comprehensive Metabolic Panel     Other Visit Diagnoses       Other fatigue        Relevant Medications    cyanocobalamin injection 1,000 mcg (Completed)          1. COPD.  I will send in a prescription for Breztri.    2. Hypothyroidism.  A prescription for Synthroid was sent to [facility].    3. Health maintenance.  The CDC recommends a second COVID-19 vaccine 6 months after the last vaccine he received. I have advised him that he may want to update this vaccination. He will receive a B12 injection today.    4. Fatigue.  His fatigue could be due to rheumatoid arthritis or the weather. He will receive a vitamin B12 injection today.    BMI is within normal parameters. No other follow-up for BMI required.       Follow Up   Return in about 3 months (around 6/1/2024), or LABS.  Findings and plans discussed with patient who verbalizes understanding and agreement. Will followup with patient once results are in. Patient was given instructions and counseling regarding his condition or for health maintenance advice. Please see specific information pulled into the AVS if appropriate.       Elissa Geronimo MD    Transcribed from ambient dictation for Elissa Geronimo MD by Rita Leon.  03/01/24   16:05 EST    Patient or patient  representative verbalized consent to the visit recording.  I have personally performed the services described in this document as transcribed by the above individual, and it is both accurate and complete.

## 2024-03-02 LAB
ALBUMIN SERPL-MCNC: 4 G/DL (ref 3.5–5.2)
ALBUMIN/GLOB SERPL: 1.3 G/DL
ALP SERPL-CCNC: 73 U/L (ref 39–117)
ALT SERPL W P-5'-P-CCNC: 13 U/L (ref 1–41)
ANION GAP SERPL CALCULATED.3IONS-SCNC: 14.8 MMOL/L (ref 5–15)
AST SERPL-CCNC: 18 U/L (ref 1–40)
BILIRUB SERPL-MCNC: 0.5 MG/DL (ref 0–1.2)
BUN SERPL-MCNC: 18 MG/DL (ref 8–23)
BUN/CREAT SERPL: 18.4 (ref 7–25)
CALCIUM SPEC-SCNC: 9.1 MG/DL (ref 8.6–10.5)
CHLORIDE SERPL-SCNC: 96 MMOL/L (ref 98–107)
CO2 SERPL-SCNC: 23.2 MMOL/L (ref 22–29)
CREAT SERPL-MCNC: 0.98 MG/DL (ref 0.76–1.27)
EGFRCR SERPLBLD CKD-EPI 2021: 73.7 ML/MIN/1.73
GLOBULIN UR ELPH-MCNC: 3.2 GM/DL
GLUCOSE SERPL-MCNC: 85 MG/DL (ref 65–99)
POTASSIUM SERPL-SCNC: 4.6 MMOL/L (ref 3.5–5.2)
PROT SERPL-MCNC: 7.2 G/DL (ref 6–8.5)
SODIUM SERPL-SCNC: 134 MMOL/L (ref 136–145)
T4 FREE SERPL-MCNC: 1.59 NG/DL (ref 0.93–1.7)
TSH SERPL DL<=0.05 MIU/L-ACNC: 1.99 UIU/ML (ref 0.27–4.2)

## 2024-03-04 DIAGNOSIS — J43.8 OTHER EMPHYSEMA: ICD-10-CM

## 2024-03-04 NOTE — TELEPHONE ENCOUNTER
Caller: JENNIE JOVELE    Relationship: Emergency Contact    Best call back number: [phone]    Requested Prescriptions:   Requested Prescriptions     Pending Prescriptions Disp Refills    Budeson-Glycopyrrol-Formoterol (BREZTRI) 160-9-4.8 MCG/ACT aerosol inhaler 10.7 g 5     Sig: Inhale 2 puffs 2 (Two) Times a Day.        Pharmacy where request should be sent: [facility] - [place] [address] [address]  - [phone] [facility] [phone] [facility]     Last office visit with prescribing clinician: 3/1/2024   Last telemedicine visit with prescribing clinician: Visit date not found   Next office visit with prescribing clinician: 5/30/2024     Additional details provided by patient: PT IS ALMOST OUT [facility] TO [facility]    Does the patient have less than a 3 day supply:  [x] Yes  [] No    Would you like a call back once the refill request has been completed: [x] Yes [] No    If the office needs to give you a call back, can they leave a voicemail: [] Yes [] No    Nancy Smith Rep   03/04/24 10:00 EST

## 2024-04-09 ENCOUNTER — OFFICE VISIT (OUTPATIENT)
Dept: PULMONOLOGY | Facility: CLINIC | Age: 89
End: 2024-04-09
Payer: MEDICARE

## 2024-04-09 VITALS
HEART RATE: 54 BPM | DIASTOLIC BLOOD PRESSURE: 66 MMHG | TEMPERATURE: 96.8 F | SYSTOLIC BLOOD PRESSURE: 118 MMHG | BODY MASS INDEX: 23.16 KG/M2 | WEIGHT: 171 LBS | OXYGEN SATURATION: 96 % | HEIGHT: 72 IN

## 2024-04-09 DIAGNOSIS — J44.9 CHRONIC OBSTRUCTIVE PULMONARY DISEASE, UNSPECIFIED COPD TYPE: Primary | ICD-10-CM

## 2024-04-09 DIAGNOSIS — G47.34 NOCTURNAL HYPOXIA: ICD-10-CM

## 2024-04-09 DIAGNOSIS — G47.33 OSA (OBSTRUCTIVE SLEEP APNEA): ICD-10-CM

## 2024-04-09 DIAGNOSIS — J30.2 SEASONAL ALLERGIC RHINITIS, UNSPECIFIED TRIGGER: ICD-10-CM

## 2024-04-09 PROCEDURE — 99214 OFFICE O/P EST MOD 30 MIN: CPT | Performed by: PHYSICIAN ASSISTANT

## 2024-04-09 RX ORDER — AZELASTINE 1 MG/ML
1 SPRAY, METERED NASAL 2 TIMES DAILY PRN
Qty: 30 ML | Refills: 8 | Status: SHIPPED | OUTPATIENT
Start: 2024-04-09

## 2024-04-09 NOTE — PROGRESS NOTES
"Chief Complaint  Chronic obstructive pulmonary disease, unspecified COPD type    Subjective        Kevin Fonseca presents to [facility]  History of Present Illness    Mr. Fonseca presents today for follow up of COPD and nocturnal hypoxia. Continues on Breztri. Aims to rinse his mouth out for a certain period of time but can still have some difficulty with doing this for an extended time. Wife has noted some white areas on the back of the tongue area but Mr. Fonseca denies any oral soreness/irritation.   Continues with nocturnal supplemental oxygen use at this time.      Objective   Vital Signs:  /66   Pulse 54   Temp 96.8 °F (36 °C)   Ht 182.9 cm (72.01\")   Wt 77.6 kg (171 lb)   SpO2 96%   BMI 23.19 kg/m²   Estimated body mass index is 23.19 kg/m² as calculated from the following:    Height as of this encounter: 182.9 cm (72.01\").    Weight as of this encounter: 77.6 kg (171 lb).       BMI is within normal parameters. No other follow-up for BMI required.      Physical Exam  Vitals reviewed.   Constitutional:       General: He is not in acute distress.     Appearance: He is well-developed. He is not diaphoretic.   HENT:      Head: Normocephalic and atraumatic.   Cardiovascular:      Rate and Rhythm: Normal rate and regular rhythm.      Heart sounds: Murmur (faint murmur) heard.   Pulmonary:      Effort: Pulmonary effort is normal.      Breath sounds: No wheezing, rhonchi or rales.   Musculoskeletal:         General: Normal range of motion.   Neurological:      Mental Status: He is alert and oriented to person, place, and time.   Psychiatric:         Behavior: Behavior normal.        Result Review :    The following data was reviewed by: Nereyda Velasquez PA-C on 04/09/2024:  Echo results 2023  PFT testing from 2021  Home sleep study 2022      Assessment and Plan     Diagnoses and all orders for this visit:    1. Chronic obstructive pulmonary disease, " unspecified COPD type (Primary)    2. Nocturnal hypoxia    3. Seasonal allergic rhinitis, unspecified trigger    4. BENNIE (obstructive sleep apnea)  -     Detailed AutoPAP Order    Other orders  -     azelastine (ASTELIN) 0.1 % nasal spray; 1 spray into the nostril(s) as directed by provider 2 (Two) Times a Day As Needed for Rhinitis or Allergies. Use in each nostril as directed  Dispense: 30 mL; Refill: 8      COPD:   Continue with albuterol inhaler as needed  Continue Breztri as ascheduled.   Currently using 3 puffs per day due to side effect concerns.   Discussed that he could even decrease to 2 puffs per day as tolerated (effective for COPD dosing but further reduces side effect risks).   Rinses mouth out & gargles several times after use.  Discussed different techniques for this.    Main goal is to prevent thrush.  Previously given spacer to help improve inhalation  No urgent need for PFT at this time, we will hold off due to age  Saturation noted at 96% today on room air.   Prefers to hold off on walk oximetry testing today.   Patient and wife frequently monitor at home.  Advised if saturation drops to 88% or lower contact myself/seek further evaluation as needed      Nocturnal hypoxia:   Currently continues with 2 L at nighttime.   Did complete sleep apnea testing in 2022 (home study), notable for mild sleep apnea. Had not yet attempted therapy.   Will consider trial of this device. Use may increase anxious, but if not tolerated can resume nighttime oxygen use.   May provide better cardiac benefit if tolerated than oxygen alone.   Order placed for autopap of 5-16 cm.   Can try either full facial or nasal only mask per tolerance.       Seasonal Rhinitis:   Will try azelastine PRN   Recommend trying minimal dose (1 spray per nostril), then increase dose as needed/tolerated.       Follow Up     Return in about 3 months (around 7/9/2024), or if symptoms worsen or fail to improve, for Next scheduled follow  up.  Patient was given instructions and counseling regarding his condition or for health maintenance advice. Please see specific information pulled into the AVS if appropriate.

## 2024-04-23 ENCOUNTER — OFFICE VISIT (OUTPATIENT)
Dept: FAMILY MEDICINE CLINIC | Facility: CLINIC | Age: 89
End: 2024-04-23
Payer: MEDICARE

## 2024-04-23 VITALS
DIASTOLIC BLOOD PRESSURE: 60 MMHG | TEMPERATURE: 96.9 F | OXYGEN SATURATION: 98 % | BODY MASS INDEX: 23.49 KG/M2 | SYSTOLIC BLOOD PRESSURE: 130 MMHG | HEIGHT: 72 IN | HEART RATE: 68 BPM | WEIGHT: 173.4 LBS

## 2024-04-23 DIAGNOSIS — R30.0 DYSURIA: Primary | ICD-10-CM

## 2024-04-23 DIAGNOSIS — R60.0 BILATERAL EDEMA OF LOWER EXTREMITY: ICD-10-CM

## 2024-04-23 DIAGNOSIS — R05.3 CHRONIC COUGH: ICD-10-CM

## 2024-04-23 LAB
BILIRUB BLD-MCNC: NEGATIVE MG/DL
CLARITY, POC: CLEAR
COLOR UR: YELLOW
EXPIRATION DATE: NORMAL
GLUCOSE UR STRIP-MCNC: NEGATIVE MG/DL
KETONES UR QL: NEGATIVE
LEUKOCYTE EST, POC: NEGATIVE
Lab: NORMAL
NITRITE UR-MCNC: NEGATIVE MG/ML
PH UR: 6 [PH] (ref 5–8)
PROT UR STRIP-MCNC: NEGATIVE MG/DL
RBC # UR STRIP: NEGATIVE /UL
SP GR UR: 1.01 (ref 1–1.03)
UROBILINOGEN UR QL: NORMAL

## 2024-04-23 PROCEDURE — 93000 ELECTROCARDIOGRAM COMPLETE: CPT | Performed by: FAMILY MEDICINE

## 2024-04-23 PROCEDURE — 1160F RVW MEDS BY RX/DR IN RCRD: CPT | Performed by: FAMILY MEDICINE

## 2024-04-23 PROCEDURE — 1125F AMNT PAIN NOTED PAIN PRSNT: CPT | Performed by: FAMILY MEDICINE

## 2024-04-23 PROCEDURE — 99214 OFFICE O/P EST MOD 30 MIN: CPT | Performed by: FAMILY MEDICINE

## 2024-04-23 PROCEDURE — 81003 URINALYSIS AUTO W/O SCOPE: CPT | Performed by: FAMILY MEDICINE

## 2024-04-23 PROCEDURE — 1159F MED LIST DOCD IN RCRD: CPT | Performed by: FAMILY MEDICINE

## 2024-04-23 RX ORDER — FUROSEMIDE 20 MG/1
20 TABLET ORAL DAILY
Qty: 5 TABLET | Refills: 0 | Status: SHIPPED | OUTPATIENT
Start: 2024-04-23 | End: 2024-04-28

## 2024-04-25 DIAGNOSIS — R60.0 BILATERAL EDEMA OF LOWER EXTREMITY: ICD-10-CM

## 2024-04-25 RX ORDER — FUROSEMIDE 20 MG/1
20 TABLET ORAL DAILY
Qty: 5 TABLET | Refills: 0 | OUTPATIENT
Start: 2024-04-25 | End: 2024-04-30

## 2024-04-26 ENCOUNTER — TELEPHONE (OUTPATIENT)
Dept: FAMILY MEDICINE CLINIC | Facility: CLINIC | Age: 89
End: 2024-04-26
Payer: MEDICARE

## 2024-04-26 NOTE — TELEPHONE ENCOUNTER
Caller: DILAN JOVEL    Relationship: Emergency Contact    Best call back number: [phone]    Which medication are you concerned about: Furosemide (Lasix) 20 MG tablet     Who prescribed you this medication: DR. MARCOS    When did you start taking this medication: STARTED THIS MORNING    What are your concerns: REQUESTING A CALL BACK. WANTING TO KNOW HOW OFTEN PATIENT NEEDS TO WEIGHT HIMSELF?     PATIENT DID WEIGHT HIMSELF THIS MORNING   170.6 LBS

## 2024-04-30 NOTE — TELEPHONE ENCOUNTER
Okay. He is to check his weight for several days. If it has increased by Thursday or Friday, he is to call.

## 2024-05-02 ENCOUNTER — TELEPHONE (OUTPATIENT)
Dept: FAMILY MEDICINE CLINIC | Facility: CLINIC | Age: 89
End: 2024-05-02
Payer: MEDICARE

## 2024-05-02 NOTE — TELEPHONE ENCOUNTER
Faby called with Kevin's weights yesterday was 170.6 today 169.8,she reports for about a month be has complained of pain in his right side below rib cage down to hip bone that radiates around to his back on & off,hurting today,cannot really associate it with anything because it just comes & goes for no reason,hurts at night when he is lying down also.

## 2024-05-03 NOTE — TELEPHONE ENCOUNTER
Weights are okay. This pain really needs to be addressed in a visit and not over the phone as it would necessitate an physical exam. (And not a 1 PM appointment either).

## 2024-05-03 NOTE — TELEPHONE ENCOUNTER
Spoke with Faby she stated they were leaving for a hair cut right now & she would call back Monday if he is still having pain & schedule.

## 2024-05-06 ENCOUNTER — TELEPHONE (OUTPATIENT)
Dept: FAMILY MEDICINE CLINIC | Facility: CLINIC | Age: 89
End: 2024-05-06
Payer: MEDICARE

## 2024-05-07 DIAGNOSIS — R05.3 CHRONIC COUGH: Primary | ICD-10-CM

## 2024-05-14 ENCOUNTER — OFFICE VISIT (OUTPATIENT)
Dept: FAMILY MEDICINE CLINIC | Facility: CLINIC | Age: 89
End: 2024-05-14
Payer: MEDICARE

## 2024-05-14 VITALS
WEIGHT: 169.6 LBS | HEIGHT: 72 IN | TEMPERATURE: 97.7 F | OXYGEN SATURATION: 96 % | BODY MASS INDEX: 22.97 KG/M2 | DIASTOLIC BLOOD PRESSURE: 62 MMHG | HEART RATE: 67 BPM | SYSTOLIC BLOOD PRESSURE: 118 MMHG

## 2024-05-14 DIAGNOSIS — F41.9 ANXIETY: ICD-10-CM

## 2024-05-14 DIAGNOSIS — E03.8 OTHER SPECIFIED HYPOTHYROIDISM: ICD-10-CM

## 2024-05-14 DIAGNOSIS — R05.1 ACUTE COUGH: Primary | ICD-10-CM

## 2024-05-14 PROCEDURE — 99214 OFFICE O/P EST MOD 30 MIN: CPT | Performed by: FAMILY MEDICINE

## 2024-05-14 PROCEDURE — G2211 COMPLEX E/M VISIT ADD ON: HCPCS | Performed by: FAMILY MEDICINE

## 2024-05-14 PROCEDURE — 1159F MED LIST DOCD IN RCRD: CPT | Performed by: FAMILY MEDICINE

## 2024-05-14 PROCEDURE — 1160F RVW MEDS BY RX/DR IN RCRD: CPT | Performed by: FAMILY MEDICINE

## 2024-05-14 PROCEDURE — 1125F AMNT PAIN NOTED PAIN PRSNT: CPT | Performed by: FAMILY MEDICINE

## 2024-05-14 RX ORDER — BUSPIRONE HYDROCHLORIDE 5 MG/1
5 TABLET ORAL 3 TIMES DAILY PRN
Qty: 90 TABLET | Refills: 2 | Status: SHIPPED | OUTPATIENT
Start: 2024-05-14

## 2024-05-14 NOTE — PROGRESS NOTES
"Kevin Fonseca     VITALS: Blood pressure 118/62, pulse 67, temperature 97.7 °F (36.5 °C), temperature source Temporal, height 182.9 cm (72.01\"), weight 76.9 kg (169 lb 9.6 oz), SpO2 96%.    Subjective  Chief Complaint  Right inguinal pain (//) and Hematochezia    Subjective          History of Present Illness:  The patient is an 89-year-old male with medical conditions significant for sick sinus syndrome and COPD presents for follow-up. He is accompanied by an adult female who is his wife.    He has been experiencing intermittent right-sided pain, localized below the rib cage, since 03/08/2024, following a physical therapy session. The pain, described as a stinging sensation, occurs both during the day and at night, often disrupting his sleep. The pain is absent during ambulation or sitting. The accompanying adult female reports palpable swelling in the right inguinal area.    He has productive cough with mucus and hemoptysis, which started yesterday morning 05/13/2024 and has persisted since then.    He experiences fatigue, making it difficult for him to ambulate. He moves very slowly and falls asleep as soon as he sits down. The adult female queries if his medications could be contributing to this.    He continues to experience anxiety and the accompanying adult female is uncertain about the efficacy of BuSpar.    He has excessive salivation and rhinorrhea, which occurs annually. He has previously used azelastine nasal spray but discontinued its use due to dizziness. The adult female says he possesses inhalers at home.    Her ankles have been significantly swollen, but the swelling has subsided slightly. The adult female inquiries about the potential benefits of compression stockings. The patient's salt intake is normal. He sleeps with his lower extremities down most of the time.    He had a fall in his garage on 03/11/2024, after stepping backwards over a plastic box and a wheelbarrow. He did not lose consciousness " "or hit his head. He landed on his buttocks and back on a concrete floor    No complaints about any of the medications.    The following portions of the patient's history were reviewed and updated as appropriate: allergies, current medications, past family history, past medical history, past social history, past surgical history and problem list.    Past Medical History  Past Medical History:   Diagnosis Date    Emphysema lung     Gastritis     Heartburn     Macular degeneration     Reflux esophagitis     Thyroid disease        Surgical History  Past Surgical History:   Procedure Laterality Date    INTRACAPSULAR CATARACT EXTRACTION      Dr. Lesly Gray. Dr. Neto Light.       Family History  Family History   Problem Relation Age of Onset    Hypertension Mother     Other Mother     Cancer Father     Cancer Brother     Asthma Brother        Social History  Social History     Socioeconomic History    Marital status:    Tobacco Use    Smoking status: Former     Current packs/day: 0.00     Average packs/day: 1.5 packs/day for 44.0 years (66.0 ttl pk-yrs)     Types: Cigarettes     Start date:      Quit date:      Years since quittin.3    Smokeless tobacco: Never   Vaping Use    Vaping status: Never Used   Substance and Sexual Activity    Alcohol use: Not Currently     Comment: 2 week    Drug use: Never    Sexual activity: Defer       Objective   Vital Signs:   /62 (BP Location: Right arm, Patient Position: Sitting, Cuff Size: Adult)   Pulse 67   Temp 97.7 °F (36.5 °C) (Temporal)   Ht 182.9 cm (72.01\")   Wt 76.9 kg (169 lb 9.6 oz)   SpO2 96%   BMI 23.00 kg/m²     Physical Exam     Gen: Patient in NAD. Pleasant and answers appropriately. A&Ox3.    Skin: Warm and dry with normal turgor. No purpura, rashes, or unusual pigmentation noted. Hair is normal in appearance and distribution.    HEENT: NC/AT. No lesions noted. Conjunctiva clear, sclera nonicteric. PERRL. EOMI without nystagmus or " strabismus. Fundi appear benign. No hemorrhages or exudates of eyes. Auditory canals are patent bilaterally without lesions. TMs intact,  nonerythematous, nonbulging without lesions. Nasal mucosa pink, nonerythematous, and nonedematous. Frontal and maxillary sinuses are nontender. O/P erythematous and moist without exudate.    Neck: Supple without lymph nodes palpated. FROM.     Lungs: Slightly decreased B/L without rales, rhonchi, crackles, or wheezes.    Heart: Distant.  RRR. S1 and S2 normal. No S3 or S4. No MRGT.    Abd: Soft, nontender,nondistended. (+)BSx4 quadrants.  No inguinal hernia palpated over the right inguinal area.    Extrem: No CCE. Radial pulses 2+/4 and equal B/L. FROMx4. No bone, joint, or muscle tenderness noted.    Neuro: No focal motor/sensory deficits.    Procedures    Result Review :   The following data was reviewed by: Elissa Geronimo MD  on 05/14/2024:                Assessment and Plan    Kevin Fonseca is a 89 y.o. here for medical followup.    Diagnoses and all orders for this visit:    1. Acute cough (Primary)  -     Respiratory Culture - Sputum, Cough; Future  -     Cancel: Respiratory Culture - Sputum, Cough    2. Other specified hypothyroidism  Patient continues Synthroid 70 mcg orally daily.    3. Anxiety  -     busPIRone (BUSPAR) 5 MG tablet; Take 1 tablet by mouth 3 (Three) Times a Day As Needed (anxiety).  Dispense: 90 tablet; Refill: 2          Problem List Items Addressed This Visit          Endocrine and Metabolic    Hypothyroidism     Other Visit Diagnoses       Acute cough    -  Primary    Relevant Orders    Respiratory Culture - Sputum, Cough    Anxiety        Relevant Medications    busPIRone (BUSPAR) 5 MG tablet            Cough and hemoptysis  The patient is scheduled for a CT scan of the lungs tomorrow.    Anxiety  Patient is unsure if BuSpar is really helping.  Will try a trial of BuSpar discontinuation.  The discontinuation of BuSpar is planned to monitor his anxiety  symptoms. If his anxiety symptoms worsen, he will resume BuSpar and contact me, at which point we will consider increasing the dosage.    Ankle edema.  The patient is advised to elevate his legs above his heart level or wear compression stockings. He is recommended to reduce his salt intake to less than 1000 mg. Physical therapy is recommended to strengthen his core body. Massage therapy is not recommended. The patient is recommended to try sleeping on his side and elevating his legs.    Inguinal pain.  The patient's pain in the inguinal area is due to tension or stretching.  There is no hernia seen on physical examination.      Follow-up  The patient is scheduled for a follow-up visit in 2 weeks.    BMI is within normal parameters. No other follow-up for BMI required.              Follow Up   Return (already has appt).  Findings and plans discussed with patient who verbalizes understanding and agreement. Will followup with patient once results are in. Patient was given instructions and counseling regarding his condition or for health maintenance advice. Please see specific information pulled into the AVS if appropriate.       Elissa Geronimo MD      Transcribed from ambient dictation for Elissa Geronimo MD by Clarissa Salazar.  05/14/24   16:28 EDT    Patient or patient representative verbalized consent to the visit recording.  I have personally performed the services described in this document as transcribed by the above individual, and it is both accurate and complete.

## 2024-05-15 ENCOUNTER — TELEPHONE (OUTPATIENT)
Dept: FAMILY MEDICINE CLINIC | Facility: CLINIC | Age: 89
End: 2024-05-15
Payer: MEDICARE

## 2024-05-15 ENCOUNTER — HOSPITAL ENCOUNTER (OUTPATIENT)
Dept: CT IMAGING | Facility: HOSPITAL | Age: 89
Discharge: HOME | End: 2024-05-15
Admitting: FAMILY MEDICINE
Payer: MEDICARE

## 2024-05-15 DIAGNOSIS — R05.3 CHRONIC COUGH: ICD-10-CM

## 2024-05-15 LAB — CREAT BLDA-MCNC: 1 MG/DL (ref 0.6–1.3)

## 2024-05-15 PROCEDURE — 71260 CT THORAX DX C+: CPT

## 2024-05-15 PROCEDURE — 25510000001 IOPAMIDOL 61 % SOLUTION: Performed by: FAMILY MEDICINE

## 2024-05-15 PROCEDURE — 82565 ASSAY OF CREATININE: CPT

## 2024-05-15 RX ORDER — BUSPIRONE HYDROCHLORIDE 5 MG/1
5 TABLET ORAL 3 TIMES DAILY PRN
Qty: 90 TABLET | Refills: 2 | OUTPATIENT
Start: 2024-05-15

## 2024-05-15 RX ADMIN — IOPAMIDOL 60 ML: 612 INJECTION, SOLUTION INTRAVENOUS at 14:24

## 2024-05-15 NOTE — TELEPHONE ENCOUNTER
Spoke with Faby she just wanted to know if his Buspar was sent in,notified it was sent in yesterday.

## 2024-05-15 NOTE — TELEPHONE ENCOUNTER
Caller: DILAN JOVEL    Relationship: Emergency Contact    Best call back number:      What is the best time to reach you: ANYTIME    Who are you requesting to speak with (clinical staff, provider,  specific staff member): MARQUES    Do you know the name of the person who called: DILAN    What was the call regarding: ADVISED PATIENTS SPOUSE THAT HIS     busPIRone (BUSPAR) 5 MG tablet   WAS CONFIRMED BY THE PHARMACY YESTERDAY      I

## 2024-05-22 ENCOUNTER — TELEPHONE (OUTPATIENT)
Dept: FAMILY MEDICINE CLINIC | Facility: CLINIC | Age: 89
End: 2024-05-22
Payer: MEDICARE

## 2024-05-22 DIAGNOSIS — R10.31 RIGHT GROIN PAIN: ICD-10-CM

## 2024-05-22 DIAGNOSIS — R16.0 LIVER MASS: ICD-10-CM

## 2024-05-22 DIAGNOSIS — R91.8 LUNG MASS: Primary | ICD-10-CM

## 2024-05-22 NOTE — TELEPHONE ENCOUNTER
Caller: DILAN JOVEL     Relationship: Emergency Contact    Best call back number:     Caller requesting test results: WIFE DILAN    What test was performed: 14TH  LAB IN OFFICE  AND 15TH CT  [facility]    When was the test performed: SEE ABOVE    Where was the test performed: SEE ABOVE    Additional notes: PLEASE CALL ASAP WITH THIS PATIENT'S TEST RESULTS

## 2024-05-23 NOTE — TELEPHONE ENCOUNTER
Discussed with patient. We discussed that there are 3 masses in his right lung and there is a liver mass, raising the possibility of mets.     He is agreeable to an appointment with Dr. Arellano for lung biopsy.  He is agreeable to a PET scan.   Both ordered.  Keep f/u appointment with me on 5/30/24.

## 2024-05-24 ENCOUNTER — TELEPHONE (OUTPATIENT)
Dept: FAMILY MEDICINE CLINIC | Facility: CLINIC | Age: 89
End: 2024-05-24
Payer: MEDICARE

## 2024-05-24 NOTE — TELEPHONE ENCOUNTER
Faby called stated Dr. Geronimo told her to call toady if they had not heard from the PET scan being scheduled,she reports they have not heard anything?

## 2024-05-29 ENCOUNTER — HOSPITAL ENCOUNTER (OUTPATIENT)
Dept: PET IMAGING | Facility: HOSPITAL | Age: 89
Discharge: HOME | End: 2024-05-29
Payer: MEDICARE

## 2024-05-30 ENCOUNTER — HOSPITAL ENCOUNTER (OUTPATIENT)
Dept: PET IMAGING | Facility: HOSPITAL | Age: 89
Discharge: HOME | End: 2024-05-30
Payer: MEDICARE

## 2024-05-30 ENCOUNTER — OFFICE VISIT (OUTPATIENT)
Dept: FAMILY MEDICINE CLINIC | Facility: CLINIC | Age: 89
End: 2024-05-30
Payer: MEDICARE

## 2024-05-30 VITALS
HEART RATE: 79 BPM | DIASTOLIC BLOOD PRESSURE: 52 MMHG | OXYGEN SATURATION: 95 % | SYSTOLIC BLOOD PRESSURE: 116 MMHG | WEIGHT: 163.8 LBS | TEMPERATURE: 98.6 F | BODY MASS INDEX: 22.19 KG/M2 | HEIGHT: 72 IN

## 2024-05-30 DIAGNOSIS — R91.8 LUNG MASS: Primary | ICD-10-CM

## 2024-05-30 DIAGNOSIS — R16.0 LIVER MASS: ICD-10-CM

## 2024-05-30 DIAGNOSIS — E03.8 OTHER SPECIFIED HYPOTHYROIDISM: ICD-10-CM

## 2024-05-30 PROCEDURE — 0 FLUDEOXYGLUCOSE F18 SOLUTION: Performed by: FAMILY MEDICINE

## 2024-05-30 PROCEDURE — A9552 F18 FDG: HCPCS | Performed by: FAMILY MEDICINE

## 2024-05-30 PROCEDURE — 78815 PET IMAGE W/CT SKULL-THIGH: CPT

## 2024-05-30 RX ORDER — LEVOTHYROXINE SODIUM 0.07 MG/1
75 TABLET ORAL DAILY
Qty: 90 TABLET | Refills: 1 | Status: SHIPPED | OUTPATIENT
Start: 2024-05-30

## 2024-05-30 RX ADMIN — FLUDEOXYGLUCOSE F 18 1 DOSE: 200 INJECTION, SOLUTION INTRAVENOUS at 08:50

## 2024-05-30 NOTE — PROGRESS NOTES
"Kevin Fonseca     VITALS: Blood pressure 116/52, pulse 79, temperature 98.6 °F (37 °C), temperature source Temporal, height 182.9 cm (72\"), weight 74.3 kg (163 lb 12.8 oz), SpO2 95%.    Subjective  Chief Complaint  PET scan results    Subjective          History of Present Illness:  Patient is a 89 y.o. male with medical conditions significant for sick sinus syndrome and COPD who presents for medical follow-up. He has several lung masses that we are currently addressing. He is accompanied by an adult female who is his wife.  He recently just had a PET scan done.    The patient underwent a PET scan today. He has self-discontinued Breztri, however, he reports no difference  in his condition or increased shortness of breath. The accompanying adult female inquires about the frequency of albuterol administration every 4 to 6 hours. [facility] Symbicort [facility].    The patient reports experiencing groin pain.    The accompanying adult female inquires about the continuation of levothyroxine for the patient.    No complaints about any of the medications.    The following portions of the patient's history were reviewed and updated as appropriate: allergies, current medications, past family history, past medical history, past social history, past surgical history and problem list.    Past Medical History  Past Medical History:   Diagnosis Date    Emphysema lung     Gastritis     Heartburn     Macular degeneration     Reflux esophagitis     Thyroid disease        Surgical History  Past Surgical History:   Procedure Laterality Date    INTRACAPSULAR CATARACT EXTRACTION      Dr. Lesly Gray. Dr. Neto Light.       Family History  Family History   Problem Relation Age of Onset    Hypertension Mother     Other Mother     Cancer Father     Cancer Brother     Asthma Brother        Social History  Social History     Socioeconomic History    Marital status:    Tobacco Use    Smoking status: Former     Current " "packs/day: 0.00     Average packs/day: 1.5 packs/day for 44.0 years (66.0 ttl pk-yrs)     Types: Cigarettes     Start date:      Quit date:      Years since quittin.4    Smokeless tobacco: Never   Vaping Use    Vaping status: Never Used   Substance and Sexual Activity    Alcohol use: Not Currently     Comment: 2 week    Drug use: Never    Sexual activity: Defer       Objective   Vital Signs:   /52 (BP Location: Right arm, Patient Position: Sitting)   Pulse 79   Temp 98.6 °F (37 °C) (Temporal)   Ht 182.9 cm (72\")   Wt 74.3 kg (163 lb 12.8 oz)   SpO2 95%   BMI 22.22 kg/m²     Physical Exam     Gen: Patient in NAD. Pleasant and answers appropriately. A&Ox3.    Skin: Warm and dry with normal turgor. No purpura, rashes, or unusual pigmentation noted. Hair is normal in appearance and distribution.    HEENT: NC/AT. No lesions noted. Conjunctiva clear, sclera nonicteric. PERRL. EOMI without nystagmus or strabismus. Fundi appear benign. No hemorrhages or exudates of eyes. Auditory canals are patent bilaterally without lesions. TMs intact,  nonerythematous, bulging without lesions. Nasal mucosa pink, nonerythematous, and nonedematous. Frontal and maxillary sinuses are nontender. O/P erythematous and moist without exudate.    Neck: Supple without lymph nodes palpated. FROM.     Lungs: Decreased B/L without rales, rhonchi, crackles, or wheezes.    Heart: Distant.  RRR. S1 and S2 normal. No S3 or S4. No MRGT.    Abd: Soft, nontender,nondistended. (+)BSx4 quadrants.  No inguinal hernias palpated.    Extrem: No CCE. Radial pulses 2+/4 and equal B/L. FROMx4.  Positive joint tenderness noted.    Neuro: No focal motor/sensory deficits.    Procedures    Result Review :   The following data was reviewed by: Elissa Geronimo MD on 2024:    Data reviewed : Radiologic studies PET scan done on 24.         Assessment and Plan    This is a 89-year-old male who presents to clinic for medical " follow-up.    Diagnoses and all orders for this visit:    1. Lung mass (Primary)  Will send to cardiothoracic surgery for biopsy.    2. Other specified hypothyroidism  -     levothyroxine (SYNTHROID, LEVOTHROID) 75 MCG tablet; Take 1 tablet by mouth Daily.  Dispense: 90 tablet; Refill: 1    3. Liver mass  Most likely secondary to lung mass mets.      Chronic Obstructive Pulmonary Disease (COPD).  The patient has been advised to abstain from Breztri. The patient has been advised to use albuterol as needed for shortness of breath.    Hypothyroidism.  The patient will maintain his levothyroxine regimen.    Groin pain.  The groin pain may be due to a muscle strain.    Problem List Items Addressed This Visit          Endocrine and Metabolic    Hypothyroidism    Relevant Medications    levothyroxine (SYNTHROID, LEVOTHROID) 75 MCG tablet       BMI is within normal parameters. No other follow-up for BMI required.       Follow Up   Return in about 4 weeks (around 6/27/2024).  Findings and plans discussed with patient who verbalizes understanding and agreement. Will followup with patient once results are in. Patient was given instructions and counseling regarding his condition or for health maintenance advice. Please see specific information pulled into the AVS if appropriate.     Transcribed from ambient dictation for Elissa Geronimo MD by Danuta Austin.  05/30/24   18:29 EDT    Patient or patient representative verbalized consent to the visit recording.  I have personally performed the services described in this document as transcribed by the above individual, and it is both accurate and complete.

## 2024-06-03 ENCOUNTER — TELEPHONE (OUTPATIENT)
Dept: FAMILY MEDICINE CLINIC | Facility: CLINIC | Age: 89
End: 2024-06-03
Payer: MEDICARE

## 2024-06-03 NOTE — TELEPHONE ENCOUNTER
Verified that she is on the HIPAA form.    You can let her know that the PET scan showed 3 lung masses. 2/3 are reactive (and are the ones that we need to focus on).  He also has at least 2 liver masses that show activity. This most likely means that the lung masses could be cancer with a spread to the liver. No other extra activity that is worrisome at this time. The next step is to meet with Dr. May at that appointment to decide how to biopsy the lung masses for treatment.

## 2024-06-03 NOTE — TELEPHONE ENCOUNTER
Caller: ROSETTA JOVEL    Relationship: Grandchild    Best call back number: [phone]     Caller requesting test results: GRANDDAUGHTER     What test was performed: PET SCAN     When was the test performed: MAY 30TH     Where was the test performed: [facility]     Additional notes: PLEASE CALL PATIENT GRANDDAUGHTER BACK WITH ANY RESULTS.

## 2024-06-03 NOTE — TELEPHONE ENCOUNTER
Yes, we are aware. Usually, vascular does not fix until it is 4.5 cm or greater so he is under that. We would recheck every year. I was a little worried because he has that weird right inguinal groin pain so I was going to readdress that with him about possibly going in to talk to vascular later on after we finished the mass workup, but the priority right now is to figure out the masses and what it means and treatment options.

## 2024-06-03 NOTE — TELEPHONE ENCOUNTER
Spoke with Christel and she verbalized understanding.  She asked that I specifically mention   Partially visualized infrarenal abdominal aortic aneurysm measuring  3.4 cm.    Found on CT result to you and get your thoughts...

## 2024-06-04 ENCOUNTER — TELEPHONE (OUTPATIENT)
Dept: FAMILY MEDICINE CLINIC | Facility: CLINIC | Age: 89
End: 2024-06-04
Payer: MEDICARE

## 2024-06-04 NOTE — TELEPHONE ENCOUNTER
Spoke with Faby she said the problem has resolved he has had a good bowel movement and if it changes she will let us know.

## 2024-06-04 NOTE — TELEPHONE ENCOUNTER
How about fiber and miralax like we discussed? Do they have stool softeners like colace at home? Was the golf ball poop from the brown bomber? He will have to do more of them if so.

## 2024-06-04 NOTE — TELEPHONE ENCOUNTER
"Patients wife called indicating Kevin has been struggling with constipation.  Other than a \"hard golf ball size\" bm  he had earlier today, his bowels have not moved in a week.  They tried \"1 1/2 bolivar of mag\" last night, fruit juices, a brown bomber and hot tomato soup today to no avail...   "

## 2024-06-05 ENCOUNTER — TELEPHONE (OUTPATIENT)
Dept: FAMILY MEDICINE CLINIC | Facility: CLINIC | Age: 89
End: 2024-06-05
Payer: MEDICARE

## 2024-06-05 NOTE — TELEPHONE ENCOUNTER
"Faby reports Kevin is coughing more & seems to have a harder time getting his mucus up which is white in color,also reports he complained of more pain in his right side yesterday & she can feel a\" lump \"in that area & appears to her to be swollen,is questioning if his PET Scan showed anything in that area,please advise.  "

## 2024-06-05 NOTE — TELEPHONE ENCOUNTER
He doesn't have anything in that area that lit up on PET scan.     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 We would monitor it. It has nothing to do with his masses.    If he would like a CT scan of that area, we can send him while we are waiting for the lung consult.

## 2024-06-06 ENCOUNTER — TELEPHONE (OUTPATIENT)
Dept: CARDIAC SURGERY | Facility: CLINIC | Age: 89
End: 2024-06-06
Payer: MEDICARE

## 2024-06-06 DIAGNOSIS — R10.31 RLQ ABDOMINAL PAIN: Primary | ICD-10-CM

## 2024-06-06 NOTE — TELEPHONE ENCOUNTER
Caller: DILAN JOVEL    Relationship to patient: Emergency Contact    Best call back number: [phone]    Chief complaint: PT REQUESTS APPT AFTER 1PM DUE TO TRANSPORTATION- NO AVAILABILITY ON HUB END TO SCHEDULE.     Type of visit: NEW PT    Requested date: ASAP    If rescheduling, when is the original appointment: 6/12/24    Additional notes:PLEASE CALL BACK PT REGARDING SCHEDULING.     WITHIN THE WEEK IF POSSIBLE PER PT.

## 2024-06-06 NOTE — TELEPHONE ENCOUNTER
I spoke with spouse. Unfortunately with our schedule at the time. There is no 1:00 appt available until July. Spouse stated they would keep the 6/12/24 appt at 11:30. They have someone coming from out of town to bring him to this appt.   He also will be using his [facility] insurance as secondary to [employer] medicare.

## 2024-06-06 NOTE — TELEPHONE ENCOUNTER
Pt would like to use his VA for the Appt with NP.  Called referring office and spoke with Miguelina. She stated that she would send the authorization today and if she hadn't heard back from them by Monday that she would call them. I told her the pt appt is 6/12/24. She voiced understanding.

## 2024-06-10 PROBLEM — R91.8 MASS OF RIGHT LUNG: Status: ACTIVE | Noted: 2024-06-10

## 2024-06-11 ENCOUNTER — TELEPHONE (OUTPATIENT)
Dept: CARDIAC SURGERY | Facility: CLINIC | Age: 89
End: 2024-06-11

## 2024-06-11 DIAGNOSIS — R91.8 LUNG MASS: Primary | ICD-10-CM

## 2024-06-11 NOTE — TELEPHONE ENCOUNTER
This is a new patient scheduled for 6/12/24 referred by Dr. Elissa Geronimo. Dr. May has reviewed more in depth of patients chart/ records for appt, and states that Mr. Fonseca is not a surgical candidate. Per Dr. May he has recommended that the patient has a CT guided lung biopsy and be referred to Medical Oncology. I have spoken with the patients wife she is aware of this info and that I have canceled Mr. Felix appt.     I have routed back the referral to the referring office regarding above.

## 2024-06-14 ENCOUNTER — HOSPITAL ENCOUNTER (OUTPATIENT)
Dept: CT IMAGING | Facility: HOSPITAL | Age: 89
Discharge: HOME | End: 2024-06-14
Payer: MEDICARE

## 2024-06-14 LAB — CREAT BLDA-MCNC: 1.1 MG/DL (ref 0.6–1.3)

## 2024-06-14 PROCEDURE — 25510000001 IOPAMIDOL 61 % SOLUTION: Performed by: FAMILY MEDICINE

## 2024-06-14 PROCEDURE — 82565 ASSAY OF CREATININE: CPT

## 2024-06-14 PROCEDURE — 74177 CT ABD & PELVIS W/CONTRAST: CPT

## 2024-06-14 RX ADMIN — IOPAMIDOL 80 ML: 612 INJECTION, SOLUTION INTRAVENOUS at 14:55

## 2024-06-19 ENCOUNTER — HOSPITAL ENCOUNTER (OUTPATIENT)
Dept: CT IMAGING | Facility: HOSPITAL | Age: 89
Discharge: HOME | End: 2024-06-19
Payer: OTHER GOVERNMENT

## 2024-06-19 ENCOUNTER — HOSPITAL ENCOUNTER (OUTPATIENT)
Dept: ULTRASOUND IMAGING | Facility: HOSPITAL | Age: 89
Discharge: HOME | End: 2024-06-19
Payer: OTHER GOVERNMENT

## 2024-06-19 VITALS
HEART RATE: 66 BPM | SYSTOLIC BLOOD PRESSURE: 137 MMHG | RESPIRATION RATE: 18 BRPM | OXYGEN SATURATION: 98 % | DIASTOLIC BLOOD PRESSURE: 69 MMHG

## 2024-06-19 DIAGNOSIS — R91.8 LUNG MASS: ICD-10-CM

## 2024-06-19 DIAGNOSIS — R16.0 LIVER MASS: Primary | ICD-10-CM

## 2024-06-19 DIAGNOSIS — R16.0 LIVER MASS: ICD-10-CM

## 2024-06-19 LAB
INR PPP: 1.14 (ref 0.9–1.1)
PLATELET # BLD AUTO: 308 10*3/MM3 (ref 140–450)
PROTHROMBIN TIME: 14.7 SECONDS (ref 12.1–14.7)

## 2024-06-19 PROCEDURE — 87070 CULTURE OTHR SPECIMN AEROBIC: CPT | Performed by: FAMILY MEDICINE

## 2024-06-19 PROCEDURE — 87116 MYCOBACTERIA CULTURE: CPT | Performed by: FAMILY MEDICINE

## 2024-06-19 PROCEDURE — 87102 FUNGUS ISOLATION CULTURE: CPT | Performed by: FAMILY MEDICINE

## 2024-06-19 PROCEDURE — 87176 TISSUE HOMOGENIZATION CULTR: CPT | Performed by: FAMILY MEDICINE

## 2024-06-19 PROCEDURE — 85049 AUTOMATED PLATELET COUNT: CPT | Performed by: RADIOLOGY

## 2024-06-19 PROCEDURE — 85610 PROTHROMBIN TIME: CPT | Performed by: RADIOLOGY

## 2024-06-19 PROCEDURE — 76942 ECHO GUIDE FOR BIOPSY: CPT

## 2024-06-19 PROCEDURE — 87205 SMEAR GRAM STAIN: CPT | Performed by: FAMILY MEDICINE

## 2024-06-19 PROCEDURE — 87206 SMEAR FLUORESCENT/ACID STAI: CPT | Performed by: FAMILY MEDICINE

## 2024-06-19 PROCEDURE — 87075 CULTR BACTERIA EXCEPT BLOOD: CPT | Performed by: FAMILY MEDICINE

## 2024-06-19 NOTE — NURSING NOTE
Patient arrived for CT lung biopsy. Dr. Zepeda reviewed pre-procedure imaging and sees liver lesion. Dr. Zepeda discussed with ordering provider, Dr. Geronimo. Decision was made to perform ultrasound guided liver biopsy instead of lung biopsy today. Dr. Zepeda discussed with patient, patient's wife, and granddaughter who all verbalize understanding and voice no concerns at this time.

## 2024-06-20 ENCOUNTER — OFFICE VISIT (OUTPATIENT)
Dept: FAMILY MEDICINE CLINIC | Facility: CLINIC | Age: 89
End: 2024-06-20
Payer: MEDICARE

## 2024-06-20 VITALS
BODY MASS INDEX: 22.29 KG/M2 | SYSTOLIC BLOOD PRESSURE: 136 MMHG | OXYGEN SATURATION: 96 % | TEMPERATURE: 97.5 F | WEIGHT: 164.6 LBS | DIASTOLIC BLOOD PRESSURE: 66 MMHG | HEART RATE: 74 BPM | HEIGHT: 72 IN

## 2024-06-20 DIAGNOSIS — G89.29 CHRONIC RIGHT SHOULDER PAIN: ICD-10-CM

## 2024-06-20 DIAGNOSIS — M25.511 CHRONIC RIGHT SHOULDER PAIN: ICD-10-CM

## 2024-06-20 DIAGNOSIS — R91.8 LUNG MASS: Primary | ICD-10-CM

## 2024-06-20 DIAGNOSIS — R16.0 LIVER MASS: ICD-10-CM

## 2024-06-20 LAB
ACID FAST STN SPEC: NEGATIVE
SPECIMEN PREPARATION: NORMAL

## 2024-06-20 RX ORDER — MULTIPLE VITAMINS W/ MINERALS TAB 9MG-400MCG
1 TAB ORAL DAILY
COMMUNITY

## 2024-06-21 LAB — REF LAB TEST METHOD: NORMAL

## 2024-06-22 LAB
BACTERIA SPEC AEROBE CULT: NORMAL
GRAM STN SPEC: NORMAL

## 2024-06-24 LAB
BACTERIA SPEC ANAEROBE CULT: NORMAL
FUNGUS SPEC CULT: NORMAL
FUNGUS SPEC FUNGUS STN: NORMAL

## 2024-06-25 ENCOUNTER — TELEPHONE (OUTPATIENT)
Dept: FAMILY MEDICINE CLINIC | Facility: CLINIC | Age: 89
End: 2024-06-25
Payer: MEDICARE

## 2024-06-25 DIAGNOSIS — C78.7 METASTATIC SQUAMOUS CELL CARCINOMA TO LIVER: Primary | ICD-10-CM

## 2024-06-25 DIAGNOSIS — C34.90 MALIGNANT NEOPLASM OF LUNG, UNSPECIFIED LATERALITY, UNSPECIFIED PART OF LUNG: ICD-10-CM

## 2024-06-25 NOTE — TELEPHONE ENCOUNTER
Spoke with tate & she verbalized understanding,she stated it is fine to use the [employer] if it will get him in faster.

## 2024-06-25 NOTE — TELEPHONE ENCOUNTER
Caller: DILAN JOVEL    Relationship: Emergency Contact    Best call back number: [phone]    Caller requesting test results: DILAN    What test was performed: BIOPSY    When was the test performed: 06/19/2024    Additional notes: DILAN CALLED FOR TEST RESULTS. PLEASE ADVISE

## 2024-06-25 NOTE — TELEPHONE ENCOUNTER
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 We are working on the oncology referral. Referrals would like to know if they would want to use his VA insurance or his [employer] Medicare replacement insurance. We would get him in faster with [facility]. With VA, we would have to do a Prior authorization to see if the VA would even cover and that could take up to 2-3 weeks.

## 2024-06-25 NOTE — TELEPHONE ENCOUNTER
Caller: LAMIN    Relationship: [facility]    Best call back number: [phone]     What was the call regarding: REPORTING THAT PATIENT HAS A LIVER MASS, METASTATIC SQUAMOUS CELL CARCINOMA.

## 2024-06-26 NOTE — PROGRESS NOTES
Subjective     Date: 6/28/2024    Referring Provider  Elissa Geronimo MD     Chief Complaint  Malignant neoplasm of lung, unspecified laterality, unspecified part of lung   Metastatic squamous cell carcinoma to liver     Subjective          Kevin Fonseca is a 89 y.o. male who presents today to [facility] for initial evaluation .    HPI:   89 y.o. male with past medical history of chronic obstructive pulmonary disease, macular degeneration of the eye, hypothyroid disease, sick sinus syndrome who presents for consultation regarding new diagnosis of squamous cell carcinoma of the lung.    Oncology history:  April 23 2024: CXR with pleural based consolidation periphery of the right lung and fullness in the right suprahilar region  May 14 2024: Patient presented to PCP, Dr. Geronimo, regarding intermittent hemoptysis, R chest pain since March 2024. This is associated with weight loss (50 lbs), fatigue, and R groin pain.   May 15 2024: CT chest right upper lobe peripheral based based consolidative masslike opacity measuring 2.9 x 7.8 cm with a more central hilar mass on the right side measuring 5.1 x 4.4 cm with smaller right upper lobe nodule measuring 2.6 cm. Peripheral mass does appear to cause fourth rib erosion or destruction. Right lobe of liver mass concerning for metastatic disease, compression fracture L2 vertebral body.   May 30 2024: PET/CT confirmed peripheral consolidated mass that appears to invade the right upper ribs of right upper lobe measuring 8.6 cm with mSUV 15.1. Consolidative more central and elongated masslike consolidation superior right hilum measures 7 cm and again shows mSUV 21. Mass extends into the right hilum. Pet hypermetabolic liver masses identified, largest in right lobe measuring 8.3 cm with mSUV 14. Compression fracture of L2 vertebral body, 8 mm right upper lobe spiculated pulmonary nodule shows no PET hypermetabolism.  June 19 2024: Underwent  CT guided core biopsy of the liver mass. Pathology shows squamous cell carcinoma. PD-L1 TPS 22c3 5%.       Mr. Fonseca presents today with his wife Faby, daughter Aleja, and grand daughter Alicia. They express Mr. Fonseca's decline in function over the past year. He has not experienced shortness of breath, but does endorse weight loss, fatigue, inability to perform activities he usually would be able to such as feeding animals etc.     He has experienced a few falls over the last year, denies losing consciousness. Denies lightheadedness today (HR 44). He was given the option to have a pacemaker placed, but he declined due to heart rate returning to normal (60-70s). His appetite is good, reports drinking fluids.     He is a previous smoker, smoked 2 packs/day X 50 years, quit 27 years ago. Denies alcohol or drug use. Previously worked as a . Family history significant for brother with lung cancer and paternal grandmother with liver cancer.    He lives with his wife. Daughter and grand daughter live in [place].      The following portions of the patient's history were reviewed and updated as appropriate: allergies, current medications, past family history, past medical history, past social history, past surgical history and problem list.    Objective     Objective     Allergy:   No Known Allergies     Current Medications:   Current Outpatient Medications   Medication Sig Dispense Refill    albuterol sulfate  (90 Base) MCG/ACT inhaler Inhale 2 puffs Every 4 (Four) Hours As Needed for Wheezing or Shortness of Air. 18 g 8    busPIRone (BUSPAR) 5 MG tablet Take 1 tablet by mouth 3 (Three) Times a Day As Needed (anxiety). (Patient taking differently: Take 1 tablet by mouth 3 (Three) Times a Day As Needed (anxiety). 2 tablets in the AM 1 tablet PM) 90 tablet 2    guaiFENesin (MUCINEX PO) Take  by mouth.      levothyroxine (SYNTHROID, LEVOTHROID) 75 MCG tablet Take 1 tablet by mouth Daily. 90 tablet 1     "multivitamin with minerals (OCUVITE EXTRA PO) Take 1 tablet by mouth Daily.      Budeson-Glycopyrrol-Formoterol (BREZTRI) 160-9-4.8 MCG/ACT aerosol inhaler Inhale 2 puffs 2 (Two) Times a Day. (Patient not taking: Reported on 2024) 10.7 g 5     No current facility-administered medications for this visit.       Past Medical History:  Past Medical History:   Diagnosis Date    Arthritis     Asthma     Chronic bronchitis     Emphysema lung     Gastritis     Heartburn     Macular degeneration     Macular degeneration, wet     Reflux esophagitis     Thyroid disease        Past Surgical History:  Past Surgical History:   Procedure Laterality Date    INTRACAPSULAR CATARACT EXTRACTION      Dr. Lesly Gray. Dr. Neto Light.    LIVER BIOPSY         Social History:  Social History     Socioeconomic History    Marital status:    Tobacco Use    Smoking status: Former     Current packs/day: 0.00     Average packs/day: 1.5 packs/day for 44.0 years (66.0 ttl pk-yrs)     Types: Cigarettes     Start date:      Quit date:      Years since quittin.5     Passive exposure: Past    Smokeless tobacco: Never   Vaping Use    Vaping status: Never Used   Substance and Sexual Activity    Alcohol use: Not Currently     Comment: 2 week    Drug use: Never    Sexual activity: Defer         Family History:  Family History   Problem Relation Age of Onset    Hypertension Mother     Other Mother     Cancer Father     Cancer Brother     Asthma Brother        Review of Systems:  Review of Systems    Vital Signs:   /59   Pulse (!) 44 Comment: RN notified  Temp 97.5 °F (36.4 °C) (Temporal)   Resp 18   Ht 182.9 cm (72\")   Wt 74.5 kg (164 lb 3.2 oz)   SpO2 96%   BMI 22.27 kg/m²      Physical Exam:  Physical Exam    PHQ-9 Score  PHQ-9 Total Score: 0     Pain Score  Vitals:    24 1502   BP: 135/59   Pulse: (!) 44  Comment: RN notified   Resp: 18   Temp: 97.5 °F (36.4 °C)   TempSrc: Temporal   SpO2: 96%   Weight: " "74.5 kg (164 lb 3.2 oz)   Height: 182.9 cm (72\")   PainSc: 0-No pain       ECOG score: 0           PAINSCOREQUALITYMETRIC:   Vitals:    06/27/24 1502   PainSc: 0-No pain          Lab Review  Lab Results   Component Value Date    WBC 10.47 06/27/2024    HGB 12.0 (L) 06/27/2024    HCT 37.3 (L) 06/27/2024    MCV 90.5 06/27/2024    RDW 13.7 06/27/2024     06/27/2024    NEUTRORELPCT 61.2 06/27/2024    LYMPHORELPCT 19.7 06/27/2024    MONORELPCT 13.7 (H) 06/27/2024    EOSRELPCT 4.3 06/27/2024    BASORELPCT 0.9 06/27/2024    NEUTROABS 6.42 06/27/2024    LYMPHSABS 2.06 06/27/2024     Lab Results   Component Value Date     (L) 06/27/2024    K 4.9 06/27/2024    CO2 23.5 06/27/2024    CL 95 (L) 06/27/2024    BUN 20 06/27/2024    CREATININE 0.91 06/27/2024    EGFRIFNONA 59 (L) 01/19/2022    EGFRIFAFRI 71 01/19/2022    GLUCOSE 86 06/27/2024    CALCIUM 9.6 06/27/2024    ALKPHOS 97 06/27/2024    AST 19 06/27/2024    ALT 14 06/27/2024    BILITOT 0.5 06/27/2024    ALBUMIN 3.7 06/27/2024    PROTEINTOT 7.7 06/27/2024    MG 2.1 11/11/2022       Radiology Results  US Guided Liver Biopsy (06/19/2024 12:19)   IMPRESSION:  Ultrasound-guided liver biopsy as detailed above.    CT Abdomen Pelvis With Contrast (06/14/2024 14:55)   FINDINGS:  ABDOMEN: The lung bases are clear. The heart is normal in size. There  are multiple hepatic masses, the largest of which is in the right lobe  measuring 8.1 cm consistent with metastatic disease. The spleen is  homogenous. No adrenal mass is present. The pancreas is unremarkable.  The kidneys are normal. There is a 3.3 cm abdominal aortic aneurysm. The  mesenteric vascualture is patent. There is no free fluid or adenopathy.  The abdominal portions of the GI tract are unremarkable with no evidence  of obstruction.     PELVIS: The appendix is normal. The urinary bladder is unremarkable.  There is no significant free fluid or adenopathy. Bone windows reveal an  L2 compression fracture which is " unchanged compared to the prior exam.  No new osseous abnormalities are identified. The extraperitoneal soft  tissues are unremarkable.     IMPRESSION:  1. Hepatic metastases not significantly changed compared to the prior  exam.  2. 3.3 cm abdominal aortic aneurysm.  3. Stable L2 compression fracture.    NM PET/CT Skull Base to Mid Thigh (05/30/2024 10:53)   FINDINGS:      HEAD:    BRAIN AND EXTRA-AXIAL SPACES:  Unremarkable as visualized.    NASOPHARYNX:  Unremarkable as visualized.      NECK:    HYPOPHARYNX:  Unremarkable as visualized.    LARYNX:  Unremarkable as visualized.    TRACHEA:  Unremarkable as visualized.    RETROPHARYNGEAL SPACE:  Unremarkable as visualized.    SUBMANDIBULAR/PAROTID GLANDS:  Unremarkable as visualized.  Glands are  normal in size.    THYROID:  Unremarkable as visualized.  No enlarged or calcified  nodules.      CHEST:    LUNGS AND PLEURAL SPACES:  Consolidative more central and elongated  masslike consolidation near the superior right hilum measures about 7 cm  and again shows PET hypermetabolism mSUV 21. The mass extends into the  right hilum.  A 8 mm right upper lobe spiculated pulmonary nodule shows  no PET hypermetabolism at this time.    HEART:  Unremarkable as visualized.  No cardiomegaly.  No significant  pericardial effusion.    MEDIASTINUM:  Unremarkable as visualized.  No mass.      ABDOMEN:    LIVER:  PET hypermetabolic liver masses are identified with the  largest in the right lobe measuring about 8.3 cm and with PET  hypermetabolism mSUV 14.2. A smaller liver masses noted in the left lobe  of the liver.    GALLBLADDER AND BILE DUCTS:  Unremarkable as visualized.  No calcified  stones.  No ductal dilation.    PANCREAS:  Unremarkable as visualized.  No ductal dilation.    SPLEEN:  Unremarkable as visualized.  No splenomegaly.    ADRENALS:  Unremarkable as visualized.  No mass.    KIDNEYS AND URETERS:  Unremarkable as visualized.    STOMACH AND BOWEL:  Unremarkable as  visualized.  No obstruction.      PELVIS:    APPENDIX:  No findings to suggest acute appendicitis.    BLADDER:  Unremarkable as visualized.    REPRODUCTIVE:  Unremarkable as visualized.      WHOLE BODY:    INTRAPERITONEAL SPACE:  Unremarkable as visualized.  No significant  fluid collection.  No free air.    BONES/JOINTS:  Again there is a peripheral consolidated mass that  appears to invade the right upper ribs of the right upper lobe measuring  about 8.6 cm and with PET hypermetabolism mSUV 15.1.  Compression  fracture of L2 vertebral body is again noted.  No dislocation.    SOFT TISSUES:  Unremarkable as visualized.    VASCULATURE:  Unremarkable as visualized.  No aortic aneurysm.    LYMPH NODES:  Unremarkable as visualized.  No lymphadenopathy.  No  hypermetabolic activity.     IMPRESSION:  1.  Again there is a peripheral consolidated mass that appears to invade  the right upper ribs of the right upper lobe measuring about 8.6 cm and  with PET hypermetabolism mSUV 15.1.  2.  Consolidative more central and elongated masslike consolidation near  the superior right hilum measures about 7 cm and again shows PET  hypermetabolism mSUV 21. The mass extends into the right hilum.  3.  PET hypermetabolic liver masses are identified with the largest in  the right lobe measuring about 8.3 cm and with PET hypermetabolism mSUV  14.2. A smaller liver masses noted in the left lobe of the liver.  4.  Compression fracture of L2 vertebral body is again noted.  5.  A 8 mm right upper lobe spiculated pulmonary nodule shows no PET  hypermetabolism at this time.    CT Chest With Contrast Diagnostic (05/15/2024 14:24)   FINDINGS:    LUNGS AND PLEURAL SPACES:  Right upper lobe peripheral pleural-based  consolidative masslike opacity measuring about 2.9 x 7.8 cm with a more  central hilar mass on the right side measuring 5.1 x 4.4 cm and a  smaller right upper lobe nodule component measuring 2.6 cm. The  peripheral mass does appear to  cause fourth rib erosion or destruction.   Emphysematous lungs noted.  Right upper lobe 1.2 cm ill-defined nodule  that could represent scarring.  No pneumothorax.  No significant  effusion.    HEART:  Unremarkable as visualized.  No cardiomegaly.  No significant  pericardial effusion.  No significant coronary artery calcifications.    BONES/JOINTS:  Compression fracture noted of probable L2 vertebral  body.  No dislocation.    SOFT TISSUES:  Unremarkable as visualized.    VASCULATURE:  Partially visualized infrarenal abdominal aortic  aneurysm measuring 3.4 cm.    LYMPH NODES:  Unremarkable as visualized.  No enlarged lymph nodes.    LIVER:  Right lobe of liver mass concerning for metastatic disease  measuring about 7 mm.    OTHER FINDINGS:  .     IMPRESSION:  1.  Right upper lobe peripheral pleural-based consolidative masslike  opacity measuring about 2.9 x 7.8 cm with a more central hilar mass on  the right side measuring 5.1 x 4.4 cm and a smaller right upper lobe  nodule component measuring 2.6 cm. The peripheral mass does appear to  cause fourth rib erosion or destruction.  2.  Right lobe of liver mass concerning for metastatic disease measuring  about 7 mm.  3.  Partially visualized infrarenal abdominal aortic aneurysm measuring  3.4 cm.  4.  Compression fracture noted of probable L2 vertebral body.    XR Chest 2 View (04/23/2024 16:34)   FINDINGS:  LUNGS: Pleural-based consolidation periphery of the right lung. Mild  fullness in the right suprahilar region  HEART AND MEDIASTINUM: Heart and mediastinal contours are unremarkable  SKELETON: Bony and soft tissue structures are unremarkable.     IMPRESSION:  Pleural-based consolidation periphery of the right lung and fullness in  the right suprahilar region. Recommend CT        Pathology:   06/21/2024          Assessment / Plan         Assessment and Plan   Kevin Fonseca is a 89 y.o. year old with history of     DeNovo metastatic non small cell carcinoma, squamous  cell. PD-L1 TPS 5%   -Patient with ~1 year of decline in function, weight loss, fatigue, and intermittent hemoptysis. CT May 15 2024 with with concerning right upper lobe peripheral consolidation 2.9 x 7.8 cm with central hilar mass 5 x 4.4 cm, small right upper lobe nodule 2.6 cm, another right upper lobe 1.2 cm ill defined mass could represent scarring. Also noted liver mass concerning for metastatic disease. Follow up PET/CT 6/14/2024 with hepatic metastasis largest measuring 8.1 cm, and noted L2 compression fracture. 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 ECOG PS 2-3  -I presented them with the stage and treatment, which would be palliative in nature. Treatment would consist of carboplatin/taxol/pembrolizumab q3w x 4 cycles followed by pembrolizumab or pembrolizumab q3w. Given his performance status, my recommendation is pembrolizumab alone.   -Patient and family would like to discuss this before proceeding with treatments   -MRI brain ordered   -Recommend port placement if treatment is desired     2. Malignancy associated pain  -Currently denies     3. Nutrition  -Recommend patient take in 2-3 boost/day    4. Hyponatremia   - Sodium of 128 noted on lab work today. Patient is currently asymptomatic. Recommend coming to the ED if experiences confusion or seizure like activity  - He will receive 1L NS 6/28 with repeat labs 7/2        Discussed possible differential diagnoses, testing, treatment, recommended non-pharmacological interventions, risks, warning signs to monitor for that would indicate need for follow-up in clinic or ER. If no improvement with these regimens or you have new or worsening symptoms follow-up. Patient verbalizes understanding and agreement with plan of care. Denies further needs or concerns.      Patient was given instructions and counseling regarding her condition and for health maintenance advised.       All questions were answered to his satisfaction.    BMI is within normal parameters. No other follow-up for BMI required.         ACO / MARY/Other  Quality measures  -  Kevin Fonseca did not receive 2023 flu vaccine.  This was recommended.  -  Kevin Fonseca reports a pain score of 0.  Given his pain assessment as noted, treatment options were discussed and the following options were decided upon as a follow-up plan to address the patient's pain: continuation of current treatment plan for pain.  -  Current outpatient and discharge medications have been reconciled for the patient.  Reviewed by: Jennifer Hinds MD        Meds ordered during this visit  No orders of the defined types were placed in this encounter.      Visit Diagnoses    ICD-10-CM ICD-9-CM   1. Non-small cell carcinoma of lung, right  C34.91 162.9       Follow Up   No follow-ups on file.        This document has been electronically signed by Jennifer Hinds MD   June 28, 2024 17:59 EDT    Dictated Utilizing Dragon Dictation: Part of this note may be an electronic transcription/translation of spoken language to printed text using the Dragon Dictation System.

## 2024-06-27 ENCOUNTER — LAB (OUTPATIENT)
Dept: ONCOLOGY | Facility: CLINIC | Age: 89
End: 2024-06-27
Payer: MEDICARE

## 2024-06-27 ENCOUNTER — CONSULT (OUTPATIENT)
Dept: ONCOLOGY | Facility: CLINIC | Age: 89
End: 2024-06-27
Payer: MEDICARE

## 2024-06-27 VITALS
BODY MASS INDEX: 22.24 KG/M2 | HEIGHT: 72 IN | SYSTOLIC BLOOD PRESSURE: 135 MMHG | TEMPERATURE: 97.5 F | RESPIRATION RATE: 18 BRPM | WEIGHT: 164.2 LBS | OXYGEN SATURATION: 96 % | HEART RATE: 44 BPM | DIASTOLIC BLOOD PRESSURE: 59 MMHG

## 2024-06-27 DIAGNOSIS — C34.91 NON-SMALL CELL CARCINOMA OF LUNG, RIGHT: ICD-10-CM

## 2024-06-27 DIAGNOSIS — C34.91 NON-SMALL CELL CARCINOMA OF LUNG, RIGHT: Primary | ICD-10-CM

## 2024-06-27 LAB
ALBUMIN SERPL-MCNC: 3.7 G/DL (ref 3.5–5.2)
ALBUMIN/GLOB SERPL: 0.9 G/DL
ALP SERPL-CCNC: 97 U/L (ref 39–117)
ALT SERPL W P-5'-P-CCNC: 14 U/L (ref 1–41)
ANION GAP SERPL CALCULATED.3IONS-SCNC: 9.5 MMOL/L (ref 5–15)
AST SERPL-CCNC: 19 U/L (ref 1–40)
BASOPHILS # BLD AUTO: 0.09 10*3/MM3 (ref 0–0.2)
BASOPHILS NFR BLD AUTO: 0.9 % (ref 0–1.5)
BILIRUB SERPL-MCNC: 0.5 MG/DL (ref 0–1.2)
BUN SERPL-MCNC: 20 MG/DL (ref 8–23)
BUN/CREAT SERPL: 22 (ref 7–25)
CALCIUM SPEC-SCNC: 9.6 MG/DL (ref 8.6–10.5)
CHLORIDE SERPL-SCNC: 95 MMOL/L (ref 98–107)
CO2 SERPL-SCNC: 23.5 MMOL/L (ref 22–29)
CREAT SERPL-MCNC: 0.91 MG/DL (ref 0.76–1.27)
DEPRECATED RDW RBC AUTO: 45.7 FL (ref 37–54)
EGFRCR SERPLBLD CKD-EPI 2021: 80.6 ML/MIN/1.73
EOSINOPHIL # BLD AUTO: 0.45 10*3/MM3 (ref 0–0.4)
EOSINOPHIL NFR BLD AUTO: 4.3 % (ref 0.3–6.2)
ERYTHROCYTE [DISTWIDTH] IN BLOOD BY AUTOMATED COUNT: 13.7 % (ref 12.3–15.4)
GLOBULIN UR ELPH-MCNC: 4 GM/DL
GLUCOSE SERPL-MCNC: 86 MG/DL (ref 65–99)
HCT VFR BLD AUTO: 37.3 % (ref 37.5–51)
HGB BLD-MCNC: 12 G/DL (ref 13–17.7)
IMM GRANULOCYTES # BLD AUTO: 0.02 10*3/MM3 (ref 0–0.05)
IMM GRANULOCYTES NFR BLD AUTO: 0.2 % (ref 0–0.5)
LYMPHOCYTES # BLD AUTO: 2.06 10*3/MM3 (ref 0.7–3.1)
LYMPHOCYTES NFR BLD AUTO: 19.7 % (ref 19.6–45.3)
MCH RBC QN AUTO: 29.1 PG (ref 26.6–33)
MCHC RBC AUTO-ENTMCNC: 32.2 G/DL (ref 31.5–35.7)
MCV RBC AUTO: 90.5 FL (ref 79–97)
MONOCYTES # BLD AUTO: 1.43 10*3/MM3 (ref 0.1–0.9)
MONOCYTES NFR BLD AUTO: 13.7 % (ref 5–12)
NEUTROPHILS NFR BLD AUTO: 6.42 10*3/MM3 (ref 1.7–7)
NEUTROPHILS NFR BLD AUTO: 61.2 % (ref 42.7–76)
NRBC BLD AUTO-RTO: 0 /100 WBC (ref 0–0.2)
PLATELET # BLD AUTO: 320 10*3/MM3 (ref 140–450)
PMV BLD AUTO: 9.6 FL (ref 6–12)
POTASSIUM SERPL-SCNC: 4.9 MMOL/L (ref 3.5–5.2)
PROT SERPL-MCNC: 7.7 G/DL (ref 6–8.5)
RBC # BLD AUTO: 4.12 10*6/MM3 (ref 4.14–5.8)
SODIUM SERPL-SCNC: 128 MMOL/L (ref 136–145)
WBC NRBC COR # BLD AUTO: 10.47 10*3/MM3 (ref 3.4–10.8)

## 2024-06-27 PROCEDURE — 85025 COMPLETE CBC W/AUTO DIFF WBC: CPT | Performed by: INTERNAL MEDICINE

## 2024-06-27 PROCEDURE — 80053 COMPREHEN METABOLIC PANEL: CPT | Performed by: INTERNAL MEDICINE

## 2024-06-27 NOTE — PROGRESS NOTES
Venipuncture Blood Specimen Collection  Venipuncture performed in left arm by Ramakrishna Srivastava MA with good hemostasis. Patient tolerated the procedure well without complications.   06/27/24   Ramakrishna Srivastava MA

## 2024-06-28 ENCOUNTER — INFUSION (OUTPATIENT)
Dept: ONCOLOGY | Facility: HOSPITAL | Age: 89
End: 2024-06-28
Payer: MEDICARE

## 2024-06-28 VITALS
DIASTOLIC BLOOD PRESSURE: 49 MMHG | RESPIRATION RATE: 18 BRPM | TEMPERATURE: 97.7 F | SYSTOLIC BLOOD PRESSURE: 107 MMHG | HEART RATE: 73 BPM | OXYGEN SATURATION: 95 % | BODY MASS INDEX: 21.97 KG/M2 | WEIGHT: 162 LBS

## 2024-06-28 DIAGNOSIS — E86.0 DEHYDRATION: Primary | ICD-10-CM

## 2024-06-28 PROCEDURE — 96360 HYDRATION IV INFUSION INIT: CPT

## 2024-06-28 PROCEDURE — 25810000003 SODIUM CHLORIDE 0.9 % SOLUTION: Performed by: NURSE PRACTITIONER

## 2024-06-28 RX ORDER — SODIUM CHLORIDE 9 MG/ML
1000 INJECTION, SOLUTION INTRAVENOUS ONCE
Status: COMPLETED | OUTPATIENT
Start: 2024-06-28 | End: 2024-06-28

## 2024-06-28 RX ADMIN — SODIUM CHLORIDE 1000 ML: 9 INJECTION, SOLUTION INTRAVENOUS at 15:16

## 2024-07-01 ENCOUNTER — PATIENT ROUNDING (BHMG ONLY) (OUTPATIENT)
Dept: ONCOLOGY | Facility: CLINIC | Age: 89
End: 2024-07-01
Payer: MEDICARE

## 2024-07-01 ENCOUNTER — TELEPHONE (OUTPATIENT)
Dept: FAMILY MEDICINE CLINIC | Facility: CLINIC | Age: 89
End: 2024-07-01
Payer: MEDICARE

## 2024-07-01 NOTE — TELEPHONE ENCOUNTER
Caller: DILAN JOVEL    Relationship: Emergency Contact    Best call back number: [phone]    What is the best time to reach you: ANY    Who are you requesting to speak with (clinical staff, provider,  specific staff member):     CLINICAL      What was the call regarding:     CANCER DIAGNOSIS  PLEASE CALL TO DISCUSS TREATMENT OPTIONS

## 2024-07-01 NOTE — TELEPHONE ENCOUNTER
Spoke with Faby she stated Kevin is just going over things in his Mind & wanted to know what Hosp[facility] offers,explained as much of their services as I could he has a brain scan on the 18 th an appointment with you on the 19 th he has not started or decided on any treatment plans but wants to discuss pros & cons with you on his appointment the 19 th.

## 2024-07-02 ENCOUNTER — TELEPHONE (OUTPATIENT)
Dept: ONCOLOGY | Facility: CLINIC | Age: 89
End: 2024-07-02
Payer: MEDICARE

## 2024-07-02 ENCOUNTER — CLINICAL SUPPORT (OUTPATIENT)
Dept: ONCOLOGY | Facility: CLINIC | Age: 89
End: 2024-07-02
Payer: MEDICARE

## 2024-07-02 VITALS
HEART RATE: 52 BPM | TEMPERATURE: 97.5 F | RESPIRATION RATE: 18 BRPM | DIASTOLIC BLOOD PRESSURE: 57 MMHG | SYSTOLIC BLOOD PRESSURE: 137 MMHG | OXYGEN SATURATION: 99 %

## 2024-07-02 DIAGNOSIS — C34.91 NON-SMALL CELL CARCINOMA OF LUNG, RIGHT: Primary | ICD-10-CM

## 2024-07-02 DIAGNOSIS — E86.0 DEHYDRATION: ICD-10-CM

## 2024-07-02 LAB
ALBUMIN SERPL-MCNC: 3.6 G/DL (ref 3.5–5.2)
ALBUMIN/GLOB SERPL: 1 G/DL
ALP SERPL-CCNC: 98 U/L (ref 39–117)
ALT SERPL W P-5'-P-CCNC: 13 U/L (ref 1–41)
ANION GAP SERPL CALCULATED.3IONS-SCNC: 7.1 MMOL/L (ref 5–15)
AST SERPL-CCNC: 21 U/L (ref 1–40)
BASOPHILS # BLD AUTO: 0.08 10*3/MM3 (ref 0–0.2)
BASOPHILS NFR BLD AUTO: 0.8 % (ref 0–1.5)
BILIRUB SERPL-MCNC: 0.5 MG/DL (ref 0–1.2)
BUN SERPL-MCNC: 20 MG/DL (ref 8–23)
BUN/CREAT SERPL: 19 (ref 7–25)
CALCIUM SPEC-SCNC: 9.7 MG/DL (ref 8.6–10.5)
CHLORIDE SERPL-SCNC: 94 MMOL/L (ref 98–107)
CO2 SERPL-SCNC: 26.9 MMOL/L (ref 22–29)
CREAT SERPL-MCNC: 1.05 MG/DL (ref 0.76–1.27)
DEPRECATED RDW RBC AUTO: 44 FL (ref 37–54)
EGFRCR SERPLBLD CKD-EPI 2021: 67.9 ML/MIN/1.73
EOSINOPHIL # BLD AUTO: 0.41 10*3/MM3 (ref 0–0.4)
EOSINOPHIL NFR BLD AUTO: 4.2 % (ref 0.3–6.2)
ERYTHROCYTE [DISTWIDTH] IN BLOOD BY AUTOMATED COUNT: 13.5 % (ref 12.3–15.4)
GLOBULIN UR ELPH-MCNC: 3.6 GM/DL
GLUCOSE SERPL-MCNC: 107 MG/DL (ref 65–99)
HCT VFR BLD AUTO: 34.7 % (ref 37.5–51)
HGB BLD-MCNC: 11.5 G/DL (ref 13–17.7)
IMM GRANULOCYTES # BLD AUTO: 0.03 10*3/MM3 (ref 0–0.05)
IMM GRANULOCYTES NFR BLD AUTO: 0.3 % (ref 0–0.5)
LYMPHOCYTES # BLD AUTO: 1.86 10*3/MM3 (ref 0.7–3.1)
LYMPHOCYTES NFR BLD AUTO: 19.2 % (ref 19.6–45.3)
MCH RBC QN AUTO: 29.3 PG (ref 26.6–33)
MCHC RBC AUTO-ENTMCNC: 33.1 G/DL (ref 31.5–35.7)
MCV RBC AUTO: 88.5 FL (ref 79–97)
MONOCYTES # BLD AUTO: 1.46 10*3/MM3 (ref 0.1–0.9)
MONOCYTES NFR BLD AUTO: 15 % (ref 5–12)
NEUTROPHILS NFR BLD AUTO: 5.87 10*3/MM3 (ref 1.7–7)
NEUTROPHILS NFR BLD AUTO: 60.5 % (ref 42.7–76)
NRBC BLD AUTO-RTO: 0 /100 WBC (ref 0–0.2)
PLATELET # BLD AUTO: 334 10*3/MM3 (ref 140–450)
PMV BLD AUTO: 9.6 FL (ref 6–12)
POTASSIUM SERPL-SCNC: 5.3 MMOL/L (ref 3.5–5.2)
PROT SERPL-MCNC: 7.2 G/DL (ref 6–8.5)
RBC # BLD AUTO: 3.92 10*6/MM3 (ref 4.14–5.8)
SODIUM SERPL-SCNC: 128 MMOL/L (ref 136–145)
WBC NRBC COR # BLD AUTO: 9.71 10*3/MM3 (ref 3.4–10.8)

## 2024-07-02 PROCEDURE — 80053 COMPREHEN METABOLIC PANEL: CPT | Performed by: INTERNAL MEDICINE

## 2024-07-02 PROCEDURE — 85025 COMPLETE CBC W/AUTO DIFF WBC: CPT | Performed by: INTERNAL MEDICINE

## 2024-07-02 NOTE — TELEPHONE ENCOUNTER
Patient here in clinic for lab only appointment for sodium recheck today 7/2.     With MD instruction, RN informed patient and patient's spouse about sodium level of 128 and that Dr. Hinds would like patient to restrict fluids to 16 oz a day for a few days until labs can be rechecked on Friday 7/5. Patient's spouse reports that he usually drinks at least 8 glasses of water per day.  RN informed them that if the patient were to experience any mentation changes, hallucinations, seizure-like activity, etc, that they would need to call an ambulance or get the patient to ED immediately. They both voiced understanding.     Patient and patient's spouse voiced understanding and are agreeable to plan of care. RN provided them with AVS with future appointments.      RN encouraged them to call our office with any questions or concerns in the meantime.

## 2024-07-02 NOTE — PROGRESS NOTES
Venipuncture Blood Specimen Collection  Venipuncture performed in left arm by Apoorva Goode MA with good hemostasis. Patient tolerated the procedure well without complications.   07/02/24   Apoorva Goode MA

## 2024-07-03 NOTE — TELEPHONE ENCOUNTER
That's really too far out. Can he come in earlier? Can fit him in on Tuesday, but block out 30 minutes. He usually takes an hour.

## 2024-07-05 ENCOUNTER — CLINICAL SUPPORT (OUTPATIENT)
Dept: ONCOLOGY | Facility: CLINIC | Age: 89
End: 2024-07-05
Payer: MEDICARE

## 2024-07-05 DIAGNOSIS — C34.91 NON-SMALL CELL CARCINOMA OF LUNG, RIGHT: Primary | ICD-10-CM

## 2024-07-05 LAB
ALBUMIN SERPL-MCNC: 3.8 G/DL (ref 3.5–5.2)
ALBUMIN/GLOB SERPL: 1.2 G/DL
ALP SERPL-CCNC: 100 U/L (ref 39–117)
ALT SERPL W P-5'-P-CCNC: 16 U/L (ref 1–41)
ANION GAP SERPL CALCULATED.3IONS-SCNC: 8.8 MMOL/L (ref 5–15)
AST SERPL-CCNC: 22 U/L (ref 1–40)
BASOPHILS # BLD AUTO: 0.13 10*3/MM3 (ref 0–0.2)
BASOPHILS NFR BLD AUTO: 1.3 % (ref 0–1.5)
BILIRUB SERPL-MCNC: 0.4 MG/DL (ref 0–1.2)
BUN SERPL-MCNC: 22 MG/DL (ref 8–23)
BUN/CREAT SERPL: 20.2 (ref 7–25)
CALCIUM SPEC-SCNC: 10 MG/DL (ref 8.6–10.5)
CHLORIDE SERPL-SCNC: 103 MMOL/L (ref 98–107)
CO2 SERPL-SCNC: 27.2 MMOL/L (ref 22–29)
CREAT SERPL-MCNC: 1.09 MG/DL (ref 0.76–1.27)
DEPRECATED RDW RBC AUTO: 46.3 FL (ref 37–54)
EGFRCR SERPLBLD CKD-EPI 2021: 64.9 ML/MIN/1.73
EOSINOPHIL # BLD AUTO: 0.55 10*3/MM3 (ref 0–0.4)
EOSINOPHIL NFR BLD AUTO: 5.3 % (ref 0.3–6.2)
ERYTHROCYTE [DISTWIDTH] IN BLOOD BY AUTOMATED COUNT: 13.9 % (ref 12.3–15.4)
GLOBULIN UR ELPH-MCNC: 3.3 GM/DL
GLUCOSE SERPL-MCNC: 100 MG/DL (ref 65–99)
HCT VFR BLD AUTO: 37.7 % (ref 37.5–51)
HGB BLD-MCNC: 12 G/DL (ref 13–17.7)
IMM GRANULOCYTES # BLD AUTO: 0.04 10*3/MM3 (ref 0–0.05)
IMM GRANULOCYTES NFR BLD AUTO: 0.4 % (ref 0–0.5)
LYMPHOCYTES # BLD AUTO: 2.05 10*3/MM3 (ref 0.7–3.1)
LYMPHOCYTES NFR BLD AUTO: 19.9 % (ref 19.6–45.3)
MCH RBC QN AUTO: 28.7 PG (ref 26.6–33)
MCHC RBC AUTO-ENTMCNC: 31.8 G/DL (ref 31.5–35.7)
MCV RBC AUTO: 90.2 FL (ref 79–97)
MONOCYTES # BLD AUTO: 1.53 10*3/MM3 (ref 0.1–0.9)
MONOCYTES NFR BLD AUTO: 14.8 % (ref 5–12)
NEUTROPHILS NFR BLD AUTO: 58.3 % (ref 42.7–76)
NEUTROPHILS NFR BLD AUTO: 6.01 10*3/MM3 (ref 1.7–7)
NRBC BLD AUTO-RTO: 0 /100 WBC (ref 0–0.2)
PLATELET # BLD AUTO: 346 10*3/MM3 (ref 140–450)
PMV BLD AUTO: 9.5 FL (ref 6–12)
POTASSIUM SERPL-SCNC: 5.2 MMOL/L (ref 3.5–5.2)
PROT SERPL-MCNC: 7.1 G/DL (ref 6–8.5)
RBC # BLD AUTO: 4.18 10*6/MM3 (ref 4.14–5.8)
SODIUM SERPL-SCNC: 139 MMOL/L (ref 136–145)
WBC NRBC COR # BLD AUTO: 10.31 10*3/MM3 (ref 3.4–10.8)

## 2024-07-05 PROCEDURE — 85025 COMPLETE CBC W/AUTO DIFF WBC: CPT | Performed by: INTERNAL MEDICINE

## 2024-07-05 PROCEDURE — 80053 COMPREHEN METABOLIC PANEL: CPT | Performed by: INTERNAL MEDICINE

## 2024-07-05 NOTE — PROGRESS NOTES
Venipuncture Blood Specimen Collection  Venipuncture performed in left arm by Bryn Madera MA with good hemostasis. Patient tolerated the procedure well without complications.   07/05/24   Bryn Madera MA

## 2024-07-08 ENCOUNTER — HOSPITAL ENCOUNTER (OUTPATIENT)
Dept: MRI IMAGING | Facility: HOSPITAL | Age: 89
Discharge: HOME | End: 2024-07-08
Admitting: INTERNAL MEDICINE
Payer: OTHER GOVERNMENT

## 2024-07-08 DIAGNOSIS — C34.91 NON-SMALL CELL CARCINOMA OF LUNG, RIGHT: Primary | ICD-10-CM

## 2024-07-08 DIAGNOSIS — C34.91 NON-SMALL CELL CARCINOMA OF LUNG, RIGHT: ICD-10-CM

## 2024-07-08 DIAGNOSIS — E87.1 HYPONATREMIA: ICD-10-CM

## 2024-07-08 NOTE — PROGRESS NOTES
"Kevin Fonseca     VITALS: Blood pressure 136/66, pulse 74, temperature 97.5 °F (36.4 °C), temperature source Temporal, height 182.9 cm (72\"), weight 74.7 kg (164 lb 9.6 oz), SpO2 96%.    Subjective  Chief Complaint  Shoulder Pain (Right shoulder) and CT Results    Subjective          History of Present Illness:  Patient is a 89 y.o.  male with medical conditions significant for arthritis, asthma, and GERD who presents to clinic secondary to medical followup.  He is complaining about right shoulder pain today.  This is not new.  He denies any new trauma or injury.  He does have a history of arthritis.  He states that is been hurting him worse than usual.  He states that he is not able to rotate the shoulder as well as he used to.  Denies any numbness or tingling down his right upper extremity.    Patient also had his liver biopsy done today.  He does have masses in his lungs and liver.  The decision was made to do a liver biopsy instead of a lung biopsy secondary to the liver masses seeming to be metastatic and easier to reach.    No complaints about any of the medications.    The following portions of the patient's history were reviewed and updated as appropriate: allergies, current medications, past family history, past medical history, past social history, past surgical history and problem list.    Past Medical History  Past Medical History:   Diagnosis Date    Arthritis     Asthma     Chronic bronchitis     Emphysema lung     Gastritis     Heartburn     Macular degeneration     Macular degeneration, wet     Reflux esophagitis     Thyroid disease        Surgical History  Past Surgical History:   Procedure Laterality Date    INTRACAPSULAR CATARACT EXTRACTION      Dr. Lesly Gray. Dr. Neto Light.    LIVER BIOPSY         Family History  Family History   Problem Relation Age of Onset    Hypertension Mother     Other Mother     Cancer Father     Cancer Brother     Asthma Brother        Social History  Social " "History     Socioeconomic History    Marital status:    Tobacco Use    Smoking status: Former     Current packs/day: 0.00     Average packs/day: 1.5 packs/day for 44.0 years (66.0 ttl pk-yrs)     Types: Cigarettes     Start date:      Quit date:      Years since quittin.5     Passive exposure: Past    Smokeless tobacco: Never   Vaping Use    Vaping status: Never Used   Substance and Sexual Activity    Alcohol use: Not Currently     Comment: 2 week    Drug use: Never    Sexual activity: Defer       Objective   Vital Signs:   /66 (BP Location: Right arm, Patient Position: Sitting, Cuff Size: Adult)   Pulse 74   Temp 97.5 °F (36.4 °C) (Temporal)   Ht 182.9 cm (72\")   Wt 74.7 kg (164 lb 9.6 oz)   SpO2 96%   BMI 22.32 kg/m²     Physical Exam     Gen: Patient in NAD. Pleasant and answers appropriately. A&Ox3.    Skin: Warm and dry with normal turgor. No purpura, rashes, or unusual pigmentation noted. Hair is normal in appearance and distribution.    HEENT: NC/AT. No lesions noted. Conjunctiva clear, sclera nonicteric. PERRL. EOMI without nystagmus or strabismus. Fundi appear benign. No hemorrhages or exudates of eyes. Auditory canals are patent bilaterally without lesions. TMs intact,  nonerythematous, nonbulging without lesions. Nasal mucosa pink, nonerythematous, and nonedematous. Frontal and maxillary sinuses are nontender. O/P nonerythematous and moist without exudate.    Neck: Supple without lymph nodes palpated. FROM.     Lungs: Decreased B/L without rales, rhonchi, crackles, or wheezes.    Heart: RRR. S1 and S2 normal. No S3 or S4. No MRGT.    Abd: Soft, nontender,nondistended. (+)BSx4 quadrants.     Extrem: No CCE. Radial pulses 2+/4 and equal B/L.  Decreased range of motion of the right shoulder.    Neuro: No focal motor/sensory deficits.    Procedures    Result Review :   The following data was reviewed by: Elissa Geronimo MD on 2024:                Assessment and Plan  "   Kevin Fonseca is a 89 y.o. here for medical followup.    Diagnoses and all orders for this visit:    1. Lung mass (Primary)  Pending liver mass pathology.  Will send to oncology once biopsy is proven to be metastatic.    2. Liver mass  Pending liver mass pathology.    3. Chronic right shoulder pain  Discussed at length.  He declines physical therapy.  Home exercises given to patient.        Problem List Items Addressed This Visit    None  Visit Diagnoses       Lung mass    -  Primary    Liver mass        Chronic right shoulder pain                  BMI is within normal parameters. No other follow-up for BMI required.            I spent 43 minutes caring for Kevin on this date of service. This time includes time spent by me in the following activities:reviewing tests, obtaining and/or reviewing a separately obtained history, performing a medically appropriate examination and/or evaluation , and counseling and educating the patient/family/caregiver  Follow Up   Return in about 4 weeks (around 7/18/2024).  Findings and plans discussed with patient who verbalizes understanding and agreement. Will followup with patient once results are in. Patient was given instructions and counseling regarding his condition or for health maintenance advice. Please see specific information pulled into the AVS if appropriate.       Elissa Geronimo MD

## 2024-07-09 ENCOUNTER — OFFICE VISIT (OUTPATIENT)
Dept: ONCOLOGY | Facility: CLINIC | Age: 89
End: 2024-07-09
Payer: MEDICARE

## 2024-07-09 ENCOUNTER — LAB (OUTPATIENT)
Dept: ONCOLOGY | Facility: CLINIC | Age: 89
End: 2024-07-09
Payer: MEDICARE

## 2024-07-09 ENCOUNTER — HOSPITAL ENCOUNTER (OUTPATIENT)
Dept: CT IMAGING | Facility: HOSPITAL | Age: 89
Discharge: HOME | End: 2024-07-09
Admitting: INTERNAL MEDICINE
Payer: MEDICARE

## 2024-07-09 ENCOUNTER — OFFICE VISIT (OUTPATIENT)
Dept: FAMILY MEDICINE CLINIC | Facility: CLINIC | Age: 89
End: 2024-07-09
Payer: MEDICARE

## 2024-07-09 VITALS
WEIGHT: 165.8 LBS | OXYGEN SATURATION: 99 % | TEMPERATURE: 97.5 F | HEART RATE: 71 BPM | HEIGHT: 72 IN | DIASTOLIC BLOOD PRESSURE: 68 MMHG | BODY MASS INDEX: 22.46 KG/M2 | SYSTOLIC BLOOD PRESSURE: 126 MMHG | RESPIRATION RATE: 16 BRPM

## 2024-07-09 VITALS
TEMPERATURE: 98 F | SYSTOLIC BLOOD PRESSURE: 129 MMHG | DIASTOLIC BLOOD PRESSURE: 70 MMHG | HEIGHT: 72 IN | OXYGEN SATURATION: 98 % | RESPIRATION RATE: 20 BRPM | WEIGHT: 167 LBS | HEART RATE: 68 BPM | BODY MASS INDEX: 22.62 KG/M2

## 2024-07-09 DIAGNOSIS — E87.1 HYPONATREMIA: ICD-10-CM

## 2024-07-09 DIAGNOSIS — C34.91 NON-SMALL CELL CARCINOMA OF LUNG, RIGHT: ICD-10-CM

## 2024-07-09 DIAGNOSIS — F41.9 ANXIETY: ICD-10-CM

## 2024-07-09 DIAGNOSIS — R30.0 DYSURIA: Primary | ICD-10-CM

## 2024-07-09 DIAGNOSIS — C34.91 NON-SMALL CELL CARCINOMA OF LUNG, RIGHT: Primary | ICD-10-CM

## 2024-07-09 DIAGNOSIS — C34.90 METASTATIC NON-SMALL CELL LUNG CANCER: ICD-10-CM

## 2024-07-09 LAB
ALBUMIN SERPL-MCNC: 3.7 G/DL (ref 3.5–5.2)
ALBUMIN/GLOB SERPL: 1 G/DL
ALP SERPL-CCNC: 96 U/L (ref 39–117)
ALT SERPL W P-5'-P-CCNC: 14 U/L (ref 1–41)
ANION GAP SERPL CALCULATED.3IONS-SCNC: 7.4 MMOL/L (ref 5–15)
AST SERPL-CCNC: 18 U/L (ref 1–40)
BILIRUB BLD-MCNC: NEGATIVE MG/DL
BILIRUB SERPL-MCNC: 0.4 MG/DL (ref 0–1.2)
BUN SERPL-MCNC: 23 MG/DL (ref 8–23)
BUN/CREAT SERPL: 23 (ref 7–25)
CALCIUM SPEC-SCNC: 9.8 MG/DL (ref 8.6–10.5)
CHLORIDE SERPL-SCNC: 98 MMOL/L (ref 98–107)
CLARITY, POC: CLEAR
CO2 SERPL-SCNC: 28.6 MMOL/L (ref 22–29)
COLOR UR: YELLOW
CREAT SERPL-MCNC: 1 MG/DL (ref 0.76–1.27)
EGFRCR SERPLBLD CKD-EPI 2021: 71.9 ML/MIN/1.73
EXPIRATION DATE: NORMAL
GLOBULIN UR ELPH-MCNC: 3.6 GM/DL
GLUCOSE SERPL-MCNC: 89 MG/DL (ref 65–99)
GLUCOSE UR STRIP-MCNC: NEGATIVE MG/DL
KETONES UR QL: NEGATIVE
LEUKOCYTE EST, POC: NEGATIVE
Lab: NORMAL
NITRITE UR-MCNC: NEGATIVE MG/ML
PH UR: 6.5 [PH] (ref 5–8)
POTASSIUM SERPL-SCNC: 5.1 MMOL/L (ref 3.5–5.2)
PROT SERPL-MCNC: 7.3 G/DL (ref 6–8.5)
PROT UR STRIP-MCNC: NEGATIVE MG/DL
RBC # UR STRIP: NEGATIVE /UL
SODIUM SERPL-SCNC: 134 MMOL/L (ref 136–145)
SP GR UR: 1.01 (ref 1–1.03)
UROBILINOGEN UR QL: NORMAL

## 2024-07-09 PROCEDURE — G2211 COMPLEX E/M VISIT ADD ON: HCPCS | Performed by: FAMILY MEDICINE

## 2024-07-09 PROCEDURE — 70460 CT HEAD/BRAIN W/DYE: CPT

## 2024-07-09 PROCEDURE — 99214 OFFICE O/P EST MOD 30 MIN: CPT | Performed by: INTERNAL MEDICINE

## 2024-07-09 PROCEDURE — 25510000001 IOPAMIDOL 61 % SOLUTION: Performed by: INTERNAL MEDICINE

## 2024-07-09 PROCEDURE — 1159F MED LIST DOCD IN RCRD: CPT | Performed by: FAMILY MEDICINE

## 2024-07-09 PROCEDURE — 1126F AMNT PAIN NOTED NONE PRSNT: CPT | Performed by: INTERNAL MEDICINE

## 2024-07-09 PROCEDURE — 99215 OFFICE O/P EST HI 40 MIN: CPT | Performed by: FAMILY MEDICINE

## 2024-07-09 PROCEDURE — 81003 URINALYSIS AUTO W/O SCOPE: CPT | Performed by: FAMILY MEDICINE

## 2024-07-09 PROCEDURE — G2211 COMPLEX E/M VISIT ADD ON: HCPCS | Performed by: INTERNAL MEDICINE

## 2024-07-09 PROCEDURE — 1160F RVW MEDS BY RX/DR IN RCRD: CPT | Performed by: FAMILY MEDICINE

## 2024-07-09 PROCEDURE — 80053 COMPREHEN METABOLIC PANEL: CPT | Performed by: INTERNAL MEDICINE

## 2024-07-09 PROCEDURE — 1126F AMNT PAIN NOTED NONE PRSNT: CPT | Performed by: FAMILY MEDICINE

## 2024-07-09 RX ORDER — BUSPIRONE HYDROCHLORIDE 5 MG/1
5 TABLET ORAL 3 TIMES DAILY PRN
Qty: 90 TABLET | Refills: 2 | Status: SHIPPED | OUTPATIENT
Start: 2024-07-09

## 2024-07-09 RX ADMIN — IOPAMIDOL 80 ML: 612 INJECTION, SOLUTION INTRAVENOUS at 09:47

## 2024-07-09 NOTE — PROGRESS NOTES
Venipuncture Blood Specimen Collection  Venipuncture performed in right arm by Angelica Bryson MA with good hemostasis. Patient tolerated the procedure well without complications.   07/09/24   Angelica Bryson MA

## 2024-07-09 NOTE — PROGRESS NOTES
Subjective     Date: 7/9/2024    Referring Provider  Elissa Geronimo MD     Chief Complaint  Malignant neoplasm of lung, unspecified laterality, unspecified part of lung   Metastatic squamous cell carcinoma to liver     Subjective          Kevin Fonseca is a 89 y.o. male who presents today to [facility] for follow up.     HPI:   89 y.o. male with past medical history of chronic obstructive pulmonary disease, macular degeneration of the eye, hypothyroid disease, sick sinus syndrome who presents for consultation regarding new diagnosis of squamous cell carcinoma of the lung.    Oncology history:  April 23 2024: CXR with pleural based consolidation periphery of the right lung and fullness in the right suprahilar region  May 14 2024: Patient presented to PCP, Dr. Geronimo, regarding intermittent hemoptysis, R chest pain since March 2024. This is associated with weight loss (50 lbs), fatigue, and R groin pain.   May 15 2024: CT chest right upper lobe peripheral based based consolidative masslike opacity measuring 2.9 x 7.8 cm with a more central hilar mass on the right side measuring 5.1 x 4.4 cm with smaller right upper lobe nodule measuring 2.6 cm. Peripheral mass does appear to cause fourth rib erosion or destruction. Right lobe of liver mass concerning for metastatic disease, compression fracture L2 vertebral body.   May 30 2024: PET/CT confirmed peripheral consolidated mass that appears to invade the right upper ribs of right upper lobe measuring 8.6 cm with mSUV 15.1. Consolidative more central and elongated masslike consolidation superior right hilum measures 7 cm and again shows mSUV 21. Mass extends into the right hilum. Pet hypermetabolic liver masses identified, largest in right lobe measuring 8.3 cm with mSUV 14. Compression fracture of L2 vertebral body, 8 mm right upper lobe spiculated pulmonary nodule shows no PET hypermetabolism.  June 19 2024: Underwent CT guided  core biopsy of the liver mass. Pathology shows squamous cell carcinoma. PD-L1 TPS 22c3 5%.       Mr. Fonseca presents today with his wife Faby, daughter Aleja, and grand daughter Alicia. They express Mr. Fonseca's decline in function over the past year. He has not experienced shortness of breath, but does endorse weight loss, fatigue, inability to perform activities he usually would be able to such as feeding animals etc.     He has experienced a few falls over the last year, denies losing consciousness. Denies lightheadedness today (HR 44). He was given the option to have a pacemaker placed, but he declined due to heart rate returning to normal (60-70s). His appetite is good, reports drinking fluids.     He is a previous smoker, smoked 2 packs/day X 50 years, quit 27 years ago. Denies alcohol or drug use. Previously worked as a . Family history significant for brother with lung cancer and paternal grandmother with liver cancer.    He lives with his wife. Daughter and grand daughter live in [place].    Interval History 07/09/2024   Mr. Fonseca and family present for follow up. He feels improved in cognition over the last week after correction of his hyponatremia. Has been drinking one boost daily, which has helped with his energy. Gained ~3 lbs since our consultation. Was unable to stay flat in MRI machine, not completed. No new symptoms    After giving it much thought, he would like to proceed with treatment/immunotherapy only. He would like to avoid chemotherapy, if able.       Objective     Objective     Allergy:   No Known Allergies     Current Medications:   Current Outpatient Medications   Medication Sig Dispense Refill    albuterol sulfate  (90 Base) MCG/ACT inhaler Inhale 2 puffs Every 4 (Four) Hours As Needed for Wheezing or Shortness of Air. 18 g 8    busPIRone (BUSPAR) 5 MG tablet Take 1 tablet by mouth 3 (Three) Times a Day As Needed (anxiety). (Patient taking differently: Take 1 tablet by  mouth 3 (Three) Times a Day As Needed (anxiety). 2 tablets in the AM 1 tablet PM) 90 tablet 2    guaiFENesin (MUCINEX PO) Take 1 tablet by mouth Daily.      levothyroxine (SYNTHROID, LEVOTHROID) 75 MCG tablet Take 1 tablet by mouth Daily. 90 tablet 1    multivitamin with minerals (OCUVITE EXTRA PO) Take 1 tablet by mouth Daily.      Nutritional Supplements (BOOST PO) Take 1-3 cans by mouth Daily.       No current facility-administered medications for this visit.       Past Medical History:  Past Medical History:   Diagnosis Date    Arthritis     Asthma     Chronic bronchitis     Emphysema lung     Gastritis     Heartburn     Macular degeneration     Macular degeneration, wet     Reflux esophagitis     Thyroid disease        Past Surgical History:  Past Surgical History:   Procedure Laterality Date    INTRACAPSULAR CATARACT EXTRACTION      Dr. Lesly Gray. Dr. Neto Light.    LIVER BIOPSY         Social History:  Social History     Socioeconomic History    Marital status:    Tobacco Use    Smoking status: Former     Current packs/day: 0.00     Average packs/day: 1.5 packs/day for 44.0 years (66.0 ttl pk-yrs)     Types: Cigarettes     Start date:      Quit date:      Years since quittin.5     Passive exposure: Past    Smokeless tobacco: Never   Vaping Use    Vaping status: Never Used   Substance and Sexual Activity    Alcohol use: Not Currently     Comment: 2 week    Drug use: Never    Sexual activity: Defer         Family History:  Family History   Problem Relation Age of Onset    Hypertension Mother     Other Mother     Cancer Father     Cancer Brother     Asthma Brother        Review of Systems:  Review of Systems   Constitutional:  Positive for appetite change, fatigue and unexpected weight change. Negative for chills, diaphoresis and fever.   HENT:  Negative for mouth sores, sore throat and tinnitus.    Eyes:  Negative for discharge and visual disturbance.   Respiratory:  Negative for  "cough, shortness of breath and wheezing.    Cardiovascular:  Negative for chest pain, palpitations and leg swelling.   Gastrointestinal:  Negative for abdominal distention, abdominal pain, constipation, diarrhea, nausea and vomiting.   Genitourinary:  Negative for dysuria and hematuria.   Musculoskeletal:  Negative for arthralgias, gait problem and myalgias.   Skin:  Negative for rash.   Neurological:  Negative for dizziness, weakness, light-headedness, numbness and headaches.   Hematological:  Negative for adenopathy. Does not bruise/bleed easily.   Psychiatric/Behavioral:  Negative for sleep disturbance. The patient is not nervous/anxious.        Vital Signs:   /70   Pulse 68   Temp 98 °F (36.7 °C) (Temporal)   Resp 20   Ht 182.9 cm (72\")   Wt 75.8 kg (167 lb)   SpO2 98%   BMI 22.65 kg/m²      Physical Exam:  Physical Exam  Vitals reviewed.   Constitutional:       General: He is not in acute distress.     Appearance: Normal appearance. He is not ill-appearing.      Comments: In a wheelchair today   HENT:      Head: Normocephalic and atraumatic.      Mouth/Throat:      Mouth: Mucous membranes are moist.      Pharynx: Oropharynx is clear.   Eyes:      Conjunctiva/sclera: Conjunctivae normal.      Pupils: Pupils are equal, round, and reactive to light.   Cardiovascular:      Rate and Rhythm: Normal rate and regular rhythm.      Heart sounds: No murmur heard.  Pulmonary:      Effort: Pulmonary effort is normal. No respiratory distress.      Breath sounds: Normal breath sounds. No wheezing.   Abdominal:      General: Abdomen is flat. Bowel sounds are normal. There is no distension.      Palpations: Abdomen is soft. There is no mass.      Tenderness: There is no abdominal tenderness. There is no guarding.   Musculoskeletal:         General: No swelling. Normal range of motion.      Cervical back: Normal range of motion.   Lymphadenopathy:      Cervical: No cervical adenopathy.   Skin:     General: Skin is " "warm and dry.   Neurological:      General: No focal deficit present.      Mental Status: He is alert and oriented to person, place, and time. Mental status is at baseline.   Psychiatric:         Mood and Affect: Mood normal.         PHQ-9 Score  PHQ-9 Total Score:       Pain Score  Vitals:    07/09/24 0818   BP: 129/70   Pulse: 68   Resp: 20   Temp: 98 °F (36.7 °C)   TempSrc: Temporal   SpO2: 98%   Weight: 75.8 kg (167 lb)   Height: 182.9 cm (72\")   PainSc: 0-No pain         ECOG score: 0           PAINSCOREQUALITYMETRIC:   Vitals:    07/09/24 0818   PainSc: 0-No pain            Lab Review  Lab Results   Component Value Date    WBC 10.31 07/05/2024    HGB 12.0 (L) 07/05/2024    HCT 37.7 07/05/2024    MCV 90.2 07/05/2024    RDW 13.9 07/05/2024     07/05/2024    NEUTRORELPCT 58.3 07/05/2024    LYMPHORELPCT 19.9 07/05/2024    MONORELPCT 14.8 (H) 07/05/2024    EOSRELPCT 5.3 07/05/2024    BASORELPCT 1.3 07/05/2024    NEUTROABS 6.01 07/05/2024    LYMPHSABS 2.05 07/05/2024     Lab Results   Component Value Date     (L) 07/09/2024    K 5.1 07/09/2024    CO2 28.6 07/09/2024    CL 98 07/09/2024    BUN 23 07/09/2024    CREATININE 1.00 07/09/2024    EGFRIFNONA 59 (L) 01/19/2022    EGFRIFAFRI 71 01/19/2022    GLUCOSE 89 07/09/2024    CALCIUM 9.8 07/09/2024    ALKPHOS 96 07/09/2024    AST 18 07/09/2024    ALT 14 07/09/2024    BILITOT 0.4 07/09/2024    ALBUMIN 3.7 07/09/2024    PROTEINTOT 7.3 07/09/2024    MG 2.1 11/11/2022       Radiology Results  CT Head With Contrast (07/09/2024 09:47)   IMPRESSION:  1.  No acute intracranial findings identified.  2.  No enhancing brain parenchymal lesions identified.  3.  Diffuse cerebral atrophy with chronic small vessel ischemic disease.  4.  Focal encephalomalacia of the right frontal lobe.    CT Abdomen Pelvis With Contrast (06/14/2024 14:55)   FINDINGS:  ABDOMEN: The lung bases are clear. The heart is normal in size. There  are multiple hepatic masses, the largest of which " is in the right lobe  measuring 8.1 cm consistent with metastatic disease. The spleen is  homogenous. No adrenal mass is present. The pancreas is unremarkable.  The kidneys are normal. There is a 3.3 cm abdominal aortic aneurysm. The  mesenteric vascualture is patent. There is no free fluid or adenopathy.  The abdominal portions of the GI tract are unremarkable with no evidence  of obstruction.     PELVIS: The appendix is normal. The urinary bladder is unremarkable.  There is no significant free fluid or adenopathy. Bone windows reveal an  L2 compression fracture which is unchanged compared to the prior exam.  No new osseous abnormalities are identified. The extraperitoneal soft  tissues are unremarkable.     IMPRESSION:  1. Hepatic metastases not significantly changed compared to the prior  exam.  2. 3.3 cm abdominal aortic aneurysm.  3. Stable L2 compression fracture.    NM PET/CT Skull Base to Mid Thigh (05/30/2024 10:53)   FINDINGS:      HEAD:    BRAIN AND EXTRA-AXIAL SPACES:  Unremarkable as visualized.    NASOPHARYNX:  Unremarkable as visualized.      NECK:    HYPOPHARYNX:  Unremarkable as visualized.    LARYNX:  Unremarkable as visualized.    TRACHEA:  Unremarkable as visualized.    RETROPHARYNGEAL SPACE:  Unremarkable as visualized.    SUBMANDIBULAR/PAROTID GLANDS:  Unremarkable as visualized.  Glands are  normal in size.    THYROID:  Unremarkable as visualized.  No enlarged or calcified  nodules.      CHEST:    LUNGS AND PLEURAL SPACES:  Consolidative more central and elongated  masslike consolidation near the superior right hilum measures about 7 cm  and again shows PET hypermetabolism mSUV 21. The mass extends into the  right hilum.  A 8 mm right upper lobe spiculated pulmonary nodule shows  no PET hypermetabolism at this time.    HEART:  Unremarkable as visualized.  No cardiomegaly.  No significant  pericardial effusion.    MEDIASTINUM:  Unremarkable as visualized.  No mass.      ABDOMEN:    LIVER:  PET  hypermetabolic liver masses are identified with the  largest in the right lobe measuring about 8.3 cm and with PET  hypermetabolism mSUV 14.2. A smaller liver masses noted in the left lobe  of the liver.    GALLBLADDER AND BILE DUCTS:  Unremarkable as visualized.  No calcified  stones.  No ductal dilation.    PANCREAS:  Unremarkable as visualized.  No ductal dilation.    SPLEEN:  Unremarkable as visualized.  No splenomegaly.    ADRENALS:  Unremarkable as visualized.  No mass.    KIDNEYS AND URETERS:  Unremarkable as visualized.    STOMACH AND BOWEL:  Unremarkable as visualized.  No obstruction.      PELVIS:    APPENDIX:  No findings to suggest acute appendicitis.    BLADDER:  Unremarkable as visualized.    REPRODUCTIVE:  Unremarkable as visualized.      WHOLE BODY:    INTRAPERITONEAL SPACE:  Unremarkable as visualized.  No significant  fluid collection.  No free air.    BONES/JOINTS:  Again there is a peripheral consolidated mass that  appears to invade the right upper ribs of the right upper lobe measuring  about 8.6 cm and with PET hypermetabolism mSUV 15.1.  Compression  fracture of L2 vertebral body is again noted.  No dislocation.    SOFT TISSUES:  Unremarkable as visualized.    VASCULATURE:  Unremarkable as visualized.  No aortic aneurysm.    LYMPH NODES:  Unremarkable as visualized.  No lymphadenopathy.  No  hypermetabolic activity.     IMPRESSION:  1.  Again there is a peripheral consolidated mass that appears to invade  the right upper ribs of the right upper lobe measuring about 8.6 cm and  with PET hypermetabolism mSUV 15.1.  2.  Consolidative more central and elongated masslike consolidation near  the superior right hilum measures about 7 cm and again shows PET  hypermetabolism mSUV 21. The mass extends into the right hilum.  3.  PET hypermetabolic liver masses are identified with the largest in  the right lobe measuring about 8.3 cm and with PET hypermetabolism mSUV  14.2. A smaller liver masses noted  in the left lobe of the liver.  4.  Compression fracture of L2 vertebral body is again noted.  5.  A 8 mm right upper lobe spiculated pulmonary nodule shows no PET  hypermetabolism at this time.    CT Chest With Contrast Diagnostic (05/15/2024 14:24)   FINDINGS:    LUNGS AND PLEURAL SPACES:  Right upper lobe peripheral pleural-based  consolidative masslike opacity measuring about 2.9 x 7.8 cm with a more  central hilar mass on the right side measuring 5.1 x 4.4 cm and a  smaller right upper lobe nodule component measuring 2.6 cm. The  peripheral mass does appear to cause fourth rib erosion or destruction.   Emphysematous lungs noted.  Right upper lobe 1.2 cm ill-defined nodule  that could represent scarring.  No pneumothorax.  No significant  effusion.    HEART:  Unremarkable as visualized.  No cardiomegaly.  No significant  pericardial effusion.  No significant coronary artery calcifications.    BONES/JOINTS:  Compression fracture noted of probable L2 vertebral  body.  No dislocation.    SOFT TISSUES:  Unremarkable as visualized.    VASCULATURE:  Partially visualized infrarenal abdominal aortic  aneurysm measuring 3.4 cm.    LYMPH NODES:  Unremarkable as visualized.  No enlarged lymph nodes.    LIVER:  Right lobe of liver mass concerning for metastatic disease  measuring about 7 mm.    OTHER FINDINGS:  .     IMPRESSION:  1.  Right upper lobe peripheral pleural-based consolidative masslike  opacity measuring about 2.9 x 7.8 cm with a more central hilar mass on  the right side measuring 5.1 x 4.4 cm and a smaller right upper lobe  nodule component measuring 2.6 cm. The peripheral mass does appear to  cause fourth rib erosion or destruction.  2.  Right lobe of liver mass concerning for metastatic disease measuring  about 7 mm.  3.  Partially visualized infrarenal abdominal aortic aneurysm measuring  3.4 cm.  4.  Compression fracture noted of probable L2 vertebral body.    XR Chest 2 View (04/23/2024 16:34)    FINDINGS:  LUNGS: Pleural-based consolidation periphery of the right lung. Mild  fullness in the right suprahilar region  HEART AND MEDIASTINUM: Heart and mediastinal contours are unremarkable  SKELETON: Bony and soft tissue structures are unremarkable.     IMPRESSION:  Pleural-based consolidation periphery of the right lung and fullness in  the right suprahilar region. Recommend CT        Pathology:   06/21/2024          Assessment / Plan         Assessment and Plan   Kevin Fonseca is a 89 y.o. year old with history of     DeNovo metastatic non small cell carcinoma, squamous cell. PD-L1 TPS 5%   -Patient with ~1 year of decline in function, weight loss, fatigue, and intermittent hemoptysis. CT May 15 2024 with with concerning right upper lobe peripheral consolidation 2.9 x 7.8 cm with central hilar mass 5 x 4.4 cm, small right upper lobe nodule 2.6 cm, another right upper lobe 1.2 cm ill defined mass could represent scarring. Also noted liver mass concerning for metastatic disease. Follow up PET/CT 6/14/2024 with hepatic metastasis largest measuring 8.1 cm, and noted L2 compression fracture. 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 ECOG PS 2  -I presented them with the stage and treatment, which would be palliative in nature. After a thorough discussion, patient and family decided to proceed with single agent pembrolizumab q21d given his performance status and co-morbidities. I feel this is reasonable given his functional status. Adverse effects were discussed such as fatigue, nausea, vomiting, colitis, pneumonitis, thyroiditis. He is agreeable to therapy, chemoExpert handout is provided.  -MRI brain unable to be performed, CT brain without metastatic disease  -NGS testing requested to assist with therapy directed to  mutations    2. Malignancy associated pain  -Currently denies     3. Nutrition  -Recommend patient take in 2-3 boost/day    4. Hyponatremia   - Improved         Discussed possible differential diagnoses, testing, treatment, recommended non-pharmacological interventions, risks, warning signs to monitor for that would indicate need for follow-up in clinic or ER. If no improvement with these regimens or you have new or worsening symptoms follow-up. Patient verbalizes understanding and agreement with plan of care. Denies further needs or concerns.     Patient was given instructions and counseling regarding her condition and for health maintenance advised.       All questions were answered to his satisfaction.    BMI is within normal parameters. No other follow-up for BMI required.         ACO / MARY/Other  Quality measures  -  Keivn Fonseca did not receive 2023 flu vaccine.  This was recommended.  -  Kevin Fonseca reports a pain score of 0.  Given his pain assessment as noted, treatment options were discussed and the following options were decided upon as a follow-up plan to address the patient's pain: continuation of current treatment plan for pain.  -  Current outpatient and discharge medications have been reconciled for the patient.  Reviewed by: Jennifer Hinds MD        Meds ordered during this visit  No orders of the defined types were placed in this encounter.      Visit Diagnoses    ICD-10-CM ICD-9-CM   1. Non-small cell carcinoma of lung, right  C34.91 162.9         Follow Up   In 3-4 weeks, prior to C2 of treatment         This document has been electronically signed by Jennifer Hinds MD   July 9, 2024 12:15 EDT    Dictated Utilizing Dragon Dictation: Part of this note may be an electronic transcription/translation of spoken language to printed text using the Dragon Dictation System.

## 2024-07-10 ENCOUNTER — TELEPHONE (OUTPATIENT)
Dept: ONCOLOGY | Facility: CLINIC | Age: 89
End: 2024-07-10
Payer: MEDICARE

## 2024-07-10 NOTE — TELEPHONE ENCOUNTER
Caller: DILAN JOVEL    Relationship: Emergency Contact    Best call back number: [phone]    What is the best time to reach you: ANY    Who are you requesting to speak with (clinical staff, provider,  specific staff member): CLINICAL     What was the call regarding: DILAN IS CALLING STATES SHE RECEIVED A CALL FROM [facility] REGARDING APPROVAL FOR TREATMENT    DILAN DIDN'T KNOW WHAT TREATMENT WAS APPROVED AND WHEN THIS WILL START    PLEASE CALL TO DISCUSS AN ADVISE

## 2024-07-10 NOTE — TELEPHONE ENCOUNTER
RN spoke with Faby and informed her that we received authorization for patient's treatment yesterday 7/9 and  infusion center has been notified to get patient scheduled for chemo education visit prior to initiation of treatment. RN encouraged her to be on the lookout for a call to schedule. Faby voiced understanding and RN encouraged her to call our office back with any further questions or concerns.

## 2024-07-11 LAB
CREAT UR-MCNC: 31.8 MG/DL
OSMOLALITY SERPL: 277 MOSMOL/KG (ref 280–301)
OSMOLALITY UR: 434 MOSMOL/KG
SODIUM UR-SCNC: 93 MMOL/L

## 2024-07-12 ENCOUNTER — TELEPHONE (OUTPATIENT)
Dept: FAMILY MEDICINE CLINIC | Facility: CLINIC | Age: 89
End: 2024-07-12
Payer: MEDICARE

## 2024-07-12 NOTE — TELEPHONE ENCOUNTER
Kevin stated that he had diarrhea for about 3/4 days before his last visit on 07-. He took 5 pills of imodium within a day in a half span. Since then he has just been constipated.      To help with his constipation he stated taking Metamucil for 1 day. He stopped using that for about 2 days and begin taking milk of magnesia for 1 day then switched back to metamucil in hopes that it would help but has stopped taking that as well.       He says he has to wear a depend pretty much every day. Patient says he has gas built up and he trys to let it out but he has sharp pain in his anal area and there is no relief it. This has been happening since about Monday evening.

## 2024-07-13 ENCOUNTER — APPOINTMENT (OUTPATIENT)
Dept: CT IMAGING | Facility: HOSPITAL | Age: 89
End: 2024-07-13
Payer: OTHER GOVERNMENT

## 2024-07-13 ENCOUNTER — HOSPITAL ENCOUNTER (EMERGENCY)
Facility: HOSPITAL | Age: 89
Discharge: HOME | End: 2024-07-13
Attending: EMERGENCY MEDICINE
Payer: OTHER GOVERNMENT

## 2024-07-13 VITALS
SYSTOLIC BLOOD PRESSURE: 123 MMHG | HEART RATE: 70 BPM | RESPIRATION RATE: 18 BRPM | HEIGHT: 72 IN | OXYGEN SATURATION: 96 % | DIASTOLIC BLOOD PRESSURE: 59 MMHG | BODY MASS INDEX: 22.46 KG/M2 | WEIGHT: 165.79 LBS | TEMPERATURE: 96.8 F

## 2024-07-13 DIAGNOSIS — C80.1 CANCER: ICD-10-CM

## 2024-07-13 DIAGNOSIS — K59.00 CONSTIPATION, UNSPECIFIED CONSTIPATION TYPE: Primary | ICD-10-CM

## 2024-07-13 DIAGNOSIS — E87.5 HYPERKALEMIA: ICD-10-CM

## 2024-07-13 LAB
ALBUMIN SERPL-MCNC: 3.6 G/DL (ref 3.5–5.2)
ALBUMIN/GLOB SERPL: 1 G/DL
ALP SERPL-CCNC: 100 U/L (ref 39–117)
ALT SERPL W P-5'-P-CCNC: 14 U/L (ref 1–41)
ANION GAP SERPL CALCULATED.3IONS-SCNC: 10.8 MMOL/L (ref 5–15)
AST SERPL-CCNC: 21 U/L (ref 1–40)
BACTERIA UR QL AUTO: ABNORMAL /HPF
BASOPHILS # BLD AUTO: 0.09 10*3/MM3 (ref 0–0.2)
BASOPHILS NFR BLD AUTO: 0.7 % (ref 0–1.5)
BILIRUB SERPL-MCNC: 0.4 MG/DL (ref 0–1.2)
BILIRUB UR QL STRIP: NEGATIVE
BUN SERPL-MCNC: 26 MG/DL (ref 8–23)
BUN/CREAT SERPL: 28 (ref 7–25)
CALCIUM SPEC-SCNC: 9.6 MG/DL (ref 8.6–10.5)
CHLORIDE SERPL-SCNC: 93 MMOL/L (ref 98–107)
CLARITY UR: CLEAR
CO2 SERPL-SCNC: 24.2 MMOL/L (ref 22–29)
COLOR UR: YELLOW
CREAT SERPL-MCNC: 0.93 MG/DL (ref 0.76–1.27)
CRP SERPL-MCNC: 2.99 MG/DL (ref 0–0.5)
DEPRECATED RDW RBC AUTO: 43.9 FL (ref 37–54)
EGFRCR SERPLBLD CKD-EPI 2021: 78.5 ML/MIN/1.73
EOSINOPHIL # BLD AUTO: 0.54 10*3/MM3 (ref 0–0.4)
EOSINOPHIL NFR BLD AUTO: 4.2 % (ref 0.3–6.2)
ERYTHROCYTE [DISTWIDTH] IN BLOOD BY AUTOMATED COUNT: 13.5 % (ref 12.3–15.4)
ERYTHROCYTE [SEDIMENTATION RATE] IN BLOOD: 53 MM/HR (ref 0–20)
GEN 5 2HR TROPONIN T REFLEX: 28 NG/L
GLOBULIN UR ELPH-MCNC: 3.6 GM/DL
GLUCOSE SERPL-MCNC: 92 MG/DL (ref 65–99)
GLUCOSE UR STRIP-MCNC: NEGATIVE MG/DL
HCT VFR BLD AUTO: 35.1 % (ref 37.5–51)
HGB BLD-MCNC: 11.4 G/DL (ref 13–17.7)
HGB UR QL STRIP.AUTO: ABNORMAL
HYALINE CASTS UR QL AUTO: ABNORMAL /LPF
IMM GRANULOCYTES # BLD AUTO: 0.04 10*3/MM3 (ref 0–0.05)
IMM GRANULOCYTES NFR BLD AUTO: 0.3 % (ref 0–0.5)
KETONES UR QL STRIP: NEGATIVE
LEUKOCYTE ESTERASE UR QL STRIP.AUTO: ABNORMAL
LYMPHOCYTES # BLD AUTO: 2.13 10*3/MM3 (ref 0.7–3.1)
LYMPHOCYTES NFR BLD AUTO: 16.7 % (ref 19.6–45.3)
MCH RBC QN AUTO: 28.7 PG (ref 26.6–33)
MCHC RBC AUTO-ENTMCNC: 32.5 G/DL (ref 31.5–35.7)
MCV RBC AUTO: 88.4 FL (ref 79–97)
MONOCYTES # BLD AUTO: 1.73 10*3/MM3 (ref 0.1–0.9)
MONOCYTES NFR BLD AUTO: 13.6 % (ref 5–12)
NEUTROPHILS NFR BLD AUTO: 64.5 % (ref 42.7–76)
NEUTROPHILS NFR BLD AUTO: 8.23 10*3/MM3 (ref 1.7–7)
NITRITE UR QL STRIP: NEGATIVE
NRBC BLD AUTO-RTO: 0 /100 WBC (ref 0–0.2)
PH UR STRIP.AUTO: 6.5 [PH] (ref 5–8)
PLATELET # BLD AUTO: 320 10*3/MM3 (ref 140–450)
PMV BLD AUTO: 9.8 FL (ref 6–12)
POTASSIUM SERPL-SCNC: 5.5 MMOL/L (ref 3.5–5.2)
PROT SERPL-MCNC: 7.2 G/DL (ref 6–8.5)
PROT UR QL STRIP: NEGATIVE
QT INTERVAL: 414 MS
QTC INTERVAL: 416 MS
RBC # BLD AUTO: 3.97 10*6/MM3 (ref 4.14–5.8)
RBC # UR STRIP: ABNORMAL /HPF
REF LAB TEST METHOD: ABNORMAL
SODIUM SERPL-SCNC: 128 MMOL/L (ref 136–145)
SP GR UR STRIP: 1.01 (ref 1–1.03)
SQUAMOUS #/AREA URNS HPF: ABNORMAL /HPF
TROPONIN T DELTA: -6 NG/L
TROPONIN T SERPL HS-MCNC: 34 NG/L
UROBILINOGEN UR QL STRIP: ABNORMAL
WBC # UR STRIP: ABNORMAL /HPF
WBC NRBC COR # BLD AUTO: 12.76 10*3/MM3 (ref 3.4–10.8)

## 2024-07-13 PROCEDURE — 81001 URINALYSIS AUTO W/SCOPE: CPT | Performed by: EMERGENCY MEDICINE

## 2024-07-13 PROCEDURE — 84484 ASSAY OF TROPONIN QUANT: CPT | Performed by: EMERGENCY MEDICINE

## 2024-07-13 PROCEDURE — 0 DIATRIZOATE MEGLUMINE & SODIUM PER 1 ML: Performed by: EMERGENCY MEDICINE

## 2024-07-13 PROCEDURE — 80053 COMPREHEN METABOLIC PANEL: CPT | Performed by: EMERGENCY MEDICINE

## 2024-07-13 PROCEDURE — 85652 RBC SED RATE AUTOMATED: CPT | Performed by: EMERGENCY MEDICINE

## 2024-07-13 PROCEDURE — 85025 COMPLETE CBC W/AUTO DIFF WBC: CPT | Performed by: EMERGENCY MEDICINE

## 2024-07-13 PROCEDURE — 99284 EMERGENCY DEPT VISIT MOD MDM: CPT

## 2024-07-13 PROCEDURE — 36415 COLL VENOUS BLD VENIPUNCTURE: CPT

## 2024-07-13 PROCEDURE — 93005 ELECTROCARDIOGRAM TRACING: CPT | Performed by: EMERGENCY MEDICINE

## 2024-07-13 PROCEDURE — 74176 CT ABD & PELVIS W/O CONTRAST: CPT

## 2024-07-13 PROCEDURE — 74176 CT ABD & PELVIS W/O CONTRAST: CPT | Performed by: RADIOLOGY

## 2024-07-13 PROCEDURE — 86140 C-REACTIVE PROTEIN: CPT | Performed by: EMERGENCY MEDICINE

## 2024-07-13 PROCEDURE — 93010 ELECTROCARDIOGRAM REPORT: CPT | Performed by: INTERNAL MEDICINE

## 2024-07-13 RX ORDER — SODIUM CHLORIDE 0.9 % (FLUSH) 0.9 %
10 SYRINGE (ML) INJECTION AS NEEDED
Status: DISCONTINUED | OUTPATIENT
Start: 2024-07-13 | End: 2024-07-14 | Stop reason: HOSPADM

## 2024-07-13 RX ORDER — POLYETHYLENE GLYCOL 3350 17 G/17G
100 POWDER, FOR SOLUTION ORAL DAILY PRN
Qty: 510 G | Refills: 0 | Status: SHIPPED | OUTPATIENT
Start: 2024-07-13

## 2024-07-13 RX ADMIN — DIATRIZOATE MEGLUMINE AND DIATRIZOATE SODIUM 200 ML: 600; 100 SOLUTION ORAL; RECTAL at 21:42

## 2024-07-13 NOTE — ED NOTES
MEDICAL SCREENING:    Reason for Visit: rectal pain and constipation     Patient initially seen in triage.  The patient was advised further evaluation and diagnostic testing will be needed, some of the treatment and testing will be initiated in the lobby in order to begin the process.  The patient will be returned to the waiting area for the time being and possibly be re-assessed by a subsequent ED provider.  The patient will be brought back to the treatment area in as timely manner as possible.      Aubrie Vega PA  07/13/24 0655

## 2024-07-13 NOTE — ED PROVIDER NOTES
Subjective     History provided by:  Patient   used: No    Abdominal Pain  Pain location:  Generalized  Pain quality: aching, cramping and dull    Pain radiates to:  Does not radiate  Pain severity:  Mild  Onset quality:  Gradual  Duration: Several.  Timing:  Constant  Progression:  Worsening  Chronicity:  New  Context: not alcohol use, not awakening from sleep, not diet changes, not eating, not laxative use, not medication withdrawal, not previous surgeries, not recent illness, not recent sexual activity, not recent travel, not retching, not sick contacts, not suspicious food intake and not trauma    Relieved by:  Nothing  Worsened by:  Nothing  Ineffective treatments:  None tried  Associated symptoms: no anorexia, no belching, no chest pain, no chills, no constipation, no cough, no diarrhea, no dysuria, no fatigue, no fever, no flatus, no hematemesis, no hematochezia, no hematuria, no melena, no nausea, no shortness of breath, no sore throat and no vomiting    Risk factors: being elderly    Risk factors: no alcohol abuse, no aspirin use, has not had multiple surgeries, no NSAID use, not obese and no recent hospitalization        Review of Systems   Constitutional:  Negative for activity change, appetite change, chills, diaphoresis, fatigue and fever.   HENT:  Negative for congestion, ear pain and sore throat.    Eyes:  Negative for redness.   Respiratory:  Negative for cough, chest tightness, shortness of breath and wheezing.    Cardiovascular:  Negative for chest pain, palpitations and leg swelling.   Gastrointestinal:  Positive for abdominal pain. Negative for anorexia, constipation, diarrhea, flatus, hematemesis, hematochezia, melena, nausea and vomiting.   Genitourinary:  Negative for dysuria, hematuria and urgency.   Musculoskeletal:  Negative for arthralgias, back pain, myalgias and neck pain.   Skin:  Negative for pallor, rash and wound.   Neurological:  Negative for dizziness, speech  difficulty, weakness and headaches.   Psychiatric/Behavioral:  Negative for agitation, behavioral problems, confusion and decreased concentration.    All other systems reviewed and are negative.      Past Medical History:   Diagnosis Date    Arthritis     Asthma     Chronic bronchitis     Emphysema lung     Gastritis     Heartburn     Macular degeneration     Macular degeneration, wet     Reflux esophagitis     Thyroid disease        No Known Allergies    Past Surgical History:   Procedure Laterality Date    INTRACAPSULAR CATARACT EXTRACTION      Dr. Lesly Gray. Dr. Neto Light.    LIVER BIOPSY         Family History   Problem Relation Age of Onset    Hypertension Mother     Other Mother     Cancer Father     Cancer Brother     Asthma Brother        Social History     Socioeconomic History    Marital status:    Tobacco Use    Smoking status: Former     Current packs/day: 0.00     Average packs/day: 1.5 packs/day for 44.0 years (66.0 ttl pk-yrs)     Types: Cigarettes     Start date:      Quit date:      Years since quittin.5     Passive exposure: Past    Smokeless tobacco: Never   Vaping Use    Vaping status: Never Used   Substance and Sexual Activity    Alcohol use: Not Currently     Comment: 2 week    Drug use: Never    Sexual activity: Defer           Objective   Physical Exam  Vitals and nursing note reviewed.   Constitutional:       General: He is not in acute distress.     Appearance: Normal appearance. He is well-developed. He is not toxic-appearing or diaphoretic.   HENT:      Head: Normocephalic and atraumatic.      Right Ear: External ear normal.      Left Ear: External ear normal.      Nose: Nose normal.      Mouth/Throat:      Pharynx: No oropharyngeal exudate.      Tonsils: No tonsillar exudate.   Eyes:      General: Lids are normal.      Conjunctiva/sclera: Conjunctivae normal.      Pupils: Pupils are equal, round, and reactive to light.   Neck:      Thyroid: No thyromegaly.    Cardiovascular:      Rate and Rhythm: Normal rate and regular rhythm.      Pulses: Normal pulses.      Heart sounds: Normal heart sounds, S1 normal and S2 normal.   Pulmonary:      Effort: Pulmonary effort is normal. No tachypnea or respiratory distress.      Breath sounds: Normal breath sounds. No decreased breath sounds, wheezing or rales.   Chest:      Chest wall: No tenderness.   Abdominal:      General: Bowel sounds are normal. There is no distension.      Palpations: Abdomen is soft.      Tenderness: There is abdominal tenderness. There is no guarding or rebound.   Musculoskeletal:         General: No tenderness or deformity. Normal range of motion.      Cervical back: Full passive range of motion without pain, normal range of motion and neck supple.   Lymphadenopathy:      Cervical: No cervical adenopathy.   Skin:     General: Skin is warm and dry.      Coloration: Skin is not pale.      Findings: No erythema or rash.   Neurological:      Mental Status: He is alert and oriented to person, place, and time.      GCS: GCS eye subscore is 4. GCS verbal subscore is 5. GCS motor subscore is 6.      Cranial Nerves: No cranial nerve deficit.      Sensory: No sensory deficit.   Psychiatric:         Speech: Speech normal.         Behavior: Behavior normal.         Thought Content: Thought content normal.         Judgment: Judgment normal.         Procedures           ED Course  ED Course as of 07/13/24 2257   Sat Jul 13, 2024 1925 EKG interpretation July 13, 2024 at 19: 19 ventricular rate 61 bpm, intervals normal, right axis deviation, atrial fibrillation, atrial fibrillation with complete pause, no signs of acute ischemia  Electronically signed by Goldy Clayton MD, 07/13/24, 7:28 PM EDT.   [AK]      ED Course User Index  [AK] Goldy Clayton MD KASPER reviewed by Curtis Spears MD, Goldy Clayton MD       Medical Decision Making  Reevaluation at 10:48 PM,  patient CT remarkable for constipation, they know about the liver lesions, he states that he has not had a bowel movement here, but he is starting to feel lalo bloating/cramping with Gastrografin which was initiated by the prior provider.  Will send him home with MiraLAX and Dulcolax for constipation.  He has not had a full bowel movement 5 days.  He has had small hard bowel movements over the past 5 days.  Recommendation to follow-up with his primary care doctor in 3 days for reevaluation of the patient's constipation    Patient's troponin was elevated, patient did not have any chest pain he never had any shortness of breath or any cardiac symptoms.  His main complaint was rectal pain.  While his troponin was elevated his EKG does not show any signs of active ischemia, he had T wave inversions in leads III, aVF, but no signs of ST elevations, T waves is not inverted in lead II, not believe he is having active coronary ischemia and he is currently asymptomatic.  He did have hyperkalemia that was mild, however no signs of peaked T waves    Amount and/or Complexity of Data Reviewed  Labs: ordered.  Radiology: ordered.  ECG/medicine tests: ordered.    Risk  Prescription drug management.        Final diagnoses:   Constipation, unspecified constipation type   Cancer   Hyperkalemia       ED Disposition  ED Disposition       ED Disposition   Discharge    Condition   Stable    Comment   --               Elissa Geronimo MD  [address] [place] [id number] [phone]    In 3 days           Medication List        New Prescriptions      polyethylene glycol 17 GM/SCOOP powder  Commonly known as: MIRALAX  Take 100 g by mouth Daily As Needed (constipation).               Where to Get Your Medications        These medications were sent to [facility] [id number] - [place] - [address] AT [facility] [address] [facility] [phone] [facility] [phone] FX  [address] [place] [address]      Phone:  [phone]   polyethylene glycol 17 GM/SCOOP powder            Goldy Clayton MD  07/13/24 1161

## 2024-07-15 DIAGNOSIS — C34.90 METASTATIC NON-SMALL CELL LUNG CANCER: Primary | ICD-10-CM

## 2024-07-15 RX ORDER — SODIUM CHLORIDE 9 MG/ML
20 INJECTION, SOLUTION INTRAVENOUS ONCE
Status: CANCELLED | OUTPATIENT
Start: 2024-07-22

## 2024-07-16 ENCOUNTER — OFFICE VISIT (OUTPATIENT)
Dept: FAMILY MEDICINE CLINIC | Facility: CLINIC | Age: 89
End: 2024-07-16
Payer: MEDICARE

## 2024-07-16 ENCOUNTER — PATIENT OUTREACH (OUTPATIENT)
Dept: CASE MANAGEMENT | Facility: OTHER | Age: 89
End: 2024-07-16
Payer: MEDICARE

## 2024-07-16 VITALS
BODY MASS INDEX: 21.81 KG/M2 | HEIGHT: 72 IN | OXYGEN SATURATION: 99 % | WEIGHT: 161 LBS | SYSTOLIC BLOOD PRESSURE: 110 MMHG | TEMPERATURE: 97.5 F | HEART RATE: 80 BPM | DIASTOLIC BLOOD PRESSURE: 56 MMHG | RESPIRATION RATE: 16 BRPM

## 2024-07-16 DIAGNOSIS — I49.5 SICK SINUS SYNDROME: ICD-10-CM

## 2024-07-16 DIAGNOSIS — K59.04 CHRONIC IDIOPATHIC CONSTIPATION: Primary | ICD-10-CM

## 2024-07-16 DIAGNOSIS — C34.90 METASTATIC NON-SMALL CELL LUNG CANCER: ICD-10-CM

## 2024-07-16 PROCEDURE — 1126F AMNT PAIN NOTED NONE PRSNT: CPT | Performed by: FAMILY MEDICINE

## 2024-07-16 PROCEDURE — G2211 COMPLEX E/M VISIT ADD ON: HCPCS | Performed by: FAMILY MEDICINE

## 2024-07-16 PROCEDURE — 99214 OFFICE O/P EST MOD 30 MIN: CPT | Performed by: FAMILY MEDICINE

## 2024-07-16 RX ORDER — DOCUSATE SODIUM 100 MG/1
100 CAPSULE, LIQUID FILLED ORAL 2 TIMES DAILY
Qty: 60 CAPSULE | Refills: 2 | Status: SHIPPED | OUTPATIENT
Start: 2024-07-16

## 2024-07-16 NOTE — OUTREACH NOTE
AMBULATORY CASE MANAGEMENT NOTE    Names and Relationships of Patient/Support Persons: Contact: JENNIE JOVELE; Relationship: Emergency Contact -     Patient Outreach    [facility] Ed visit 07/13/2024: Constipation.  Wife reports patient was seen by Dr. Geronimo today.  Prescribed stool softeners, Miralax and fiber powder started today.  Wife reports small bowel movement yesterday.  Patient remains uncomfortable and bloated. Patient encouraged to increase fluid intake. Wife states patient plans on doing mild exercise while in bed and tummy massage.  Denies further needs from RN-ZOHREH.    rAlyn COLE  Ambulatory Case Management    7/16/2024, 15:35 EDT

## 2024-07-17 ENCOUNTER — OFFICE VISIT (OUTPATIENT)
Dept: ONCOLOGY | Facility: CLINIC | Age: 89
End: 2024-07-17
Payer: MEDICARE

## 2024-07-17 ENCOUNTER — LAB (OUTPATIENT)
Dept: ONCOLOGY | Facility: HOSPITAL | Age: 89
End: 2024-07-17
Payer: MEDICARE

## 2024-07-17 ENCOUNTER — TELEPHONE (OUTPATIENT)
Dept: ONCOLOGY | Facility: CLINIC | Age: 89
End: 2024-07-17
Payer: MEDICARE

## 2024-07-17 VITALS
DIASTOLIC BLOOD PRESSURE: 55 MMHG | HEART RATE: 80 BPM | RESPIRATION RATE: 18 BRPM | SYSTOLIC BLOOD PRESSURE: 108 MMHG | TEMPERATURE: 97 F | OXYGEN SATURATION: 97 % | WEIGHT: 159.1 LBS | BODY MASS INDEX: 21.58 KG/M2

## 2024-07-17 DIAGNOSIS — R41.0 CONFUSION: Primary | ICD-10-CM

## 2024-07-17 DIAGNOSIS — C34.90 METASTATIC NON-SMALL CELL LUNG CANCER: ICD-10-CM

## 2024-07-17 LAB
ALBUMIN SERPL-MCNC: 3.6 G/DL (ref 3.5–5.2)
ALBUMIN/GLOB SERPL: 1.1 G/DL
ALP SERPL-CCNC: 104 U/L (ref 39–117)
ALT SERPL W P-5'-P-CCNC: 13 U/L (ref 1–41)
ANION GAP SERPL CALCULATED.3IONS-SCNC: 8.9 MMOL/L (ref 5–15)
AST SERPL-CCNC: 25 U/L (ref 1–40)
BACTERIA UR QL AUTO: NORMAL /HPF
BASOPHILS # BLD AUTO: 0.1 10*3/MM3 (ref 0–0.2)
BASOPHILS NFR BLD AUTO: 0.9 % (ref 0–1.5)
BILIRUB SERPL-MCNC: 0.6 MG/DL (ref 0–1.2)
BILIRUB UR QL STRIP: NEGATIVE
BUN SERPL-MCNC: 19 MG/DL (ref 8–23)
BUN/CREAT SERPL: 19.8 (ref 7–25)
CALCIUM SPEC-SCNC: 10 MG/DL (ref 8.6–10.5)
CHLORIDE SERPL-SCNC: 97 MMOL/L (ref 98–107)
CLARITY UR: ABNORMAL
CO2 SERPL-SCNC: 25.1 MMOL/L (ref 22–29)
COLOR UR: YELLOW
CREAT SERPL-MCNC: 0.96 MG/DL (ref 0.76–1.27)
DEPRECATED RDW RBC AUTO: 45.4 FL (ref 37–54)
EGFRCR SERPLBLD CKD-EPI 2021: 75.6 ML/MIN/1.73
EOSINOPHIL # BLD AUTO: 0.4 10*3/MM3 (ref 0–0.4)
EOSINOPHIL NFR BLD AUTO: 3.6 % (ref 0.3–6.2)
ERYTHROCYTE [DISTWIDTH] IN BLOOD BY AUTOMATED COUNT: 13.9 % (ref 12.3–15.4)
GLOBULIN UR ELPH-MCNC: 3.4 GM/DL
GLUCOSE SERPL-MCNC: 108 MG/DL (ref 65–99)
GLUCOSE UR STRIP-MCNC: NEGATIVE MG/DL
HCT VFR BLD AUTO: 35.4 % (ref 37.5–51)
HGB BLD-MCNC: 11.4 G/DL (ref 13–17.7)
HGB UR QL STRIP.AUTO: NEGATIVE
HYALINE CASTS UR QL AUTO: NORMAL /LPF
IMM GRANULOCYTES # BLD AUTO: 0.03 10*3/MM3 (ref 0–0.05)
IMM GRANULOCYTES NFR BLD AUTO: 0.3 % (ref 0–0.5)
KETONES UR QL STRIP: NEGATIVE
LEUKOCYTE ESTERASE UR QL STRIP.AUTO: ABNORMAL
LYMPHOCYTES # BLD AUTO: 1.94 10*3/MM3 (ref 0.7–3.1)
LYMPHOCYTES NFR BLD AUTO: 17.4 % (ref 19.6–45.3)
MCH RBC QN AUTO: 28.7 PG (ref 26.6–33)
MCHC RBC AUTO-ENTMCNC: 32.2 G/DL (ref 31.5–35.7)
MCV RBC AUTO: 89.2 FL (ref 79–97)
MONOCYTES # BLD AUTO: 1.5 10*3/MM3 (ref 0.1–0.9)
MONOCYTES NFR BLD AUTO: 13.4 % (ref 5–12)
NEUTROPHILS NFR BLD AUTO: 64.4 % (ref 42.7–76)
NEUTROPHILS NFR BLD AUTO: 7.21 10*3/MM3 (ref 1.7–7)
NITRITE UR QL STRIP: NEGATIVE
NRBC BLD AUTO-RTO: 0 /100 WBC (ref 0–0.2)
PH UR STRIP.AUTO: 7.5 [PH] (ref 5–8)
PLATELET # BLD AUTO: 356 10*3/MM3 (ref 140–450)
PMV BLD AUTO: 9.8 FL (ref 6–12)
POTASSIUM SERPL-SCNC: 4.9 MMOL/L (ref 3.5–5.2)
PROT SERPL-MCNC: 7 G/DL (ref 6–8.5)
PROT UR QL STRIP: NEGATIVE
RBC # BLD AUTO: 3.97 10*6/MM3 (ref 4.14–5.8)
RBC # UR STRIP: NORMAL /HPF
REF LAB TEST METHOD: NORMAL
SODIUM SERPL-SCNC: 131 MMOL/L (ref 136–145)
SP GR UR STRIP: 1.02 (ref 1–1.03)
SQUAMOUS #/AREA URNS HPF: NORMAL /HPF
T4 FREE SERPL-MCNC: 1.51 NG/DL (ref 0.93–1.7)
TSH SERPL DL<=0.05 MIU/L-ACNC: 2.66 UIU/ML (ref 0.27–4.2)
UROBILINOGEN UR QL STRIP: ABNORMAL
WBC # UR STRIP: NORMAL /HPF
WBC NRBC COR # BLD AUTO: 11.18 10*3/MM3 (ref 3.4–10.8)

## 2024-07-17 PROCEDURE — 81001 URINALYSIS AUTO W/SCOPE: CPT | Performed by: NURSE PRACTITIONER

## 2024-07-17 PROCEDURE — 80053 COMPREHEN METABOLIC PANEL: CPT

## 2024-07-17 PROCEDURE — 84443 ASSAY THYROID STIM HORMONE: CPT

## 2024-07-17 PROCEDURE — 85025 COMPLETE CBC W/AUTO DIFF WBC: CPT

## 2024-07-17 PROCEDURE — 84439 ASSAY OF FREE THYROXINE: CPT

## 2024-07-17 NOTE — TELEPHONE ENCOUNTER
RN discussed with MD who recommended patient come in for an acute visit today 7/17 for further evaluation and repeat labs. RN spoke with Christel and she was agreeable to the plan. She is aware of appointment time and location for today at 11:00 am.

## 2024-07-17 NOTE — PROGRESS NOTES
"Date: 2024    Patient Name: Kevin Fonseca   : 1934   ID: [id number]    Diagnosis: Lung cancer    Treatment History: Please refer to Dr. Hinds's last office note (24) for full oncology and treatment history. He is being seen today for an acute visit.     Complaint: Intermittent confusion    Interim History:  Mr. Fonseca presents today accompanied by his granddaughter for an acute visit. He recently established care with Dr. Hinds and is planning to begin palliative immunotherapy with Pembrolizumab. He was previously seen in the ED due to constipation and granddaughter states that medications prescribed caused patient to have some diarrhea that has since resolved. She states that he has told her that he \"feels more confused\" and has been complaining with this since 24. She states that she is concerned that his sodium may be low due to diarrhea secondary to use of laxatives. She states that she has noticed intermittent periods of worsening confusion and is not sure if this is \"his new normal or if something else is going on\". On examination, he is awake, alert and is oriented to place. He knows that it is July but states that it is . He calls his granddaughter by name but states that her relationship is his niece. He is otherwise without specific complaints today.    VS:   /55   Pulse 80   Temp 97 °F (36.1 °C) (Oral)   Resp 18   Wt 72.2 kg (159 lb 1.6 oz)   SpO2 97%   BMI 21.58 kg/m²      PHQ-Score Total:  PHQ-9 Total Score:      Review of Systems  A comprehensive 14 point review of systems was conducted with patient and positive as per HPI otherwise negative.    Physical Exam:  General:  Awake, alert and oriented to person, place, in no distress  HEENT:  Pupils are equal, round and reactive to light and accommodation  Neck:  No JVD, thyromegaly or lymphadenopathy  CV:  Regular rate and rhythm, no murmurs, rubs or gallops  Resp:  Lungs are clear to auscultation " bilaterally  Abd:  Soft, non-tender, non-distended, bowel sounds present, no organomegaly  Ext:  No clubbing, cyanosis or edema  Neuro:  MS as above, CN II-XII intact, grossly non-focal exam      Labs:       Lab Results   Component Value Date    WBC 12.76 (H) 07/13/2024    HGB 11.4 (L) 07/13/2024    HCT 35.1 (L) 07/13/2024    MCV 88.4 07/13/2024    RDW 13.5 07/13/2024     07/13/2024    NEUTRORELPCT 64.5 07/13/2024    LYMPHORELPCT 16.7 (L) 07/13/2024    MONORELPCT 13.6 (H) 07/13/2024    EOSRELPCT 4.2 07/13/2024    BASORELPCT 0.7 07/13/2024    NEUTROABS 8.23 (H) 07/13/2024    LYMPHSABS 2.13 07/13/2024       Lab Results   Component Value Date     (L) 07/13/2024    K 5.5 (H) 07/13/2024    CO2 24.2 07/13/2024    CL 93 (L) 07/13/2024    BUN 26 (H) 07/13/2024    CREATININE 0.93 07/13/2024    GLUCOSE 92 07/13/2024    CALCIUM 9.6 07/13/2024    ALKPHOS 100 07/13/2024    AST 21 07/13/2024    ALT 14 07/13/2024    BILITOT 0.4 07/13/2024    ALBUMIN 3.6 07/13/2024    PROTEINTOT 7.2 07/13/2024    MG 2.1 11/11/2022         Assessment & Plan:  Kevin Fonseca is a 89 y.o. male with:    1. Squamous cell carcinoma of lung  - Follows with Dr. Hinds for palliative treatment of lung cancer. Please refer to last office note (07/09/2024) for full oncology and treatment history. Planning to begin Pembrolizumab soon.  - He will follow up with Dr. Hinds as scheduled.    2. Intermittent confusion  - Reports some worsening of periods of intermittent confusion.  - Previously was treated for hyponatremia with improvement in cognition.  - On examination today, he is awake, alert and oriented to person, place. He states that it is July 2013. He knows that he is accompanied by Alicia, who he states is his niece (she is his granddaughter).  - Granddaughter concerned that sodium may be low due to frequent use of laxatives over the weekend to treat constipation.  - Labs today with WBC 11.18, Hg 11.4, Hct 35.4, platelets 356,000. Sodium  131, improved from 128 previously. No other abnormalities.   - UA was obtained and was without evidence of infection.  - Previously had CT head with contrast that was without evidence of metastases.   - MRI brain was ordered but patient was unable to tolerate lying flat due to issues with his neck/back. We discussed that workup as above was unremarkable. Given his age and functional decline, we discussed that periods of intermittent confusion may be the baseline status of the patient. We also discussed additional imaging, such as MRI brain with contrast and patient does not feel that he could tolerate the test. He and his granddaughter prefer to monitor symptoms and will notify us of any worsening.         I spent 30 minutes with Kevin Fonseca today.  In the office today, more than 50% of this time was spent face-to-face with him  in counseling / coordination of care, reviewing his interim medical history and counseling on the current treatment plan.  All questions were answered to his satisfaction.               Electronically Signed By: DENICE Serna

## 2024-07-17 NOTE — TELEPHONE ENCOUNTER
Can you call Kevin? So yesterday we had discussed a bunch of ways to improve the constipation, but I did not address the gas. Does he think he still has gas? We could start him on something for the gas also which could help the constipation.

## 2024-07-17 NOTE — TELEPHONE ENCOUNTER
He is experiencing more confusion. Saturday night we went to the ER for constipation. He has had to drink a lot of water with the meds. He saw his PCP  yesterday and she said she wasn't concerned with a little confusion. Christel said she is having a hard time judging if this is his new base line or if his sodium is low . First thing this morning , he told her that he was really confused . She felt like this was something that Dr. Hinds needed to know.

## 2024-07-18 ENCOUNTER — TELEPHONE (OUTPATIENT)
Dept: FAMILY MEDICINE CLINIC | Facility: CLINIC | Age: 89
End: 2024-07-18
Payer: MEDICARE

## 2024-07-18 LAB
FUNGUS SPEC CULT: NORMAL
FUNGUS SPEC FUNGUS STN: NORMAL

## 2024-07-18 NOTE — TELEPHONE ENCOUNTER
"Faby called and questioned if Kevin could \"hold off\" on docusate, fiber and orlando lax the remainder of today and up to his Cancer treatment tomorrow as he has been having frequent BMs now and doesn't want that to interfere with treatment.       "

## 2024-07-18 NOTE — TELEPHONE ENCOUNTER
Faby said that it's hard to say, but she knows it is a lot. She said just today he has had about 4. She said it is loose stools. She said he is worried about having an accident at the hospital.

## 2024-07-18 NOTE — TELEPHONE ENCOUNTER
Go ahead and stop. Skip anything for tomorrow. But then for Saturday, could he do one fiber and one miralax daily and see how he does. If he holds, then he can either just do one of each daily or even decrease that, and if he needs more, he can always add a second fiber or a second miralax or restart the colace.

## 2024-07-19 ENCOUNTER — OFFICE VISIT (OUTPATIENT)
Dept: ONCOLOGY | Facility: CLINIC | Age: 89
End: 2024-07-19
Payer: MEDICARE

## 2024-07-19 DIAGNOSIS — C34.90 METASTATIC NON-SMALL CELL LUNG CANCER: Primary | ICD-10-CM

## 2024-07-19 PROCEDURE — 1126F AMNT PAIN NOTED NONE PRSNT: CPT | Performed by: NURSE PRACTITIONER

## 2024-07-19 PROCEDURE — 1160F RVW MEDS BY RX/DR IN RCRD: CPT | Performed by: NURSE PRACTITIONER

## 2024-07-19 PROCEDURE — 1159F MED LIST DOCD IN RCRD: CPT | Performed by: NURSE PRACTITIONER

## 2024-07-19 PROCEDURE — 99215 OFFICE O/P EST HI 40 MIN: CPT | Performed by: NURSE PRACTITIONER

## 2024-07-22 ENCOUNTER — CLINICAL SUPPORT (OUTPATIENT)
Dept: ONCOLOGY | Facility: CLINIC | Age: 89
End: 2024-07-22
Payer: MEDICARE

## 2024-07-22 ENCOUNTER — INFUSION (OUTPATIENT)
Dept: ONCOLOGY | Facility: HOSPITAL | Age: 89
End: 2024-07-22
Payer: MEDICARE

## 2024-07-22 VITALS
RESPIRATION RATE: 20 BRPM | HEART RATE: 80 BPM | DIASTOLIC BLOOD PRESSURE: 56 MMHG | TEMPERATURE: 98 F | BODY MASS INDEX: 22.22 KG/M2 | OXYGEN SATURATION: 97 % | HEIGHT: 72 IN | SYSTOLIC BLOOD PRESSURE: 108 MMHG

## 2024-07-22 VITALS
SYSTOLIC BLOOD PRESSURE: 118 MMHG | OXYGEN SATURATION: 95 % | BODY MASS INDEX: 22.22 KG/M2 | WEIGHT: 163.8 LBS | TEMPERATURE: 97.7 F | HEART RATE: 68 BPM | RESPIRATION RATE: 18 BRPM | DIASTOLIC BLOOD PRESSURE: 61 MMHG

## 2024-07-22 VITALS
HEART RATE: 64 BPM | SYSTOLIC BLOOD PRESSURE: 135 MMHG | RESPIRATION RATE: 18 BRPM | TEMPERATURE: 97.1 F | DIASTOLIC BLOOD PRESSURE: 55 MMHG | OXYGEN SATURATION: 97 %

## 2024-07-22 DIAGNOSIS — E87.1 HYPONATREMIA: ICD-10-CM

## 2024-07-22 DIAGNOSIS — C34.90 METASTATIC NON-SMALL CELL LUNG CANCER: Primary | ICD-10-CM

## 2024-07-22 DIAGNOSIS — C34.91 NON-SMALL CELL CARCINOMA OF LUNG, RIGHT: ICD-10-CM

## 2024-07-22 DIAGNOSIS — C34.90 METASTATIC NON-SMALL CELL LUNG CANCER: ICD-10-CM

## 2024-07-22 LAB
ALBUMIN SERPL-MCNC: 3.5 G/DL (ref 3.5–5.2)
ALBUMIN/GLOB SERPL: 1.1 G/DL
ALP SERPL-CCNC: 101 U/L (ref 39–117)
ALT SERPL W P-5'-P-CCNC: 17 U/L (ref 1–41)
ANION GAP SERPL CALCULATED.3IONS-SCNC: 8.6 MMOL/L (ref 5–15)
AST SERPL-CCNC: 24 U/L (ref 1–40)
BASOPHILS # BLD AUTO: 0.11 10*3/MM3 (ref 0–0.2)
BASOPHILS NFR BLD AUTO: 1 % (ref 0–1.5)
BILIRUB SERPL-MCNC: 0.4 MG/DL (ref 0–1.2)
BUN SERPL-MCNC: 15 MG/DL (ref 8–23)
BUN/CREAT SERPL: 17.4 (ref 7–25)
CALCIUM SPEC-SCNC: 9.4 MG/DL (ref 8.6–10.5)
CHLORIDE SERPL-SCNC: 95 MMOL/L (ref 98–107)
CO2 SERPL-SCNC: 24.4 MMOL/L (ref 22–29)
CREAT SERPL-MCNC: 0.86 MG/DL (ref 0.76–1.27)
DEPRECATED RDW RBC AUTO: 44.9 FL (ref 37–54)
EGFRCR SERPLBLD CKD-EPI 2021: 82.8 ML/MIN/1.73
EOSINOPHIL # BLD AUTO: 0.46 10*3/MM3 (ref 0–0.4)
EOSINOPHIL NFR BLD AUTO: 4.2 % (ref 0.3–6.2)
ERYTHROCYTE [DISTWIDTH] IN BLOOD BY AUTOMATED COUNT: 13.7 % (ref 12.3–15.4)
GLOBULIN UR ELPH-MCNC: 3.1 GM/DL
GLUCOSE SERPL-MCNC: 90 MG/DL (ref 65–99)
HCT VFR BLD AUTO: 34.7 % (ref 37.5–51)
HGB BLD-MCNC: 11.4 G/DL (ref 13–17.7)
IMM GRANULOCYTES # BLD AUTO: 0.04 10*3/MM3 (ref 0–0.05)
IMM GRANULOCYTES NFR BLD AUTO: 0.4 % (ref 0–0.5)
LYMPHOCYTES # BLD AUTO: 1.94 10*3/MM3 (ref 0.7–3.1)
LYMPHOCYTES NFR BLD AUTO: 17.6 % (ref 19.6–45.3)
MCH RBC QN AUTO: 29.3 PG (ref 26.6–33)
MCHC RBC AUTO-ENTMCNC: 32.9 G/DL (ref 31.5–35.7)
MCV RBC AUTO: 89.2 FL (ref 79–97)
MONOCYTES # BLD AUTO: 1.57 10*3/MM3 (ref 0.1–0.9)
MONOCYTES NFR BLD AUTO: 14.2 % (ref 5–12)
NEUTROPHILS NFR BLD AUTO: 6.91 10*3/MM3 (ref 1.7–7)
NEUTROPHILS NFR BLD AUTO: 62.6 % (ref 42.7–76)
NRBC BLD AUTO-RTO: 0 /100 WBC (ref 0–0.2)
PLATELET # BLD AUTO: 344 10*3/MM3 (ref 140–450)
PMV BLD AUTO: 9.8 FL (ref 6–12)
POTASSIUM SERPL-SCNC: 4.8 MMOL/L (ref 3.5–5.2)
PROT SERPL-MCNC: 6.6 G/DL (ref 6–8.5)
RBC # BLD AUTO: 3.89 10*6/MM3 (ref 4.14–5.8)
REF LAB TEST METHOD: NORMAL
SODIUM SERPL-SCNC: 128 MMOL/L (ref 136–145)
T4 FREE SERPL-MCNC: 1.24 NG/DL (ref 0.93–1.7)
TSH SERPL DL<=0.05 MIU/L-ACNC: 3.86 UIU/ML (ref 0.27–4.2)
WBC NRBC COR # BLD AUTO: 11.03 10*3/MM3 (ref 3.4–10.8)

## 2024-07-22 PROCEDURE — 25810000003 SODIUM CHLORIDE 0.9 % SOLUTION: Performed by: NURSE PRACTITIONER

## 2024-07-22 PROCEDURE — 85025 COMPLETE CBC W/AUTO DIFF WBC: CPT | Performed by: INTERNAL MEDICINE

## 2024-07-22 PROCEDURE — 84443 ASSAY THYROID STIM HORMONE: CPT | Performed by: INTERNAL MEDICINE

## 2024-07-22 PROCEDURE — 80053 COMPREHEN METABOLIC PANEL: CPT | Performed by: INTERNAL MEDICINE

## 2024-07-22 PROCEDURE — 96366 THER/PROPH/DIAG IV INF ADDON: CPT

## 2024-07-22 PROCEDURE — 96413 CHEMO IV INFUSION 1 HR: CPT

## 2024-07-22 PROCEDURE — 84439 ASSAY OF FREE THYROXINE: CPT | Performed by: INTERNAL MEDICINE

## 2024-07-22 PROCEDURE — 96361 HYDRATE IV INFUSION ADD-ON: CPT

## 2024-07-22 PROCEDURE — 25010000002 PEMBROLIZUMAB 100 MG/4ML SOLUTION 4 ML VIAL: Performed by: NURSE PRACTITIONER

## 2024-07-22 RX ORDER — SODIUM CHLORIDE 9 MG/ML
20 INJECTION, SOLUTION INTRAVENOUS ONCE
Status: DISCONTINUED | OUTPATIENT
Start: 2024-07-22 | End: 2024-07-22 | Stop reason: HOSPADM

## 2024-07-22 RX ADMIN — SODIUM CHLORIDE 200 MG: 9 INJECTION, SOLUTION INTRAVENOUS at 15:08

## 2024-07-22 RX ADMIN — SODIUM CHLORIDE 1000 ML: 9 INJECTION, SOLUTION INTRAVENOUS at 15:00

## 2024-07-22 NOTE — PROGRESS NOTES
IMMUNOTHERAPY PREPARATION    Kevin Fonseca  [id number]  1934    Chief Complaint: immunotherapy education     History of present illness:  Kevin Fonseca is a 89 y.o. year old male who is here today for immunotherapy preparation and needs assessment. The patient has been diagnosed with squamous cell carcinoma of the lung and is scheduled to begin treatment with Pembrolizumab on 07/22/2024. At present, he is doing well and denies any specific complaints.     Oncology History:    Oncology/Hematology History   Metastatic non-small cell lung cancer   7/9/2024 Initial Diagnosis    Metastatic non-small cell lung cancer     7/22/2024 -  Chemotherapy    OP LUNG Pembrolizumab 200 mg         Past Medical History:   Diagnosis Date    Arthritis     Asthma     Chronic bronchitis     Emphysema lung     Gastritis     Heartburn     Macular degeneration     Macular degeneration, wet     Reflux esophagitis     Thyroid disease        Past Surgical History:   Procedure Laterality Date    INTRACAPSULAR CATARACT EXTRACTION      Dr. Lesly Gray. Dr. Neto Light.    LIVER BIOPSY         Family History   Problem Relation Age of Onset    Hypertension Mother     Other Mother     Cancer Father     Cancer Brother     Asthma Brother        Medications: The current medication list was reviewed and reconciled.     Allergies:  has No Known Allergies.    Review of Systems:  A comprehensive 14 point review of systems was performed. Significant findings as mentioned above. All other systems reviewed and are negative.        Physical Exam:    Vitals:    07/19/24 1132   BP: 118/61   Pulse: 68   Resp: 18   Temp: 97.7 °F (36.5 °C)   SpO2: 95%     Vitals:    07/19/24 1132   PainSc: 0-No pain         ECOG: (3) Capable of Limited Self Care, Confined to Bed or Chair Greater Than 50% of Waking Hours  General: Well developed, well nourished. In no acute distress.  HEENT: Pupils equally round and reactive to light. Extraocular muscles  "intact.  Cardiovascular: Regular rate and rhythm. No murmurs, rubs or gallops.  Lungs: Clear to auscultation bilaterally.  Abdomen: Soft, nontender, nondistended. Normoactive bowel sounds x 4 quadrants.  Extremities: No clubbing, cyanosis or edema bilaterally.  Skin: Warm, dry, intact.  Neuro: Grossly non-focal exam.  Psych: Alert and oriented x 3.             NEEDS ASSESSMENTS    Genetics  The patient's new diagnosis and family history have been reviewed for genetic counseling needs. A genetic referral is not recommended.     Barriers to care  A barriers form was also completed by the patient today. We discussed services offered by our facility to help him have adequate access to care. The patient was given the name and card for our Oncology Social Worker, Dariana Ballard. Based upon barriers assessment today, the patient will not require a follow-up call from the  to further discuss needs.       VAD Assessment  The patient and I discussed planned intervenous chemotherapy as well as other IV treatments that are often needed throughout the course of treatment. These may include, but are not limited to blood transfusions, antibiotics, and IV hydration. The vasculature does appear to be adequate for multiple peripheral IVs throughout their treatment course. Discussed risks and benefits of VADs. The patient would not like to pursue Port-A-Cath insertion prior to initiation of treatment.     Advanced Care Planning  The patient and I discussed advanced care planning, \"Conversations that Matter\".   This service was offered, free of charge, for development of advance directives with a certified ACP facilitator.  The patient does not have an up-to-date advanced directive. This document is not on file with our office. The patient is not interested in an appointment with one of our facilitators to create or update their advanced directives.      Palliative Care  The patient and I discussed palliative care services. " Palliative care is not the same as Hospice care. This is specialized medical care for people living with serious illness with the goal of improving quality of life for the patient and their family. [facility] has partnered with [facility] to offer our patients outpatient palliative care early along with their treatment to assist in coordination of care, symptom management, pain management, and medical decision making.  Oncology criteria for palliative care referral is not met at this time. The patient is not interested in a palliative care consultation.     Additional Referral needs  none      IMMUNOTHERAPY EDUCATION    Booklets Given: Chemotherapy and You [x]  Eating Hints [x]    Sexuality/Fertility Books []      Chemotherapy/Biotherapy Education Sheets: (list all that apply)  nausea management, acid reflux management, diarrhea management, Cancer resourse contacts information, skin and mouth care, and vaccination information                                                                                                                                                                 Chemotherapy Regimen:   Treatment Plans       Name Type Plan Dates Plan Provider         Active    OP LUNG Pembrolizumab 200 mg ONCOLOGY TREATMENT  7/8/2024 - Present Jennifer Hinds MD                      TOPICS EDUCATION PROVIDED COMMENTS   ANEMIA:  role of RBC, cause, s/s, ways to manage, role of transfusion [x]    THROMBOCYTOPENIA:  role of platelet, cause, s/s, ways to prevent bleeding, things to avoid, when to seek help [x]    NEUTROPENIA:  role of WBC, cause, infection precautions, s/s of infection, when to call MD [x]    NUTRITION & APPETITE CHANGES:  importance of maintaining healthy diet & weight, ways to manage to improve intake, dietary consult, exercise regimen [x]    DIARRHEA:  causes, s/s of dehydration, ways to manage, dietary changes, when to call MD [x]    CONSTIPATION:  causes, ways to manage, dietary  changes, when to call MD [x]    NAUSEA & VOMITING:  cause, use of antiemetics, dietary changes, when to call MD [x]    MOUTH SORES:  causes, oral care, ways to manage [x]    ALOPECIA:  cause, ways to manage, resources [x]    INFERTILITY & SEXUALITY:  causes, fertility preservation options, sexuality changes, ways to manage, importance of birth control [x]    NERVOUS SYSTEM CHANGES:  causes, s/s, neuropathies, cognitive changes, ways to manage [x]    PAIN:  causes, ways to manage [x]    SKIN & NAIL CHANGES:  cause, s/s, ways to manage [x]    ORGAN TOXICITIES:  cause, s/s, need for diagnostic tests, labs, when to notify MD [x]    SURVIVORSHIP:  distress, distress assessment, secondary malignancies, early/late effects, follow-up, social issues, social support [x]    HOME CARE:  use of spill kits, storing of PO chemo, how to manage bodily fluids [x]    MISCELLANEOUS:  drug interactions, administration, vesicant, et [x]        Assessment and Plan:    Diagnoses and all orders for this visit:    1. Metastatic non-small cell lung cancer (Primary)        The patient and I have reviewed their new cancer diagnosis and scheduled treatment plan. Needs assessment was completed including genetics, barriers to care, VAD evaluation, advanced care planning, and palliative care services. Referrals have been ordered as appropriate based upon our evaluation and patient desires.     Immunotherapy teaching was also completed today as documented above. Adequate time was given to answer all questions to his satisfaction. Patient and family are aware of their care team members and contact information if they have questions or problems throughout the treatment course. Needs assessments and education has been completed. The patient is adequately prepared to begin treatment as scheduled.     Reviewed with patient education regarding Zofran and Compazine prescriptions sent to pharmacy.       I advised the patient that he can take Tylenol or  Ibuprofen (if no contraindications) as needed for aches/pains related to cancer/treatment. I also advised patient he could use Senakot or Miralax as needed for constipation or Imodium as needed for diarrhea.       I reviewed with the patient the care team members. I also reviewed the option of the acute care visits provided through our oncology office for evaluation and management of symptoms related to treatment. Patient was provided with phone number to call during regular office hours [phone] press #1 and for treatment related questions call [phone] to speak to a nurse. If after hours or on the weekend call [phone] and ask to page the MD on call for [facility] services.         I spent 60 minutes with Kevin Fonseca today.  In the office today, more than 50% of this time was spent face-to-face with him  in counseling / coordination of care, reviewing his interim medical history and counseling on the current treatment plan.  All questions were answered to his satisfaction.           Electronically Signed by: DENICE Ashford , July 22, 2024 11:40 EDT

## 2024-07-22 NOTE — PROGRESS NOTES
Venipuncture Blood Specimen Collection  Venipuncture performed in right arm by Angelica Davison MA with good hemostasis. Patient tolerated the procedure well without complications.   07/22/24   Angelica Davison MA

## 2024-07-24 ENCOUNTER — TELEPHONE (OUTPATIENT)
Dept: FAMILY MEDICINE CLINIC | Facility: CLINIC | Age: 89
End: 2024-07-24
Payer: MEDICARE

## 2024-07-24 NOTE — TELEPHONE ENCOUNTER
----- Message from Elissa Geronimo sent at 7/24/2024  3:38 AM EDT -----  Can you call and see how his cancer treatments are going? Thanks.

## 2024-07-24 NOTE — TELEPHONE ENCOUNTER
Spoke with Faby he has not stopped the treatments they have just been rescheduled due to him going back Friday & the computer system was down,she reports he did well with his first treatment,he reported while we were on the phone that he is voiding about every 30 minutes but denies any dysuria.

## 2024-07-24 NOTE — PROGRESS NOTES
"Kevin Fonseca     VITALS: Blood pressure 126/68, pulse 71, temperature 97.5 °F (36.4 °C), temperature source Temporal, resp. rate 16, height 182.9 cm (72\"), weight 75.2 kg (165 lb 12.8 oz), SpO2 99%.    Subjective  Chief Complaint  Lung Mass    Subjective          History of Present Illness:  Patient is a 89 y.o.  male with medical conditions significant for arthritis, asthma, GERD, and metastatic non-small cell lung cancer who presents to clinic secondary to medical followup.  He recently just had his first meeting with oncology and they explained to him possible treatment options.  He is here to discuss them.  He also complains of dysuria.  No fevers or chills.  He denies any hematuria.  He denies any urinary frequency but complains of some urinary retention.    No complaints about any of the medications.    The following portions of the patient's history were reviewed and updated as appropriate: allergies, current medications, past family history, past medical history, past social history, past surgical history and problem list.    Past Medical History  Past Medical History:   Diagnosis Date    Arthritis     Asthma     Chronic bronchitis     Emphysema lung     Gastritis     Heartburn     Macular degeneration     Macular degeneration, wet     Reflux esophagitis     Thyroid disease        Surgical History  Past Surgical History:   Procedure Laterality Date    INTRACAPSULAR CATARACT EXTRACTION      Dr. Lesly Gray. Dr. Neto Light.    LIVER BIOPSY         Family History  Family History   Problem Relation Age of Onset    Hypertension Mother     Other Mother     Cancer Father     Cancer Brother     Asthma Brother        Social History  Social History     Socioeconomic History    Marital status:    Tobacco Use    Smoking status: Former     Current packs/day: 0.00     Average packs/day: 1.5 packs/day for 44.0 years (66.0 ttl pk-yrs)     Types: Cigarettes     Start date: 1952     Quit date: 1996     Years " "since quittin.5     Passive exposure: Past    Smokeless tobacco: Never   Vaping Use    Vaping status: Never Used   Substance and Sexual Activity    Alcohol use: Not Currently     Comment: 2 week    Drug use: Never    Sexual activity: Defer       Objective   Vital Signs:   /68 (BP Location: Right arm, Patient Position: Sitting, Cuff Size: Adult)   Pulse 71   Temp 97.5 °F (36.4 °C) (Temporal)   Resp 16   Ht 182.9 cm (72\")   Wt 75.2 kg (165 lb 12.8 oz)   SpO2 99%   BMI 22.49 kg/m²     Physical Exam     Gen: Patient in NAD. Pleasant and answers appropriately. A&Ox3.    Skin: Warm and dry with normal turgor. No purpura, rashes, or unusual pigmentation noted. Hair is normal in appearance and distribution.    HEENT: NC/AT. No lesions noted. Conjunctiva clear, sclera nonicteric. PERRL. EOMI without nystagmus or strabismus. Fundi appear benign. No hemorrhages or exudates of eyes. Auditory canals are patent bilaterally without lesions. TMs intact,  nonerythematous, bulging without lesions. Nasal mucosa pink, nonerythematous, and nonedematous. Frontal and maxillary sinuses are nontender. O/P erythematous and moist without exudate.    Neck: Supple without lymph nodes palpated. FROM.     Lungs: Decreased B/L without rales, rhonchi, crackles, or wheezes.    Heart: Distant.  RRR. S1 and S2 normal. No S3 or S4. No MRGT.    Abd: Soft, nontender, slightly distended. (+)BSx4 quadrants.     Extrem: No CC.  Trace edema bilateral lower extremities.  Radial pulses 2+/4 and equal B/L. FROMx4.  Positive joint tenderness noted.    Neuro: No focal motor/sensory deficits.    Procedures    Result Review :   The following data was reviewed by: Elissa Geronimo MD on 2024:                Assessment and Plan    Kevin Fonseca is a 89 y.o. here for medical followup.    Diagnoses and all orders for this visit:    1. Dysuria (Primary)  -     POCT urinalysis dipstick, automated  -     Osmolality, Serum  -     Osmolality, Urine - " Urine, Clean Catch  -     Sodium, Urine, Random - Urine, Clean Catch  -     Creatinine Urine Random (kidney function) GFR component - Urine, Clean Catch    2. Anxiety  -     busPIRone (BUSPAR) 5 MG tablet; Take 1 tablet by mouth 3 (Three) Times a Day As Needed (anxiety). 2 tablets in the AM 1 tablet PM  Dispense: 90 tablet; Refill: 2    3. Hyponatremia  -     Cancel: Osmolality, Serum; Future  -     Cancel: Sodium, Urine, Random - Urine, Clean Catch; Future  -     Osmolality, Urine - Urine, Clean Catch; Future  -     Cancel: Creatinine Urine Random (kidney function) GFR component - Urine, Clean Catch; Future  -     Osmolality, Serum  -     Osmolality, Urine - Urine, Clean Catch  -     Sodium, Urine, Random - Urine, Clean Catch  -     Creatinine Urine Random (kidney function) GFR component - Urine, Clean Catch    4. Metastatic non-small cell lung cancer  Discussed at length with patient.  Discussed treatment options and oncology staff.  This took about 45 minutes.  Will continue to monitor.        Problem List Items Addressed This Visit          Hematology and Neoplasia    Metastatic non-small cell lung cancer     Other Visit Diagnoses       Dysuria    -  Primary    Relevant Orders    POCT urinalysis dipstick, automated (Completed)    Osmolality, Serum (Completed)    Osmolality, Urine - Urine, Clean Catch (Completed)    Sodium, Urine, Random - Urine, Clean Catch (Completed)    Creatinine Urine Random (kidney function) GFR component - Urine, Clean Catch (Completed)    Anxiety        Relevant Medications    busPIRone (BUSPAR) 5 MG tablet    Hyponatremia        Relevant Orders    Osmolality, Urine - Urine, Clean Catch    Osmolality, Serum (Completed)    Osmolality, Urine - Urine, Clean Catch (Completed)    Sodium, Urine, Random - Urine, Clean Catch (Completed)    Creatinine Urine Random (kidney function) GFR component - Urine, Clean Catch (Completed)              BMI is within normal parameters. No other follow-up for  BMI required.         I spent 50 minutes caring for Kevni on this date of service. This time includes time spent by me in the following activities:reviewing tests, obtaining and/or reviewing a separately obtained history, performing a medically appropriate examination and/or evaluation , and counseling and educating the patient/family/caregiver  Follow Up   Return in about 6 weeks (around 8/20/2024), or LABS (cancel 7/19 appt).  Findings and plans discussed with patient who verbalizes understanding and agreement. Will followup with patient once results are in. Patient was given instructions and counseling regarding his condition or for health maintenance advice. Please see specific information pulled into the AVS if appropriate.       Elissa Geronimo MD

## 2024-07-29 DIAGNOSIS — E03.8 OTHER SPECIFIED HYPOTHYROIDISM: ICD-10-CM

## 2024-07-29 RX ORDER — LEVOTHYROXINE SODIUM 0.07 MG/1
75 TABLET ORAL DAILY
Qty: 90 TABLET | Refills: 1 | OUTPATIENT
Start: 2024-07-29

## 2024-07-29 NOTE — TELEPHONE ENCOUNTER
Caller: DILAN JOVEL    Relationship: Emergency Contact    Best call back number: [phone]     Requested Prescriptions:   Requested Prescriptions     Pending Prescriptions Disp Refills    levothyroxine (SYNTHROID, LEVOTHROID) 75 MCG tablet 90 tablet 1     Sig: Take 1 tablet by mouth Daily.        Pharmacy where request should be sent: [facility] [id number]  [place] - [address] AT [address]  - [phone] [facility] [phone] FX     Last office visit with prescribing clinician: 7/16/2024   Last telemedicine visit with prescribing clinician: Visit date not found   Next office visit with prescribing clinician: 8/27/2024     Additional details provided by patient: PATIENT HAS 6 DAYS LEFT OF THIS MEDICATION.    Does the patient have less than a 3 day supply:  [] Yes  [x] No    Would you like a call back once the refill request has been completed: [] Yes [x] No    If the office needs to give you a call back, can they leave a voicemail: [] Yes [x] No    Nancy Hernandez Rep   07/29/24 14:06 EDT

## 2024-07-31 ENCOUNTER — OFFICE VISIT (OUTPATIENT)
Dept: ONCOLOGY | Facility: CLINIC | Age: 89
End: 2024-07-31
Payer: MEDICARE

## 2024-07-31 ENCOUNTER — INFUSION (OUTPATIENT)
Dept: ONCOLOGY | Facility: HOSPITAL | Age: 89
End: 2024-07-31
Payer: MEDICARE

## 2024-07-31 ENCOUNTER — TELEPHONE (OUTPATIENT)
Dept: ONCOLOGY | Facility: CLINIC | Age: 89
End: 2024-07-31
Payer: MEDICARE

## 2024-07-31 ENCOUNTER — APPOINTMENT (OUTPATIENT)
Dept: ONCOLOGY | Facility: HOSPITAL | Age: 89
End: 2024-07-31
Payer: MEDICARE

## 2024-07-31 VITALS
SYSTOLIC BLOOD PRESSURE: 99 MMHG | DIASTOLIC BLOOD PRESSURE: 46 MMHG | TEMPERATURE: 97.3 F | RESPIRATION RATE: 18 BRPM | HEART RATE: 71 BPM | OXYGEN SATURATION: 96 %

## 2024-07-31 VITALS
DIASTOLIC BLOOD PRESSURE: 46 MMHG | TEMPERATURE: 97.3 F | HEART RATE: 71 BPM | RESPIRATION RATE: 18 BRPM | SYSTOLIC BLOOD PRESSURE: 99 MMHG | OXYGEN SATURATION: 96 %

## 2024-07-31 DIAGNOSIS — R41.0 CONFUSION: ICD-10-CM

## 2024-07-31 DIAGNOSIS — C34.90 METASTATIC NON-SMALL CELL LUNG CANCER: Primary | ICD-10-CM

## 2024-07-31 DIAGNOSIS — E87.1 HYPONATREMIA: ICD-10-CM

## 2024-07-31 LAB
ALBUMIN SERPL-MCNC: 3.5 G/DL (ref 3.5–5.2)
ALBUMIN/GLOB SERPL: 1 G/DL
ALP SERPL-CCNC: 118 U/L (ref 39–117)
ALT SERPL W P-5'-P-CCNC: 16 U/L (ref 1–41)
ANION GAP SERPL CALCULATED.3IONS-SCNC: 10.4 MMOL/L (ref 5–15)
AST SERPL-CCNC: 21 U/L (ref 1–40)
BASOPHILS # BLD AUTO: 0.11 10*3/MM3 (ref 0–0.2)
BASOPHILS NFR BLD AUTO: 1.1 % (ref 0–1.5)
BILIRUB SERPL-MCNC: 0.4 MG/DL (ref 0–1.2)
BILIRUB UR QL STRIP: NEGATIVE
BUN SERPL-MCNC: 23 MG/DL (ref 8–23)
BUN/CREAT SERPL: 23.2 (ref 7–25)
CALCIUM SPEC-SCNC: 9.6 MG/DL (ref 8.6–10.5)
CHLORIDE SERPL-SCNC: 93 MMOL/L (ref 98–107)
CLARITY UR: CLEAR
CO2 SERPL-SCNC: 25.6 MMOL/L (ref 22–29)
COLOR UR: YELLOW
CREAT SERPL-MCNC: 0.99 MG/DL (ref 0.76–1.27)
DEPRECATED RDW RBC AUTO: 44.2 FL (ref 37–54)
EGFRCR SERPLBLD CKD-EPI 2021: 72.8 ML/MIN/1.73
EOSINOPHIL # BLD AUTO: 0.88 10*3/MM3 (ref 0–0.4)
EOSINOPHIL NFR BLD AUTO: 8.6 % (ref 0.3–6.2)
ERYTHROCYTE [DISTWIDTH] IN BLOOD BY AUTOMATED COUNT: 13.5 % (ref 12.3–15.4)
GLOBULIN UR ELPH-MCNC: 3.6 GM/DL
GLUCOSE SERPL-MCNC: 118 MG/DL (ref 65–99)
GLUCOSE UR STRIP-MCNC: NEGATIVE MG/DL
HCT VFR BLD AUTO: 34.7 % (ref 37.5–51)
HGB BLD-MCNC: 11.4 G/DL (ref 13–17.7)
HGB UR QL STRIP.AUTO: NEGATIVE
IMM GRANULOCYTES # BLD AUTO: 0.05 10*3/MM3 (ref 0–0.05)
IMM GRANULOCYTES NFR BLD AUTO: 0.5 % (ref 0–0.5)
KETONES UR QL STRIP: ABNORMAL
LEUKOCYTE ESTERASE UR QL STRIP.AUTO: NEGATIVE
LYMPHOCYTES # BLD AUTO: 1.48 10*3/MM3 (ref 0.7–3.1)
LYMPHOCYTES NFR BLD AUTO: 14.5 % (ref 19.6–45.3)
MCH RBC QN AUTO: 29.4 PG (ref 26.6–33)
MCHC RBC AUTO-ENTMCNC: 32.9 G/DL (ref 31.5–35.7)
MCV RBC AUTO: 89.4 FL (ref 79–97)
MONOCYTES # BLD AUTO: 1.34 10*3/MM3 (ref 0.1–0.9)
MONOCYTES NFR BLD AUTO: 13.2 % (ref 5–12)
NEUTROPHILS NFR BLD AUTO: 6.32 10*3/MM3 (ref 1.7–7)
NEUTROPHILS NFR BLD AUTO: 62.1 % (ref 42.7–76)
NITRITE UR QL STRIP: NEGATIVE
NRBC BLD AUTO-RTO: 0 /100 WBC (ref 0–0.2)
PH UR STRIP.AUTO: 5.5 [PH] (ref 5–8)
PLATELET # BLD AUTO: 343 10*3/MM3 (ref 140–450)
PMV BLD AUTO: 9.6 FL (ref 6–12)
POTASSIUM SERPL-SCNC: 4.7 MMOL/L (ref 3.5–5.2)
PROT SERPL-MCNC: 7.1 G/DL (ref 6–8.5)
PROT UR QL STRIP: NEGATIVE
RBC # BLD AUTO: 3.88 10*6/MM3 (ref 4.14–5.8)
SODIUM SERPL-SCNC: 129 MMOL/L (ref 136–145)
SP GR UR STRIP: 1.02 (ref 1–1.03)
UROBILINOGEN UR QL STRIP: ABNORMAL
WBC NRBC COR # BLD AUTO: 10.18 10*3/MM3 (ref 3.4–10.8)

## 2024-07-31 PROCEDURE — 85025 COMPLETE CBC W/AUTO DIFF WBC: CPT

## 2024-07-31 PROCEDURE — 81003 URINALYSIS AUTO W/O SCOPE: CPT

## 2024-07-31 PROCEDURE — 96360 HYDRATION IV INFUSION INIT: CPT

## 2024-07-31 PROCEDURE — 25810000003 SODIUM CHLORIDE 0.9 % SOLUTION

## 2024-07-31 PROCEDURE — 80053 COMPREHEN METABOLIC PANEL: CPT

## 2024-07-31 RX ORDER — SODIUM CHLORIDE 9 MG/ML
1000 INJECTION, SOLUTION INTRAVENOUS ONCE
Status: COMPLETED | OUTPATIENT
Start: 2024-07-31 | End: 2024-07-31

## 2024-07-31 RX ADMIN — SODIUM CHLORIDE 1000 ML: 9 INJECTION, SOLUTION INTRAVENOUS at 16:29

## 2024-07-31 NOTE — PROGRESS NOTES
"Kevin Fonseca     VITALS: Blood pressure 110/56, pulse 80, temperature 97.5 °F (36.4 °C), temperature source Temporal, resp. rate 16, height 182.9 cm (72\"), weight 73 kg (161 lb), SpO2 99%.    Subjective  Chief Complaint  ER Follow Up, Constipation, Abnormal Lab, and Abdominal Pain    Subjective          History of Present Illness:  Patient is a 89 y.o.  male with medical conditions significant for recent diagnosis of lung cancer, arthritis, and GERD who presents to clinic secondary to medical followup.     Patient was seen in the ER several days ago secondary to abdominal pain which turned out to be constipation.  He has been using MiraLAX and has been doing better.  He denies any nausea or vomiting.  He denies any fevers or chills.  Patient also has recently been diagnosed with metastatic lung cancer and has been meeting with oncology to determine best course of treatment.    No complaints about any of the medications.    The following portions of the patient's history were reviewed and updated as appropriate: allergies, current medications, past family history, past medical history, past social history, past surgical history and problem list.    Past Medical History  Past Medical History:   Diagnosis Date    Arthritis     Asthma     Chronic bronchitis     Emphysema lung     Gastritis     Heartburn     Macular degeneration     Macular degeneration, wet     Reflux esophagitis     Thyroid disease        Surgical History  Past Surgical History:   Procedure Laterality Date    INTRACAPSULAR CATARACT EXTRACTION      Dr. Lesly Gray. Dr. Neto Light.    LIVER BIOPSY         Family History  Family History   Problem Relation Age of Onset    Hypertension Mother     Other Mother     Cancer Father     Cancer Brother     Asthma Brother        Social History  Social History     Socioeconomic History    Marital status:    Tobacco Use    Smoking status: Former     Current packs/day: 0.00     Average packs/day: 1.5 " "packs/day for 44.0 years (66.0 ttl pk-yrs)     Types: Cigarettes     Start date:      Quit date:      Years since quittin.5     Passive exposure: Past    Smokeless tobacco: Never   Vaping Use    Vaping status: Never Used   Substance and Sexual Activity    Alcohol use: Not Currently     Comment: 2 week    Drug use: Never    Sexual activity: Defer       Objective   Vital Signs:   /56 (BP Location: Right arm, Patient Position: Sitting, Cuff Size: Adult)   Pulse 80   Temp 97.5 °F (36.4 °C) (Temporal)   Resp 16   Ht 182.9 cm (72\")   Wt 73 kg (161 lb)   SpO2 99%   BMI 21.84 kg/m²     Physical Exam     Gen: Patient in NAD. Pleasant and answers appropriately. A&Ox3.    Skin: Warm and dry with normal turgor. No purpura, rashes, or unusual pigmentation noted. Hair is normal in appearance and distribution.    HEENT: NC/AT. No lesions noted. Conjunctiva clear, sclera nonicteric. PERRL. EOMI without nystagmus or strabismus. Fundi appear benign. No hemorrhages or exudates of eyes. Auditory canals are patent bilaterally without lesions. TMs intact,  nonerythematous, nonbulging without lesions. Nasal mucosa pink, nonerythematous, and nonedematous. Frontal and maxillary sinuses are nontender. O/P nonerythematous and moist without exudate.    Neck: Supple without lymph nodes palpated. FROM.     Lungs: Distant B/L without rales, rhonchi, crackles, or wheezes.    Heart: RRR. S1 and S2 normal. No S3 or S4. No MRGT.    Abd: Soft, nontender,nondistended. (+)BSx4 quadrants.     Extrem: No CCE. Radial pulses 2+/4 and equal B/L. FROMx4.  Positive joint tenderness noted.    Neuro: No focal motor/sensory deficits.    Procedures    Result Review :   The following data was reviewed by: Elissa Geronimo MD on 2024:                Assessment and Plan    Kevin Fonseca is a 89 y.o. here for medical followup.    Diagnoses and all orders for this visit:    1. Chronic idiopathic constipation (Primary)  -     docusate " sodium (COLACE) 100 MG capsule; Take 1 capsule by mouth 2 (Two) Times a Day.  Dispense: 60 capsule; Refill: 2    2. Metastatic non-small cell lung cancer  Per oncology.    3. Sick sinus syndrome  Per cardiology.  Patient has been stable without any exacerbations.          Problem List Items Addressed This Visit    None  Visit Diagnoses       Chronic idiopathic constipation    -  Primary    Relevant Medications    docusate sodium (COLACE) 100 MG capsule              BMI is within normal parameters. No other follow-up for BMI required.            I spent 34 total minutes, face-to-face, caring for Kevin today. 80% of this time involved counseling and/or coordination of care answering questions about his cancer and possible treatments as documented within this note.   Follow Up   Return (already has appt).  Findings and plans discussed with patient who verbalizes understanding and agreement. Will followup with patient once results are in. Patient was given instructions and counseling regarding his condition or for health maintenance advice. Please see specific information pulled into the AVS if appropriate.       Elissa Geronimo MD

## 2024-07-31 NOTE — PROGRESS NOTES
"DATE:  8/1/2024    DIAGNOSIS:  Malignant neoplasm of lung, unspecified laterality, unspecified part of lung   Metastatic squamous cell carcinoma to liver      CHIEF COMPLAINT:  Intermittent confusion    TREATMENT HISTORY:  Please see Dr. Hinds's most recent office note (7/9/2024) for full oncology history. he is being seen today for an acute visit.  He began and completed his first cycle of Keytruda on 7/22/2024.    HISTORY OF PRESENT ILLNESS:   Kevin Fonseca is a very pleasant 89 y.o. male who presented today for an acute visit.  Mr. Fonseca presents today accompanied by his wife.  He and his wife reports some intermittent confusion, which is not new for him recently, but his wife states that \"he lost track of time and there is a 2-week gap that he cannot remember\", thinking that he was due for his next cycle of Keytruda today when it is not until 8/12. His wife was concerned that his sodium may be low again today because he has had issues with this in the recent past.  Patient was recently seen by DENICE Guadarrama on 7/19/2024 for an acute visit due to intermittent confusion that he had been experiencing since 7/13/2024.  At that time she checked labs and did a urinalysis, all of which were unremarkable.  He does have a history of hyponatremia, but sodium had actually improved on this today.  She did feel like the patient would benefit from a MRI, but patient did not want to have this done because he did not think he could tolerate lying flat.  Of note he did have a recent ED visit earlier in June for constipation and had been taking MiraLAX.  However, due to this he started having diarrhea, which was thought to be the cause of his diarrhea, but this has since resolved. He has had a recent CT head on 7/9 which was unremarkable.  On assessment today the patient is alert and aware of why he is here today. He is oriented to person, place, situation, but is unable to tell me the date today, he is however able to " tell me the year.  Denies any infectious type symptoms.  Otherwise, no other new or specific complaints today.       The following portions of the patient's history were reviewed and updated as appropriate: allergies, current medications, past family history, past medical history, past social history, past surgical history and problem list.    PAST MEDICAL HISTORY:  Past Medical History:   Diagnosis Date    Arthritis     Asthma     Chronic bronchitis     Emphysema lung     Gastritis     Heartburn     Macular degeneration     Macular degeneration, wet     Reflux esophagitis     Thyroid disease        PAST SURGICAL HISTORY:  Past Surgical History:   Procedure Laterality Date    INTRACAPSULAR CATARACT EXTRACTION      Dr. Lesly Gray. Dr. Neto Light.    LIVER BIOPSY         SOCIAL HISTORY:  Social History     Socioeconomic History    Marital status:    Tobacco Use    Smoking status: Former     Current packs/day: 0.00     Average packs/day: 1.5 packs/day for 44.0 years (66.0 ttl pk-yrs)     Types: Cigarettes     Start date:      Quit date:      Years since quittin.6     Passive exposure: Past    Smokeless tobacco: Never   Vaping Use    Vaping status: Never Used   Substance and Sexual Activity    Alcohol use: Not Currently     Comment: 2 week    Drug use: Never    Sexual activity: Defer     FAMILY HISTORY:  Family History   Problem Relation Age of Onset    Hypertension Mother     Other Mother     Cancer Father     Cancer Brother     Asthma Brother          MEDICATIONS:  The current medication list was reviewed in the EMR    Current Outpatient Medications:     albuterol sulfate  (90 Base) MCG/ACT inhaler, Inhale 2 puffs Every 4 (Four) Hours As Needed for Wheezing or Shortness of Air., Disp: 18 g, Rfl: 8    busPIRone (BUSPAR) 5 MG tablet, Take 1 tablet by mouth 3 (Three) Times a Day As Needed (anxiety). 2 tablets in the AM 1 tablet PM, Disp: 90 tablet, Rfl: 2    docusate sodium (COLACE)  100 MG capsule, Take 1 capsule by mouth 2 (Two) Times a Day., Disp: 60 capsule, Rfl: 2    guaiFENesin (MUCINEX PO), Take 1 tablet by mouth Daily., Disp: , Rfl:     levothyroxine (SYNTHROID, LEVOTHROID) 75 MCG tablet, Take 1 tablet by mouth Daily., Disp: 90 tablet, Rfl: 1    multivitamin with minerals (OCUVITE EXTRA PO), Take 1 tablet by mouth Daily., Disp: , Rfl:     Nutritional Supplements (BOOST PO), Take 1-3 cans by mouth Daily., Disp: , Rfl:     polyethylene glycol (MIRALAX) 17 GM/SCOOP powder, Take 100 g by mouth Daily As Needed (constipation)., Disp: 510 g, Rfl: 0  No current facility-administered medications for this visit.    ALLERGIES:  No Known Allergies      REVIEW OF SYSTEMS:    A comprehensive 14 point review of systems was performed.  Significant findings as mentioned above.  All other systems reviewed and are negative.      (3) Capable of limited self-care, confined to bed or chair > 50% of waking hours  Physical Exam   Vital Signs:   Vitals:    07/31/24 1511   BP: 99/46   Pulse: 71   Resp: 18   Temp: 97.3 °F (36.3 °C)   SpO2: 96%    BMI is within normal parameters. No other follow-up for BMI required.    General: Well developed, well nourished, alert and oriented x 3, in no acute distress. In wheelchair  Head: ATNC   Eyes: PERRL, No evidence of conjunctivitis.   Nose: No nasal discharge.   Mouth: Oral mucosal membranes moist. No oral ulceration or hemorrhages.   Neck: Neck supple. No thyromegaly. No JVD.   Lungs: Clear in all fields to A&P. No wheezing.    Heart: S1, S2. Regular rate and rhythm. No murmurs, rubs, or gallops.   Abdomen: Soft. Bowel sounds are normoactive. Nontender with palpation. No Hepatosplenomegaly can be appreciated.   Extremities: No cyanosis or edema. Peripheral pulses palpable and +2 bilaterally.   Integumentary: No rash, sores, erythema or nodules. No blistering, bruising, or dry skin.   Neurologic: Alert, awake. Oriented to person, place, but is unable to tell me the date  today, he is able to tell me the year.        Pain Score:  Pain Score    07/31/24 1511   PainSc: 0-No pain       PHQ-Score Total:  PHQ-9 Total Score:         PATHOLOGY:        ENDOSCOPY:        IMAGING:  CT Abdomen Pelvis Without Contrast    Result Date: 7/13/2024   1.  Vague areas of low-attenuation in the liver parenchyma suspicious for mass lesions. Recommend correlation with follow-up MRI examination, as clinically warranted. 2.  Constipation throughout the colon with features of stool impaction at the rectosigmoid colon segment. 3.  Small right inguinal hernia contains mesenteric fat and multiple small bowel loops without associated small bowel obstruction. 4.  No features of enteritis or colitis. 5.  No free fluid or free air. 6.  No abscess or hematoma. 7.  Enlarged prostate gland. 8.  Degenerative disc disease throughout the lumbar spine with endplate and facet hypertrophic changes. 9.  Ectatic infrarenal abdominal aortic segment up to 3.54 cm in diameter with no retroperitoneal hemorrhage or aortic leak.   This report was finalized on 7/13/2024 10:09 PM by Malvin Castillo MD.      CT Head With Contrast    Result Date: 7/9/2024  1.  No acute intracranial findings identified. 2.  No enhancing brain parenchymal lesions identified. 3.  Diffuse cerebral atrophy with chronic small vessel ischemic disease. 4.  Focal encephalomalacia of the right frontal lobe.   This report was finalized on 7/9/2024 10:11 AM by Dr. Silas Zepeda MD.      US Guided Liver Biopsy    Result Date: 6/19/2024  Ultrasound-guided liver biopsy as detailed above.  This report was finalized on 6/19/2024 12:59 PM by Dr. Silas Zepeda MD.      CT Abdomen Pelvis With Contrast    Result Date: 6/14/2024  1. Hepatic metastases not significantly changed compared to the prior exam. 2. 3.3 cm abdominal aortic aneurysm. 3. Stable L2 compression fracture.   This report was finalized on 6/14/2024 4:47 PM by Katlyn Sheehan M.D..      NM PET/CT Skull  Base to Mid Thigh    Result Date: 5/30/2024  1.  Again there is a peripheral consolidated mass that appears to invade the right upper ribs of the right upper lobe measuring about 8.6 cm and with PET hypermetabolism mSUV 15.1. 2.  Consolidative more central and elongated masslike consolidation near the superior right hilum measures about 7 cm and again shows PET hypermetabolism mSUV 21. The mass extends into the right hilum. 3.  PET hypermetabolic liver masses are identified with the largest in the right lobe measuring about 8.3 cm and with PET hypermetabolism mSUV 14.2. A smaller liver masses noted in the left lobe of the liver. 4.  Compression fracture of L2 vertebral body is again noted. 5.  A 8 mm right upper lobe spiculated pulmonary nodule shows no PET hypermetabolism at this time.   This report was finalized on 5/30/2024 11:08 AM by Dr. Kishor Martinez MD.      CT Chest With Contrast Diagnostic    Result Date: 5/16/2024  1.  Right upper lobe peripheral pleural-based consolidative masslike opacity measuring about 2.9 x 7.8 cm with a more central hilar mass on the right side measuring 5.1 x 4.4 cm and a smaller right upper lobe nodule component measuring 2.6 cm. The peripheral mass does appear to cause fourth rib erosion or destruction. 2.  Right lobe of liver mass concerning for metastatic disease measuring about 7 mm. 3.  Partially visualized infrarenal abdominal aortic aneurysm measuring 3.4 cm. 4.  Compression fracture noted of probable L2 vertebral body.   This report was finalized on 5/16/2024 7:51 AM by Dr. Kishor Martinez MD.      XR Chest 2 View    Result Date: 4/24/2024  Pleural-based consolidation periphery of the right lung and fullness in the right suprahilar region. Recommend CT   This report was finalized on 4/24/2024 8:07 AM by Dr. Vinicius Kidd MD.           RECENT LABS:  Lab Results   Component Value Date    WBC 10.18 07/31/2024    HGB 11.4 (L) 07/31/2024    HCT 34.7 (L) 07/31/2024    MCV 89.4  "07/31/2024    RDW 13.5 07/31/2024     07/31/2024    NEUTRORELPCT 62.1 07/31/2024    LYMPHORELPCT 14.5 (L) 07/31/2024    MONORELPCT 13.2 (H) 07/31/2024    EOSRELPCT 8.6 (H) 07/31/2024    BASORELPCT 1.1 07/31/2024    NEUTROABS 6.32 07/31/2024    LYMPHSABS 1.48 07/31/2024       Lab Results   Component Value Date     (L) 07/31/2024    K 4.7 07/31/2024    CO2 25.6 07/31/2024    CL 93 (L) 07/31/2024    BUN 23 07/31/2024    CREATININE 0.99 07/31/2024    EGFRIFNONA 59 (L) 01/19/2022    EGFRIFAFRI 71 01/19/2022    GLUCOSE 118 (H) 07/31/2024    CALCIUM 9.6 07/31/2024    ALKPHOS 118 (H) 07/31/2024    AST 21 07/31/2024    ALT 16 07/31/2024    BILITOT 0.4 07/31/2024    ALBUMIN 3.5 07/31/2024    PROTEINTOT 7.1 07/31/2024    MG 2.1 11/11/2022       No results found for: \"LDH\", \"URICACID\"    No results found for: \"FERRITIN\", \"IRON\", \"TIBC\", \"LABIRON\", \"HPXJHAVC72\", \"FOLATE\", \"HAPTOGLOBIN\", \"RETICCTPCT\", \"RETIC\"       ASSESSMENT & PLAN:  Kevin Fonseca is a very pleasant 89 y.o. male with    1.  DeNovo metastatic non small cell carcinoma, squamous cell. PD-L1 TPS 5%   -Please see Dr. Hinds's most recent office note (7/9/2024) for full oncology history. He is being seen today for an acute visit.   He completed cycle 1 of Keytruda on 7/22/2024.  He will follow-up with NP on day of cycle 3 on 8/12/2024.  He will follow-up with MD on day of cycle 4 on 9/3/2024.    2.  Intermittent confusion   3.  Hyponatremia  -Reports intermittent confusion, which is not new for him recently, but his wife states that \"he lost track of time and there is a 2-week gap that he cannot remember\", thinking that he was due for his next cycle of Keytruda today when it is not until 8/12.   -Concerned today for hyponatremia due to recent issues with this.   -Patient was recently seen by DENICE Guadarrama on 7/19/2024 for an acute visit due to intermittent confusion that he had been experiencing since 7/13/2024.  At that time she checked labs and " did a urinalysis, all of which were unremarkable.    -At the time of this visit with DENICE a MRI was recommended but patient did not want to have this done because he did not think he could tolerate lying flat.   -Recent CT head on 7/9 which was unremarkable.    -Labs today with WBC of 10.18, H&H 11.4/34.7, platelets 343. Na today stable from previous, but again slightly low at 129. Labs otherwise unremarkable.   -UA again obtained and was unremarkable.   -Denies any infectious type symptoms today. He is alert and aware of why he is here today. He is oriented to person, place, situation, but is unable to tell me the date today, he is however able to tell me the year.   -Will give 1L IVF today to help correct Na.   -Again discussed today that workup was mostly unremarkable and given his age and functional decline, that periods of intermittent confusion may be the baseline status of the patient. For further testing and a MRI of the brain with contrast would be suggested, but due to the patient being unable to lie flat this is not an option at this time.   -Instructed to monitor symptoms and continue to notify us if there is any worsening. Due to these symptoms starting prior to beginning Keytruda therapy I do not believe that this is the cause of his current symptoms.       ACO / MARY/Other  Quality measures  -  Kevin Fonseca did not receive 2023 flu vaccine.  -  Kevin Fonseca reports a pain score of 0.    -  Current outpatient and discharge medications have been reconciled for the patient.  Reviewed by: DENICE Bill      A total of 30 minutes were spent coordinating this patient’s care in clinic today; more than 50% of this time was face-to-face with the patient, reviewing his interim medical history and counseling on the current treatment and followup plan. All questions were answered to his satisfaction.      Electronically Signed by: DENICE Bill  August 1, 2024 10:38 EDT       CC:   No ref.  provider found  Elissa Geronimo MD

## 2024-07-31 NOTE — TELEPHONE ENCOUNTER
"RN was transferred from call from [facility] at . RN spoke with patient's spouse who reported the patient has \"lost track of time and there is a 2 week gap that he can't remember.\" He thought he was due for his next cycle of Keytruda today and he is not. Patient has history of hyponatremia. RN discussed with MD who recommended patient come in for an acute visit with APRN today 7/31. Patient's spouse was agreeable to plan of care and is aware of appointment time and location.   "

## 2024-08-02 ENCOUNTER — DOCUMENTATION (OUTPATIENT)
Dept: ONCOLOGY | Facility: CLINIC | Age: 89
End: 2024-08-02
Payer: MEDICARE

## 2024-08-03 LAB
ACID FAST STN SPEC: NEGATIVE
MYCOBACTERIUM SPEC QL CULT: NEGATIVE
SPECIMEN PREPARATION: NORMAL

## 2024-08-08 ENCOUNTER — TELEPHONE (OUTPATIENT)
Dept: ONCOLOGY | Facility: CLINIC | Age: 89
End: 2024-08-08
Payer: MEDICARE

## 2024-08-08 NOTE — TELEPHONE ENCOUNTER
RN returned Faby's call and informed her the purpose of the [id number] test, and explained that typically even if insurance does not cover the testing, there is not usually a bill given to the patient (as RN has been told by the [employer] representative). Faby verbalized understanding.

## 2024-08-08 NOTE — TELEPHONE ENCOUNTER
Caller: DILAN JOVEL    Relationship: Emergency Contact    Best call back number: [phone]     What is the best time to reach you: ASAP    Who are you requesting to speak with (clinical staff, provider,  specific staff member): CLINICAL    What was the call regarding: PLEASE CALL DILAN BACK TO DISCUSS A LETTER THE PATIENT RECEIVED FROM A [facility] [place] [place].  THE LETTER MENTIONS A TEST DONE 7/28 AND IS MENTIONING A CLAIM BEING SUBMITTED TO INSURANCE, SHE IS NOT SURE WHAT THIS IS IN REGARD TO.  NEEDS SOME INFORMATION ON WHAT TEST/LABS THIS WOULD BE CONCERNING.  PLEASE CALL TO DISCUSS.

## 2024-08-12 ENCOUNTER — INFUSION (OUTPATIENT)
Dept: ONCOLOGY | Facility: HOSPITAL | Age: 89
End: 2024-08-12
Payer: MEDICARE

## 2024-08-12 ENCOUNTER — OFFICE VISIT (OUTPATIENT)
Dept: ONCOLOGY | Facility: CLINIC | Age: 89
End: 2024-08-12
Payer: MEDICARE

## 2024-08-12 ENCOUNTER — LAB (OUTPATIENT)
Dept: ONCOLOGY | Facility: HOSPITAL | Age: 89
End: 2024-08-12
Payer: MEDICARE

## 2024-08-12 VITALS
HEART RATE: 69 BPM | RESPIRATION RATE: 18 BRPM | OXYGEN SATURATION: 98 % | BODY MASS INDEX: 22.49 KG/M2 | DIASTOLIC BLOOD PRESSURE: 45 MMHG | WEIGHT: 165.8 LBS | TEMPERATURE: 97.8 F | SYSTOLIC BLOOD PRESSURE: 128 MMHG

## 2024-08-12 DIAGNOSIS — C34.90 METASTATIC NON-SMALL CELL LUNG CANCER: ICD-10-CM

## 2024-08-12 DIAGNOSIS — C34.90 METASTATIC NON-SMALL CELL LUNG CANCER: Primary | ICD-10-CM

## 2024-08-12 DIAGNOSIS — E87.1 HYPONATREMIA: ICD-10-CM

## 2024-08-12 DIAGNOSIS — L30.9 DERMATITIS: ICD-10-CM

## 2024-08-12 LAB
ALBUMIN SERPL-MCNC: 3.7 G/DL (ref 3.5–5.2)
ALBUMIN/GLOB SERPL: 1 G/DL
ALP SERPL-CCNC: 124 U/L (ref 39–117)
ALT SERPL W P-5'-P-CCNC: 11 U/L (ref 1–41)
ANION GAP SERPL CALCULATED.3IONS-SCNC: 9.1 MMOL/L (ref 5–15)
AST SERPL-CCNC: 20 U/L (ref 1–40)
BASOPHILS # BLD AUTO: 0.12 10*3/MM3 (ref 0–0.2)
BASOPHILS NFR BLD AUTO: 1 % (ref 0–1.5)
BILIRUB SERPL-MCNC: 0.3 MG/DL (ref 0–1.2)
BUN SERPL-MCNC: 21 MG/DL (ref 8–23)
BUN/CREAT SERPL: 24.1 (ref 7–25)
CALCIUM SPEC-SCNC: 9.8 MG/DL (ref 8.6–10.5)
CHLORIDE SERPL-SCNC: 98 MMOL/L (ref 98–107)
CO2 SERPL-SCNC: 25.9 MMOL/L (ref 22–29)
CREAT SERPL-MCNC: 0.87 MG/DL (ref 0.76–1.27)
DEPRECATED RDW RBC AUTO: 44.8 FL (ref 37–54)
EGFRCR SERPLBLD CKD-EPI 2021: 82.5 ML/MIN/1.73
EOSINOPHIL # BLD AUTO: 1.36 10*3/MM3 (ref 0–0.4)
EOSINOPHIL NFR BLD AUTO: 11.8 % (ref 0.3–6.2)
ERYTHROCYTE [DISTWIDTH] IN BLOOD BY AUTOMATED COUNT: 13.6 % (ref 12.3–15.4)
GLOBULIN UR ELPH-MCNC: 3.6 GM/DL
GLUCOSE SERPL-MCNC: 100 MG/DL (ref 65–99)
HCT VFR BLD AUTO: 35.8 % (ref 37.5–51)
HGB BLD-MCNC: 11.5 G/DL (ref 13–17.7)
IMM GRANULOCYTES # BLD AUTO: 0.05 10*3/MM3 (ref 0–0.05)
IMM GRANULOCYTES NFR BLD AUTO: 0.4 % (ref 0–0.5)
LYMPHOCYTES # BLD AUTO: 1.65 10*3/MM3 (ref 0.7–3.1)
LYMPHOCYTES NFR BLD AUTO: 14.3 % (ref 19.6–45.3)
MCH RBC QN AUTO: 28.6 PG (ref 26.6–33)
MCHC RBC AUTO-ENTMCNC: 32.1 G/DL (ref 31.5–35.7)
MCV RBC AUTO: 89.1 FL (ref 79–97)
MONOCYTES # BLD AUTO: 1.7 10*3/MM3 (ref 0.1–0.9)
MONOCYTES NFR BLD AUTO: 14.8 % (ref 5–12)
NEUTROPHILS NFR BLD AUTO: 57.7 % (ref 42.7–76)
NEUTROPHILS NFR BLD AUTO: 6.63 10*3/MM3 (ref 1.7–7)
NRBC BLD AUTO-RTO: 0 /100 WBC (ref 0–0.2)
PLATELET # BLD AUTO: 319 10*3/MM3 (ref 140–450)
PMV BLD AUTO: 9.6 FL (ref 6–12)
POTASSIUM SERPL-SCNC: 5.2 MMOL/L (ref 3.5–5.2)
PROT SERPL-MCNC: 7.3 G/DL (ref 6–8.5)
RBC # BLD AUTO: 4.02 10*6/MM3 (ref 4.14–5.8)
SODIUM SERPL-SCNC: 133 MMOL/L (ref 136–145)
WBC NRBC COR # BLD AUTO: 11.51 10*3/MM3 (ref 3.4–10.8)

## 2024-08-12 PROCEDURE — 25010000002 PEMBROLIZUMAB 100 MG/4ML SOLUTION 4 ML VIAL: Performed by: NURSE PRACTITIONER

## 2024-08-12 PROCEDURE — 80053 COMPREHEN METABOLIC PANEL: CPT

## 2024-08-12 PROCEDURE — 1126F AMNT PAIN NOTED NONE PRSNT: CPT | Performed by: NURSE PRACTITIONER

## 2024-08-12 PROCEDURE — 1159F MED LIST DOCD IN RCRD: CPT | Performed by: NURSE PRACTITIONER

## 2024-08-12 PROCEDURE — 99214 OFFICE O/P EST MOD 30 MIN: CPT | Performed by: NURSE PRACTITIONER

## 2024-08-12 PROCEDURE — 25810000003 SODIUM CHLORIDE 0.9 % SOLUTION: Performed by: NURSE PRACTITIONER

## 2024-08-12 PROCEDURE — 1160F RVW MEDS BY RX/DR IN RCRD: CPT | Performed by: NURSE PRACTITIONER

## 2024-08-12 PROCEDURE — 85025 COMPLETE CBC W/AUTO DIFF WBC: CPT

## 2024-08-12 PROCEDURE — 96413 CHEMO IV INFUSION 1 HR: CPT

## 2024-08-12 RX ORDER — SODIUM CHLORIDE 9 MG/ML
20 INJECTION, SOLUTION INTRAVENOUS ONCE
Status: COMPLETED | OUTPATIENT
Start: 2024-08-12 | End: 2024-08-12

## 2024-08-12 RX ADMIN — SODIUM CHLORIDE 20 ML/HR: 9 INJECTION, SOLUTION INTRAVENOUS at 15:25

## 2024-08-12 RX ADMIN — SODIUM CHLORIDE 200 MG: 9 INJECTION, SOLUTION INTRAVENOUS at 15:25

## 2024-08-12 NOTE — PROGRESS NOTES
Subjective     Date: 8/12/2024    Referring Provider  Elissa Geronimo MD     Chief Complaint  Malignant neoplasm of lung, unspecified laterality, unspecified part of lung   Metastatic squamous cell carcinoma to liver     Subjective          Kevin Fonseca is a 89 y.o. male who presents today to [facility] for follow up.     HPI:   89 y.o. male with past medical history of chronic obstructive pulmonary disease, macular degeneration of the eye, hypothyroid disease, sick sinus syndrome who presents for consultation regarding new diagnosis of squamous cell carcinoma of the lung.    Oncology history:  April 23 2024: CXR with pleural based consolidation periphery of the right lung and fullness in the right suprahilar region  May 14 2024: Patient presented to PCP, Dr. Geronimo, regarding intermittent hemoptysis, R chest pain since March 2024. This is associated with weight loss (50 lbs), fatigue, and R groin pain.   May 15 2024: CT chest right upper lobe peripheral based based consolidative masslike opacity measuring 2.9 x 7.8 cm with a more central hilar mass on the right side measuring 5.1 x 4.4 cm with smaller right upper lobe nodule measuring 2.6 cm. Peripheral mass does appear to cause fourth rib erosion or destruction. Right lobe of liver mass concerning for metastatic disease, compression fracture L2 vertebral body.   May 30 2024: PET/CT confirmed peripheral consolidated mass that appears to invade the right upper ribs of right upper lobe measuring 8.6 cm with mSUV 15.1. Consolidative more central and elongated masslike consolidation superior right hilum measures 7 cm and again shows mSUV 21. Mass extends into the right hilum. Pet hypermetabolic liver masses identified, largest in right lobe measuring 8.3 cm with mSUV 14. Compression fracture of L2 vertebral body, 8 mm right upper lobe spiculated pulmonary nodule shows no PET hypermetabolism.  June 19 2024: Underwent CT  guided core biopsy of the liver mass. Pathology shows squamous cell carcinoma. PD-L1 TPS 22c3 5%.       Mr. Fonseca presents today with his wife Faby, daughter Aleja, and grand daughter Alicia. They express Mr. Fonseca's decline in function over the past year. He has not experienced shortness of breath, but does endorse weight loss, fatigue, inability to perform activities he usually would be able to such as feeding animals etc.     He has experienced a few falls over the last year, denies losing consciousness. Denies lightheadedness today (HR 44). He was given the option to have a pacemaker placed, but he declined due to heart rate returning to normal (60-70s). His appetite is good, reports drinking fluids.     He is a previous smoker, smoked 2 packs/day X 50 years, quit 27 years ago. Denies alcohol or drug use. Previously worked as a . Family history significant for brother with lung cancer and paternal grandmother with liver cancer.    He lives with his wife. Daughter and grand daughter live in [place].    Interval History 07/09/2024   Mr. Fonseca and family present for follow up. He feels improved in cognition over the last week after correction of his hyponatremia. Has been drinking one boost daily, which has helped with his energy. Gained ~3 lbs since our consultation. Was unable to stay flat in MRI machine, not completed. No new symptoms    After giving it much thought, he would like to proceed with treatment/immunotherapy only. He would like to avoid chemotherapy, if able.     Interval History 08/12/2024  Mr. Fonseca presents today accompanied by his wife, prior to cycle 2 Pembrolizumab. Overall, he reports that he tolerated his initial cycle well without any noticeable side effects. He reports a good appetite and hydrating well. Denies nausea/vomiting or diarrhea. His wife continues to report intermittent episodes of confusion but no worsening. He has mild erythema to left upper extremity forearm with  old bruising. There is no swelling. He denies any pain. Reports itching. No fever/chills. Wife reports that she first noticed this yesterday. Denies any changes in soaps/detergents. He reports that he does occasionally bump into things. Otherwise denies any trauma/injury. He is without any other complaints or concerns at this time.       Objective     Objective     Allergy:   No Known Allergies     Current Medications:   Current Outpatient Medications   Medication Sig Dispense Refill    albuterol sulfate  (90 Base) MCG/ACT inhaler Inhale 2 puffs Every 4 (Four) Hours As Needed for Wheezing or Shortness of Air. 18 g 8    busPIRone (BUSPAR) 5 MG tablet Take 1 tablet by mouth 3 (Three) Times a Day As Needed (anxiety). 2 tablets in the AM 1 tablet PM 90 tablet 2    docusate sodium (COLACE) 100 MG capsule Take 1 capsule by mouth 2 (Two) Times a Day. 60 capsule 2    guaiFENesin (MUCINEX PO) Take 1 tablet by mouth Daily.      levothyroxine (SYNTHROID, LEVOTHROID) 75 MCG tablet Take 1 tablet by mouth Daily. 90 tablet 1    multivitamin with minerals (OCUVITE EXTRA PO) Take 1 tablet by mouth Daily.      Nutritional Supplements (BOOST PO) Take 1-3 cans by mouth Daily.      polyethylene glycol (MIRALAX) 17 GM/SCOOP powder Take 100 g by mouth Daily As Needed (constipation). 510 g 0     No current facility-administered medications for this visit.     Facility-Administered Medications Ordered in Other Visits   Medication Dose Route Frequency Provider Last Rate Last Admin    Pembrolizumab (KEYTRUDA) 200 mg in sodium chloride 0.9 % 58 mL chemo IVPB  200 mg Intravenous Once Laina Livingston APRN        sodium chloride 0.9 % infusion  20 mL/hr Intravenous Once Laina Livingston APRN           Past Medical History:  Past Medical History:   Diagnosis Date    Arthritis     Asthma     Chronic bronchitis     Emphysema lung     Gastritis     Heartburn     Macular degeneration     Macular degeneration, wet     Reflux  esophagitis     Thyroid disease        Past Surgical History:  Past Surgical History:   Procedure Laterality Date    INTRACAPSULAR CATARACT EXTRACTION      Dr. Lesly Gray. Dr. Neto Light.    LIVER BIOPSY         Social History:  Social History     Socioeconomic History    Marital status:    Tobacco Use    Smoking status: Former     Current packs/day: 0.00     Average packs/day: 1.5 packs/day for 44.0 years (66.0 ttl pk-yrs)     Types: Cigarettes     Start date:      Quit date:      Years since quittin.6     Passive exposure: Past    Smokeless tobacco: Never   Vaping Use    Vaping status: Never Used   Substance and Sexual Activity    Alcohol use: Not Currently     Comment: 2 week    Drug use: Never    Sexual activity: Defer         Family History:  Family History   Problem Relation Age of Onset    Hypertension Mother     Other Mother     Cancer Father     Cancer Brother     Asthma Brother        Review of Systems:  Review of Systems   Constitutional:  Positive for fatigue. Negative for chills, diaphoresis and fever.   HENT:  Negative for mouth sores, sore throat and tinnitus.    Eyes:  Negative for discharge and visual disturbance.   Respiratory:  Negative for cough, shortness of breath and wheezing.    Cardiovascular:  Negative for chest pain, palpitations and leg swelling.   Gastrointestinal:  Negative for abdominal distention, abdominal pain, constipation, diarrhea, nausea and vomiting.   Genitourinary:  Negative for dysuria and hematuria.   Musculoskeletal:  Negative for arthralgias, gait problem and myalgias.   Skin:  Positive for color change and rash.   Neurological:  Negative for dizziness, weakness, light-headedness, numbness and headaches.   Hematological:  Negative for adenopathy. Does not bruise/bleed easily.   Psychiatric/Behavioral:  Negative for sleep disturbance. The patient is not nervous/anxious.        Vital Signs:   /45   Pulse 69   Temp 97.8 °F (36.6 °C)  (Temporal)   Resp 18   Wt 75.2 kg (165 lb 12.8 oz)   SpO2 98%   BMI 22.49 kg/m²      Physical Exam:  Physical Exam  Vitals reviewed.   Constitutional:       General: He is not in acute distress.     Appearance: Normal appearance. He is not ill-appearing.      Comments: In a wheelchair today   HENT:      Head: Normocephalic and atraumatic.      Mouth/Throat:      Mouth: Mucous membranes are moist.      Pharynx: Oropharynx is clear.   Eyes:      Conjunctiva/sclera: Conjunctivae normal.      Pupils: Pupils are equal, round, and reactive to light.   Cardiovascular:      Rate and Rhythm: Normal rate and regular rhythm.      Heart sounds: No murmur heard.  Pulmonary:      Effort: Pulmonary effort is normal. No respiratory distress.      Breath sounds: Normal breath sounds. No wheezing.   Abdominal:      General: Abdomen is flat. Bowel sounds are normal. There is no distension.      Palpations: Abdomen is soft. There is no mass.      Tenderness: There is no abdominal tenderness. There is no guarding.   Musculoskeletal:         General: No swelling. Normal range of motion.      Cervical back: Normal range of motion.   Lymphadenopathy:      Cervical: No cervical adenopathy.   Skin:     General: Skin is warm and dry.      Findings: Rash present.      Comments: Left forearm with mild erythema/bruising.   Neurological:      General: No focal deficit present.      Mental Status: He is alert and oriented to person, place, and time. Mental status is at baseline.   Psychiatric:         Mood and Affect: Mood normal.         PHQ-9 Score  PHQ-9 Total Score:       Pain Score  Vitals:    08/12/24 1402   BP: 128/45   Pulse: 69   Resp: 18   Temp: 97.8 °F (36.6 °C)   TempSrc: Temporal   SpO2: 98%   Weight: 75.2 kg (165 lb 12.8 oz)   PainSc: 0-No pain             PAINSCOREQUALITYMETRIC:   Vitals:    08/12/24 1402   PainSc: 0-No pain              Lab Review  Lab Results   Component Value Date    WBC 11.51 (H) 08/12/2024    HGB 11.5 (L)  08/12/2024    HCT 35.8 (L) 08/12/2024    MCV 89.1 08/12/2024    RDW 13.6 08/12/2024     08/12/2024    NEUTRORELPCT 57.7 08/12/2024    LYMPHORELPCT 14.3 (L) 08/12/2024    MONORELPCT 14.8 (H) 08/12/2024    EOSRELPCT 11.8 (H) 08/12/2024    BASORELPCT 1.0 08/12/2024    NEUTROABS 6.63 08/12/2024    LYMPHSABS 1.65 08/12/2024     Lab Results   Component Value Date     (L) 08/12/2024    K 5.2 08/12/2024    CO2 25.9 08/12/2024    CL 98 08/12/2024    BUN 21 08/12/2024    CREATININE 0.87 08/12/2024    EGFRIFNONA 59 (L) 01/19/2022    EGFRIFAFRI 71 01/19/2022    GLUCOSE 100 (H) 08/12/2024    CALCIUM 9.8 08/12/2024    ALKPHOS 124 (H) 08/12/2024    AST 20 08/12/2024    ALT 11 08/12/2024    BILITOT 0.3 08/12/2024    ALBUMIN 3.7 08/12/2024    PROTEINTOT 7.3 08/12/2024    MG 2.1 11/11/2022       Radiology Results  CT Head With Contrast (07/09/2024 09:47)   IMPRESSION:  1.  No acute intracranial findings identified.  2.  No enhancing brain parenchymal lesions identified.  3.  Diffuse cerebral atrophy with chronic small vessel ischemic disease.  4.  Focal encephalomalacia of the right frontal lobe.    CT Abdomen Pelvis With Contrast (06/14/2024 14:55)   FINDINGS:  ABDOMEN: The lung bases are clear. The heart is normal in size. There  are multiple hepatic masses, the largest of which is in the right lobe  measuring 8.1 cm consistent with metastatic disease. The spleen is  homogenous. No adrenal mass is present. The pancreas is unremarkable.  The kidneys are normal. There is a 3.3 cm abdominal aortic aneurysm. The  mesenteric vascualture is patent. There is no free fluid or adenopathy.  The abdominal portions of the GI tract are unremarkable with no evidence  of obstruction.     PELVIS: The appendix is normal. The urinary bladder is unremarkable.  There is no significant free fluid or adenopathy. Bone windows reveal an  L2 compression fracture which is unchanged compared to the prior exam.  No new osseous abnormalities are  identified. The extraperitoneal soft  tissues are unremarkable.     IMPRESSION:  1. Hepatic metastases not significantly changed compared to the prior  exam.  2. 3.3 cm abdominal aortic aneurysm.  3. Stable L2 compression fracture.    NM PET/CT Skull Base to Mid Thigh (05/30/2024 10:53)   FINDINGS:      HEAD:    BRAIN AND EXTRA-AXIAL SPACES:  Unremarkable as visualized.    NASOPHARYNX:  Unremarkable as visualized.      NECK:    HYPOPHARYNX:  Unremarkable as visualized.    LARYNX:  Unremarkable as visualized.    TRACHEA:  Unremarkable as visualized.    RETROPHARYNGEAL SPACE:  Unremarkable as visualized.    SUBMANDIBULAR/PAROTID GLANDS:  Unremarkable as visualized.  Glands are  normal in size.    THYROID:  Unremarkable as visualized.  No enlarged or calcified  nodules.      CHEST:    LUNGS AND PLEURAL SPACES:  Consolidative more central and elongated  masslike consolidation near the superior right hilum measures about 7 cm  and again shows PET hypermetabolism mSUV 21. The mass extends into the  right hilum.  A 8 mm right upper lobe spiculated pulmonary nodule shows  no PET hypermetabolism at this time.    HEART:  Unremarkable as visualized.  No cardiomegaly.  No significant  pericardial effusion.    MEDIASTINUM:  Unremarkable as visualized.  No mass.      ABDOMEN:    LIVER:  PET hypermetabolic liver masses are identified with the  largest in the right lobe measuring about 8.3 cm and with PET  hypermetabolism mSUV 14.2. A smaller liver masses noted in the left lobe  of the liver.    GALLBLADDER AND BILE DUCTS:  Unremarkable as visualized.  No calcified  stones.  No ductal dilation.    PANCREAS:  Unremarkable as visualized.  No ductal dilation.    SPLEEN:  Unremarkable as visualized.  No splenomegaly.    ADRENALS:  Unremarkable as visualized.  No mass.    KIDNEYS AND URETERS:  Unremarkable as visualized.    STOMACH AND BOWEL:  Unremarkable as visualized.  No obstruction.      PELVIS:    APPENDIX:  No findings to suggest  acute appendicitis.    BLADDER:  Unremarkable as visualized.    REPRODUCTIVE:  Unremarkable as visualized.      WHOLE BODY:    INTRAPERITONEAL SPACE:  Unremarkable as visualized.  No significant  fluid collection.  No free air.    BONES/JOINTS:  Again there is a peripheral consolidated mass that  appears to invade the right upper ribs of the right upper lobe measuring  about 8.6 cm and with PET hypermetabolism mSUV 15.1.  Compression  fracture of L2 vertebral body is again noted.  No dislocation.    SOFT TISSUES:  Unremarkable as visualized.    VASCULATURE:  Unremarkable as visualized.  No aortic aneurysm.    LYMPH NODES:  Unremarkable as visualized.  No lymphadenopathy.  No  hypermetabolic activity.     IMPRESSION:  1.  Again there is a peripheral consolidated mass that appears to invade  the right upper ribs of the right upper lobe measuring about 8.6 cm and  with PET hypermetabolism mSUV 15.1.  2.  Consolidative more central and elongated masslike consolidation near  the superior right hilum measures about 7 cm and again shows PET  hypermetabolism mSUV 21. The mass extends into the right hilum.  3.  PET hypermetabolic liver masses are identified with the largest in  the right lobe measuring about 8.3 cm and with PET hypermetabolism mSUV  14.2. A smaller liver masses noted in the left lobe of the liver.  4.  Compression fracture of L2 vertebral body is again noted.  5.  A 8 mm right upper lobe spiculated pulmonary nodule shows no PET  hypermetabolism at this time.    CT Chest With Contrast Diagnostic (05/15/2024 14:24)   FINDINGS:    LUNGS AND PLEURAL SPACES:  Right upper lobe peripheral pleural-based  consolidative masslike opacity measuring about 2.9 x 7.8 cm with a more  central hilar mass on the right side measuring 5.1 x 4.4 cm and a  smaller right upper lobe nodule component measuring 2.6 cm. The  peripheral mass does appear to cause fourth rib erosion or destruction.   Emphysematous lungs noted.  Right  upper lobe 1.2 cm ill-defined nodule  that could represent scarring.  No pneumothorax.  No significant  effusion.    HEART:  Unremarkable as visualized.  No cardiomegaly.  No significant  pericardial effusion.  No significant coronary artery calcifications.    BONES/JOINTS:  Compression fracture noted of probable L2 vertebral  body.  No dislocation.    SOFT TISSUES:  Unremarkable as visualized.    VASCULATURE:  Partially visualized infrarenal abdominal aortic  aneurysm measuring 3.4 cm.    LYMPH NODES:  Unremarkable as visualized.  No enlarged lymph nodes.    LIVER:  Right lobe of liver mass concerning for metastatic disease  measuring about 7 mm.    OTHER FINDINGS:  .     IMPRESSION:  1.  Right upper lobe peripheral pleural-based consolidative masslike  opacity measuring about 2.9 x 7.8 cm with a more central hilar mass on  the right side measuring 5.1 x 4.4 cm and a smaller right upper lobe  nodule component measuring 2.6 cm. The peripheral mass does appear to  cause fourth rib erosion or destruction.  2.  Right lobe of liver mass concerning for metastatic disease measuring  about 7 mm.  3.  Partially visualized infrarenal abdominal aortic aneurysm measuring  3.4 cm.  4.  Compression fracture noted of probable L2 vertebral body.    XR Chest 2 View (04/23/2024 16:34)   FINDINGS:  LUNGS: Pleural-based consolidation periphery of the right lung. Mild  fullness in the right suprahilar region  HEART AND MEDIASTINUM: Heart and mediastinal contours are unremarkable  SKELETON: Bony and soft tissue structures are unremarkable.     IMPRESSION:  Pleural-based consolidation periphery of the right lung and fullness in  the right suprahilar region. Recommend CT        Pathology:   06/21/2024          Assessment / Plan         Assessment and Plan   Kevin Fonseca is a 89 y.o. year old with history of     DeNovo metastatic non small cell carcinoma, squamous cell. PD-L1 TPS 5%   -Patient with ~1 year of decline in function, weight  loss, fatigue, and intermittent hemoptysis. CT May 15 2024 with with concerning right upper lobe peripheral consolidation 2.9 x 7.8 cm with central hilar mass 5 x 4.4 cm, small right upper lobe nodule 2.6 cm, another right upper lobe 1.2 cm ill defined mass could represent scarring. Also noted liver mass concerning for metastatic disease. Follow up PET/CT 6/14/2024 with hepatic metastasis largest measuring 8.1 cm, and noted L2 compression fracture. 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 ECOG PS 2  -Dr. Hinds previously presented them with the stage and treatment, which would be palliative in nature. After a thorough discussion, patient and family decided to proceed with single agent pembrolizumab q21d given his performance status and co-morbidities. Adverse effects were discussed such as fatigue, nausea, vomiting, colitis, pneumonitis, thyroiditis. He is agreeable to therapy.  -MRI brain unable to be performed, CT brain without metastatic disease  -NGS testing requested to assist with therapy directed to mutations  -C1 07/22-tolerated well  -C2 08/12-proceed today    2. Malignancy associated pain  -Currently denies     3. Nutrition  -Recommend patient take in 2-3 boost/day    4. Hyponatremia   - Improved     5. Dermatitis  - Mild erythema to left forearm with old bruising. He denies any changes in soaps/detergents. He reports mild pruritus. He does occasionally bump into things without noticing it. Recommended Gold Bond Anti-itch Cream or hydrocortisone as needed. Advised to notify clinic if no improvement in symptoms.        Discussed possible differential diagnoses, testing, treatment, recommended non-pharmacological interventions, risks, warning signs to monitor for that would indicate need for follow-up in  clinic or ER. If no improvement with these regimens or you have new or worsening symptoms follow-up. Patient verbalizes understanding and agreement with plan of care. Denies further needs or concerns.     Patient was given instructions and counseling regarding her condition and for health maintenance advised.       All questions were answered to his satisfaction.    BMI is within normal parameters. No other follow-up for BMI required.      ACO / MARY/Other  Quality measures  -  Kevin Fonseca did not receive 2023 flu vaccine.  This was recommended.  -  Kevin Fonseca reports a pain score of 0.    -  Current outpatient and discharge medications have been reconciled for the patient.  Reviewed by: DENICE Smith                       Meds ordered during this visit  No orders of the defined types were placed in this encounter.      Visit Diagnoses    ICD-10-CM ICD-9-CM   1. Metastatic non-small cell lung cancer  C34.90 162.9   2. Hyponatremia  E87.1 276.1   3. Dermatitis  L30.9 692.9           Follow Up   With Dr. Hinds as scheduled on day of cycle 3 Pembrolizumab.        This document has been electronically signed by DENICE Ashford   August 12, 2024 14:52 EDT

## 2024-08-16 ENCOUNTER — OFFICE VISIT (OUTPATIENT)
Dept: ONCOLOGY | Facility: CLINIC | Age: 89
End: 2024-08-16
Payer: MEDICARE

## 2024-08-16 ENCOUNTER — TELEPHONE (OUTPATIENT)
Dept: ONCOLOGY | Facility: CLINIC | Age: 89
End: 2024-08-16

## 2024-08-16 ENCOUNTER — LAB (OUTPATIENT)
Dept: ONCOLOGY | Facility: CLINIC | Age: 89
End: 2024-08-16
Payer: MEDICARE

## 2024-08-16 VITALS
RESPIRATION RATE: 20 BRPM | DIASTOLIC BLOOD PRESSURE: 56 MMHG | WEIGHT: 166 LBS | HEIGHT: 72 IN | BODY MASS INDEX: 22.48 KG/M2 | SYSTOLIC BLOOD PRESSURE: 118 MMHG | TEMPERATURE: 98 F | HEART RATE: 79 BPM | OXYGEN SATURATION: 95 %

## 2024-08-16 DIAGNOSIS — C34.90 METASTATIC NON-SMALL CELL LUNG CANCER: ICD-10-CM

## 2024-08-16 DIAGNOSIS — E87.1 HYPONATREMIA: ICD-10-CM

## 2024-08-16 DIAGNOSIS — C34.90 METASTATIC NON-SMALL CELL LUNG CANCER: Primary | ICD-10-CM

## 2024-08-16 DIAGNOSIS — L29.9 PRURITUS: ICD-10-CM

## 2024-08-16 DIAGNOSIS — Z29.89 ENCOUNTER FOR IMMUNOTHERAPY: ICD-10-CM

## 2024-08-16 DIAGNOSIS — C34.91 NON-SMALL CELL CARCINOMA OF LUNG, RIGHT: ICD-10-CM

## 2024-08-16 DIAGNOSIS — E87.5 HYPERKALEMIA: ICD-10-CM

## 2024-08-16 DIAGNOSIS — M79.89 LEFT LEG SWELLING: Primary | ICD-10-CM

## 2024-08-16 LAB
ALBUMIN SERPL-MCNC: 3.5 G/DL (ref 3.5–5.2)
ALBUMIN/GLOB SERPL: 0.9 G/DL
ALP SERPL-CCNC: 139 U/L (ref 39–117)
ALT SERPL W P-5'-P-CCNC: 20 U/L (ref 1–41)
ANION GAP SERPL CALCULATED.3IONS-SCNC: 8.8 MMOL/L (ref 5–15)
AST SERPL-CCNC: 27 U/L (ref 1–40)
BASOPHILS # BLD AUTO: 0.14 10*3/MM3 (ref 0–0.2)
BASOPHILS NFR BLD AUTO: 1.1 % (ref 0–1.5)
BILIRUB SERPL-MCNC: 0.3 MG/DL (ref 0–1.2)
BUN SERPL-MCNC: 27 MG/DL (ref 8–23)
BUN/CREAT SERPL: 25.5 (ref 7–25)
CALCIUM SPEC-SCNC: 10 MG/DL (ref 8.6–10.5)
CHLORIDE SERPL-SCNC: 99 MMOL/L (ref 98–107)
CO2 SERPL-SCNC: 26.2 MMOL/L (ref 22–29)
CREAT SERPL-MCNC: 1.06 MG/DL (ref 0.76–1.27)
DEPRECATED RDW RBC AUTO: 46.3 FL (ref 37–54)
EGFRCR SERPLBLD CKD-EPI 2021: 67.1 ML/MIN/1.73
EOSINOPHIL # BLD AUTO: 1.56 10*3/MM3 (ref 0–0.4)
EOSINOPHIL NFR BLD AUTO: 11.9 % (ref 0.3–6.2)
ERYTHROCYTE [DISTWIDTH] IN BLOOD BY AUTOMATED COUNT: 13.8 % (ref 12.3–15.4)
GLOBULIN UR ELPH-MCNC: 3.9 GM/DL
GLUCOSE SERPL-MCNC: 119 MG/DL (ref 65–99)
HCT VFR BLD AUTO: 35.4 % (ref 37.5–51)
HGB BLD-MCNC: 11.5 G/DL (ref 13–17.7)
IMM GRANULOCYTES # BLD AUTO: 0.05 10*3/MM3 (ref 0–0.05)
IMM GRANULOCYTES NFR BLD AUTO: 0.4 % (ref 0–0.5)
LYMPHOCYTES # BLD AUTO: 1.9 10*3/MM3 (ref 0.7–3.1)
LYMPHOCYTES NFR BLD AUTO: 14.5 % (ref 19.6–45.3)
MCH RBC QN AUTO: 29.6 PG (ref 26.6–33)
MCHC RBC AUTO-ENTMCNC: 32.5 G/DL (ref 31.5–35.7)
MCV RBC AUTO: 91 FL (ref 79–97)
MONOCYTES # BLD AUTO: 1.76 10*3/MM3 (ref 0.1–0.9)
MONOCYTES NFR BLD AUTO: 13.5 % (ref 5–12)
NEUTROPHILS NFR BLD AUTO: 58.6 % (ref 42.7–76)
NEUTROPHILS NFR BLD AUTO: 7.65 10*3/MM3 (ref 1.7–7)
NRBC BLD AUTO-RTO: 0 /100 WBC (ref 0–0.2)
PLATELET # BLD AUTO: 323 10*3/MM3 (ref 140–450)
PMV BLD AUTO: 9.7 FL (ref 6–12)
POTASSIUM SERPL-SCNC: 5.6 MMOL/L (ref 3.5–5.2)
PROT SERPL-MCNC: 7.4 G/DL (ref 6–8.5)
RBC # BLD AUTO: 3.89 10*6/MM3 (ref 4.14–5.8)
SODIUM SERPL-SCNC: 134 MMOL/L (ref 136–145)
WBC NRBC COR # BLD AUTO: 13.06 10*3/MM3 (ref 3.4–10.8)

## 2024-08-16 PROCEDURE — 85025 COMPLETE CBC W/AUTO DIFF WBC: CPT | Performed by: INTERNAL MEDICINE

## 2024-08-16 PROCEDURE — 80053 COMPREHEN METABOLIC PANEL: CPT | Performed by: INTERNAL MEDICINE

## 2024-08-16 RX ORDER — SODIUM ZIRCONIUM CYCLOSILICATE 10 G/10G
10 POWDER, FOR SUSPENSION ORAL 3 TIMES DAILY
Qty: 3 EACH | Refills: 0 | Status: SHIPPED | OUTPATIENT
Start: 2024-08-16

## 2024-08-16 RX ORDER — LORATADINE 10 MG/1
10 TABLET ORAL DAILY
Qty: 30 TABLET | Refills: 3 | Status: SHIPPED | OUTPATIENT
Start: 2024-08-16

## 2024-08-16 NOTE — TELEPHONE ENCOUNTER
PT'S SPOUSE CALLED STATING PT HAS A RASH THAT WAS VERY ITCHY, IT WAS A REDISH PURPLE COLOR, LOOKS LIKE HE HAS BITES ALL OVER HIS ARMS HIS COUGH HAS GOTTEN WORSE SINCE STARTING KEYTRUDA.

## 2024-08-16 NOTE — PROGRESS NOTES
Subjective     Date: 8/17/2024    Referring Provider  Elissa Geronimo MD     Chief Complaint  Malignant neoplasm of lung, unspecified laterality, unspecified part of lung   Metastatic squamous cell carcinoma to liver     Subjective          Kevin Fonseca is a 89 y.o. male who presents today to [facility] for follow up.     HPI:   89 y.o. male with past medical history of chronic obstructive pulmonary disease, macular degeneration of the eye, hypothyroid disease, sick sinus syndrome who presents for consultation regarding new diagnosis of squamous cell carcinoma of the lung.    Oncology history:  April 23 2024: CXR with pleural based consolidation periphery of the right lung and fullness in the right suprahilar region  May 14 2024: Patient presented to PCP, Dr. Geronimo, regarding intermittent hemoptysis, R chest pain since March 2024. This is associated with weight loss (50 lbs), fatigue, and R groin pain.   May 15 2024: CT chest right upper lobe peripheral based based consolidative masslike opacity measuring 2.9 x 7.8 cm with a more central hilar mass on the right side measuring 5.1 x 4.4 cm with smaller right upper lobe nodule measuring 2.6 cm. Peripheral mass does appear to cause fourth rib erosion or destruction. Right lobe of liver mass concerning for metastatic disease, compression fracture L2 vertebral body.   May 30 2024: PET/CT confirmed peripheral consolidated mass that appears to invade the right upper ribs of right upper lobe measuring 8.6 cm with mSUV 15.1. Consolidative more central and elongated masslike consolidation superior right hilum measures 7 cm and again shows mSUV 21. Mass extends into the right hilum. Pet hypermetabolic liver masses identified, largest in right lobe measuring 8.3 cm with mSUV 14. Compression fracture of L2 vertebral body, 8 mm right upper lobe spiculated pulmonary nodule shows no PET hypermetabolism.  June 19 2024: Underwent CT  guided core biopsy of the liver mass. Pathology shows squamous cell carcinoma. PD-L1 TPS 22c3 5%.       Mr. Fonseca presents today with his wife Faby, daughter Aleja, and grand daughter Alicia. They express Mr. Fonseca's decline in function over the past year. He has not experienced shortness of breath, but does endorse weight loss, fatigue, inability to perform activities he usually would be able to such as feeding animals etc.     He has experienced a few falls over the last year, denies losing consciousness. Denies lightheadedness today (HR 44). He was given the option to have a pacemaker placed, but he declined due to heart rate returning to normal (60-70s). His appetite is good, reports drinking fluids.     He is a previous smoker, smoked 2 packs/day X 50 years, quit 27 years ago. Denies alcohol or drug use. Previously worked as a . Family history significant for brother with lung cancer and paternal grandmother with liver cancer.    He lives with his wife. Daughter and grand daughter live in [place].    Interval History 07/09/2024   Mr. Fonseca and family present for follow up. He feels improved in cognition over the last week after correction of his hyponatremia. Has been drinking one boost daily, which has helped with his energy. Gained ~3 lbs since our consultation. Was unable to stay flat in MRI machine, not completed. No new symptoms    After giving it much thought, he would like to proceed with treatment/immunotherapy only. He would like to avoid chemotherapy, if able.     Interval History 08/16/2024   Mr. Fonseca presents today with his wife, for an urgent visit. He has had pruritus of his bilateral distal arms after C1. Have attempted topical hydrocortisone and lidocaine cream without improvement. Has not attempted oral anti histamines. Causing discomfort.  Noted swelling of LLE, which Ms. Fonseca reports is chronic, previously evaluated without a confirmed diagnosis.    Objective     Objective      Allergy:   No Known Allergies     Current Medications:   Current Outpatient Medications   Medication Sig Dispense Refill    albuterol sulfate  (90 Base) MCG/ACT inhaler Inhale 2 puffs Every 4 (Four) Hours As Needed for Wheezing or Shortness of Air. 18 g 8    busPIRone (BUSPAR) 5 MG tablet Take 1 tablet by mouth 3 (Three) Times a Day As Needed (anxiety). 2 tablets in the AM 1 tablet PM 90 tablet 2    docusate sodium (COLACE) 100 MG capsule Take 1 capsule by mouth 2 (Two) Times a Day. 60 capsule 2    guaiFENesin (MUCINEX PO) Take 1 tablet by mouth Daily.      levothyroxine (SYNTHROID, LEVOTHROID) 75 MCG tablet Take 1 tablet by mouth Daily. 90 tablet 1    multivitamin with minerals (OCUVITE EXTRA PO) Take 1 tablet by mouth Daily.      Nutritional Supplements (BOOST PO) Take 1-3 cans by mouth Daily.      polyethylene glycol (MIRALAX) 17 GM/SCOOP powder Take 100 g by mouth Daily As Needed (constipation). 510 g 0    hydrocortisone 2.5 % cream Apply 1 Application topically to the appropriate area as directed 2 (Two) Times a Day. 28 g 1    loratadine (CLARITIN) 10 MG tablet Take 1 tablet by mouth Daily. 30 tablet 3    sodium zirconium cyclosilicate (Lokelma) 10 g packet Take 10 g by mouth 3 (Three) Times a Day. Empty entire contents of the packet(s) into a glass with at least 3 tablespoons (45 mL) of water. Stir well and drink immediately; if powder remains in the glass, add water, stir and drink immediately; repeat until no powder remains. Administer other oral medications at least 2 hours before or 2 hours after dose. 3 each 0     No current facility-administered medications for this visit.       Past Medical History:  Past Medical History:   Diagnosis Date    Arthritis     Asthma     Chronic bronchitis     Emphysema lung     Gastritis     Heartburn     Macular degeneration     Macular degeneration, wet     Reflux esophagitis     Thyroid disease        Past Surgical History:  Past Surgical History:   Procedure  "Laterality Date    INTRACAPSULAR CATARACT EXTRACTION      Dr. Lesly Gray. Dr. Neto Light.    LIVER BIOPSY         Social History:  Social History     Socioeconomic History    Marital status:    Tobacco Use    Smoking status: Former     Current packs/day: 0.00     Average packs/day: 1.5 packs/day for 44.0 years (66.0 ttl pk-yrs)     Types: Cigarettes     Start date:      Quit date:      Years since quittin.6     Passive exposure: Past    Smokeless tobacco: Never   Vaping Use    Vaping status: Never Used   Substance and Sexual Activity    Alcohol use: Not Currently     Comment: 2 week    Drug use: Never    Sexual activity: Defer         Family History:  Family History   Problem Relation Age of Onset    Hypertension Mother     Other Mother     Cancer Father     Cancer Brother     Asthma Brother        Review of Systems:  Review of Systems   Constitutional:  Positive for appetite change, fatigue and unexpected weight change. Negative for chills, diaphoresis and fever.   HENT:  Negative for mouth sores, sore throat and tinnitus.    Eyes:  Negative for discharge and visual disturbance.   Respiratory:  Negative for cough, shortness of breath and wheezing.    Cardiovascular:  Negative for chest pain, palpitations and leg swelling.   Gastrointestinal:  Negative for abdominal distention, abdominal pain, constipation, diarrhea, nausea and vomiting.   Genitourinary:  Negative for dysuria and hematuria.   Musculoskeletal:  Negative for arthralgias, gait problem and myalgias.   Skin:  Negative for rash.   Neurological:  Negative for dizziness, weakness, light-headedness, numbness and headaches.   Hematological:  Negative for adenopathy. Does not bruise/bleed easily.   Psychiatric/Behavioral:  Negative for sleep disturbance. The patient is not nervous/anxious.        Vital Signs:   /56   Pulse 79   Temp 98 °F (36.7 °C) (Temporal)   Resp 20   Ht 182.9 cm (72\")   Wt 75.3 kg (166 lb)   SpO2 " "95%   BMI 22.51 kg/m²      Physical Exam:  Physical Exam  Vitals reviewed.   Constitutional:       General: He is not in acute distress.     Appearance: Normal appearance. He is not ill-appearing.      Comments: In a wheelchair today   HENT:      Head: Normocephalic and atraumatic.      Mouth/Throat:      Mouth: Mucous membranes are moist.      Pharynx: Oropharynx is clear.   Eyes:      Conjunctiva/sclera: Conjunctivae normal.      Pupils: Pupils are equal, round, and reactive to light.   Cardiovascular:      Rate and Rhythm: Normal rate and regular rhythm.      Heart sounds: No murmur heard.  Pulmonary:      Effort: Pulmonary effort is normal. No respiratory distress.      Breath sounds: Normal breath sounds. No wheezing.   Abdominal:      General: Abdomen is flat. Bowel sounds are normal. There is no distension.      Palpations: Abdomen is soft. There is no mass.      Tenderness: There is no abdominal tenderness. There is no guarding.   Musculoskeletal:         General: No swelling. Normal range of motion.      Cervical back: Normal range of motion.      Left lower leg: Edema present.   Lymphadenopathy:      Cervical: No cervical adenopathy.   Skin:     General: Skin is warm and dry.      Findings: Erythema and rash present.      Comments: Distal LUE with erythema and excoriation    Neurological:      General: No focal deficit present.      Mental Status: He is alert and oriented to person, place, and time. Mental status is at baseline.   Psychiatric:         Mood and Affect: Mood normal.         PHQ-9 Score  PHQ-9 Total Score:       Pain Score  Vitals:    08/16/24 1520   BP: 118/56   Pulse: 79   Resp: 20   Temp: 98 °F (36.7 °C)   TempSrc: Temporal   SpO2: 95%   Weight: 75.3 kg (166 lb)   Height: 182.9 cm (72\")   PainSc: 0-No pain           ECOG score: 0           PAINSCOREQUALITYMETRIC:   Vitals:    08/16/24 1520   PainSc: 0-No pain              Lab Review  Lab Results   Component Value Date    WBC 13.06 (H) " 08/16/2024    HGB 11.5 (L) 08/16/2024    HCT 35.4 (L) 08/16/2024    MCV 91.0 08/16/2024    RDW 13.8 08/16/2024     08/16/2024    NEUTRORELPCT 58.6 08/16/2024    LYMPHORELPCT 14.5 (L) 08/16/2024    MONORELPCT 13.5 (H) 08/16/2024    EOSRELPCT 11.9 (H) 08/16/2024    BASORELPCT 1.1 08/16/2024    NEUTROABS 7.65 (H) 08/16/2024    LYMPHSABS 1.90 08/16/2024     Lab Results   Component Value Date     (L) 08/16/2024    K 5.6 (H) 08/16/2024    CO2 26.2 08/16/2024    CL 99 08/16/2024    BUN 27 (H) 08/16/2024    CREATININE 1.06 08/16/2024    EGFRIFNONA 59 (L) 01/19/2022    EGFRIFAFRI 71 01/19/2022    GLUCOSE 119 (H) 08/16/2024    CALCIUM 10.0 08/16/2024    ALKPHOS 139 (H) 08/16/2024    AST 27 08/16/2024    ALT 20 08/16/2024    BILITOT 0.3 08/16/2024    ALBUMIN 3.5 08/16/2024    PROTEINTOT 7.4 08/16/2024    MG 2.1 11/11/2022       Radiology Results  CT Head With Contrast (07/09/2024 09:47)   IMPRESSION:  1.  No acute intracranial findings identified.  2.  No enhancing brain parenchymal lesions identified.  3.  Diffuse cerebral atrophy with chronic small vessel ischemic disease.  4.  Focal encephalomalacia of the right frontal lobe.    CT Abdomen Pelvis With Contrast (06/14/2024 14:55)   FINDINGS:  ABDOMEN: The lung bases are clear. The heart is normal in size. There  are multiple hepatic masses, the largest of which is in the right lobe  measuring 8.1 cm consistent with metastatic disease. The spleen is  homogenous. No adrenal mass is present. The pancreas is unremarkable.  The kidneys are normal. There is a 3.3 cm abdominal aortic aneurysm. The  mesenteric vascualture is patent. There is no free fluid or adenopathy.  The abdominal portions of the GI tract are unremarkable with no evidence  of obstruction.     PELVIS: The appendix is normal. The urinary bladder is unremarkable.  There is no significant free fluid or adenopathy. Bone windows reveal an  L2 compression fracture which is unchanged compared to the prior  exam.  No new osseous abnormalities are identified. The extraperitoneal soft  tissues are unremarkable.     IMPRESSION:  1. Hepatic metastases not significantly changed compared to the prior  exam.  2. 3.3 cm abdominal aortic aneurysm.  3. Stable L2 compression fracture.    NM PET/CT Skull Base to Mid Thigh (05/30/2024 10:53)   FINDINGS:      HEAD:    BRAIN AND EXTRA-AXIAL SPACES:  Unremarkable as visualized.    NASOPHARYNX:  Unremarkable as visualized.      NECK:    HYPOPHARYNX:  Unremarkable as visualized.    LARYNX:  Unremarkable as visualized.    TRACHEA:  Unremarkable as visualized.    RETROPHARYNGEAL SPACE:  Unremarkable as visualized.    SUBMANDIBULAR/PAROTID GLANDS:  Unremarkable as visualized.  Glands are  normal in size.    THYROID:  Unremarkable as visualized.  No enlarged or calcified  nodules.      CHEST:    LUNGS AND PLEURAL SPACES:  Consolidative more central and elongated  masslike consolidation near the superior right hilum measures about 7 cm  and again shows PET hypermetabolism mSUV 21. The mass extends into the  right hilum.  A 8 mm right upper lobe spiculated pulmonary nodule shows  no PET hypermetabolism at this time.    HEART:  Unremarkable as visualized.  No cardiomegaly.  No significant  pericardial effusion.    MEDIASTINUM:  Unremarkable as visualized.  No mass.      ABDOMEN:    LIVER:  PET hypermetabolic liver masses are identified with the  largest in the right lobe measuring about 8.3 cm and with PET  hypermetabolism mSUV 14.2. A smaller liver masses noted in the left lobe  of the liver.    GALLBLADDER AND BILE DUCTS:  Unremarkable as visualized.  No calcified  stones.  No ductal dilation.    PANCREAS:  Unremarkable as visualized.  No ductal dilation.    SPLEEN:  Unremarkable as visualized.  No splenomegaly.    ADRENALS:  Unremarkable as visualized.  No mass.    KIDNEYS AND URETERS:  Unremarkable as visualized.    STOMACH AND BOWEL:  Unremarkable as visualized.  No obstruction.       PELVIS:    APPENDIX:  No findings to suggest acute appendicitis.    BLADDER:  Unremarkable as visualized.    REPRODUCTIVE:  Unremarkable as visualized.      WHOLE BODY:    INTRAPERITONEAL SPACE:  Unremarkable as visualized.  No significant  fluid collection.  No free air.    BONES/JOINTS:  Again there is a peripheral consolidated mass that  appears to invade the right upper ribs of the right upper lobe measuring  about 8.6 cm and with PET hypermetabolism mSUV 15.1.  Compression  fracture of L2 vertebral body is again noted.  No dislocation.    SOFT TISSUES:  Unremarkable as visualized.    VASCULATURE:  Unremarkable as visualized.  No aortic aneurysm.    LYMPH NODES:  Unremarkable as visualized.  No lymphadenopathy.  No  hypermetabolic activity.     IMPRESSION:  1.  Again there is a peripheral consolidated mass that appears to invade  the right upper ribs of the right upper lobe measuring about 8.6 cm and  with PET hypermetabolism mSUV 15.1.  2.  Consolidative more central and elongated masslike consolidation near  the superior right hilum measures about 7 cm and again shows PET  hypermetabolism mSUV 21. The mass extends into the right hilum.  3.  PET hypermetabolic liver masses are identified with the largest in  the right lobe measuring about 8.3 cm and with PET hypermetabolism mSUV  14.2. A smaller liver masses noted in the left lobe of the liver.  4.  Compression fracture of L2 vertebral body is again noted.  5.  A 8 mm right upper lobe spiculated pulmonary nodule shows no PET  hypermetabolism at this time.    CT Chest With Contrast Diagnostic (05/15/2024 14:24)   FINDINGS:    LUNGS AND PLEURAL SPACES:  Right upper lobe peripheral pleural-based  consolidative masslike opacity measuring about 2.9 x 7.8 cm with a more  central hilar mass on the right side measuring 5.1 x 4.4 cm and a  smaller right upper lobe nodule component measuring 2.6 cm. The  peripheral mass does appear to cause fourth rib erosion or  destruction.   Emphysematous lungs noted.  Right upper lobe 1.2 cm ill-defined nodule  that could represent scarring.  No pneumothorax.  No significant  effusion.    HEART:  Unremarkable as visualized.  No cardiomegaly.  No significant  pericardial effusion.  No significant coronary artery calcifications.    BONES/JOINTS:  Compression fracture noted of probable L2 vertebral  body.  No dislocation.    SOFT TISSUES:  Unremarkable as visualized.    VASCULATURE:  Partially visualized infrarenal abdominal aortic  aneurysm measuring 3.4 cm.    LYMPH NODES:  Unremarkable as visualized.  No enlarged lymph nodes.    LIVER:  Right lobe of liver mass concerning for metastatic disease  measuring about 7 mm.    OTHER FINDINGS:  .     IMPRESSION:  1.  Right upper lobe peripheral pleural-based consolidative masslike  opacity measuring about 2.9 x 7.8 cm with a more central hilar mass on  the right side measuring 5.1 x 4.4 cm and a smaller right upper lobe  nodule component measuring 2.6 cm. The peripheral mass does appear to  cause fourth rib erosion or destruction.  2.  Right lobe of liver mass concerning for metastatic disease measuring  about 7 mm.  3.  Partially visualized infrarenal abdominal aortic aneurysm measuring  3.4 cm.  4.  Compression fracture noted of probable L2 vertebral body.    XR Chest 2 View (04/23/2024 16:34)   FINDINGS:  LUNGS: Pleural-based consolidation periphery of the right lung. Mild  fullness in the right suprahilar region  HEART AND MEDIASTINUM: Heart and mediastinal contours are unremarkable  SKELETON: Bony and soft tissue structures are unremarkable.     IMPRESSION:  Pleural-based consolidation periphery of the right lung and fullness in  the right suprahilar region. Recommend CT        Pathology:   06/21/2024          Assessment / Plan         Assessment and Plan   Kevin Fonseca is a 89 y.o. year old with history of     DeNovo metastatic non small cell carcinoma, squamous cell. PD-L1 TPS 5%   -Patient  with ~1 year of decline in function, weight loss, fatigue, and intermittent hemoptysis. CT May 15 2024 with with concerning right upper lobe peripheral consolidation 2.9 x 7.8 cm with central hilar mass 5 x 4.4 cm, small right upper lobe nodule 2.6 cm, another right upper lobe 1.2 cm ill defined mass could represent scarring. Also noted liver mass concerning for metastatic disease. Follow up PET/CT 6/14/2024 with hepatic metastasis largest measuring 8.1 cm, and noted L2 compression fracture. 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 ECOG PS 2  -I presented them with the stage and treatment, which would be palliative in nature. After a thorough discussion, patient and family decided to proceed with single agent pembrolizumab q21d given his performance status and co-morbidities. I feel this is reasonable given his functional status. Adverse effects were discussed such as fatigue, nausea, vomiting, colitis, pneumonitis, thyroiditis. He is agreeable to therapy, chemoExpert handout is provided.  -MRI brain unable to be performed, CT brain without metastatic disease  -NGS testing requested to assist with therapy directed to mutations    2. Malignancy associated pain  -Currently denies     3. Nutrition  -Recommend patient take in 2-3 boost/day    4. Hyponatremia   - Improved     5. Erythema and pruritus LLE   -Started after C1 of IO  -Recommend H1 blockers: loratidine 10 mg AM and benadryl 25 mg PM  -Continue topical hydrocortisone 2.5%     6. Hyperkalemia   -Noted K of 5.6 today  -Lokelma 10 mg TID prescribed     7. LLE swelling   - U/S duplex ordered        Discussed possible differential diagnoses, testing, treatment, recommended non-pharmacological interventions, risks, warning signs to monitor for that would indicate need  for follow-up in clinic or ER. If no improvement with these regimens or you have new or worsening symptoms follow-up. Patient verbalizes understanding and agreement with plan of care. Denies further needs or concerns.     Patient was given instructions and counseling regarding her condition and for health maintenance advised.       All questions were answered to his satisfaction.    BMI is within normal parameters. No other follow-up for BMI required.         ACO / MARY/Other  Quality measures  -  Kevin Fonseca did not receive 2023 flu vaccine.  This was recommended.  -  Kevin Fonseca reports a pain score of 0.  Given his pain assessment as noted, treatment options were discussed and the following options were decided upon as a follow-up plan to address the patient's pain: continuation of current treatment plan for pain.  -  Current outpatient and discharge medications have been reconciled for the patient.  Reviewed by: Jennifer Hinds MD        Meds ordered during this visit  New Medications Ordered This Visit   Medications    loratadine (CLARITIN) 10 MG tablet     Sig: Take 1 tablet by mouth Daily.     Dispense:  30 tablet     Refill:  3    hydrocortisone 2.5 % cream     Sig: Apply 1 Application topically to the appropriate area as directed 2 (Two) Times a Day.     Dispense:  28 g     Refill:  1    sodium zirconium cyclosilicate (Lokelma) 10 g packet     Sig: Take 10 g by mouth 3 (Three) Times a Day. Empty entire contents of the packet(s) into a glass with at least 3 tablespoons (45 mL) of water. Stir well and drink immediately; if powder remains in the glass, add water, stir and drink immediately; repeat until no powder remains. Administer other oral medications at least 2 hours before or 2 hours after dose.     Dispense:  3 each     Refill:  0       Visit Diagnoses    ICD-10-CM ICD-9-CM   1. Left leg swelling  M79.89 729.81   2. Pruritus  L29.9 698.9   3. Encounter for immunotherapy  Z29.89 V07.2           Follow Up    In 3 weeks prior to C3        This document has been electronically signed by Jennifer Hinds MD   August 17, 2024 15:44 EDT    Dictated Utilizing Dragon Dictation: Part of this note may be an electronic transcription/translation of spoken language to printed text using the Dragon Dictation System.

## 2024-08-16 NOTE — PROGRESS NOTES
Venipuncture Blood Specimen Collection  Venipuncture performed in left arm by Angelica Davison MA with good hemostasis. Patient tolerated the procedure well without complications.   08/16/24   Angelica Davison MA

## 2024-08-21 DIAGNOSIS — M79.89 LEFT LEG SWELLING: ICD-10-CM

## 2024-08-21 DIAGNOSIS — C34.90 METASTATIC NON-SMALL CELL LUNG CANCER: Primary | ICD-10-CM

## 2024-08-25 ENCOUNTER — APPOINTMENT (OUTPATIENT)
Dept: GENERAL RADIOLOGY | Facility: HOSPITAL | Age: 89
End: 2024-08-25
Payer: OTHER GOVERNMENT

## 2024-08-25 ENCOUNTER — APPOINTMENT (OUTPATIENT)
Dept: ULTRASOUND IMAGING | Facility: HOSPITAL | Age: 89
End: 2024-08-25
Payer: OTHER GOVERNMENT

## 2024-08-25 ENCOUNTER — HOSPITAL ENCOUNTER (EMERGENCY)
Facility: HOSPITAL | Age: 89
Discharge: HOME | End: 2024-08-26
Payer: OTHER GOVERNMENT

## 2024-08-25 ENCOUNTER — APPOINTMENT (OUTPATIENT)
Dept: CT IMAGING | Facility: HOSPITAL | Age: 89
End: 2024-08-25
Payer: OTHER GOVERNMENT

## 2024-08-25 DIAGNOSIS — S40.022A CONTUSION OF BOTH UPPER EXTREMITIES, INITIAL ENCOUNTER: Primary | ICD-10-CM

## 2024-08-25 DIAGNOSIS — T07.XXXA ABRASIONS OF MULTIPLE SITES: ICD-10-CM

## 2024-08-25 DIAGNOSIS — C34.90 METASTATIC NON-SMALL CELL LUNG CANCER: ICD-10-CM

## 2024-08-25 DIAGNOSIS — S80.11XA HEMATOMA OF LOWER EXTREMITY, RIGHT, INITIAL ENCOUNTER: ICD-10-CM

## 2024-08-25 DIAGNOSIS — S40.021A CONTUSION OF BOTH UPPER EXTREMITIES, INITIAL ENCOUNTER: Primary | ICD-10-CM

## 2024-08-25 DIAGNOSIS — R53.81 DEBILITY: ICD-10-CM

## 2024-08-25 LAB
ALBUMIN SERPL-MCNC: 3.2 G/DL (ref 3.5–5.2)
ALBUMIN/GLOB SERPL: 0.9 G/DL
ALP SERPL-CCNC: 123 U/L (ref 39–117)
ALT SERPL W P-5'-P-CCNC: 19 U/L (ref 1–41)
ANION GAP SERPL CALCULATED.3IONS-SCNC: 9.5 MMOL/L (ref 5–15)
APTT PPP: 35.1 SECONDS (ref 26.5–34.5)
AST SERPL-CCNC: 22 U/L (ref 1–40)
BASOPHILS # BLD AUTO: 0.14 10*3/MM3 (ref 0–0.2)
BASOPHILS NFR BLD AUTO: 1 % (ref 0–1.5)
BILIRUB SERPL-MCNC: 0.2 MG/DL (ref 0–1.2)
BUN SERPL-MCNC: 26 MG/DL (ref 8–23)
BUN/CREAT SERPL: 24.5 (ref 7–25)
CALCIUM SPEC-SCNC: 9 MG/DL (ref 8.6–10.5)
CHLORIDE SERPL-SCNC: 100 MMOL/L (ref 98–107)
CO2 SERPL-SCNC: 26.5 MMOL/L (ref 22–29)
CREAT SERPL-MCNC: 1.06 MG/DL (ref 0.76–1.27)
DEPRECATED RDW RBC AUTO: 45.2 FL (ref 37–54)
EGFRCR SERPLBLD CKD-EPI 2021: 67.1 ML/MIN/1.73
EOSINOPHIL # BLD AUTO: 1.84 10*3/MM3 (ref 0–0.4)
EOSINOPHIL NFR BLD AUTO: 13.4 % (ref 0.3–6.2)
ERYTHROCYTE [DISTWIDTH] IN BLOOD BY AUTOMATED COUNT: 13.7 % (ref 12.3–15.4)
GLOBULIN UR ELPH-MCNC: 3.5 GM/DL
GLUCOSE SERPL-MCNC: 186 MG/DL (ref 65–99)
HCT VFR BLD AUTO: 33.9 % (ref 37.5–51)
HGB BLD-MCNC: 10.7 G/DL (ref 13–17.7)
IMM GRANULOCYTES # BLD AUTO: 0.08 10*3/MM3 (ref 0–0.05)
IMM GRANULOCYTES NFR BLD AUTO: 0.6 % (ref 0–0.5)
INR PPP: 1.05 (ref 0.9–1.1)
LYMPHOCYTES # BLD AUTO: 1.88 10*3/MM3 (ref 0.7–3.1)
LYMPHOCYTES NFR BLD AUTO: 13.6 % (ref 19.6–45.3)
MCH RBC QN AUTO: 28.5 PG (ref 26.6–33)
MCHC RBC AUTO-ENTMCNC: 31.6 G/DL (ref 31.5–35.7)
MCV RBC AUTO: 90.2 FL (ref 79–97)
MONOCYTES # BLD AUTO: 1.61 10*3/MM3 (ref 0.1–0.9)
MONOCYTES NFR BLD AUTO: 11.7 % (ref 5–12)
NEUTROPHILS NFR BLD AUTO: 59.7 % (ref 42.7–76)
NEUTROPHILS NFR BLD AUTO: 8.23 10*3/MM3 (ref 1.7–7)
NRBC BLD AUTO-RTO: 0 /100 WBC (ref 0–0.2)
PLATELET # BLD AUTO: 322 10*3/MM3 (ref 140–450)
PMV BLD AUTO: 9.9 FL (ref 6–12)
POTASSIUM SERPL-SCNC: 4.8 MMOL/L (ref 3.5–5.2)
PROT SERPL-MCNC: 6.7 G/DL (ref 6–8.5)
PROTHROMBIN TIME: 13.9 SECONDS (ref 12.1–14.7)
RBC # BLD AUTO: 3.76 10*6/MM3 (ref 4.14–5.8)
SODIUM SERPL-SCNC: 136 MMOL/L (ref 136–145)
WBC NRBC COR # BLD AUTO: 13.78 10*3/MM3 (ref 3.4–10.8)

## 2024-08-25 PROCEDURE — 73110 X-RAY EXAM OF WRIST: CPT | Performed by: RADIOLOGY

## 2024-08-25 PROCEDURE — 25010000002 MORPHINE PER 10 MG

## 2024-08-25 PROCEDURE — 73562 X-RAY EXAM OF KNEE 3: CPT | Performed by: RADIOLOGY

## 2024-08-25 PROCEDURE — 73562 X-RAY EXAM OF KNEE 3: CPT

## 2024-08-25 PROCEDURE — 73630 X-RAY EXAM OF FOOT: CPT

## 2024-08-25 PROCEDURE — 99285 EMERGENCY DEPT VISIT HI MDM: CPT

## 2024-08-25 PROCEDURE — 73110 X-RAY EXAM OF WRIST: CPT

## 2024-08-25 PROCEDURE — 85730 THROMBOPLASTIN TIME PARTIAL: CPT

## 2024-08-25 PROCEDURE — 85610 PROTHROMBIN TIME: CPT

## 2024-08-25 PROCEDURE — 72125 CT NECK SPINE W/O DYE: CPT

## 2024-08-25 PROCEDURE — 70450 CT HEAD/BRAIN W/O DYE: CPT | Performed by: RADIOLOGY

## 2024-08-25 PROCEDURE — 80053 COMPREHEN METABOLIC PANEL: CPT

## 2024-08-25 PROCEDURE — 73523 X-RAY EXAM HIPS BI 5/> VIEWS: CPT

## 2024-08-25 PROCEDURE — 76882 US LMTD JT/FCL EVL NVASC XTR: CPT

## 2024-08-25 PROCEDURE — 73080 X-RAY EXAM OF ELBOW: CPT

## 2024-08-25 PROCEDURE — 96374 THER/PROPH/DIAG INJ IV PUSH: CPT

## 2024-08-25 PROCEDURE — 76882 US LMTD JT/FCL EVL NVASC XTR: CPT | Performed by: RADIOLOGY

## 2024-08-25 PROCEDURE — 72125 CT NECK SPINE W/O DYE: CPT | Performed by: RADIOLOGY

## 2024-08-25 PROCEDURE — 70450 CT HEAD/BRAIN W/O DYE: CPT

## 2024-08-25 PROCEDURE — 85025 COMPLETE CBC W/AUTO DIFF WBC: CPT

## 2024-08-25 PROCEDURE — 73630 X-RAY EXAM OF FOOT: CPT | Performed by: RADIOLOGY

## 2024-08-25 PROCEDURE — 73523 X-RAY EXAM HIPS BI 5/> VIEWS: CPT | Performed by: RADIOLOGY

## 2024-08-25 PROCEDURE — 73080 X-RAY EXAM OF ELBOW: CPT | Performed by: RADIOLOGY

## 2024-08-25 RX ORDER — CEPHALEXIN 500 MG/1
500 CAPSULE ORAL 2 TIMES DAILY
Qty: 6 CAPSULE | Refills: 0 | Status: SHIPPED | OUTPATIENT
Start: 2024-08-25 | End: 2024-08-26

## 2024-08-25 RX ORDER — MORPHINE SULFATE 2 MG/ML
2 INJECTION, SOLUTION INTRAMUSCULAR; INTRAVENOUS ONCE
Status: COMPLETED | OUTPATIENT
Start: 2024-08-25 | End: 2024-08-25

## 2024-08-25 RX ADMIN — MORPHINE SULFATE 2 MG: 2 INJECTION, SOLUTION INTRAMUSCULAR; INTRAVENOUS at 20:54

## 2024-08-26 ENCOUNTER — APPOINTMENT (OUTPATIENT)
Dept: CT IMAGING | Facility: HOSPITAL | Age: 89
End: 2024-08-26
Payer: OTHER GOVERNMENT

## 2024-08-26 VITALS
OXYGEN SATURATION: 91 % | DIASTOLIC BLOOD PRESSURE: 63 MMHG | HEART RATE: 93 BPM | HEIGHT: 72 IN | SYSTOLIC BLOOD PRESSURE: 132 MMHG | TEMPERATURE: 98.1 F | RESPIRATION RATE: 20 BRPM | BODY MASS INDEX: 22.48 KG/M2 | WEIGHT: 166.01 LBS

## 2024-08-26 LAB
ABO GROUP BLD: NORMAL
ABO GROUP BLD: NORMAL
ALBUMIN SERPL-MCNC: 3.3 G/DL (ref 3.5–5.2)
ALBUMIN/GLOB SERPL: 1 G/DL
ALP SERPL-CCNC: 115 U/L (ref 39–117)
ALT SERPL W P-5'-P-CCNC: 21 U/L (ref 1–41)
ANION GAP SERPL CALCULATED.3IONS-SCNC: 9.9 MMOL/L (ref 5–15)
APTT PPP: 35.5 SECONDS (ref 26.5–34.5)
AST SERPL-CCNC: 30 U/L (ref 1–40)
ATMOSPHERIC PRESS: 732 MMHG
BASE EXCESS BLDV CALC-SCNC: 1.2 MMOL/L (ref 0–2)
BASOPHILS # BLD AUTO: 0.12 10*3/MM3 (ref 0–0.2)
BASOPHILS NFR BLD AUTO: 0.7 % (ref 0–1.5)
BDY SITE: ABNORMAL
BILIRUB SERPL-MCNC: 0.5 MG/DL (ref 0–1.2)
BILIRUB UR QL STRIP: NEGATIVE
BLD GP AB SCN SERPL QL: NEGATIVE
BUN SERPL-MCNC: 27 MG/DL (ref 8–23)
BUN/CREAT SERPL: 27.8 (ref 7–25)
CALCIUM SPEC-SCNC: 9 MG/DL (ref 8.6–10.5)
CHLORIDE SERPL-SCNC: 102 MMOL/L (ref 98–107)
CLARITY UR: CLEAR
CO2 BLDA-SCNC: 29.1 MMOL/L (ref 22–33)
CO2 SERPL-SCNC: 24.1 MMOL/L (ref 22–29)
COHGB MFR BLD: 1.4 % (ref 0–5)
COLOR UR: YELLOW
CREAT SERPL-MCNC: 0.97 MG/DL (ref 0.76–1.27)
D-LACTATE SERPL-SCNC: 2.3 MMOL/L (ref 0.5–2)
D-LACTATE SERPL-SCNC: 3 MMOL/L (ref 0.5–2)
DEPRECATED RDW RBC AUTO: 45.8 FL (ref 37–54)
EGFRCR SERPLBLD CKD-EPI 2021: 74.6 ML/MIN/1.73
EOSINOPHIL # BLD AUTO: 0.08 10*3/MM3 (ref 0–0.4)
EOSINOPHIL NFR BLD AUTO: 0.5 % (ref 0.3–6.2)
ERYTHROCYTE [DISTWIDTH] IN BLOOD BY AUTOMATED COUNT: 13.7 % (ref 12.3–15.4)
GEN 5 2HR TROPONIN T REFLEX: 41 NG/L
GLOBULIN UR ELPH-MCNC: 3.4 GM/DL
GLUCOSE SERPL-MCNC: 139 MG/DL (ref 65–99)
GLUCOSE UR STRIP-MCNC: NEGATIVE MG/DL
HCO3 BLDV-SCNC: 27.5 MMOL/L (ref 22–28)
HCT VFR BLD AUTO: 32.1 % (ref 37.5–51)
HGB BLD-MCNC: 10.1 G/DL (ref 13–17.7)
HGB BLDA-MCNC: 10.1 G/DL (ref 14–18)
HGB UR QL STRIP.AUTO: NEGATIVE
IMM GRANULOCYTES # BLD AUTO: 0.09 10*3/MM3 (ref 0–0.05)
IMM GRANULOCYTES NFR BLD AUTO: 0.5 % (ref 0–0.5)
INHALED O2 CONCENTRATION: 21 %
INR PPP: 1.18 (ref 0.9–1.1)
KETONES UR QL STRIP: NEGATIVE
LEUKOCYTE ESTERASE UR QL STRIP.AUTO: NEGATIVE
LYMPHOCYTES # BLD AUTO: 1.26 10*3/MM3 (ref 0.7–3.1)
LYMPHOCYTES NFR BLD AUTO: 7.1 % (ref 19.6–45.3)
Lab: ABNORMAL
MAGNESIUM SERPL-MCNC: 1.8 MG/DL (ref 1.6–2.4)
MCH RBC QN AUTO: 28.7 PG (ref 26.6–33)
MCHC RBC AUTO-ENTMCNC: 31.5 G/DL (ref 31.5–35.7)
MCV RBC AUTO: 91.2 FL (ref 79–97)
METHGB BLD QL: 0.4 % (ref 0–3)
MODALITY: ABNORMAL
MONOCYTES # BLD AUTO: 1.66 10*3/MM3 (ref 0.1–0.9)
MONOCYTES NFR BLD AUTO: 9.4 % (ref 5–12)
NEUTROPHILS NFR BLD AUTO: 14.46 10*3/MM3 (ref 1.7–7)
NEUTROPHILS NFR BLD AUTO: 81.8 % (ref 42.7–76)
NITRITE UR QL STRIP: NEGATIVE
NRBC BLD AUTO-RTO: 0 /100 WBC (ref 0–0.2)
OXYHGB MFR BLDV: 30 % (ref 45–75)
PCO2 BLDV: 50.8 MM HG (ref 41–51)
PH BLDV: 7.34 PH UNITS (ref 7.32–7.42)
PH UR STRIP.AUTO: 5.5 [PH] (ref 5–8)
PHOSPHATE SERPL-MCNC: 3.1 MG/DL (ref 2.5–4.5)
PLATELET # BLD AUTO: 315 10*3/MM3 (ref 140–450)
PMV BLD AUTO: 9.9 FL (ref 6–12)
PO2 BLDV: 21.4 MM HG (ref 27–53)
POTASSIUM SERPL-SCNC: 5.3 MMOL/L (ref 3.5–5.2)
PROT SERPL-MCNC: 6.7 G/DL (ref 6–8.5)
PROT UR QL STRIP: NEGATIVE
PROTHROMBIN TIME: 15.1 SECONDS (ref 12.1–14.7)
QT INTERVAL: 388 MS
QTC INTERVAL: 433 MS
RBC # BLD AUTO: 3.52 10*6/MM3 (ref 4.14–5.8)
RH BLD: POSITIVE
RH BLD: POSITIVE
SAO2 % BLDCOV: 30.5 % (ref 45–75)
SODIUM SERPL-SCNC: 136 MMOL/L (ref 136–145)
SP GR UR STRIP: 1.02 (ref 1–1.03)
T&S EXPIRATION DATE: NORMAL
TROPONIN T DELTA: -3 NG/L
TROPONIN T SERPL HS-MCNC: 44 NG/L
UROBILINOGEN UR QL STRIP: NORMAL
VENTILATOR MODE: ABNORMAL
WBC NRBC COR # BLD AUTO: 17.67 10*3/MM3 (ref 3.4–10.8)

## 2024-08-26 PROCEDURE — 25810000003 SODIUM CHLORIDE 0.9 % SOLUTION: Performed by: STUDENT IN AN ORGANIZED HEALTH CARE EDUCATION/TRAINING PROGRAM

## 2024-08-26 PROCEDURE — 82820 HEMOGLOBIN-OXYGEN AFFINITY: CPT

## 2024-08-26 PROCEDURE — 84484 ASSAY OF TROPONIN QUANT: CPT | Performed by: INTERNAL MEDICINE

## 2024-08-26 PROCEDURE — 25810000003 SODIUM CHLORIDE 0.9 % SOLUTION

## 2024-08-26 PROCEDURE — 86900 BLOOD TYPING SEROLOGIC ABO: CPT

## 2024-08-26 PROCEDURE — 83735 ASSAY OF MAGNESIUM: CPT | Performed by: INTERNAL MEDICINE

## 2024-08-26 PROCEDURE — 86900 BLOOD TYPING SEROLOGIC ABO: CPT | Performed by: INTERNAL MEDICINE

## 2024-08-26 PROCEDURE — 82805 BLOOD GASES W/O2 SATURATION: CPT

## 2024-08-26 PROCEDURE — 71260 CT THORAX DX C+: CPT

## 2024-08-26 PROCEDURE — 86901 BLOOD TYPING SEROLOGIC RH(D): CPT

## 2024-08-26 PROCEDURE — 85730 THROMBOPLASTIN TIME PARTIAL: CPT | Performed by: INTERNAL MEDICINE

## 2024-08-26 PROCEDURE — 96361 HYDRATE IV INFUSION ADD-ON: CPT

## 2024-08-26 PROCEDURE — 80053 COMPREHEN METABOLIC PANEL: CPT | Performed by: INTERNAL MEDICINE

## 2024-08-26 PROCEDURE — 86850 RBC ANTIBODY SCREEN: CPT | Performed by: INTERNAL MEDICINE

## 2024-08-26 PROCEDURE — 85025 COMPLETE CBC W/AUTO DIFF WBC: CPT | Performed by: INTERNAL MEDICINE

## 2024-08-26 PROCEDURE — 93005 ELECTROCARDIOGRAM TRACING: CPT

## 2024-08-26 PROCEDURE — 86901 BLOOD TYPING SEROLOGIC RH(D): CPT | Performed by: INTERNAL MEDICINE

## 2024-08-26 PROCEDURE — 83605 ASSAY OF LACTIC ACID: CPT | Performed by: INTERNAL MEDICINE

## 2024-08-26 PROCEDURE — 74177 CT ABD & PELVIS W/CONTRAST: CPT

## 2024-08-26 PROCEDURE — 36415 COLL VENOUS BLD VENIPUNCTURE: CPT | Performed by: STUDENT IN AN ORGANIZED HEALTH CARE EDUCATION/TRAINING PROGRAM

## 2024-08-26 PROCEDURE — 81003 URINALYSIS AUTO W/O SCOPE: CPT | Performed by: INTERNAL MEDICINE

## 2024-08-26 PROCEDURE — 85610 PROTHROMBIN TIME: CPT | Performed by: INTERNAL MEDICINE

## 2024-08-26 PROCEDURE — 74177 CT ABD & PELVIS W/CONTRAST: CPT | Performed by: RADIOLOGY

## 2024-08-26 PROCEDURE — 84100 ASSAY OF PHOSPHORUS: CPT | Performed by: INTERNAL MEDICINE

## 2024-08-26 PROCEDURE — 25510000001 IOPAMIDOL 61 % SOLUTION: Performed by: STUDENT IN AN ORGANIZED HEALTH CARE EDUCATION/TRAINING PROGRAM

## 2024-08-26 PROCEDURE — 71260 CT THORAX DX C+: CPT | Performed by: RADIOLOGY

## 2024-08-26 RX ORDER — IOPAMIDOL 612 MG/ML
100 INJECTION, SOLUTION INTRAVASCULAR
Status: COMPLETED | OUTPATIENT
Start: 2024-08-26 | End: 2024-08-26

## 2024-08-26 RX ORDER — CEPHALEXIN 500 MG/1
500 CAPSULE ORAL 2 TIMES DAILY
Qty: 14 CAPSULE | Refills: 0 | Status: SHIPPED | OUTPATIENT
Start: 2024-08-26 | End: 2024-09-03

## 2024-08-26 RX ADMIN — IOPAMIDOL 80 ML: 612 INJECTION, SOLUTION INTRAVENOUS at 04:39

## 2024-08-26 RX ADMIN — SODIUM CHLORIDE 1000 ML: 9 INJECTION, SOLUTION INTRAVENOUS at 00:37

## 2024-08-26 RX ADMIN — SODIUM CHLORIDE 1000 ML: 9 INJECTION, SOLUTION INTRAVENOUS at 04:45
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
## 2024-08-26 NOTE — DISCHARGE INSTRUCTIONS
As we discussed please keep your wounds clean and dry.  Allow warm soapy water to rinse over them while bathing, pat dry, cover with Aquaphor.  I have prescribed you antibiotics to help prevent any infections.  Please follow-up with your primary care physician to establish wound care services to ensure that you are healing appropriately.  If you begin experiencing any worsening pain or concerning symptoms do not hesitate to return to the ER.

## 2024-08-26 NOTE — ED NOTES
Attempted to discharge patient via wheelchair into private vehicle. Patient noted to become confused and became weak. Patient was then placed back into wheelchair at this time. Lead RN and ER staff were called to the ER [place]. Patient was then placed back into the room, placed on monitor, IV established. Dr. Romero called to the bedside. EKG obtained.

## 2024-08-27 ENCOUNTER — TELEPHONE (OUTPATIENT)
Dept: FAMILY MEDICINE CLINIC | Facility: CLINIC | Age: 89
End: 2024-08-27

## 2024-08-27 NOTE — TELEPHONE ENCOUNTER
Faby alexis stated Kevin is not able to come in today,she has an appointment on Friday & wants to know if you would work him in that day with her?

## 2024-08-27 NOTE — TELEPHONE ENCOUNTER
I would put him in the 315 spot on Friday. (If we end up needing the 315 spot, I'll make him the 415 spot).

## 2024-08-28 ENCOUNTER — PATIENT OUTREACH (OUTPATIENT)
Dept: CASE MANAGEMENT | Facility: OTHER | Age: 89
End: 2024-08-28
Payer: MEDICARE

## 2024-08-28 NOTE — OUTREACH NOTE
AMBULATORY CASE MANAGEMENT NOTE    Names and Relationships of Patient/Support Persons: Contact: SHAYNE JOVEL; Relationship: Emergency Contact -     Patient Outreach    [facility] ED visit 08/25/2024: bilateral contusions to upper extremities as a result of fall, hypotension, multiple abrasion  and bruises. Prescribed cephalexin 500 mg bid X 3 days. RN-ACM spoke with patient wife and then granddaughter.  Granddaughter has been assisting in care of patient but has to return to [place] today. Grandmother needing assistance with caring for patient.  Home health referral was made in ED, no one has contacted family. RN-CHRISTAM reached out to [facility] and [facility] - no referral received.  RN-CHRISTAM reached to primary care, referral placed to [facility]. Patient has [employer] as secondary insurance, contact information provided to inquire about home health aid services.  Role of RN-ACM explained, agreeable to service.    Arlyn COLE  Ambulatory Case Management    8/28/2024, 15:42 EDT

## 2024-08-30 ENCOUNTER — TELEPHONE (OUTPATIENT)
Dept: ONCOLOGY | Facility: CLINIC | Age: 89
End: 2024-08-30
Payer: MEDICARE

## 2024-08-30 DIAGNOSIS — C34.90 METASTATIC NON-SMALL CELL LUNG CANCER: Primary | ICD-10-CM

## 2024-08-30 DIAGNOSIS — Z91.81 HISTORY OF RECENT FALL: ICD-10-CM

## 2024-08-30 DIAGNOSIS — R53.81 DEBILITY: ICD-10-CM

## 2024-08-30 RX ORDER — SODIUM CHLORIDE 9 MG/ML
20 INJECTION, SOLUTION INTRAVENOUS ONCE
OUTPATIENT
Start: 2024-09-03
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
## 2024-09-03 ENCOUNTER — OFFICE VISIT (OUTPATIENT)
Dept: ONCOLOGY | Facility: CLINIC | Age: 89
End: 2024-09-03
Payer: MEDICARE

## 2024-09-03 ENCOUNTER — INFUSION (OUTPATIENT)
Dept: ONCOLOGY | Facility: HOSPITAL | Age: 89
End: 2024-09-03
Payer: MEDICARE

## 2024-09-03 ENCOUNTER — LAB (OUTPATIENT)
Dept: ONCOLOGY | Facility: CLINIC | Age: 89
End: 2024-09-03
Payer: MEDICARE

## 2024-09-03 VITALS
RESPIRATION RATE: 18 BRPM | TEMPERATURE: 97.7 F | SYSTOLIC BLOOD PRESSURE: 117 MMHG | HEART RATE: 73 BPM | DIASTOLIC BLOOD PRESSURE: 55 MMHG | OXYGEN SATURATION: 96 %

## 2024-09-03 VITALS
RESPIRATION RATE: 18 BRPM | HEART RATE: 78 BPM | BODY MASS INDEX: 22.49 KG/M2 | SYSTOLIC BLOOD PRESSURE: 132 MMHG | DIASTOLIC BLOOD PRESSURE: 63 MMHG | WEIGHT: 165.8 LBS | TEMPERATURE: 98 F | OXYGEN SATURATION: 95 %

## 2024-09-03 DIAGNOSIS — C34.90 METASTATIC NON-SMALL CELL LUNG CANCER: ICD-10-CM

## 2024-09-03 DIAGNOSIS — C34.90 METASTATIC NON-SMALL CELL LUNG CANCER: Primary | ICD-10-CM

## 2024-09-03 DIAGNOSIS — Z29.89 ENCOUNTER FOR IMMUNOTHERAPY: ICD-10-CM

## 2024-09-03 DIAGNOSIS — Z91.81 HISTORY OF RECENT FALL: ICD-10-CM

## 2024-09-03 LAB
ALBUMIN SERPL-MCNC: 3.7 G/DL (ref 3.5–5.2)
ALBUMIN/GLOB SERPL: 1 G/DL
ALP SERPL-CCNC: 112 U/L (ref 39–117)
ALT SERPL W P-5'-P-CCNC: 20 U/L (ref 1–41)
ANION GAP SERPL CALCULATED.3IONS-SCNC: 10.9 MMOL/L (ref 5–15)
AST SERPL-CCNC: 26 U/L (ref 1–40)
BASOPHILS # BLD AUTO: 0.12 10*3/MM3 (ref 0–0.2)
BASOPHILS NFR BLD AUTO: 0.9 % (ref 0–1.5)
BILIRUB SERPL-MCNC: 0.9 MG/DL (ref 0–1.2)
BUN SERPL-MCNC: 22 MG/DL (ref 8–23)
BUN/CREAT SERPL: 23.7 (ref 7–25)
CALCIUM SPEC-SCNC: 9.2 MG/DL (ref 8.6–10.5)
CHLORIDE SERPL-SCNC: 98 MMOL/L (ref 98–107)
CO2 SERPL-SCNC: 27.1 MMOL/L (ref 22–29)
CREAT SERPL-MCNC: 0.93 MG/DL (ref 0.76–1.27)
DEPRECATED RDW RBC AUTO: 48.7 FL (ref 37–54)
EGFRCR SERPLBLD CKD-EPI 2021: 78.5 ML/MIN/1.73
EOSINOPHIL # BLD AUTO: 1.69 10*3/MM3 (ref 0–0.4)
EOSINOPHIL NFR BLD AUTO: 12.2 % (ref 0.3–6.2)
ERYTHROCYTE [DISTWIDTH] IN BLOOD BY AUTOMATED COUNT: 14.6 % (ref 12.3–15.4)
GLOBULIN UR ELPH-MCNC: 3.8 GM/DL
GLUCOSE SERPL-MCNC: 96 MG/DL (ref 65–99)
HCT VFR BLD AUTO: 29.5 % (ref 37.5–51)
HGB BLD-MCNC: 9.4 G/DL (ref 13–17.7)
IMM GRANULOCYTES # BLD AUTO: 0.06 10*3/MM3 (ref 0–0.05)
IMM GRANULOCYTES NFR BLD AUTO: 0.4 % (ref 0–0.5)
LYMPHOCYTES # BLD AUTO: 1.87 10*3/MM3 (ref 0.7–3.1)
LYMPHOCYTES NFR BLD AUTO: 13.5 % (ref 19.6–45.3)
MCH RBC QN AUTO: 29.4 PG (ref 26.6–33)
MCHC RBC AUTO-ENTMCNC: 31.9 G/DL (ref 31.5–35.7)
MCV RBC AUTO: 92.2 FL (ref 79–97)
MONOCYTES # BLD AUTO: 1.74 10*3/MM3 (ref 0.1–0.9)
MONOCYTES NFR BLD AUTO: 12.5 % (ref 5–12)
NEUTROPHILS NFR BLD AUTO: 60.5 % (ref 42.7–76)
NEUTROPHILS NFR BLD AUTO: 8.41 10*3/MM3 (ref 1.7–7)
NRBC BLD AUTO-RTO: 0 /100 WBC (ref 0–0.2)
PLATELET # BLD AUTO: 441 10*3/MM3 (ref 140–450)
PMV BLD AUTO: 9.3 FL (ref 6–12)
POTASSIUM SERPL-SCNC: 4.6 MMOL/L (ref 3.5–5.2)
PROT SERPL-MCNC: 7.5 G/DL (ref 6–8.5)
RBC # BLD AUTO: 3.2 10*6/MM3 (ref 4.14–5.8)
SODIUM SERPL-SCNC: 136 MMOL/L (ref 136–145)
T4 FREE SERPL-MCNC: 1.44 NG/DL (ref 0.92–1.68)
TSH SERPL DL<=0.05 MIU/L-ACNC: 4.27 UIU/ML (ref 0.27–4.2)
WBC NRBC COR # BLD AUTO: 13.89 10*3/MM3 (ref 3.4–10.8)

## 2024-09-03 PROCEDURE — 1125F AMNT PAIN NOTED PAIN PRSNT: CPT | Performed by: INTERNAL MEDICINE

## 2024-09-03 PROCEDURE — 85025 COMPLETE CBC W/AUTO DIFF WBC: CPT | Performed by: INTERNAL MEDICINE

## 2024-09-03 PROCEDURE — G2211 COMPLEX E/M VISIT ADD ON: HCPCS | Performed by: INTERNAL MEDICINE

## 2024-09-03 PROCEDURE — 84439 ASSAY OF FREE THYROXINE: CPT | Performed by: INTERNAL MEDICINE

## 2024-09-03 PROCEDURE — 25010000002 PEMBROLIZUMAB 100 MG/4ML SOLUTION 4 ML VIAL: Performed by: INTERNAL MEDICINE

## 2024-09-03 PROCEDURE — 84443 ASSAY THYROID STIM HORMONE: CPT | Performed by: INTERNAL MEDICINE

## 2024-09-03 PROCEDURE — 80053 COMPREHEN METABOLIC PANEL: CPT | Performed by: INTERNAL MEDICINE

## 2024-09-03 PROCEDURE — 25810000003 SODIUM CHLORIDE 0.9 % SOLUTION: Performed by: INTERNAL MEDICINE

## 2024-09-03 PROCEDURE — 96413 CHEMO IV INFUSION 1 HR: CPT

## 2024-09-03 PROCEDURE — 99214 OFFICE O/P EST MOD 30 MIN: CPT | Performed by: INTERNAL MEDICINE

## 2024-09-03 RX ORDER — HYDROCORTISONE 2.5 %
1 CREAM (GRAM) TOPICAL 2 TIMES DAILY
Qty: 28 G | Refills: 1 | Status: SHIPPED | OUTPATIENT
Start: 2024-09-03

## 2024-09-03 RX ORDER — SODIUM CHLORIDE 9 MG/ML
20 INJECTION, SOLUTION INTRAVENOUS ONCE
Status: COMPLETED | OUTPATIENT
Start: 2024-09-03 | End: 2024-09-03

## 2024-09-03 RX ORDER — ONDANSETRON 4 MG/1
4 TABLET, ORALLY DISINTEGRATING ORAL EVERY 8 HOURS PRN
Qty: 30 TABLET | Refills: 0 | Status: SHIPPED | OUTPATIENT
Start: 2024-09-03

## 2024-09-03 RX ORDER — AMOXICILLIN 250 MG
1 CAPSULE ORAL DAILY
Qty: 30 TABLET | Refills: 3 | Status: SHIPPED | OUTPATIENT
Start: 2024-09-03

## 2024-09-03 RX ADMIN — SODIUM CHLORIDE 200 MG: 9 INJECTION, SOLUTION INTRAVENOUS at 15:15

## 2024-09-03 RX ADMIN — SODIUM CHLORIDE 20 ML/HR: 9 INJECTION, SOLUTION INTRAVENOUS at 15:15
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
## 2024-09-03 NOTE — PROGRESS NOTES
Venipuncture Blood Specimen Collection  Venipuncture performed in left arm by Ramakrishna Srivastava MA with good hemostasis. Patient tolerated the procedure well without complications.   09/03/24   Ramakrishna Srivastava MA

## 2024-09-04 ENCOUNTER — PATIENT OUTREACH (OUTPATIENT)
Dept: CASE MANAGEMENT | Facility: OTHER | Age: 89
End: 2024-09-04
Payer: MEDICARE

## 2024-09-04 NOTE — OUTREACH NOTE
AMBULATORY CASE MANAGEMENT NOTE    Names and Relationships of Patient/Support Persons: Contact: SHAYNE JOVEL; Relationship: Emergency Contact -     Patient Outreach    RN-ACM received contact information for [facility] in [place], in home services. Information given to granddaughter. Granddaughter thanked RN-ACM.    Care Coordination    Ari Billingsley, [facility] Coordinator -  [place], [place]  [phone]      Arlyn COLE  Ambulatory Case Management    9/4/2024, 15:17 EDT

## 2024-09-06 ENCOUNTER — HOSPITAL ENCOUNTER (EMERGENCY)
Facility: HOSPITAL | Age: 89
Discharge: HOME | End: 2024-09-07
Attending: STUDENT IN AN ORGANIZED HEALTH CARE EDUCATION/TRAINING PROGRAM
Payer: OTHER GOVERNMENT

## 2024-09-06 ENCOUNTER — TELEPHONE (OUTPATIENT)
Dept: ONCOLOGY | Facility: CLINIC | Age: 89
End: 2024-09-06
Payer: MEDICARE

## 2024-09-06 ENCOUNTER — APPOINTMENT (OUTPATIENT)
Dept: ULTRASOUND IMAGING | Facility: HOSPITAL | Age: 89
End: 2024-09-06
Payer: OTHER GOVERNMENT

## 2024-09-06 VITALS
HEIGHT: 72 IN | OXYGEN SATURATION: 94 % | HEART RATE: 78 BPM | RESPIRATION RATE: 14 BRPM | WEIGHT: 157 LBS | BODY MASS INDEX: 21.26 KG/M2 | TEMPERATURE: 97.8 F | DIASTOLIC BLOOD PRESSURE: 44 MMHG | SYSTOLIC BLOOD PRESSURE: 101 MMHG

## 2024-09-06 DIAGNOSIS — L03.115 CELLULITIS OF RIGHT LOWER EXTREMITY: Primary | ICD-10-CM

## 2024-09-06 LAB
ALBUMIN SERPL-MCNC: 3.4 G/DL (ref 3.5–5.2)
ALBUMIN/GLOB SERPL: 0.9 G/DL
ALP SERPL-CCNC: 117 U/L (ref 39–117)
ALT SERPL W P-5'-P-CCNC: 14 U/L (ref 1–41)
ANION GAP SERPL CALCULATED.3IONS-SCNC: 9.9 MMOL/L (ref 5–15)
AST SERPL-CCNC: 20 U/L (ref 1–40)
BASOPHILS # BLD AUTO: 0.12 10*3/MM3 (ref 0–0.2)
BASOPHILS NFR BLD AUTO: 1 % (ref 0–1.5)
BILIRUB SERPL-MCNC: 0.7 MG/DL (ref 0–1.2)
BUN SERPL-MCNC: 25 MG/DL (ref 8–23)
BUN/CREAT SERPL: 24.3 (ref 7–25)
CALCIUM SPEC-SCNC: 9.1 MG/DL (ref 8.6–10.5)
CHLORIDE SERPL-SCNC: 96 MMOL/L (ref 98–107)
CO2 SERPL-SCNC: 27.1 MMOL/L (ref 22–29)
CREAT SERPL-MCNC: 1.03 MG/DL (ref 0.76–1.27)
CRP SERPL-MCNC: 4.31 MG/DL (ref 0–0.5)
D-LACTATE SERPL-SCNC: 1.6 MMOL/L (ref 0.5–2)
DEPRECATED RDW RBC AUTO: 49.7 FL (ref 37–54)
EGFRCR SERPLBLD CKD-EPI 2021: 69.4 ML/MIN/1.73
EOSINOPHIL # BLD AUTO: 1.16 10*3/MM3 (ref 0–0.4)
EOSINOPHIL NFR BLD AUTO: 10.1 % (ref 0.3–6.2)
ERYTHROCYTE [DISTWIDTH] IN BLOOD BY AUTOMATED COUNT: 14.9 % (ref 12.3–15.4)
GLOBULIN UR ELPH-MCNC: 3.9 GM/DL
GLUCOSE SERPL-MCNC: 146 MG/DL (ref 65–99)
HCT VFR BLD AUTO: 30.5 % (ref 37.5–51)
HGB BLD-MCNC: 9.5 G/DL (ref 13–17.7)
IMM GRANULOCYTES # BLD AUTO: 0.06 10*3/MM3 (ref 0–0.05)
IMM GRANULOCYTES NFR BLD AUTO: 0.5 % (ref 0–0.5)
LYMPHOCYTES # BLD AUTO: 1.53 10*3/MM3 (ref 0.7–3.1)
LYMPHOCYTES NFR BLD AUTO: 13.3 % (ref 19.6–45.3)
MCH RBC QN AUTO: 28.7 PG (ref 26.6–33)
MCHC RBC AUTO-ENTMCNC: 31.1 G/DL (ref 31.5–35.7)
MCV RBC AUTO: 92.1 FL (ref 79–97)
MONOCYTES # BLD AUTO: 1.4 10*3/MM3 (ref 0.1–0.9)
MONOCYTES NFR BLD AUTO: 12.1 % (ref 5–12)
NEUTROPHILS NFR BLD AUTO: 63 % (ref 42.7–76)
NEUTROPHILS NFR BLD AUTO: 7.26 10*3/MM3 (ref 1.7–7)
NRBC BLD AUTO-RTO: 0 /100 WBC (ref 0–0.2)
PLATELET # BLD AUTO: 422 10*3/MM3 (ref 140–450)
PMV BLD AUTO: 9.3 FL (ref 6–12)
POTASSIUM SERPL-SCNC: 4.9 MMOL/L (ref 3.5–5.2)
PROT SERPL-MCNC: 7.3 G/DL (ref 6–8.5)
RBC # BLD AUTO: 3.31 10*6/MM3 (ref 4.14–5.8)
SODIUM SERPL-SCNC: 133 MMOL/L (ref 136–145)
WBC NRBC COR # BLD AUTO: 11.53 10*3/MM3 (ref 3.4–10.8)

## 2024-09-06 PROCEDURE — 99284 EMERGENCY DEPT VISIT MOD MDM: CPT

## 2024-09-06 PROCEDURE — 93971 EXTREMITY STUDY: CPT

## 2024-09-06 PROCEDURE — 93971 EXTREMITY STUDY: CPT | Performed by: RADIOLOGY

## 2024-09-06 PROCEDURE — 36415 COLL VENOUS BLD VENIPUNCTURE: CPT

## 2024-09-06 PROCEDURE — 83605 ASSAY OF LACTIC ACID: CPT | Performed by: NURSE PRACTITIONER

## 2024-09-06 PROCEDURE — 25010000002 CEFTRIAXONE PER 250 MG: Performed by: STUDENT IN AN ORGANIZED HEALTH CARE EDUCATION/TRAINING PROGRAM

## 2024-09-06 PROCEDURE — 87040 BLOOD CULTURE FOR BACTERIA: CPT | Performed by: NURSE PRACTITIONER

## 2024-09-06 PROCEDURE — 85025 COMPLETE CBC W/AUTO DIFF WBC: CPT | Performed by: NURSE PRACTITIONER

## 2024-09-06 PROCEDURE — 96365 THER/PROPH/DIAG IV INF INIT: CPT

## 2024-09-06 PROCEDURE — 86140 C-REACTIVE PROTEIN: CPT | Performed by: NURSE PRACTITIONER

## 2024-09-06 PROCEDURE — 80053 COMPREHEN METABOLIC PANEL: CPT | Performed by: NURSE PRACTITIONER

## 2024-09-06 RX ADMIN — CEFTRIAXONE 1000 MG: 1 INJECTION, POWDER, FOR SOLUTION INTRAMUSCULAR; INTRAVENOUS at 21:08

## 2024-09-06 NOTE — ED NOTES
MEDICAL SCREENING:    Reason for Visit: Swollen, red, hot lower extremity     Patient initially seen in triage.  The patient was advised further evaluation and diagnostic testing will be needed, some of the treatment and testing will be initiated in the lobby in order to begin the process.  The patient will be returned to the waiting area for the time being and possibly be re-assessed by a subsequent ED provider.  The patient will be brought back to the treatment area in as timely manner as possible.      Ata Lawson, APRN  09/06/24 1800

## 2024-09-06 NOTE — TELEPHONE ENCOUNTER
RN was transferred phone call from the .   RN spoke with patient's granddaughter, Christel, who was concerned. She reported that the patient's right foot and shin area was warm to the touch, red, and swollen. She reported the patient did have a fall recently and ended up in the ER on 8/25.     RN discussed with MD on call, Dr. Forrest, who recommended the patient go to ED for further evaluation and workup.     RN relayed this to Christel and educated her on the importance of patient seeking medical attention as he could have an underlying clot form the fall or something else may be going on. She voiced understanding.

## 2024-09-07 RX ORDER — AMOXICILLIN 875 MG
875 TABLET ORAL 2 TIMES DAILY
Qty: 14 TABLET | Refills: 0 | Status: SHIPPED | OUTPATIENT
Start: 2024-09-07 | End: 2024-09-14

## 2024-09-07 RX ORDER — DOXYCYCLINE 100 MG/1
100 CAPSULE ORAL 2 TIMES DAILY
Qty: 14 CAPSULE | Refills: 0 | Status: SHIPPED | OUTPATIENT
Start: 2024-09-07 | End: 2024-09-14
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
## 2024-09-10 ENCOUNTER — PATIENT OUTREACH (OUTPATIENT)
Dept: CASE MANAGEMENT | Facility: OTHER | Age: 89
End: 2024-09-10
Payer: MEDICARE

## 2024-09-10 NOTE — OUTREACH NOTE
AMBULATORY CASE MANAGEMENT NOTE    Names and Relationships of Patient/Support Persons: Contact: DILAN JOVEL; Relationship: Emergency Contact -     Patient Outreach    Patient's wife reports decreased swelling and redness of right lower extremity, left lower extremity swelling decreased, redness unchanged. Patient is taking antibiotics as ordered. States patient is eating and hydrating well, experiencing frequent need to urinate, especially at night.  Patient is able to get up to bedside commode without accident. [facility] with [facility], next appointment 9/13/2024 to discuss care giving assistance. Encouraged wife to request incontinence supplies from [facility] if needed. Denies SDOH.    Education Documentation  Infection Prevention, taught by Arlyn Chisholm, RN at 9/10/2024 11:40 AM.  Learner: Significant Other  Readiness: Acceptance  Method: Explanation  Response: Verbalizes Understanding          Arlyn COLE  Ambulatory Case Management    9/10/2024, 11:40 EDT

## 2024-09-11 DIAGNOSIS — C34.90 METASTATIC NON-SMALL CELL LUNG CANCER: Primary | ICD-10-CM

## 2024-09-11 LAB
BACTERIA SPEC AEROBE CULT: NORMAL
BACTERIA SPEC AEROBE CULT: NORMAL

## 2024-09-11 RX ORDER — SODIUM CHLORIDE 9 MG/ML
20 INJECTION, SOLUTION INTRAVENOUS ONCE
OUTPATIENT
Start: 2024-09-24

## 2024-09-12 ENCOUNTER — TELEPHONE (OUTPATIENT)
Dept: FAMILY MEDICINE CLINIC | Facility: CLINIC | Age: 89
End: 2024-09-12

## 2024-09-13 ENCOUNTER — HOSPITAL ENCOUNTER (OUTPATIENT)
Dept: ULTRASOUND IMAGING | Facility: HOSPITAL | Age: 89
Discharge: HOME | End: 2024-09-13
Payer: MEDICARE

## 2024-09-13 ENCOUNTER — LAB (OUTPATIENT)
Dept: LAB | Facility: HOSPITAL | Age: 89
End: 2024-09-13
Payer: MEDICARE

## 2024-09-13 ENCOUNTER — HOSPITAL ENCOUNTER (OUTPATIENT)
Dept: GENERAL RADIOLOGY | Facility: HOSPITAL | Age: 89
Discharge: HOME | End: 2024-09-13
Payer: MEDICARE

## 2024-09-13 DIAGNOSIS — Z12.5 ENCOUNTER FOR SCREENING FOR MALIGNANT NEOPLASM OF PROSTATE: ICD-10-CM

## 2024-09-13 DIAGNOSIS — Z12.5 ENCOUNTER FOR SCREENING FOR MALIGNANT NEOPLASM OF PROSTATE: Primary | ICD-10-CM

## 2024-09-13 DIAGNOSIS — C34.90 METASTATIC NON-SMALL CELL LUNG CANCER: ICD-10-CM

## 2024-09-13 DIAGNOSIS — R19.7 DIARRHEA, UNSPECIFIED TYPE: ICD-10-CM

## 2024-09-13 DIAGNOSIS — M79.89 LEFT LEG SWELLING: ICD-10-CM

## 2024-09-13 PROCEDURE — 74018 RADEX ABDOMEN 1 VIEW: CPT

## 2024-09-13 PROCEDURE — 85025 COMPLETE CBC W/AUTO DIFF WBC: CPT

## 2024-09-13 PROCEDURE — 36415 COLL VENOUS BLD VENIPUNCTURE: CPT

## 2024-09-13 PROCEDURE — 93971 EXTREMITY STUDY: CPT

## 2024-09-13 PROCEDURE — 80053 COMPREHEN METABOLIC PANEL: CPT

## 2024-09-13 PROCEDURE — G0103 PSA SCREENING: HCPCS

## 2024-09-14 LAB
ALBUMIN SERPL-MCNC: 3.6 G/DL (ref 3.5–5.2)
ALBUMIN/GLOB SERPL: 1.1 G/DL
ALP SERPL-CCNC: 129 U/L (ref 39–117)
ALT SERPL W P-5'-P-CCNC: 14 U/L (ref 1–41)
ANION GAP SERPL CALCULATED.3IONS-SCNC: 11 MMOL/L (ref 5–15)
AST SERPL-CCNC: 23 U/L (ref 1–40)
BASOPHILS # BLD AUTO: 0.15 10*3/MM3 (ref 0–0.2)
BASOPHILS NFR BLD AUTO: 1.6 % (ref 0–1.5)
BILIRUB SERPL-MCNC: 0.6 MG/DL (ref 0–1.2)
BUN SERPL-MCNC: 19 MG/DL (ref 8–23)
BUN/CREAT SERPL: 20 (ref 7–25)
CALCIUM SPEC-SCNC: 9.1 MG/DL (ref 8.6–10.5)
CHLORIDE SERPL-SCNC: 95 MMOL/L (ref 98–107)
CO2 SERPL-SCNC: 25 MMOL/L (ref 22–29)
CREAT SERPL-MCNC: 0.95 MG/DL (ref 0.76–1.27)
DEPRECATED RDW RBC AUTO: 40.8 FL (ref 37–54)
EGFRCR SERPLBLD CKD-EPI 2021: 76.5 ML/MIN/1.73
EOSINOPHIL # BLD AUTO: 0.96 10*3/MM3 (ref 0–0.4)
EOSINOPHIL NFR BLD AUTO: 10.5 % (ref 0.3–6.2)
ERYTHROCYTE [DISTWIDTH] IN BLOOD BY AUTOMATED COUNT: 12.7 % (ref 12.3–15.4)
GLOBULIN UR ELPH-MCNC: 3.2 GM/DL
GLUCOSE SERPL-MCNC: 80 MG/DL (ref 65–99)
HCT VFR BLD AUTO: 30.2 % (ref 37.5–51)
HGB BLD-MCNC: 9.8 G/DL (ref 13–17.7)
IMM GRANULOCYTES # BLD AUTO: 0.03 10*3/MM3 (ref 0–0.05)
IMM GRANULOCYTES NFR BLD AUTO: 0.3 % (ref 0–0.5)
LYMPHOCYTES # BLD AUTO: 1.88 10*3/MM3 (ref 0.7–3.1)
LYMPHOCYTES NFR BLD AUTO: 20.6 % (ref 19.6–45.3)
MCH RBC QN AUTO: 28.7 PG (ref 26.6–33)
MCHC RBC AUTO-ENTMCNC: 32.5 G/DL (ref 31.5–35.7)
MCV RBC AUTO: 88.3 FL (ref 79–97)
MONOCYTES # BLD AUTO: 1.39 10*3/MM3 (ref 0.1–0.9)
MONOCYTES NFR BLD AUTO: 15.3 % (ref 5–12)
NEUTROPHILS NFR BLD AUTO: 4.7 10*3/MM3 (ref 1.7–7)
NEUTROPHILS NFR BLD AUTO: 51.7 % (ref 42.7–76)
NRBC BLD AUTO-RTO: 0 /100 WBC (ref 0–0.2)
PLATELET # BLD AUTO: 428 10*3/MM3 (ref 140–450)
PMV BLD AUTO: 10.4 FL (ref 6–12)
POTASSIUM SERPL-SCNC: 4.7 MMOL/L (ref 3.5–5.2)
PROT SERPL-MCNC: 6.8 G/DL (ref 6–8.5)
PSA SERPL-MCNC: 0.92 NG/ML (ref 0–4)
RBC # BLD AUTO: 3.42 10*6/MM3 (ref 4.14–5.8)
SODIUM SERPL-SCNC: 131 MMOL/L (ref 136–145)
WBC NRBC COR # BLD AUTO: 9.11 10*3/MM3 (ref 3.4–10.8)

## 2024-09-16 ENCOUNTER — TELEPHONE (OUTPATIENT)
Dept: FAMILY MEDICINE CLINIC | Facility: CLINIC | Age: 89
End: 2024-09-16
Payer: MEDICARE

## 2024-09-16 RX ORDER — LEVOTHYROXINE SODIUM 88 UG/1
88 TABLET ORAL DAILY
Qty: 90 TABLET | Refills: 0 | Status: SHIPPED | OUTPATIENT
Start: 2024-09-16

## 2024-09-17 ENCOUNTER — TELEPHONE (OUTPATIENT)
Dept: ONCOLOGY | Facility: CLINIC | Age: 89
End: 2024-09-17
Payer: MEDICARE

## 2024-09-18 ENCOUNTER — HOSPITAL ENCOUNTER (INPATIENT)
Facility: HOSPITAL | Age: 89
LOS: 1 days | Discharge: ANOTHER HOSPITAL | DRG: 309 | End: 2024-09-19
Admitting: INTERNAL MEDICINE
Payer: OTHER GOVERNMENT

## 2024-09-18 ENCOUNTER — OFFICE VISIT (OUTPATIENT)
Dept: ONCOLOGY | Facility: CLINIC | Age: 89
End: 2024-09-18
Payer: MEDICARE

## 2024-09-18 ENCOUNTER — HOSPITAL ENCOUNTER (OUTPATIENT)
Dept: RESPIRATORY THERAPY | Facility: HOSPITAL | Age: 89
Discharge: HOME | End: 2024-09-18
Admitting: NURSE PRACTITIONER
Payer: MEDICARE

## 2024-09-18 ENCOUNTER — INFUSION (OUTPATIENT)
Dept: ONCOLOGY | Facility: HOSPITAL | Age: 89
End: 2024-09-18
Payer: OTHER GOVERNMENT

## 2024-09-18 ENCOUNTER — APPOINTMENT (OUTPATIENT)
Dept: GENERAL RADIOLOGY | Facility: HOSPITAL | Age: 89
DRG: 309 | End: 2024-09-18
Payer: OTHER GOVERNMENT

## 2024-09-18 VITALS
SYSTOLIC BLOOD PRESSURE: 116 MMHG | BODY MASS INDEX: 22.31 KG/M2 | DIASTOLIC BLOOD PRESSURE: 32 MMHG | HEART RATE: 49 BPM | TEMPERATURE: 97.5 F | WEIGHT: 164.5 LBS | RESPIRATION RATE: 18 BRPM | OXYGEN SATURATION: 98 %

## 2024-09-18 VITALS
BODY MASS INDEX: 22.31 KG/M2 | OXYGEN SATURATION: 98 % | HEART RATE: 49 BPM | SYSTOLIC BLOOD PRESSURE: 116 MMHG | WEIGHT: 164.5 LBS | DIASTOLIC BLOOD PRESSURE: 32 MMHG | TEMPERATURE: 97.5 F | RESPIRATION RATE: 18 BRPM

## 2024-09-18 DIAGNOSIS — R00.1 BRADYCARDIA: Primary | ICD-10-CM

## 2024-09-18 DIAGNOSIS — C34.90 METASTATIC NON-SMALL CELL LUNG CANCER: ICD-10-CM

## 2024-09-18 DIAGNOSIS — R00.1 BRADYCARDIA: ICD-10-CM

## 2024-09-18 DIAGNOSIS — L03.119 CELLULITIS OF LOWER EXTREMITY, UNSPECIFIED LATERALITY: ICD-10-CM

## 2024-09-18 LAB
ALBUMIN SERPL-MCNC: 2.8 G/DL (ref 3.5–5.2)
ALBUMIN/GLOB SERPL: 0.7 G/DL
ALP SERPL-CCNC: 119 U/L (ref 39–117)
ALT SERPL W P-5'-P-CCNC: 12 U/L (ref 1–41)
ANION GAP SERPL CALCULATED.3IONS-SCNC: 7.7 MMOL/L (ref 5–15)
ANION GAP SERPL CALCULATED.3IONS-SCNC: 9.1 MMOL/L (ref 5–15)
AST SERPL-CCNC: 26 U/L (ref 1–40)
BASOPHILS # BLD AUTO: 0.08 10*3/MM3 (ref 0–0.2)
BASOPHILS NFR BLD AUTO: 0.9 % (ref 0–1.5)
BILIRUB SERPL-MCNC: 0.4 MG/DL (ref 0–1.2)
BUN SERPL-MCNC: 25 MG/DL (ref 8–23)
BUN SERPL-MCNC: 27 MG/DL (ref 8–23)
BUN/CREAT SERPL: 26 (ref 7–25)
BUN/CREAT SERPL: 26 (ref 7–25)
CALCIUM SPEC-SCNC: 8.6 MG/DL (ref 8.6–10.5)
CALCIUM SPEC-SCNC: 8.6 MG/DL (ref 8.6–10.5)
CHLORIDE SERPL-SCNC: 97 MMOL/L (ref 98–107)
CHLORIDE SERPL-SCNC: 98 MMOL/L (ref 98–107)
CO2 SERPL-SCNC: 22.3 MMOL/L (ref 22–29)
CO2 SERPL-SCNC: 25.9 MMOL/L (ref 22–29)
CREAT SERPL-MCNC: 0.96 MG/DL (ref 0.76–1.27)
CREAT SERPL-MCNC: 1.04 MG/DL (ref 0.76–1.27)
CRP SERPL-MCNC: 0.68 MG/DL (ref 0–0.5)
D-LACTATE SERPL-SCNC: 1.7 MMOL/L (ref 0.5–2)
DEPRECATED RDW RBC AUTO: 54.3 FL (ref 37–54)
EGFRCR SERPLBLD CKD-EPI 2021: 68.6 ML/MIN/1.73
EGFRCR SERPLBLD CKD-EPI 2021: 75.6 ML/MIN/1.73
EOSINOPHIL # BLD AUTO: 1.02 10*3/MM3 (ref 0–0.4)
EOSINOPHIL NFR BLD AUTO: 10.9 % (ref 0.3–6.2)
ERYTHROCYTE [DISTWIDTH] IN BLOOD BY AUTOMATED COUNT: 14.7 % (ref 12.3–15.4)
ERYTHROCYTE [SEDIMENTATION RATE] IN BLOOD: 64 MM/HR (ref 0–20)
GEN 5 2HR TROPONIN T REFLEX: 65 NG/L
GLOBULIN UR ELPH-MCNC: 4 GM/DL
GLUCOSE SERPL-MCNC: 102 MG/DL (ref 65–99)
GLUCOSE SERPL-MCNC: 125 MG/DL (ref 65–99)
HCT VFR BLD AUTO: 36.3 % (ref 37.5–51)
HGB BLD-MCNC: 10.5 G/DL (ref 13–17.7)
HOLD SPECIMEN: NORMAL
HOLD SPECIMEN: NORMAL
HYPOCHROMIA BLD QL: NORMAL
IMM GRANULOCYTES # BLD AUTO: 0.03 10*3/MM3 (ref 0–0.05)
IMM GRANULOCYTES NFR BLD AUTO: 0.3 % (ref 0–0.5)
LYMPHOCYTES # BLD AUTO: 2.14 10*3/MM3 (ref 0.7–3.1)
LYMPHOCYTES NFR BLD AUTO: 22.8 % (ref 19.6–45.3)
MACROCYTES BLD QL SMEAR: NORMAL
MAGNESIUM SERPL-MCNC: 2.5 MG/DL (ref 1.6–2.4)
MCH RBC QN AUTO: 28.7 PG (ref 26.6–33)
MCHC RBC AUTO-ENTMCNC: 28.9 G/DL (ref 31.5–35.7)
MCV RBC AUTO: 99.2 FL (ref 79–97)
MONOCYTES # BLD AUTO: 1.46 10*3/MM3 (ref 0.1–0.9)
MONOCYTES NFR BLD AUTO: 15.6 % (ref 5–12)
NEUTROPHILS NFR BLD AUTO: 4.64 10*3/MM3 (ref 1.7–7)
NEUTROPHILS NFR BLD AUTO: 49.5 % (ref 42.7–76)
NRBC BLD AUTO-RTO: 0 /100 WBC (ref 0–0.2)
PLAT MORPH BLD: NORMAL
PLATELET # BLD AUTO: 295 10*3/MM3 (ref 140–450)
PMV BLD AUTO: 9.8 FL (ref 6–12)
POIKILOCYTOSIS BLD QL SMEAR: NORMAL
POTASSIUM SERPL-SCNC: 5.3 MMOL/L (ref 3.5–5.2)
POTASSIUM SERPL-SCNC: 5.7 MMOL/L (ref 3.5–5.2)
PROT SERPL-MCNC: 6.8 G/DL (ref 6–8.5)
RBC # BLD AUTO: 3.66 10*6/MM3 (ref 4.14–5.8)
SODIUM SERPL-SCNC: 128 MMOL/L (ref 136–145)
SODIUM SERPL-SCNC: 132 MMOL/L (ref 136–145)
TROPONIN T DELTA: 2 NG/L
TROPONIN T SERPL HS-MCNC: 63 NG/L
TSH SERPL DL<=0.05 MIU/L-ACNC: 2.13 UIU/ML (ref 0.27–4.2)
WBC NRBC COR # BLD AUTO: 9.37 10*3/MM3 (ref 3.4–10.8)
WHOLE BLOOD HOLD COAG: NORMAL
WHOLE BLOOD HOLD SPECIMEN: NORMAL

## 2024-09-18 PROCEDURE — 93005 ELECTROCARDIOGRAM TRACING: CPT | Performed by: NURSE PRACTITIONER

## 2024-09-18 PROCEDURE — 1160F RVW MEDS BY RX/DR IN RCRD: CPT | Performed by: NURSE PRACTITIONER

## 2024-09-18 PROCEDURE — 85025 COMPLETE CBC W/AUTO DIFF WBC: CPT

## 2024-09-18 PROCEDURE — 85007 BL SMEAR W/DIFF WBC COUNT: CPT

## 2024-09-18 PROCEDURE — 85652 RBC SED RATE AUTOMATED: CPT

## 2024-09-18 PROCEDURE — 99285 EMERGENCY DEPT VISIT HI MDM: CPT

## 2024-09-18 PROCEDURE — 99223 1ST HOSP IP/OBS HIGH 75: CPT

## 2024-09-18 PROCEDURE — 36415 COLL VENOUS BLD VENIPUNCTURE: CPT

## 2024-09-18 PROCEDURE — 86140 C-REACTIVE PROTEIN: CPT

## 2024-09-18 PROCEDURE — 80053 COMPREHEN METABOLIC PANEL: CPT

## 2024-09-18 PROCEDURE — 87040 BLOOD CULTURE FOR BACTERIA: CPT

## 2024-09-18 PROCEDURE — 71045 X-RAY EXAM CHEST 1 VIEW: CPT | Performed by: RADIOLOGY

## 2024-09-18 PROCEDURE — 1159F MED LIST DOCD IN RCRD: CPT | Performed by: NURSE PRACTITIONER

## 2024-09-18 PROCEDURE — 83605 ASSAY OF LACTIC ACID: CPT

## 2024-09-18 PROCEDURE — 83735 ASSAY OF MAGNESIUM: CPT

## 2024-09-18 PROCEDURE — 25010000002 HEPARIN (PORCINE) PER 1000 UNITS: Performed by: INTERNAL MEDICINE

## 2024-09-18 PROCEDURE — 25010000002 CEFTRIAXONE PER 250 MG

## 2024-09-18 PROCEDURE — 1126F AMNT PAIN NOTED NONE PRSNT: CPT | Performed by: NURSE PRACTITIONER

## 2024-09-18 PROCEDURE — 93005 ELECTROCARDIOGRAM TRACING: CPT | Performed by: INTERNAL MEDICINE

## 2024-09-18 PROCEDURE — 99214 OFFICE O/P EST MOD 30 MIN: CPT | Performed by: NURSE PRACTITIONER

## 2024-09-18 PROCEDURE — 25810000003 SODIUM CHLORIDE 0.9 % SOLUTION

## 2024-09-18 PROCEDURE — 84484 ASSAY OF TROPONIN QUANT: CPT

## 2024-09-18 PROCEDURE — 71045 X-RAY EXAM CHEST 1 VIEW: CPT

## 2024-09-18 PROCEDURE — 93010 ELECTROCARDIOGRAM REPORT: CPT | Performed by: INTERNAL MEDICINE

## 2024-09-18 PROCEDURE — 93005 ELECTROCARDIOGRAM TRACING: CPT

## 2024-09-18 PROCEDURE — 84443 ASSAY THYROID STIM HORMONE: CPT

## 2024-09-18 RX ORDER — POLYETHYLENE GLYCOL 3350 17 G/17G
17 POWDER, FOR SOLUTION ORAL DAILY PRN
Status: DISCONTINUED | OUTPATIENT
Start: 2024-09-18 | End: 2024-09-20 | Stop reason: HOSPADM

## 2024-09-18 RX ORDER — SODIUM CHLORIDE 0.9 % (FLUSH) 0.9 %
10 SYRINGE (ML) INJECTION AS NEEDED
Status: DISCONTINUED | OUTPATIENT
Start: 2024-09-18 | End: 2024-09-20 | Stop reason: HOSPADM

## 2024-09-18 RX ORDER — NICOTINE POLACRILEX 4 MG
15 LOZENGE BUCCAL
Status: DISCONTINUED | OUTPATIENT
Start: 2024-09-18 | End: 2024-09-20 | Stop reason: HOSPADM

## 2024-09-18 RX ORDER — SODIUM CHLORIDE 9 MG/ML
40 INJECTION, SOLUTION INTRAVENOUS AS NEEDED
Status: DISCONTINUED | OUTPATIENT
Start: 2024-09-18 | End: 2024-09-20 | Stop reason: HOSPADM

## 2024-09-18 RX ORDER — HYDRALAZINE HYDROCHLORIDE 20 MG/ML
10 INJECTION INTRAMUSCULAR; INTRAVENOUS EVERY 6 HOURS PRN
Status: DISCONTINUED | OUTPATIENT
Start: 2024-09-18 | End: 2024-09-20 | Stop reason: HOSPADM

## 2024-09-18 RX ORDER — ACETAMINOPHEN 160 MG/5ML
650 SOLUTION ORAL EVERY 4 HOURS PRN
Status: DISCONTINUED | OUTPATIENT
Start: 2024-09-18 | End: 2024-09-20 | Stop reason: HOSPADM

## 2024-09-18 RX ORDER — ACETAMINOPHEN 650 MG/1
650 SUPPOSITORY RECTAL EVERY 4 HOURS PRN
Status: DISCONTINUED | OUTPATIENT
Start: 2024-09-18 | End: 2024-09-20 | Stop reason: HOSPADM

## 2024-09-18 RX ORDER — NITROGLYCERIN 0.4 MG/1
0.4 TABLET SUBLINGUAL
Status: DISCONTINUED | OUTPATIENT
Start: 2024-09-18 | End: 2024-09-20 | Stop reason: HOSPADM

## 2024-09-18 RX ORDER — SODIUM CHLORIDE 9 MG/ML
500 INJECTION, SOLUTION INTRAVENOUS ONCE
Status: DISCONTINUED | OUTPATIENT
Start: 2024-09-18 | End: 2025-04-03 | Stop reason: HOSPADM

## 2024-09-18 RX ORDER — BISACODYL 10 MG
10 SUPPOSITORY, RECTAL RECTAL DAILY PRN
Status: DISCONTINUED | OUTPATIENT
Start: 2024-09-18 | End: 2024-09-20 | Stop reason: HOSPADM

## 2024-09-18 RX ORDER — IPRATROPIUM BROMIDE AND ALBUTEROL SULFATE 2.5; .5 MG/3ML; MG/3ML
3 SOLUTION RESPIRATORY (INHALATION) EVERY 4 HOURS PRN
Status: DISCONTINUED | OUTPATIENT
Start: 2024-09-18 | End: 2024-09-20 | Stop reason: HOSPADM

## 2024-09-18 RX ORDER — DEXTROSE MONOHYDRATE 25 G/50ML
25 INJECTION, SOLUTION INTRAVENOUS
Status: DISCONTINUED | OUTPATIENT
Start: 2024-09-18 | End: 2024-09-20 | Stop reason: HOSPADM

## 2024-09-18 RX ORDER — SODIUM CHLORIDE 0.9 % (FLUSH) 0.9 %
10 SYRINGE (ML) INJECTION EVERY 12 HOURS SCHEDULED
Status: DISCONTINUED | OUTPATIENT
Start: 2024-09-18 | End: 2024-09-20 | Stop reason: HOSPADM

## 2024-09-18 RX ORDER — ACETAMINOPHEN 325 MG/1
650 TABLET ORAL EVERY 4 HOURS PRN
Status: DISCONTINUED | OUTPATIENT
Start: 2024-09-18 | End: 2024-09-20 | Stop reason: HOSPADM

## 2024-09-18 RX ORDER — AZELASTINE 1 MG/ML
2 SPRAY, METERED NASAL 2 TIMES DAILY PRN
COMMUNITY

## 2024-09-18 RX ORDER — HEPARIN SODIUM 5000 [USP'U]/ML
5000 INJECTION, SOLUTION INTRAVENOUS; SUBCUTANEOUS EVERY 12 HOURS SCHEDULED
Status: DISCONTINUED | OUTPATIENT
Start: 2024-09-18 | End: 2024-09-20 | Stop reason: HOSPADM

## 2024-09-18 RX ORDER — AMOXICILLIN 250 MG
2 CAPSULE ORAL 2 TIMES DAILY
Status: DISCONTINUED | OUTPATIENT
Start: 2024-09-18 | End: 2024-09-20 | Stop reason: HOSPADM

## 2024-09-18 RX ORDER — GLUCAGON 1 MG/ML
1 KIT INJECTION
Status: DISCONTINUED | OUTPATIENT
Start: 2024-09-18 | End: 2024-09-20 | Stop reason: HOSPADM

## 2024-09-18 RX ORDER — BISACODYL 5 MG/1
5 TABLET, DELAYED RELEASE ORAL DAILY PRN
Status: DISCONTINUED | OUTPATIENT
Start: 2024-09-18 | End: 2024-09-20 | Stop reason: HOSPADM

## 2024-09-18 RX ADMIN — HEPARIN SODIUM 5000 UNITS: 5000 INJECTION INTRAVENOUS; SUBCUTANEOUS at 23:09

## 2024-09-18 RX ADMIN — SODIUM CHLORIDE 1000 ML: 9 INJECTION, SOLUTION INTRAVENOUS at 18:14

## 2024-09-18 RX ADMIN — SODIUM ZIRCONIUM CYCLOSILICATE 10 G: 10 POWDER, FOR SUSPENSION ORAL at 23:10

## 2024-09-18 RX ADMIN — CEFTRIAXONE 1000 MG: 1 INJECTION, POWDER, FOR SOLUTION INTRAMUSCULAR; INTRAVENOUS at 20:45

## 2024-09-18 RX ADMIN — Medication 10 ML: at 23:10

## 2024-09-19 ENCOUNTER — APPOINTMENT (OUTPATIENT)
Dept: CARDIOLOGY | Facility: HOSPITAL | Age: 89
DRG: 309 | End: 2024-09-19
Payer: OTHER GOVERNMENT

## 2024-09-19 VITALS
DIASTOLIC BLOOD PRESSURE: 56 MMHG | BODY MASS INDEX: 22.37 KG/M2 | WEIGHT: 165.2 LBS | RESPIRATION RATE: 18 BRPM | HEIGHT: 72 IN | TEMPERATURE: 97.9 F | HEART RATE: 79 BPM | OXYGEN SATURATION: 97 % | SYSTOLIC BLOOD PRESSURE: 133 MMHG

## 2024-09-19 DIAGNOSIS — C34.90 METASTATIC NON-SMALL CELL LUNG CANCER: Primary | ICD-10-CM

## 2024-09-19 LAB
ALBUMIN SERPL-MCNC: 3.2 G/DL (ref 3.5–5.2)
ALBUMIN/GLOB SERPL: 0.9 G/DL
ALP SERPL-CCNC: 109 U/L (ref 39–117)
ALT SERPL W P-5'-P-CCNC: 14 U/L (ref 1–41)
ANION GAP SERPL CALCULATED.3IONS-SCNC: 7.2 MMOL/L (ref 5–15)
AST SERPL-CCNC: 23 U/L (ref 1–40)
BASOPHILS # BLD AUTO: 0.11 10*3/MM3 (ref 0–0.2)
BASOPHILS NFR BLD AUTO: 1 % (ref 0–1.5)
BH CV ECHO MEAS - AO MAX PG: 23.1 MMHG
BH CV ECHO MEAS - AO MEAN PG: 11.3 MMHG
BH CV ECHO MEAS - AO V2 MAX: 240 CM/SEC
BH CV ECHO MEAS - AO V2 VTI: 51.1 CM
BH CV ECHO MEAS - AVA(I,D): 1.2 CM2
BH CV ECHO MEAS - EDV(MOD-SP4): 55.8 ML
BH CV ECHO MEAS - EF(MOD-SP4): 64.7 %
BH CV ECHO MEAS - ESV(MOD-SP4): 19.7 ML
BH CV ECHO MEAS - LV DIASTOLIC VOL/BSA (35-75): 28.4 CM2
BH CV ECHO MEAS - LV MAX PG: 4.2 MMHG
BH CV ECHO MEAS - LV MEAN PG: 2 MMHG
BH CV ECHO MEAS - LV SYSTOLIC VOL/BSA (12-30): 10 CM2
BH CV ECHO MEAS - LV V1 MAX: 103 CM/SEC
BH CV ECHO MEAS - LV V1 VTI: 21.7 CM
BH CV ECHO MEAS - LVOT AREA: 2.8 CM2
BH CV ECHO MEAS - LVOT DIAM: 1.9 CM
BH CV ECHO MEAS - MV A MAX VEL: 75.1 CM/SEC
BH CV ECHO MEAS - MV E MAX VEL: 162 CM/SEC
BH CV ECHO MEAS - MV E/A: 2.16
BH CV ECHO MEAS - PA ACC TIME: 0.12 SEC
BH CV ECHO MEAS - RAP SYSTOLE: 10 MMHG
BH CV ECHO MEAS - RVSP: 42.3 MMHG
BH CV ECHO MEAS - SV(LVOT): 61.5 ML
BH CV ECHO MEAS - SV(MOD-SP4): 36.1 ML
BH CV ECHO MEAS - SVI(LVOT): 31.3 ML/M2
BH CV ECHO MEAS - SVI(MOD-SP4): 18.4 ML/M2
BH CV ECHO MEAS - TR MAX PG: 32.3 MMHG
BH CV ECHO MEAS - TR MAX VEL: 284 CM/SEC
BILIRUB SERPL-MCNC: 0.3 MG/DL (ref 0–1.2)
BUN SERPL-MCNC: 24 MG/DL (ref 8–23)
BUN/CREAT SERPL: 26.7 (ref 7–25)
CALCIUM SPEC-SCNC: 8.5 MG/DL (ref 8.6–10.5)
CHLORIDE SERPL-SCNC: 97 MMOL/L (ref 98–107)
CO2 SERPL-SCNC: 25.8 MMOL/L (ref 22–29)
CREAT SERPL-MCNC: 0.9 MG/DL (ref 0.76–1.27)
DEPRECATED RDW RBC AUTO: 49.9 FL (ref 37–54)
EGFRCR SERPLBLD CKD-EPI 2021: 81.6 ML/MIN/1.73
EOSINOPHIL # BLD AUTO: 0.32 10*3/MM3 (ref 0–0.4)
EOSINOPHIL NFR BLD AUTO: 2.9 % (ref 0.3–6.2)
ERYTHROCYTE [DISTWIDTH] IN BLOOD BY AUTOMATED COUNT: 14.5 % (ref 12.3–15.4)
GLOBULIN UR ELPH-MCNC: 3.5 GM/DL
GLUCOSE BLDC GLUCOMTR-MCNC: 102 MG/DL (ref 70–130)
GLUCOSE BLDC GLUCOMTR-MCNC: 131 MG/DL (ref 70–130)
GLUCOSE BLDC GLUCOMTR-MCNC: 138 MG/DL (ref 70–130)
GLUCOSE BLDC GLUCOMTR-MCNC: 171 MG/DL (ref 70–130)
GLUCOSE BLDC GLUCOMTR-MCNC: 94 MG/DL (ref 70–130)
GLUCOSE SERPL-MCNC: 153 MG/DL (ref 65–99)
HCT VFR BLD AUTO: 31.1 % (ref 37.5–51)
HGB BLD-MCNC: 9.7 G/DL (ref 13–17.7)
IMM GRANULOCYTES # BLD AUTO: 0.04 10*3/MM3 (ref 0–0.05)
IMM GRANULOCYTES NFR BLD AUTO: 0.4 % (ref 0–0.5)
LV EF 2D ECHO EST: 55 %
LYMPHOCYTES # BLD AUTO: 1.23 10*3/MM3 (ref 0.7–3.1)
LYMPHOCYTES NFR BLD AUTO: 11.1 % (ref 19.6–45.3)
MCH RBC QN AUTO: 29.2 PG (ref 26.6–33)
MCHC RBC AUTO-ENTMCNC: 31.2 G/DL (ref 31.5–35.7)
MCV RBC AUTO: 93.7 FL (ref 79–97)
MONOCYTES # BLD AUTO: 1.31 10*3/MM3 (ref 0.1–0.9)
MONOCYTES NFR BLD AUTO: 11.8 % (ref 5–12)
NEUTROPHILS NFR BLD AUTO: 72.8 % (ref 42.7–76)
NEUTROPHILS NFR BLD AUTO: 8.1 10*3/MM3 (ref 1.7–7)
NRBC BLD AUTO-RTO: 0 /100 WBC (ref 0–0.2)
PLATELET # BLD AUTO: 293 10*3/MM3 (ref 140–450)
PMV BLD AUTO: 9.9 FL (ref 6–12)
POTASSIUM SERPL-SCNC: 4.9 MMOL/L (ref 3.5–5.2)
PROT SERPL-MCNC: 6.7 G/DL (ref 6–8.5)
QT INTERVAL: 434 MS
QT INTERVAL: 456 MS
QT INTERVAL: 486 MS
QT INTERVAL: 502 MS
QT INTERVAL: 526 MS
QT INTERVAL: 530 MS
QTC INTERVAL: 409 MS
QTC INTERVAL: 412 MS
QTC INTERVAL: 415 MS
QTC INTERVAL: 415 MS
QTC INTERVAL: 424 MS
QTC INTERVAL: 453 MS
RBC # BLD AUTO: 3.32 10*6/MM3 (ref 4.14–5.8)
SODIUM SERPL-SCNC: 130 MMOL/L (ref 136–145)
WBC NRBC COR # BLD AUTO: 11.11 10*3/MM3 (ref 3.4–10.8)

## 2024-09-19 PROCEDURE — 94799 UNLISTED PULMONARY SVC/PX: CPT

## 2024-09-19 PROCEDURE — 93306 TTE W/DOPPLER COMPLETE: CPT | Performed by: INTERNAL MEDICINE

## 2024-09-19 PROCEDURE — 93010 ELECTROCARDIOGRAM REPORT: CPT | Performed by: INTERNAL MEDICINE

## 2024-09-19 PROCEDURE — 82948 REAGENT STRIP/BLOOD GLUCOSE: CPT

## 2024-09-19 PROCEDURE — 80053 COMPREHEN METABOLIC PANEL: CPT | Performed by: INTERNAL MEDICINE

## 2024-09-19 PROCEDURE — 94664 DEMO&/EVAL PT USE INHALER: CPT

## 2024-09-19 PROCEDURE — 85025 COMPLETE CBC W/AUTO DIFF WBC: CPT | Performed by: INTERNAL MEDICINE

## 2024-09-19 PROCEDURE — 97162 PT EVAL MOD COMPLEX 30 MIN: CPT

## 2024-09-19 PROCEDURE — 94761 N-INVAS EAR/PLS OXIMETRY MLT: CPT

## 2024-09-19 PROCEDURE — 93005 ELECTROCARDIOGRAM TRACING: CPT

## 2024-09-19 PROCEDURE — 94640 AIRWAY INHALATION TREATMENT: CPT

## 2024-09-19 PROCEDURE — 93306 TTE W/DOPPLER COMPLETE: CPT

## 2024-09-19 PROCEDURE — 93005 ELECTROCARDIOGRAM TRACING: CPT | Performed by: INTERNAL MEDICINE

## 2024-09-19 PROCEDURE — 99239 HOSP IP/OBS DSCHRG MGMT >30: CPT | Performed by: INTERNAL MEDICINE

## 2024-09-19 RX ORDER — ONDANSETRON 4 MG/1
4 TABLET, ORALLY DISINTEGRATING ORAL EVERY 8 HOURS PRN
Status: DISCONTINUED | OUTPATIENT
Start: 2024-09-19 | End: 2024-09-20 | Stop reason: HOSPADM

## 2024-09-19 RX ORDER — LEVOTHYROXINE SODIUM 88 UG/1
88 TABLET ORAL
Status: DISCONTINUED | OUTPATIENT
Start: 2024-09-19 | End: 2024-09-20 | Stop reason: HOSPADM

## 2024-09-19 RX ORDER — BUSPIRONE HYDROCHLORIDE 5 MG/1
10 TABLET ORAL EVERY MORNING
COMMUNITY

## 2024-09-19 RX ORDER — SODIUM CHLORIDE 9 MG/ML
20 INJECTION, SOLUTION INTRAVENOUS ONCE
OUTPATIENT
Start: 2024-10-15

## 2024-09-19 RX ORDER — ALBUTEROL SULFATE 0.83 MG/ML
2.5 SOLUTION RESPIRATORY (INHALATION) EVERY 4 HOURS PRN
Status: CANCELLED | OUTPATIENT
Start: 2024-09-19

## 2024-09-19 RX ORDER — POLYETHYLENE GLYCOL 3350 17 G/17G
17 POWDER, FOR SOLUTION ORAL DAILY PRN
COMMUNITY

## 2024-09-19 RX ORDER — BUSPIRONE HYDROCHLORIDE 10 MG/1
10 TABLET ORAL EVERY MORNING
Status: CANCELLED | OUTPATIENT
Start: 2024-09-19

## 2024-09-19 RX ORDER — BUSPIRONE HYDROCHLORIDE 5 MG/1
5 TABLET ORAL EVERY EVENING
Status: CANCELLED | OUTPATIENT
Start: 2024-09-19

## 2024-09-19 RX ORDER — SULFAMETHOXAZOLE/TRIMETHOPRIM 800-160 MG
1 TABLET ORAL 2 TIMES DAILY
Qty: 20 TABLET | Refills: 0 | Status: SHIPPED | OUTPATIENT
Start: 2024-09-19 | End: 2024-09-29

## 2024-09-19 RX ORDER — BUSPIRONE HYDROCHLORIDE 5 MG/1
5 TABLET ORAL EVERY 8 HOURS SCHEDULED
Status: DISCONTINUED | OUTPATIENT
Start: 2024-09-19 | End: 2024-09-20 | Stop reason: HOSPADM

## 2024-09-19 RX ORDER — AMOXICILLIN 250 MG
1 CAPSULE ORAL DAILY PRN
Status: CANCELLED | OUTPATIENT
Start: 2024-09-19

## 2024-09-19 RX ORDER — BUSPIRONE HYDROCHLORIDE 5 MG/1
5 TABLET ORAL EVERY EVENING
COMMUNITY

## 2024-09-19 RX ORDER — LEVOTHYROXINE SODIUM 88 UG/1
88 TABLET ORAL DAILY
Status: CANCELLED | OUTPATIENT
Start: 2024-09-19

## 2024-09-19 RX ORDER — AMOXICILLIN 250 MG
1 CAPSULE ORAL DAILY PRN
COMMUNITY

## 2024-09-19 RX ORDER — POLYETHYLENE GLYCOL 3350 17 G/17G
17 POWDER, FOR SOLUTION ORAL DAILY
Status: DISCONTINUED | OUTPATIENT
Start: 2024-09-19 | End: 2024-09-20 | Stop reason: HOSPADM

## 2024-09-19 RX ORDER — HYDROCORTISONE 2.5 %
1 CREAM (GRAM) TOPICAL 2 TIMES DAILY PRN
COMMUNITY

## 2024-09-19 RX ORDER — HYDROCORTISONE 2.5 %
1 CREAM (GRAM) TOPICAL 2 TIMES DAILY PRN
Status: DISCONTINUED | OUTPATIENT
Start: 2024-09-19 | End: 2024-09-20 | Stop reason: HOSPADM

## 2024-09-19 RX ORDER — MULTIPLE VITAMINS W/ MINERALS TAB 9MG-400MCG
1 TAB ORAL DAILY
Status: DISCONTINUED | OUTPATIENT
Start: 2024-09-19 | End: 2024-09-20 | Stop reason: HOSPADM

## 2024-09-19 RX ADMIN — BUSPIRONE HYDROCHLORIDE 5 MG: 5 TABLET ORAL at 02:46

## 2024-09-19 RX ADMIN — IPRATROPIUM BROMIDE AND ALBUTEROL SULFATE 3 ML: .5; 2.5 SOLUTION RESPIRATORY (INHALATION) at 00:00

## 2024-09-19 RX ADMIN — BUSPIRONE HYDROCHLORIDE 5 MG: 5 TABLET ORAL at 20:15

## 2024-09-19 RX ADMIN — Medication 10 ML: at 08:09

## 2024-09-19 RX ADMIN — IPRATROPIUM BROMIDE AND ALBUTEROL SULFATE 3 ML: .5; 2.5 SOLUTION RESPIRATORY (INHALATION) at 10:58

## 2024-09-19 RX ADMIN — POLYETHYLENE GLYCOL (3350) 17 G: 17 POWDER, FOR SOLUTION ORAL at 08:09

## 2024-09-19 RX ADMIN — Medication 1 TABLET: at 08:08

## 2024-09-19 RX ADMIN — ACETAMINOPHEN 650 MG: 325 TABLET ORAL at 06:37

## 2024-09-19 RX ADMIN — SENNOSIDES AND DOCUSATE SODIUM 2 TABLET: 50; 8.6 TABLET ORAL at 08:08

## 2024-09-19 RX ADMIN — SENNOSIDES AND DOCUSATE SODIUM 2 TABLET: 50; 8.6 TABLET ORAL at 20:15

## 2024-09-19 RX ADMIN — BUSPIRONE HYDROCHLORIDE 5 MG: 5 TABLET ORAL at 13:52

## 2024-09-19 RX ADMIN — LEVOTHYROXINE SODIUM 88 MCG: 88 TABLET ORAL at 05:02

## 2024-09-23 LAB — BACTERIA SPEC AEROBE CULT: NORMAL

## 2024-09-24 ENCOUNTER — APPOINTMENT (OUTPATIENT)
Dept: CT IMAGING | Facility: HOSPITAL | Age: 89
End: 2024-09-24
Payer: OTHER GOVERNMENT

## 2024-09-24 ENCOUNTER — OFFICE VISIT (OUTPATIENT)
Dept: ONCOLOGY | Facility: CLINIC | Age: 89
End: 2024-09-24
Payer: MEDICARE

## 2024-09-24 ENCOUNTER — INFUSION (OUTPATIENT)
Dept: ONCOLOGY | Facility: HOSPITAL | Age: 89
End: 2024-09-24
Payer: OTHER GOVERNMENT

## 2024-09-24 ENCOUNTER — LAB (OUTPATIENT)
Dept: ONCOLOGY | Facility: CLINIC | Age: 89
End: 2024-09-24
Payer: MEDICARE

## 2024-09-24 ENCOUNTER — HOSPITAL ENCOUNTER (OUTPATIENT)
Dept: CT IMAGING | Facility: HOSPITAL | Age: 89
Discharge: HOME | End: 2024-09-24
Payer: OTHER GOVERNMENT

## 2024-09-24 VITALS
RESPIRATION RATE: 20 BRPM | SYSTOLIC BLOOD PRESSURE: 120 MMHG | HEART RATE: 59 BPM | TEMPERATURE: 96.9 F | OXYGEN SATURATION: 97 % | WEIGHT: 168 LBS | BODY MASS INDEX: 22.78 KG/M2 | DIASTOLIC BLOOD PRESSURE: 70 MMHG

## 2024-09-24 VITALS
TEMPERATURE: 97.5 F | OXYGEN SATURATION: 98 % | RESPIRATION RATE: 18 BRPM | HEART RATE: 66 BPM | DIASTOLIC BLOOD PRESSURE: 51 MMHG | SYSTOLIC BLOOD PRESSURE: 117 MMHG

## 2024-09-24 DIAGNOSIS — C34.90 METASTATIC NON-SMALL CELL LUNG CANCER: Primary | ICD-10-CM

## 2024-09-24 DIAGNOSIS — Z29.89 ENCOUNTER FOR IMMUNOTHERAPY: ICD-10-CM

## 2024-09-24 DIAGNOSIS — E61.1 IRON DEFICIENCY: ICD-10-CM

## 2024-09-24 DIAGNOSIS — C34.90 METASTATIC NON-SMALL CELL LUNG CANCER: ICD-10-CM

## 2024-09-24 LAB
ALBUMIN SERPL-MCNC: 3.4 G/DL (ref 3.5–5.2)
ALBUMIN/GLOB SERPL: 0.9 G/DL
ALP SERPL-CCNC: 108 U/L (ref 39–117)
ALT SERPL W P-5'-P-CCNC: 13 U/L (ref 1–41)
ANION GAP SERPL CALCULATED.3IONS-SCNC: 8.7 MMOL/L (ref 5–15)
AST SERPL-CCNC: 24 U/L (ref 1–40)
BASOPHILS # BLD AUTO: 0.13 10*3/MM3 (ref 0–0.2)
BASOPHILS NFR BLD AUTO: 1.4 % (ref 0–1.5)
BILIRUB SERPL-MCNC: 0.4 MG/DL (ref 0–1.2)
BUN SERPL-MCNC: 23 MG/DL (ref 8–23)
BUN/CREAT SERPL: 25.6 (ref 7–25)
CALCIUM SPEC-SCNC: 8.5 MG/DL (ref 8.6–10.5)
CHLORIDE SERPL-SCNC: 96 MMOL/L (ref 98–107)
CO2 SERPL-SCNC: 25.3 MMOL/L (ref 22–29)
CREAT BLDA-MCNC: 1 MG/DL (ref 0.6–1.3)
CREAT SERPL-MCNC: 0.9 MG/DL (ref 0.76–1.27)
DEPRECATED RDW RBC AUTO: 49.2 FL (ref 37–54)
EGFRCR SERPLBLD CKD-EPI 2021: 81.6 ML/MIN/1.73
EOSINOPHIL # BLD AUTO: 1.57 10*3/MM3 (ref 0–0.4)
EOSINOPHIL NFR BLD AUTO: 17.3 % (ref 0.3–6.2)
ERYTHROCYTE [DISTWIDTH] IN BLOOD BY AUTOMATED COUNT: 14.3 % (ref 12.3–15.4)
GLOBULIN UR ELPH-MCNC: 3.6 GM/DL
GLUCOSE SERPL-MCNC: 109 MG/DL (ref 65–99)
HCT VFR BLD AUTO: 31 % (ref 37.5–51)
HGB BLD-MCNC: 9.8 G/DL (ref 13–17.7)
IMM GRANULOCYTES # BLD AUTO: 0.02 10*3/MM3 (ref 0–0.05)
IMM GRANULOCYTES NFR BLD AUTO: 0.2 % (ref 0–0.5)
LYMPHOCYTES # BLD AUTO: 1.33 10*3/MM3 (ref 0.7–3.1)
LYMPHOCYTES NFR BLD AUTO: 14.7 % (ref 19.6–45.3)
MCH RBC QN AUTO: 29.6 PG (ref 26.6–33)
MCHC RBC AUTO-ENTMCNC: 31.6 G/DL (ref 31.5–35.7)
MCV RBC AUTO: 93.7 FL (ref 79–97)
MONOCYTES # BLD AUTO: 1.35 10*3/MM3 (ref 0.1–0.9)
MONOCYTES NFR BLD AUTO: 14.9 % (ref 5–12)
NEUTROPHILS NFR BLD AUTO: 4.66 10*3/MM3 (ref 1.7–7)
NEUTROPHILS NFR BLD AUTO: 51.5 % (ref 42.7–76)
NRBC BLD AUTO-RTO: 0 /100 WBC (ref 0–0.2)
PLATELET # BLD AUTO: 285 10*3/MM3 (ref 140–450)
PMV BLD AUTO: 10 FL (ref 6–12)
POTASSIUM SERPL-SCNC: 4.9 MMOL/L (ref 3.5–5.2)
PROT SERPL-MCNC: 7 G/DL (ref 6–8.5)
RBC # BLD AUTO: 3.31 10*6/MM3 (ref 4.14–5.8)
SODIUM SERPL-SCNC: 130 MMOL/L (ref 136–145)
WBC NRBC COR # BLD AUTO: 9.06 10*3/MM3 (ref 3.4–10.8)

## 2024-09-24 PROCEDURE — 71260 CT THORAX DX C+: CPT | Performed by: RADIOLOGY

## 2024-09-24 PROCEDURE — 25010000002 PEMBROLIZUMAB 100 MG/4ML SOLUTION 4 ML VIAL: Performed by: INTERNAL MEDICINE

## 2024-09-24 PROCEDURE — 99214 OFFICE O/P EST MOD 30 MIN: CPT | Performed by: INTERNAL MEDICINE

## 2024-09-24 PROCEDURE — 25810000003 SODIUM CHLORIDE 0.9 % SOLUTION: Performed by: INTERNAL MEDICINE

## 2024-09-24 PROCEDURE — 1126F AMNT PAIN NOTED NONE PRSNT: CPT | Performed by: INTERNAL MEDICINE

## 2024-09-24 PROCEDURE — 96413 CHEMO IV INFUSION 1 HR: CPT

## 2024-09-24 PROCEDURE — 71260 CT THORAX DX C+: CPT

## 2024-09-24 PROCEDURE — 85025 COMPLETE CBC W/AUTO DIFF WBC: CPT | Performed by: INTERNAL MEDICINE

## 2024-09-24 PROCEDURE — 80053 COMPREHEN METABOLIC PANEL: CPT | Performed by: INTERNAL MEDICINE

## 2024-09-24 PROCEDURE — 82565 ASSAY OF CREATININE: CPT

## 2024-09-24 PROCEDURE — 25510000001 IOPAMIDOL 61 % SOLUTION: Performed by: INTERNAL MEDICINE

## 2024-09-24 RX ORDER — IOPAMIDOL 612 MG/ML
100 INJECTION, SOLUTION INTRAVASCULAR
Status: COMPLETED | OUTPATIENT
Start: 2024-09-24 | End: 2024-09-24

## 2024-09-24 RX ORDER — SODIUM CHLORIDE 9 MG/ML
20 INJECTION, SOLUTION INTRAVENOUS ONCE
Status: COMPLETED | OUTPATIENT
Start: 2024-09-24 | End: 2024-09-24

## 2024-09-24 RX ORDER — CEPHALEXIN 500 MG/1
500 CAPSULE ORAL 3 TIMES DAILY
COMMUNITY
Start: 2024-09-21 | End: 2024-09-28

## 2024-09-24 RX ADMIN — SODIUM CHLORIDE 200 MG: 9 INJECTION, SOLUTION INTRAVENOUS at 14:08

## 2024-09-24 RX ADMIN — SODIUM CHLORIDE 20 ML/HR: 9 INJECTION, SOLUTION INTRAVENOUS at 14:08

## 2024-09-24 RX ADMIN — IOPAMIDOL 89 ML: 612 INJECTION, SOLUTION INTRAVENOUS at 16:25

## 2024-09-27 ENCOUNTER — TELEPHONE (OUTPATIENT)
Dept: ONCOLOGY | Facility: CLINIC | Age: 89
End: 2024-09-27
Payer: MEDICARE

## 2024-10-07 ENCOUNTER — OFFICE VISIT (OUTPATIENT)
Dept: FAMILY MEDICINE CLINIC | Facility: CLINIC | Age: 89
End: 2024-10-07
Payer: MEDICARE

## 2024-10-07 VITALS
HEART RATE: 64 BPM | HEIGHT: 72 IN | TEMPERATURE: 97.1 F | SYSTOLIC BLOOD PRESSURE: 128 MMHG | DIASTOLIC BLOOD PRESSURE: 62 MMHG | BODY MASS INDEX: 22.29 KG/M2 | RESPIRATION RATE: 16 BRPM | WEIGHT: 164.6 LBS | OXYGEN SATURATION: 98 %

## 2024-10-07 DIAGNOSIS — Z23 IMMUNIZATION DUE: ICD-10-CM

## 2024-10-07 DIAGNOSIS — F41.9 ANXIETY: ICD-10-CM

## 2024-10-07 DIAGNOSIS — L30.9 DERMATITIS: ICD-10-CM

## 2024-10-07 DIAGNOSIS — Z00.00 ENCOUNTER FOR SUBSEQUENT ANNUAL WELLNESS VISIT IN MEDICARE PATIENT: Primary | ICD-10-CM

## 2024-10-07 PROCEDURE — G0439 PPPS, SUBSEQ VISIT: HCPCS | Performed by: FAMILY MEDICINE

## 2024-10-07 PROCEDURE — 1159F MED LIST DOCD IN RCRD: CPT | Performed by: FAMILY MEDICINE

## 2024-10-07 PROCEDURE — 96160 PT-FOCUSED HLTH RISK ASSMT: CPT | Performed by: FAMILY MEDICINE

## 2024-10-07 PROCEDURE — 90662 IIV NO PRSV INCREASED AG IM: CPT | Performed by: FAMILY MEDICINE

## 2024-10-07 PROCEDURE — G0008 ADMIN INFLUENZA VIRUS VAC: HCPCS | Performed by: FAMILY MEDICINE

## 2024-10-07 PROCEDURE — 1126F AMNT PAIN NOTED NONE PRSNT: CPT | Performed by: FAMILY MEDICINE

## 2024-10-07 PROCEDURE — 1160F RVW MEDS BY RX/DR IN RCRD: CPT | Performed by: FAMILY MEDICINE

## 2024-10-07 RX ORDER — LORATADINE 10 MG/1
10 TABLET ORAL DAILY
COMMUNITY
End: 2024-10-07 | Stop reason: SDUPTHER

## 2024-10-07 RX ORDER — BUSPIRONE HYDROCHLORIDE 5 MG/1
5 TABLET ORAL EVERY EVENING
Qty: 90 TABLET | Refills: 1 | Status: SHIPPED | OUTPATIENT
Start: 2024-10-07

## 2024-10-07 RX ORDER — LORATADINE 10 MG/1
10 TABLET ORAL DAILY
Qty: 90 TABLET | Refills: 1 | Status: SHIPPED | OUTPATIENT
Start: 2024-10-07 | End: 2024-10-11

## 2024-10-07 RX ORDER — BUSPIRONE HYDROCHLORIDE 5 MG/1
10 TABLET ORAL EVERY MORNING
Qty: 180 TABLET | Refills: 1 | Status: SHIPPED | OUTPATIENT
Start: 2024-10-07

## 2024-10-09 ENCOUNTER — PATIENT OUTREACH (OUTPATIENT)
Dept: CASE MANAGEMENT | Facility: OTHER | Age: 89
End: 2024-10-09
Payer: MEDICARE

## 2024-10-09 NOTE — OUTREACH NOTE
AMBULATORY CASE MANAGEMENT NOTE    Names and Relationships of Patient/Support Persons: Contact: BECKADILAN; Relationship: Emergency Contact -     Patient Outreach    Mrs. Fonseca reports patient is tired most of the time but she feels he is slowly improving. Denies pain, eating and hydrating well.  Wife reports patient urinates approximately twice an hour.  Denies burning, pain or discomfort with urination, denies problems with stream. Utilizes urinal and wears Depends. Ambulates with cane. Wears oxygen continuously at 2 liters. Has large stationary concentrator and uses portable concentrator when he moves from room to room and leaves home. Tubing extensions determined a safety hazard. Complains portable concentrator too heavy for patient -weighs approximately 5#, currently using shoulder strap.  Assured Mrs. Fonseca that there are no lighter ones available at this time. Suggested if ambulation becomes too difficult with cane carrying oxygen, may consider walker with basket.  Denies other needs or concerns at this time.    Education Documentation  No documentation found.        Arlyn COLE  Ambulatory Case Management    10/9/2024, 16:30 EDT

## 2024-10-10 ENCOUNTER — TELEPHONE (OUTPATIENT)
Dept: FAMILY MEDICINE CLINIC | Facility: CLINIC | Age: 89
End: 2024-10-10
Payer: MEDICARE

## 2024-10-10 DIAGNOSIS — L30.9 DERMATITIS: ICD-10-CM

## 2024-10-10 RX ORDER — LORATADINE 10 MG/1
10 TABLET ORAL DAILY
Qty: 90 TABLET | Refills: 1 | Status: CANCELLED | OUTPATIENT
Start: 2024-10-10

## 2024-10-10 NOTE — TELEPHONE ENCOUNTER
THE PATIENT'S WIFE CALLED AND WOULD LIKE TO KNOW IF THE PATIENT CAN TAKE THE LORATADINE MORE THAN 1 TIME A DAY.  PLEASE CALL DILAN  [phone]

## 2024-10-10 NOTE — TELEPHONE ENCOUNTER
Spoke with eladio she reports he takes it for itching,the infusion he gets makes him itch & his itching has been worse.

## 2024-10-10 NOTE — TELEPHONE ENCOUNTER
No.     Are his allergies worse? Reassure them that it's normal right now because of the atmospheric pressure. Everyone's been complaining of it.

## 2024-10-11 RX ORDER — CETIRIZINE HYDROCHLORIDE 10 MG/1
10 TABLET ORAL DAILY
Qty: 30 TABLET | Refills: 0 | Status: SHIPPED | OUTPATIENT
Start: 2024-10-11

## 2024-10-11 NOTE — TELEPHONE ENCOUNTER
I sent in something stronger - zyrtec. See if they like that better. Also, I would have him talk to oncology about it. They sometimes can put something in the IV to help it.

## 2024-10-14 NOTE — PROGRESS NOTES
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 twice a day or addition of benadryl as above    6. Hypoxia   - Portable oxygen provided; patient has been oxygen dependent since recent admission for bradycardia and heart block    7. Bradycardia and AV heart block  - Evaluated by EP at [facility], he is discharged home with a cardiac monitor   - Plans to follow up in 30 days    8. Loose stool  - Recommend holding miralax and benefiber for now   - If continues to have loose stool, would recommend imodium PRN    Discussed possible differential diagnoses, testing, treatment, recommended non-pharmacological interventions, risks, warning signs to monitor for that would indicate need for follow-up in clinic or ER. If no improvement with these regimens or you have new or worsening symptoms follow-up. Patient verbalizes understanding and agreement with plan of care. Denies further needs or concerns.     Patient was given instructions and counseling regarding her condition and for health maintenance advised.       All questions were answered to his satisfaction.    BMI is within normal parameters. No other follow-up for BMI required.         ACO / MARY/Other  Quality measures  -  Kevin Fonseca did not receive 2023 flu vaccine.  This was recommended.  -  Kevin Fonseca reports a pain score of 0.  Given his pain assessment as noted,  treatment options were discussed and the following options were decided upon as a follow-up plan to address the patient's pain: continuation of current treatment plan for pain.  -  Current outpatient and discharge medications have been reconciled for the patient.  Reviewed by: Jennifer Hinds MD        Meds ordered during this visit  No orders of the defined types were placed in this encounter.      Visit Diagnoses    ICD-10-CM ICD-9-CM   1. Metastatic non-small cell lung cancer  C34.90 162.9   2. Encounter for immunotherapy  Z29.89 V07.2                 Follow Up   In 3 weeks with next treatment cycle        This document has been electronically signed by Jennifer Hinds MD   October 15, 2024 15:31 EDT    Dictated Utilizing Dragon Dictation: Part of this note may be an electronic transcription/translation of spoken language to printed text using the Dragon Dictation System.

## 2024-10-15 ENCOUNTER — OFFICE VISIT (OUTPATIENT)
Dept: ONCOLOGY | Facility: CLINIC | Age: 89
End: 2024-10-15
Payer: MEDICARE

## 2024-10-15 ENCOUNTER — LAB (OUTPATIENT)
Dept: ONCOLOGY | Facility: HOSPITAL | Age: 89
End: 2024-10-15
Payer: MEDICARE

## 2024-10-15 ENCOUNTER — INFUSION (OUTPATIENT)
Dept: ONCOLOGY | Facility: HOSPITAL | Age: 89
End: 2024-10-15
Payer: MEDICARE

## 2024-10-15 ENCOUNTER — LAB (OUTPATIENT)
Dept: ONCOLOGY | Facility: CLINIC | Age: 89
End: 2024-10-15
Payer: MEDICARE

## 2024-10-15 VITALS
HEART RATE: 61 BPM | TEMPERATURE: 97.5 F | SYSTOLIC BLOOD PRESSURE: 135 MMHG | RESPIRATION RATE: 18 BRPM | DIASTOLIC BLOOD PRESSURE: 60 MMHG | OXYGEN SATURATION: 100 %

## 2024-10-15 VITALS
HEART RATE: 74 BPM | TEMPERATURE: 98 F | DIASTOLIC BLOOD PRESSURE: 80 MMHG | HEIGHT: 72 IN | RESPIRATION RATE: 20 BRPM | OXYGEN SATURATION: 100 % | SYSTOLIC BLOOD PRESSURE: 169 MMHG | WEIGHT: 163.6 LBS | BODY MASS INDEX: 22.16 KG/M2

## 2024-10-15 DIAGNOSIS — C34.90 METASTATIC NON-SMALL CELL LUNG CANCER: ICD-10-CM

## 2024-10-15 DIAGNOSIS — C34.90 METASTATIC NON-SMALL CELL LUNG CANCER: Primary | ICD-10-CM

## 2024-10-15 DIAGNOSIS — E61.1 IRON DEFICIENCY: ICD-10-CM

## 2024-10-15 DIAGNOSIS — Z29.89 ENCOUNTER FOR IMMUNOTHERAPY: ICD-10-CM

## 2024-10-15 LAB
ALBUMIN SERPL-MCNC: 3.8 G/DL (ref 3.5–5.2)
ALBUMIN/GLOB SERPL: 1 G/DL
ALP SERPL-CCNC: 97 U/L (ref 39–117)
ALT SERPL W P-5'-P-CCNC: 13 U/L (ref 1–41)
ANION GAP SERPL CALCULATED.3IONS-SCNC: 10.6 MMOL/L (ref 5–15)
AST SERPL-CCNC: 20 U/L (ref 1–40)
BASOPHILS # BLD AUTO: 0.11 10*3/MM3 (ref 0–0.2)
BASOPHILS NFR BLD AUTO: 1.4 % (ref 0–1.5)
BILIRUB SERPL-MCNC: 0.4 MG/DL (ref 0–1.2)
BUN SERPL-MCNC: 22 MG/DL (ref 8–23)
BUN/CREAT SERPL: 25.3 (ref 7–25)
CALCIUM SPEC-SCNC: 8.8 MG/DL (ref 8.6–10.5)
CHLORIDE SERPL-SCNC: 94 MMOL/L (ref 98–107)
CO2 SERPL-SCNC: 26.4 MMOL/L (ref 22–29)
CREAT SERPL-MCNC: 0.87 MG/DL (ref 0.76–1.27)
DEPRECATED RDW RBC AUTO: 46.5 FL (ref 37–54)
EGFRCR SERPLBLD CKD-EPI 2021: 82.5 ML/MIN/1.73
EOSINOPHIL # BLD AUTO: 0.94 10*3/MM3 (ref 0–0.4)
EOSINOPHIL NFR BLD AUTO: 12 % (ref 0.3–6.2)
ERYTHROCYTE [DISTWIDTH] IN BLOOD BY AUTOMATED COUNT: 13.8 % (ref 12.3–15.4)
FERRITIN SERPL-MCNC: 367.3 NG/ML (ref 30–400)
GLOBULIN UR ELPH-MCNC: 3.7 GM/DL
GLUCOSE SERPL-MCNC: 114 MG/DL (ref 65–99)
HCT VFR BLD AUTO: 35.7 % (ref 37.5–51)
HGB BLD-MCNC: 11 G/DL (ref 13–17.7)
IMM GRANULOCYTES # BLD AUTO: 0.02 10*3/MM3 (ref 0–0.05)
IMM GRANULOCYTES NFR BLD AUTO: 0.3 % (ref 0–0.5)
IRON 24H UR-MRATE: 53 MCG/DL (ref 59–158)
IRON SATN MFR SERPL: 16 % (ref 20–50)
LYMPHOCYTES # BLD AUTO: 1.59 10*3/MM3 (ref 0.7–3.1)
LYMPHOCYTES NFR BLD AUTO: 20.3 % (ref 19.6–45.3)
MCH RBC QN AUTO: 28.3 PG (ref 26.6–33)
MCHC RBC AUTO-ENTMCNC: 30.8 G/DL (ref 31.5–35.7)
MCV RBC AUTO: 91.8 FL (ref 79–97)
MONOCYTES # BLD AUTO: 1.02 10*3/MM3 (ref 0.1–0.9)
MONOCYTES NFR BLD AUTO: 13 % (ref 5–12)
NEUTROPHILS NFR BLD AUTO: 4.16 10*3/MM3 (ref 1.7–7)
NEUTROPHILS NFR BLD AUTO: 53 % (ref 42.7–76)
NRBC BLD AUTO-RTO: 0 /100 WBC (ref 0–0.2)
PLATELET # BLD AUTO: 289 10*3/MM3 (ref 140–450)
PMV BLD AUTO: 9.9 FL (ref 6–12)
POTASSIUM SERPL-SCNC: 4.5 MMOL/L (ref 3.5–5.2)
PROT SERPL-MCNC: 7.5 G/DL (ref 6–8.5)
RBC # BLD AUTO: 3.89 10*6/MM3 (ref 4.14–5.8)
SODIUM SERPL-SCNC: 131 MMOL/L (ref 136–145)
T4 FREE SERPL-MCNC: 1.6 NG/DL (ref 0.92–1.68)
TIBC SERPL-MCNC: 335 MCG/DL (ref 298–536)
TRANSFERRIN SERPL-MCNC: 225 MG/DL (ref 200–360)
TSH SERPL DL<=0.05 MIU/L-ACNC: 1.33 UIU/ML (ref 0.27–4.2)
WBC NRBC COR # BLD AUTO: 7.84 10*3/MM3 (ref 3.4–10.8)

## 2024-10-15 PROCEDURE — 84439 ASSAY OF FREE THYROXINE: CPT | Performed by: INTERNAL MEDICINE

## 2024-10-15 PROCEDURE — 25010000002 PEMBROLIZUMAB 100 MG/4ML SOLUTION 4 ML VIAL: Performed by: INTERNAL MEDICINE

## 2024-10-15 PROCEDURE — 96413 CHEMO IV INFUSION 1 HR: CPT

## 2024-10-15 PROCEDURE — 80053 COMPREHEN METABOLIC PANEL: CPT | Performed by: INTERNAL MEDICINE

## 2024-10-15 PROCEDURE — 85025 COMPLETE CBC W/AUTO DIFF WBC: CPT | Performed by: INTERNAL MEDICINE

## 2024-10-15 PROCEDURE — 83540 ASSAY OF IRON: CPT | Performed by: INTERNAL MEDICINE

## 2024-10-15 PROCEDURE — 84443 ASSAY THYROID STIM HORMONE: CPT | Performed by: INTERNAL MEDICINE

## 2024-10-15 PROCEDURE — 82728 ASSAY OF FERRITIN: CPT | Performed by: INTERNAL MEDICINE

## 2024-10-15 PROCEDURE — 82607 VITAMIN B-12: CPT | Performed by: INTERNAL MEDICINE

## 2024-10-15 PROCEDURE — 84466 ASSAY OF TRANSFERRIN: CPT | Performed by: INTERNAL MEDICINE

## 2024-10-15 PROCEDURE — 82746 ASSAY OF FOLIC ACID SERUM: CPT | Performed by: INTERNAL MEDICINE

## 2024-10-15 RX ORDER — SODIUM CHLORIDE 9 MG/ML
20 INJECTION, SOLUTION INTRAVENOUS ONCE
Status: DISCONTINUED | OUTPATIENT
Start: 2024-10-15 | End: 2024-10-15

## 2024-10-15 RX ADMIN — SODIUM CHLORIDE 200 MG: 9 INJECTION, SOLUTION INTRAVENOUS at 15:37

## 2024-10-15 NOTE — PROGRESS NOTES
Venipuncture Blood Specimen Collection  Venipuncture performed in right arm by Angelica Davison MA with good hemostasis. Patient tolerated the procedure well without complications.   10/15/24   Angelica Davison MA

## 2024-10-16 LAB
FOLATE SERPL-MCNC: 11.3 NG/ML (ref 4.78–24.2)
VIT B12 BLD-MCNC: 447 PG/ML (ref 211–946)

## 2024-10-17 LAB — REF LAB TEST METHOD: NORMAL

## 2024-10-18 ENCOUNTER — TELEPHONE (OUTPATIENT)
Dept: ONCOLOGY | Facility: CLINIC | Age: 89
End: 2024-10-18
Payer: MEDICARE

## 2024-10-18 NOTE — TELEPHONE ENCOUNTER
RN returned Faby's call after speaking with Dr. Basilio (who recommended the patient follow up with his PCP). RN advised that the benadryl and loratadine most likely is not causing his issues with urinating. Faby said the patient complains of slow urination, denies pain or other urinary symptoms. She is agreeable to take the patient to his PCP for further workup. RN also discussed benadryl dosing with Faby, and answered all questions.

## 2024-10-18 NOTE — TELEPHONE ENCOUNTER
PT's wife Faby has called and states that PT is having difficulty urinating. Faby is wondering if this has to do with him recently starting Benadryl and Loratadine. Please advise.

## 2024-10-22 NOTE — PROGRESS NOTES
Subjective   The ABCs of the Annual Wellness Visit  Medicare Wellness Visit      Kevin Fonseca is a 89 y.o. patient with medical conditions significant for COPD, recent metastatic non-small cell lung cancer diagnosis, BENNIE, and sick sinus syndrome who presents for a Medicare Wellness Visit.    The following portions of the patient's history were reviewed and   updated as appropriate: allergies, current medications, past family history, past medical history, past social history, past surgical history, and problem list.    Compared to one year ago, the patient's physical   health is worse.  Compared to one year ago, the patient's mental   health is the same.    Recent Hospitalizations:  This patient has had a [facility] admission record on file within the last 365 days.  Current Medical Providers:  Patient Care Team:  Elissa Geronimo MD as PCP - General (Family Medicine)  Jennifer Hinds MD as Consulting Physician (Hematology and Oncology)  Roman Orta DPM (Podiatry)  Benjamin Montez PA-C as Physician Assistant (Physician Assistant)  Nereyda Velasquez PA-C as Physician Assistant (Pulmonary Disease)  Arlen Ballard MSW as   Arlyn Chisholm RN as Ambulatory  (Oncology) (Population Health)  Fabiana Lord PA (Physician Assistant)  Laina Livingston APRN as Nurse Practitioner (Hematology and Oncology)  Porsha Young APRN as Nurse Practitioner (Hematology and Oncology)  Faye Liz APRN as Nurse Practitioner (Hematology and Oncology)    Outpatient Medications Prior to Visit   Medication Sig Dispense Refill    albuterol sulfate  (90 Base) MCG/ACT inhaler Inhale 2 puffs Every 4 (Four) Hours As Needed for Wheezing or Shortness of Air. 18 g 8    hydrocortisone 2.5 % cream Apply 1 Application topically to the appropriate area as directed 2 (Two) Times a Day As Needed for Irritation.      levothyroxine (SYNTHROID, LEVOTHROID) 88 MCG tablet Take  1 tablet by mouth Daily. 90 tablet 0    multivitamin with minerals (OCUVITE EXTRA PO) Take 1 tablet by mouth Daily.      ondansetron ODT (ZOFRAN-ODT) 4 MG disintegrating tablet Place 1 tablet on the tongue Every 8 (Eight) Hours As Needed for Nausea or Vomiting. 30 tablet 0    polyethylene glycol (MiraLax) 17 GM/SCOOP powder Take 17 g by mouth Daily As Needed (for constipation).      PSYLLIUM PO Take 1 packet by mouth 2 (Two) Times a Day.      sennosides-docusate (senna-docusate sodium) 8.6-50 MG per tablet Take 1 tablet by mouth Daily As Needed for Constipation.      Wheat Dextrin (BENEFIBER PO) Take 1 dose by mouth 2 (Two) Times a Day.      busPIRone (BUSPAR) 5 MG tablet Take 2 tablets by mouth Every Morning.      busPIRone (BUSPAR) 5 MG tablet Take 1 tablet by mouth Every Evening.      loratadine (CLARITIN) 10 MG tablet Take 1 tablet by mouth Daily.      azelastine (ASTELIN) 0.1 % nasal spray Administer 2 sprays into the nostril(s) as directed by provider 2 (Two) Times a Day As Needed for Allergies. (Patient not taking: Reported on 10/7/2024)       Facility-Administered Medications Prior to Visit   Medication Dose Route Frequency Provider Last Rate Last Admin    sodium chloride 0.9 % infusion 500 mL  500 mL Intravenous Once Laina Livingston, DENICE         No opioid medication identified on active medication list. I have reviewed chart for other potential  high risk medication/s and harmful drug interactions in the elderly.      Aspirin is not on active medication list.  Aspirin use is not indicated based on review of current medical condition/s. Risk of harm outweighs potential benefits.  .    Patient Active Problem List   Diagnosis    Chronic obstructive pulmonary disease    Former cigarette smoker    Nocturnal hypoxia    Sinus pause    Hypothyroidism    Exudative age-related macular degeneration, bilateral, with inactive scar    Other specified peripheral vascular diseases    Sick sinus syndrome    BENNIE  "(obstructive sleep apnea)    Seasonal allergic rhinitis    Mass of right lung    Metastatic non-small cell lung cancer    Bradycardia     Advance Care Planning Advance Directive is not on file.  ACP discussion was held with the patient during this visit. Patient does not have an advance directive, information provided.            Objective   Vitals:    10/07/24 1519   BP: 128/62   BP Location: Right arm   Patient Position: Sitting   Cuff Size: Adult   Pulse: 64   Resp: 16   Temp: 97.1 °F (36.2 °C)   TempSrc: Temporal   SpO2: 98%  Comment: 2 LPM   Weight: 74.7 kg (164 lb 9.6 oz)   Height: 182.9 cm (72\")   PainSc: 0-No pain       Estimated body mass index is 22.32 kg/m² as calculated from the following:    Height as of this encounter: 182.9 cm (72\").    Weight as of this encounter: 74.7 kg (164 lb 9.6 oz).    BMI is within normal parameters. No other follow-up for BMI required.       Does the patient have evidence of cognitive impairment? No         Balance and gait slightly unsteady.                                                                                      Health  Risk Assessment    Smoking Status:  Social History     Tobacco Use   Smoking Status Former    Current packs/day: 0.00    Average packs/day: 1.5 packs/day for 44.0 years (66.0 ttl pk-yrs)    Types: Cigarettes    Start date:     Quit date:     Years since quittin.8    Passive exposure: Past   Smokeless Tobacco Never     Alcohol Consumption:  Social History     Substance and Sexual Activity   Alcohol Use Not Currently    Comment: 2 week       Fall Risk Screen  STEADI Fall Risk Assessment was completed, and patient is at HIGH risk for falls. Assessment completed on:10/7/2024    Depression Screening:      10/7/2024     3:27 PM   PHQ-2/PHQ-9 Depression Screening   Retired Little Interest or Pleasure in Doing Things 0-->not at all   Retired Feeling Down, Depressed or Hopeless 0-->not at all   Retired PHQ-9: Brief Depression Severity Measure " Score 0     Health Habits and Functional and Cognitive Screening:      10/7/2024     3:28 PM   Functional & Cognitive Status   Do you have difficulty preparing food and eating? Yes   Do you have difficulty bathing yourself, getting dressed or grooming yourself? Yes   Do you have difficulty using the toilet? No   Do you have difficulty moving around from place to place? Yes   Do you have trouble with steps or getting out of a bed or a chair? Yes   Current Diet Well Balanced Diet   Dental Exam Up to date   Eye Exam Not up to date   Exercise (times per week) 2 times per week   Current Exercises Include Other        Exercise Comment PHYSICAL THERAPY   Do you need help using the phone?  Yes   Are you deaf or do you have serious difficulty hearing?  Yes   Do you need help to go to places out of walking distance? Yes   Do you need help shopping? Yes   Do you need help preparing meals?  Yes   Do you need help with housework?  Yes   Do you need help with laundry? Yes   Do you need help taking your medications? Yes   Do you need help managing money? Yes   Do you ever drive or ride in a car without wearing a seat belt? No   Have you felt unusual stress, anger or loneliness in the last month? No   Who do you live with? Spouse   If you need help, do you have trouble finding someone available to you? No   Have you been bothered in the last four weeks by sexual problems? No   Do you have difficulty concentrating, remembering or making decisions? Yes           Age-appropriate Screening Schedule:  Refer to the list below for future screening recommendations based on patient's age, sex and/or medical conditions. Orders for these recommended tests are listed in the plan section. The patient has been provided with a written plan.    Health Maintenance List  Health Maintenance   Topic Date Due    TDAP/TD VACCINES (1 - Tdap) Never done    ZOSTER VACCINE (1 of 2) Never done    RSV Vaccine - Adults (1 - 1-dose 75+ series) Never done     COVID-19 Vaccine (6 - 2023-24 season) 09/01/2024    ANNUAL WELLNESS VISIT  10/07/2025    INFLUENZA VACCINE  Completed    Pneumococcal Vaccine 65+  Completed                                                                                                                                                CMS Preventative Services Quick Reference  Risk Factors Identified During Encounter  Chronic Pain: Natural history and expected course discussed. Questions answered.  Depression/Dysphoria: Current medication is effective, no change recommended  Fall Risk-High or Moderate: Discussed Fall Prevention in the home  Hearing Problem:  Continue to monitor.  Immunizations Discussed/Encouraged: Influenza and Prevnar 20 (Pneumococcal 20-valent conjugate)  Inadequate Social Support, Isolation, Loneliness, Lack of Transportation, Financial Difficulties, or Caregiver Stress: New diagnosis of lung cancer.  Inactivity/Sedentary: Patient was advised to exercise at least 150 minutes a week per CDC recommendations.  Polypharmacy: Medication List reviewed    The above risks/problems have been discussed with the patient.  Pertinent information has been shared with the patient in the After Visit Summary.  An After Visit Summary and PPPS were made available to the patient.    Follow Up:   Next Medicare Wellness visit to be scheduled in 1 year.         Additional E&M Note during same encounter follows:  Patient has additional, significant, and separately identifiable condition(s)/problem(s) that require work above and beyond the Medicare Wellness Visit     Chief Complaint  Medicare Wellness-subsequent, Cough (PERSISTENT), Urinary Frequency (SOMETIMES TWICE AN HOUR), and SKIN REDNESS (POSSIBLY DUE TO MEDICATION)    Subjective   HPI  ILA is also being seen today for additional medical problem/s.      Patient complains of a persistent cough secondary to his lung cancer.  He states that the cancer treatments are making him urinate frequently,  "sometimes twice an hour.  He is having a rash and erythema secondary to the cancer treatments.          Objective   Vital Signs:  /62 (BP Location: Right arm, Patient Position: Sitting, Cuff Size: Adult)   Pulse 64   Temp 97.1 °F (36.2 °C) (Temporal)   Resp 16   Ht 182.9 cm (72\")   Wt 74.7 kg (164 lb 9.6 oz)   SpO2 98% Comment: 2 LPM  BMI 22.32 kg/m²   Physical Exam  Gen: Patient in NAD. Pleasant and answers appropriately. A&Ox3.    Skin: Warm and dry with normal turgor. No purpura, rashes, or unusual pigmentation noted. Hair is normal in appearance and distribution.    HEENT: NC/AT. No lesions noted. Conjunctiva clear, sclera nonicteric. PERRL. EOMI without nystagmus or strabismus. Fundi appear benign. No hemorrhages or exudates of eyes. Auditory canals are patent bilaterally without lesions. TMs intact,  nonerythematous, bulging without lesions. Nasal mucosa pink, nonerythematous, and nonedematous. Frontal and maxillary sinuses are nontender. O/P nonerythematous and moist without exudate.    Neck: Supple without lymph nodes palpated. FROM.     Lungs: Decreased B/L without rales, rhonchi, crackles, or wheezes.    Heart: Irregularly irregular. S1 and S2 normal. No S3 or S4. No MRGT.    Abd: Soft, nontender,nondistended. (+)BSx4 quadrants.     Extrem: No CC.  Trace edema bilateral lower extremities.  Radial pulses 2+/4 and equal B/L. FROMx4.  Positive joint tenderness noted.    Neuro: No focal motor/sensory deficits.            Assessment and Plan      Diagnoses and all orders for this visit:    1. Encounter for subsequent annual wellness visit in Medicare patient (Primary)    2. Dermatitis  -     loratadine (CLARITIN) 10 MG tablet; Take 1 tablet by mouth Daily.  Dispense: 90 tablet; Refill: 1    3. Anxiety  -     busPIRone (BUSPAR) 5 MG tablet; Take 2 tablets by mouth Every Morning.  Dispense: 180 tablet; Refill: 1  -     busPIRone (BUSPAR) 5 MG tablet; Take 1 tablet by mouth Every Evening.  Dispense: " 90 tablet; Refill: 1    4. Immunization due  -     Fluzone High-Dose 65+yrs (7413-7481)           Dermatitis    Encounter for subsequent annual wellness visit in Medicare patient    Anxiety    Immunization due    Orders Placed This Encounter   Procedures    Fluzone High-Dose 65+yrs (4870-3808)     New Medications Ordered This Visit   Medications    busPIRone (BUSPAR) 5 MG tablet     Sig: Take 2 tablets by mouth Every Morning.     Dispense:  180 tablet     Refill:  1    busPIRone (BUSPAR) 5 MG tablet     Sig: Take 1 tablet by mouth Every Evening.     Dispense:  90 tablet     Refill:  1          Follow Up   Return in about 3 months (around 1/7/2025).  Patient was given instructions and counseling regarding his condition or for health maintenance advice. Please see specific information pulled into the AVS if appropriate.

## 2024-10-28 ENCOUNTER — OFFICE VISIT (OUTPATIENT)
Dept: FAMILY MEDICINE CLINIC | Facility: CLINIC | Age: 89
End: 2024-10-28
Payer: MEDICARE

## 2024-10-28 VITALS
BODY MASS INDEX: 22 KG/M2 | WEIGHT: 162.4 LBS | HEIGHT: 72 IN | SYSTOLIC BLOOD PRESSURE: 134 MMHG | OXYGEN SATURATION: 95 % | DIASTOLIC BLOOD PRESSURE: 62 MMHG | TEMPERATURE: 97.8 F | HEART RATE: 67 BPM

## 2024-10-28 DIAGNOSIS — C34.90 METASTATIC NON-SMALL CELL LUNG CANCER: ICD-10-CM

## 2024-10-28 DIAGNOSIS — R30.0 DYSURIA: Primary | ICD-10-CM

## 2024-10-28 DIAGNOSIS — M25.511 CHRONIC RIGHT SHOULDER PAIN: ICD-10-CM

## 2024-10-28 DIAGNOSIS — G89.29 CHRONIC RIGHT SHOULDER PAIN: ICD-10-CM

## 2024-10-28 DIAGNOSIS — R53.81 DEBILITY: ICD-10-CM
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
## 2024-10-28 PROCEDURE — 1159F MED LIST DOCD IN RCRD: CPT | Performed by: FAMILY MEDICINE

## 2024-10-28 PROCEDURE — G2211 COMPLEX E/M VISIT ADD ON: HCPCS | Performed by: FAMILY MEDICINE

## 2024-10-28 PROCEDURE — 1126F AMNT PAIN NOTED NONE PRSNT: CPT | Performed by: FAMILY MEDICINE

## 2024-10-28 PROCEDURE — 1160F RVW MEDS BY RX/DR IN RCRD: CPT | Performed by: FAMILY MEDICINE

## 2024-10-28 PROCEDURE — 81003 URINALYSIS AUTO W/O SCOPE: CPT | Performed by: FAMILY MEDICINE

## 2024-10-28 PROCEDURE — 99214 OFFICE O/P EST MOD 30 MIN: CPT | Performed by: FAMILY MEDICINE

## 2024-10-30 LAB
BACTERIA UR CULT: NORMAL
BACTERIA UR CULT: NORMAL

## 2024-10-31 DIAGNOSIS — C34.90 METASTATIC NON-SMALL CELL LUNG CANCER: Primary | ICD-10-CM

## 2024-10-31 RX ORDER — SODIUM CHLORIDE 9 MG/ML
20 INJECTION, SOLUTION INTRAVENOUS ONCE
OUTPATIENT
Start: 2024-11-26

## 2024-10-31 RX ORDER — SODIUM CHLORIDE 9 MG/ML
20 INJECTION, SOLUTION INTRAVENOUS ONCE
OUTPATIENT
Start: 2024-11-05

## 2024-11-04 NOTE — PROGRESS NOTES
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 pembrolizumab q21d given his performance status and co-morbidities. I feel this is reasonable given his functional status.   -C3 9/3-tolerated well. Course complicated due to accident with abrasions and noted to have bradycardia with 2-1 AV block.   -C4 9/24- tolerated well  -C5 10/15- tolerated well  -C6 11/5- proceed today  - Repeat CT chest/abdomen/pelvis 12/2024    2. Malignancy associated pain  -Currently denies     3. Nutrition  -Recommend patient take in 2-3 boost/day    4. Hyponatremia   - Improved     5. Erythema and pruritus   -Started after C1 of IO  -Recommend H1 blockers: loratidine 10 mg AM and benadryl 25 mg PM  -Increase topical steroid to clobestasol 0.05 BID    6. Hypoxia   - Portable oxygen provided; patient has been oxygen dependent since recent admission for bradycardia and heart block    7. Bradycardia and AV heart block  - Evaluated by EP at [place], he is discharged home with a cardiac monitor   - Plans to follow up in 30 days  - Recommend following up with cardiology again    8. Loose stool  - Recommend holding miralax and benefiber for now   - If continues to have loose stool, would recommend imodium PRN    Discussed possible differential diagnoses, testing, treatment, recommended non-pharmacological interventions,  risks, warning signs to monitor for that would indicate need for follow-up in clinic or ER. If no improvement with these regimens or you have new or worsening symptoms follow-up. Patient verbalizes understanding and agreement with plan of care. Denies further needs or concerns.     Patient was given instructions and counseling regarding her condition and for health maintenance advised.       All questions were answered to his satisfaction.    BMI is within normal parameters. No other follow-up for BMI required.         ACO / MARY/Other  Quality measures  -  Kevin Fonseca did not receive 2024 flu vaccine.  This was recommended.  -  Kevin Fonseca reports a pain score of 0.  Given his pain assessment as noted, treatment options were discussed and the following options were decided upon as a follow-up plan to address the patient's pain: continuation of current treatment plan for pain.  -  Current outpatient and discharge medications have been reconciled for the patient.  Reviewed by: Jennifer Hinds MD        Meds ordered during this visit  New Medications Ordered This Visit   Medications    clobetasol (CLOBEX) 0.05 % lotion     Sig: Apply  topically to the appropriate area as directed 2 (Two) Times a Day.     Dispense:  118 mL     Refill:  3       Visit Diagnoses    ICD-10-CM ICD-9-CM   1. Metastatic non-small cell lung cancer  C34.90 162.9                   Follow Up   In 3 weeks with next treatment cycle    FU CT chest/abdomen/pelvis        This document has been electronically signed by Jennifer Hinds MD   November 5, 2024 13:41 EST    Dictated Utilizing Dragon Dictation: Part of this note may be an electronic transcription/translation of spoken language to printed text using the Dragon Dictation System.

## 2024-11-05 ENCOUNTER — INFUSION (OUTPATIENT)
Dept: ONCOLOGY | Facility: HOSPITAL | Age: 89
End: 2024-11-05
Payer: MEDICARE

## 2024-11-05 ENCOUNTER — APPOINTMENT (OUTPATIENT)
Dept: ONCOLOGY | Facility: HOSPITAL | Age: 89
End: 2024-11-05
Payer: MEDICARE

## 2024-11-05 ENCOUNTER — OFFICE VISIT (OUTPATIENT)
Dept: ONCOLOGY | Facility: CLINIC | Age: 89
End: 2024-11-05
Payer: MEDICARE

## 2024-11-05 ENCOUNTER — LAB (OUTPATIENT)
Dept: ONCOLOGY | Facility: CLINIC | Age: 89
End: 2024-11-05
Payer: MEDICARE

## 2024-11-05 VITALS
SYSTOLIC BLOOD PRESSURE: 115 MMHG | HEART RATE: 44 BPM | HEIGHT: 72 IN | BODY MASS INDEX: 22.03 KG/M2 | OXYGEN SATURATION: 99 % | DIASTOLIC BLOOD PRESSURE: 62 MMHG | RESPIRATION RATE: 20 BRPM | TEMPERATURE: 98 F

## 2024-11-05 VITALS
DIASTOLIC BLOOD PRESSURE: 52 MMHG | TEMPERATURE: 97.5 F | OXYGEN SATURATION: 96 % | HEART RATE: 82 BPM | SYSTOLIC BLOOD PRESSURE: 133 MMHG | RESPIRATION RATE: 20 BRPM

## 2024-11-05 DIAGNOSIS — C34.90 METASTATIC NON-SMALL CELL LUNG CANCER: ICD-10-CM

## 2024-11-05 DIAGNOSIS — C34.90 METASTATIC NON-SMALL CELL LUNG CANCER: Primary | ICD-10-CM

## 2024-11-05 DIAGNOSIS — L29.9 PRURITUS: ICD-10-CM

## 2024-11-05 LAB
ALBUMIN SERPL-MCNC: 3.5 G/DL (ref 3.5–5.2)
ALBUMIN/GLOB SERPL: 0.9 G/DL
ALP SERPL-CCNC: 101 U/L (ref 39–117)
ALT SERPL W P-5'-P-CCNC: 17 U/L (ref 1–41)
ANION GAP SERPL CALCULATED.3IONS-SCNC: 8.4 MMOL/L (ref 5–15)
AST SERPL-CCNC: 21 U/L (ref 1–40)
BASOPHILS # BLD AUTO: 0.1 10*3/MM3 (ref 0–0.2)
BASOPHILS NFR BLD AUTO: 0.9 % (ref 0–1.5)
BILIRUB SERPL-MCNC: 0.2 MG/DL (ref 0–1.2)
BUN SERPL-MCNC: 18 MG/DL (ref 8–23)
BUN/CREAT SERPL: 20.5 (ref 7–25)
CALCIUM SPEC-SCNC: 8.9 MG/DL (ref 8.6–10.5)
CHLORIDE SERPL-SCNC: 97 MMOL/L (ref 98–107)
CO2 SERPL-SCNC: 27.6 MMOL/L (ref 22–29)
CREAT SERPL-MCNC: 0.88 MG/DL (ref 0.76–1.27)
DEPRECATED RDW RBC AUTO: 45.5 FL (ref 37–54)
EGFRCR SERPLBLD CKD-EPI 2021: 82.2 ML/MIN/1.73
EOSINOPHIL # BLD AUTO: 1.12 10*3/MM3 (ref 0–0.4)
EOSINOPHIL NFR BLD AUTO: 10.5 % (ref 0.3–6.2)
ERYTHROCYTE [DISTWIDTH] IN BLOOD BY AUTOMATED COUNT: 13.5 % (ref 12.3–15.4)
GLOBULIN UR ELPH-MCNC: 4 GM/DL
GLUCOSE SERPL-MCNC: 116 MG/DL (ref 65–99)
HCT VFR BLD AUTO: 38.2 % (ref 37.5–51)
HGB BLD-MCNC: 11.8 G/DL (ref 13–17.7)
IMM GRANULOCYTES # BLD AUTO: 0.03 10*3/MM3 (ref 0–0.05)
IMM GRANULOCYTES NFR BLD AUTO: 0.3 % (ref 0–0.5)
LYMPHOCYTES # BLD AUTO: 1.79 10*3/MM3 (ref 0.7–3.1)
LYMPHOCYTES NFR BLD AUTO: 16.9 % (ref 19.6–45.3)
MCH RBC QN AUTO: 28 PG (ref 26.6–33)
MCHC RBC AUTO-ENTMCNC: 30.9 G/DL (ref 31.5–35.7)
MCV RBC AUTO: 90.7 FL (ref 79–97)
MONOCYTES # BLD AUTO: 1.35 10*3/MM3 (ref 0.1–0.9)
MONOCYTES NFR BLD AUTO: 12.7 % (ref 5–12)
NEUTROPHILS NFR BLD AUTO: 58.7 % (ref 42.7–76)
NEUTROPHILS NFR BLD AUTO: 6.23 10*3/MM3 (ref 1.7–7)
NRBC BLD AUTO-RTO: 0 /100 WBC (ref 0–0.2)
PLATELET # BLD AUTO: 361 10*3/MM3 (ref 140–450)
PMV BLD AUTO: 9.7 FL (ref 6–12)
POTASSIUM SERPL-SCNC: 4.5 MMOL/L (ref 3.5–5.2)
PROT SERPL-MCNC: 7.5 G/DL (ref 6–8.5)
RBC # BLD AUTO: 4.21 10*6/MM3 (ref 4.14–5.8)
SODIUM SERPL-SCNC: 133 MMOL/L (ref 136–145)
WBC NRBC COR # BLD AUTO: 10.62 10*3/MM3 (ref 3.4–10.8)

## 2024-11-05 PROCEDURE — 96413 CHEMO IV INFUSION 1 HR: CPT

## 2024-11-05 PROCEDURE — 96409 CHEMO IV PUSH SNGL DRUG: CPT

## 2024-11-05 PROCEDURE — 85025 COMPLETE CBC W/AUTO DIFF WBC: CPT | Performed by: INTERNAL MEDICINE

## 2024-11-05 PROCEDURE — 25010000002 PEMBROLIZUMAB 100 MG/4ML SOLUTION 4 ML VIAL: Performed by: INTERNAL MEDICINE

## 2024-11-05 PROCEDURE — 80053 COMPREHEN METABOLIC PANEL: CPT | Performed by: INTERNAL MEDICINE

## 2024-11-05 RX ORDER — CLOBETASOL PROPIONATE 0.5 MG/ML
LOTION TOPICAL 2 TIMES DAILY
Qty: 118 ML | Refills: 3 | Status: SHIPPED | OUTPATIENT
Start: 2024-11-05

## 2024-11-05 RX ORDER — SODIUM CHLORIDE 9 MG/ML
20 INJECTION, SOLUTION INTRAVENOUS ONCE
Status: DISCONTINUED | OUTPATIENT
Start: 2024-11-05 | End: 2024-11-05

## 2024-11-05 RX ADMIN — SODIUM CHLORIDE 200 MG: 9 INJECTION, SOLUTION INTRAVENOUS at 14:09

## 2024-11-05 NOTE — PROGRESS NOTES
Venipuncture Blood Specimen Collection  Venipuncture performed in right arm by Angelica Davison MA with good hemostasis. Patient tolerated the procedure well without complications.   11/05/24   Angelica Davison MA

## 2024-11-11 RX ORDER — BUSPIRONE HYDROCHLORIDE 5 MG/1
5 TABLET ORAL 3 TIMES DAILY PRN
Qty: 90 TABLET | OUTPATIENT
Start: 2024-11-11

## 2024-11-12 NOTE — PROGRESS NOTES
"Kevin Fonseca     VITALS: Blood pressure 134/62, pulse 67, temperature 97.8 °F (36.6 °C), temperature source Temporal, height 182.9 cm (72\"), weight 73.7 kg (162 lb 6.4 oz), SpO2 95%.    Subjective  Chief Complaint  Difficulty Urinating    Subjective          History of Present Illness:  Patient is a 89 y.o.  male with medical conditions significant for recent diagnosis lung cancer, arthritis, and GERD who presents to clinic secondary to medical followup.     He is doing well with his lung cancer treatment.  He is having some constipation.  He is occasionally feeling weak.    He has been falling more lately.  He states that his legs get tangled up.  He states that he has no strength.  Patient relies on a wheelchair and a walker.    No complaints about any of the medications.    The following portions of the patient's history were reviewed and updated as appropriate: allergies, current medications, past family history, past medical history, past social history, past surgical history and problem list.    Past Medical History  Past Medical History:   Diagnosis Date    Arthritis     Asthma     Chronic bronchitis     Complete heart block     Emphysema lung     Gastritis     Heartburn     Macular degeneration     Macular degeneration, wet     Reflux esophagitis     Thyroid disease        Surgical History  Past Surgical History:   Procedure Laterality Date    INTRACAPSULAR CATARACT EXTRACTION      Dr. Lesly Gray. Dr. Neto Light.    LIVER BIOPSY         Family History  Family History   Problem Relation Age of Onset    Hypertension Mother     Other Mother     Cancer Father     Cancer Brother     Asthma Brother        Social History  Social History     Socioeconomic History    Marital status:    Tobacco Use    Smoking status: Former     Current packs/day: 0.00     Average packs/day: 1.5 packs/day for 44.0 years (66.0 ttl pk-yrs)     Types: Cigarettes     Start date: 1952     Quit date: 1996     Years since " "quittin.8     Passive exposure: Past    Smokeless tobacco: Never   Vaping Use    Vaping status: Never Used   Substance and Sexual Activity    Alcohol use: Not Currently     Comment: 2 week    Drug use: Never    Sexual activity: Defer       Objective   Vital Signs:   /62 (BP Location: Right arm, Patient Position: Sitting, Cuff Size: Adult)   Pulse 67   Temp 97.8 °F (36.6 °C) (Temporal)   Ht 182.9 cm (72\")   Wt 73.7 kg (162 lb 6.4 oz)   SpO2 95% Comment: 2L  BMI 22.03 kg/m²     Physical Exam     Gen: Patient in NAD. Pleasant and answers appropriately. A&Ox3.    Skin: Warm and dry with normal turgor. No purpura, rashes, or unusual pigmentation noted. Hair is normal in appearance and distribution.    HEENT: NC/AT. No lesions noted. Conjunctiva clear, sclera nonicteric. PERRL. EOMI without nystagmus or strabismus. Fundi appear benign. No hemorrhages or exudates of eyes. Auditory canals are patent bilaterally without lesions. TMs intact,  nonerythematous, bulging without lesions. Nasal mucosa erythematous, and nonedematous. Frontal and maxillary sinuses are nontender. O/P erythematous and moist without exudate.    Neck: Supple without lymph nodes palpated.  Decreased ROM.     Lungs: Decreased B/L without rales, rhonchi, crackles, or wheezes.    Heart: Distant.  Irregularly irregular. S1 and S2 normal. No S3 or S4. No MRGT.    Abd: Soft, nontender,nondistended. (+)BSx4 quadrants.     Extrem: No CC.  Trace edema bilateral lower extremities.  Radial pulses 2+/4 and equal B/L. FROMx4.  Positive joint tenderness noted.    Neuro: No focal motor/sensory deficits.    Procedures    Result Review :   The following data was reviewed by: Elissa Geronimo MD on 10/28/2024:                Assessment and Plan    Kevin Fonseca is a 89 y.o. here for medical followup.    Diagnoses and all orders for this visit:    1. Dysuria (Primary)  -     POCT urinalysis dipstick, automated  -     Cancel: Urine Culture - Urine, Urine, " Clean Catch; Future  -     Urine Culture - Urine, Urine, Clean Catch    2. Debility  -     Ambulatory Referral to Home Health    3. Fall, initial encounter  -     Ambulatory Referral to Home Health    4. Chronic right shoulder pain  -     Ambulatory Referral to Home Health    5. Metastatic non-small cell lung cancer  Per oncology.        Problem List Items Addressed This Visit          Hematology and Neoplasia    Metastatic non-small cell lung cancer     Other Visit Diagnoses       Dysuria    -  Primary    Relevant Orders    POCT urinalysis dipstick, automated (Completed)    Urine Culture - Urine, Urine, Clean Catch (Completed)    Debility        Relevant Orders    Ambulatory Referral to Home Health (Completed)    Fall, initial encounter        Relevant Orders    Ambulatory Referral to Home Health (Completed)    Chronic right shoulder pain        Relevant Orders    Ambulatory Referral to Home Health (Completed)              BMI is within normal parameters. No other follow-up for BMI required.           Follow Up   Return (already has appt).  Findings and plans discussed with patient who verbalizes understanding and agreement. Will followup with patient once results are in. Patient was given instructions and counseling regarding his condition or for health maintenance advice. Please see specific information pulled into the AVS if appropriate.       Elissa Geronimo MD

## 2024-11-13 DIAGNOSIS — F41.9 ANXIETY: ICD-10-CM

## 2024-11-13 RX ORDER — BUSPIRONE HYDROCHLORIDE 5 MG/1
10 TABLET ORAL EVERY MORNING
Qty: 180 TABLET | Refills: 1 | OUTPATIENT
Start: 2024-11-13

## 2024-11-13 NOTE — TELEPHONE ENCOUNTER
Caller: DILAN JOVEL    Relationship: Emergency Contact    Best call back number: [phone]     Requested Prescriptions:   Requested Prescriptions     Pending Prescriptions Disp Refills    busPIRone (BUSPAR) 5 MG tablet 180 tablet 1     Sig: Take 2 tablets by mouth Every Morning.        Pharmacy where request should be sent: [facility] [id number] [place] [id number] [facility] [address] - [phone] [facility] [phone] FX     Last office visit with prescribing clinician: 10/28/2024   Last telemedicine visit with prescribing clinician: Visit date not found   Next office visit with prescribing clinician: 1/13/2025     Additional details provided by patient: 2 DAYS OF MEDICATION ON HAND     Does the patient have less than a 3 day supply:  [x] Yes  [] No    Would you like a call back once the refill request has been completed: [x] Yes [] No    If the office needs to give you a call back, can they leave a voicemail: [] Yes [] No    Nancy Villarreal Rep   11/13/24 08:36 EST

## 2024-11-13 NOTE — TELEPHONE ENCOUNTER
Faby notified a 90 day supply was sent in on 10/07/2024 with an additional refill,she will call the pharmacy.

## 2024-11-15 ENCOUNTER — TELEPHONE (OUTPATIENT)
Dept: FAMILY MEDICINE CLINIC | Facility: CLINIC | Age: 89
End: 2024-11-15
Payer: MEDICARE

## 2024-11-15 DIAGNOSIS — F41.9 ANXIETY: ICD-10-CM

## 2024-11-15 RX ORDER — BUSPIRONE HYDROCHLORIDE 5 MG/1
TABLET ORAL
Qty: 180 TABLET | Refills: 1 | Status: SHIPPED | OUTPATIENT
Start: 2024-11-15

## 2024-11-15 NOTE — TELEPHONE ENCOUNTER
Caller: DILAN JOVEL    Relationship: Emergency Contact, WIFE    Best call back number: [phone]     Which medication are you concerned about:     busPIRone (BUSPAR) 5 MG tablet       Who prescribed you this medication: DR DURANT    What are your concerns: PATIENT TAKES 2 TABLETS IN THE MORNING AND ONE AT NIGHT. THE NEW PRESCRIPTION SENT IN STATES TO TAKE IT DIFFERENTLY; IT ONLY MENTIONS TO TAKE IN THE  MORNING.   ASKING TO CORRECT THE PRESCRIPTION AND RESEND.    How long have you had these concerns: WHEN PICKING IT UP YESTERDAY, 11/14/24. IT WAS SENT TO THE WRONG [facility] IN ERROR. THE PHARMACIES HAVE BEEN UPDATED.     PLEASE SEND TO [facility] [id number] - [place], [place] - [address]  [address] [facility] [phone] [facility] [phone] FX     NOTIFY PATIENT OF ANY ISSUES.

## 2024-11-17 ENCOUNTER — HOSPITAL ENCOUNTER (OUTPATIENT)
Dept: CT IMAGING | Facility: HOSPITAL | Age: 89
Discharge: HOME | End: 2024-11-17
Payer: OTHER GOVERNMENT

## 2024-11-17 DIAGNOSIS — C34.90 METASTATIC NON-SMALL CELL LUNG CANCER: ICD-10-CM

## 2024-11-17 PROCEDURE — 25510000001 IOPAMIDOL 61 % SOLUTION: Performed by: INTERNAL MEDICINE

## 2024-11-17 PROCEDURE — 74177 CT ABD & PELVIS W/CONTRAST: CPT | Performed by: RADIOLOGY

## 2024-11-17 PROCEDURE — 71260 CT THORAX DX C+: CPT | Performed by: RADIOLOGY

## 2024-11-17 PROCEDURE — 74177 CT ABD & PELVIS W/CONTRAST: CPT

## 2024-11-17 PROCEDURE — 71260 CT THORAX DX C+: CPT

## 2024-11-17 RX ORDER — IOPAMIDOL 612 MG/ML
100 INJECTION, SOLUTION INTRAVASCULAR
Status: COMPLETED | OUTPATIENT
Start: 2024-11-17 | End: 2024-11-17

## 2024-11-17 RX ADMIN — IOPAMIDOL 70 ML: 612 INJECTION, SOLUTION INTRAVENOUS at 13:46

## 2024-11-18 ENCOUNTER — PATIENT OUTREACH (OUTPATIENT)
Dept: CASE MANAGEMENT | Facility: OTHER | Age: 89
End: 2024-11-18
Payer: MEDICARE

## 2024-11-18 NOTE — OUTREACH NOTE
AMBULATORY CASE MANAGEMENT NOTE    Names and Relationships of Patient/Support Persons: Contact: BECKADILAN; Relationship: Emergency Contact -     Patient Outreach    Mrs. Fonseca reports patient is making effort to eat and hydrate adequately. States he has some constipation, his regular stool elimination pattern was daily now he is going once every 3 days. Wife has increased the fruit, fiber and greens in his diet and gives him milk of mag as needed. Mrs. Fonseca reports he has had an increase in urination, edema to lower extremities improved. Denies other problems or concerns a this time.    Education Documentation  Symptom Management - fatigue, constipation, taught by Arlyn Chisholm, RN at 11/18/2024  2:19 PM.  Learner: Significant Other  Readiness: Acceptance  Method: Explanation  Response: Verbalizes Understanding          Arlyn COLE  Ambulatory Case Management    11/18/2024, 14:20 EST

## 2024-11-19 ENCOUNTER — TELEPHONE (OUTPATIENT)
Dept: ONCOLOGY | Facility: CLINIC | Age: 89
End: 2024-11-19
Payer: MEDICARE

## 2024-11-19 NOTE — TELEPHONE ENCOUNTER
RN called the pharmacy to verify this. They voiced that this was correct that insurance will not cover it until 12/24. Pharmacy stated that with a discount card, it would cost $89.83. RN informed patient's spouse of this and she voiced that would be fine. She is going to call the pharmacy to inform them that they want to fill it for this price in the meantime. RN voiced understanding and asked her to please call us if he were to run out prior to 12/24, so we can look at other options. She voiced understanding.

## 2024-11-19 NOTE — TELEPHONE ENCOUNTER
Clobetasol lotion that they were prescribed is almost gone and the pharmacy told her it is not due to be refilled until December 24th. (Insurance will not pay for it until then.) Patients wife wants to know if there is something else that they can use that will work just as well? Patient uses [facility] on the [address]

## 2024-11-25 NOTE — TELEPHONE ENCOUNTER
Caller: DILAN JOVEL    Relationship: Emergency Contact    Best call back number: [phone]    What is the best time to reach you: ANY TIME    Who are you requesting to speak with (clinical staff, provider,  specific staff member): MARQUES    Do you know the name of the person who called: DILAN    What was the call regarding: PATIENT NEEDS MORE SAMPLES OF THE BREZTRI. PLEASE CALL DILAN BACK     Do you require a callback: YES      Samples x 2 provided.   [Consultation] : a consultation visit

## 2024-11-26 ENCOUNTER — LAB (OUTPATIENT)
Dept: ONCOLOGY | Facility: CLINIC | Age: 89
End: 2024-11-26
Payer: MEDICARE

## 2024-11-26 ENCOUNTER — INFUSION (OUTPATIENT)
Dept: ONCOLOGY | Facility: HOSPITAL | Age: 89
End: 2024-11-26
Payer: MEDICARE

## 2024-11-26 ENCOUNTER — OFFICE VISIT (OUTPATIENT)
Dept: ONCOLOGY | Facility: CLINIC | Age: 89
End: 2024-11-26
Payer: MEDICARE

## 2024-11-26 ENCOUNTER — APPOINTMENT (OUTPATIENT)
Dept: ONCOLOGY | Facility: HOSPITAL | Age: 89
End: 2024-11-26
Payer: MEDICARE

## 2024-11-26 VITALS
HEART RATE: 75 BPM | RESPIRATION RATE: 20 BRPM | WEIGHT: 158.8 LBS | TEMPERATURE: 98 F | BODY MASS INDEX: 21.51 KG/M2 | HEIGHT: 72 IN | OXYGEN SATURATION: 98 % | DIASTOLIC BLOOD PRESSURE: 61 MMHG | SYSTOLIC BLOOD PRESSURE: 113 MMHG

## 2024-11-26 VITALS
HEART RATE: 70 BPM | RESPIRATION RATE: 18 BRPM | OXYGEN SATURATION: 98 % | DIASTOLIC BLOOD PRESSURE: 56 MMHG | SYSTOLIC BLOOD PRESSURE: 111 MMHG | TEMPERATURE: 97.3 F

## 2024-11-26 DIAGNOSIS — C34.90 METASTATIC NON-SMALL CELL LUNG CANCER: ICD-10-CM

## 2024-11-26 DIAGNOSIS — L29.9 PRURITUS: ICD-10-CM

## 2024-11-26 DIAGNOSIS — R22.41 LOCALIZED SWELLING OF RIGHT LOWER LEG: ICD-10-CM

## 2024-11-26 DIAGNOSIS — C34.90 METASTATIC NON-SMALL CELL LUNG CANCER: Primary | ICD-10-CM

## 2024-11-26 LAB
ALBUMIN SERPL-MCNC: 3.6 G/DL (ref 3.5–5.2)
ALBUMIN/GLOB SERPL: 0.9 G/DL
ALP SERPL-CCNC: 103 U/L (ref 39–117)
ALT SERPL W P-5'-P-CCNC: 11 U/L (ref 1–41)
ANION GAP SERPL CALCULATED.3IONS-SCNC: 11.7 MMOL/L (ref 5–15)
AST SERPL-CCNC: 18 U/L (ref 1–40)
BASOPHILS # BLD AUTO: 0.11 10*3/MM3 (ref 0–0.2)
BASOPHILS NFR BLD AUTO: 1.2 % (ref 0–1.5)
BILIRUB SERPL-MCNC: 0.5 MG/DL (ref 0–1.2)
BUN SERPL-MCNC: 16 MG/DL (ref 8–23)
BUN/CREAT SERPL: 17 (ref 7–25)
CALCIUM SPEC-SCNC: 9 MG/DL (ref 8.2–9.6)
CHLORIDE SERPL-SCNC: 102 MMOL/L (ref 98–107)
CO2 SERPL-SCNC: 23.3 MMOL/L (ref 22–29)
CREAT SERPL-MCNC: 0.94 MG/DL (ref 0.76–1.27)
DEPRECATED RDW RBC AUTO: 46.5 FL (ref 37–54)
EGFRCR SERPLBLD CKD-EPI 2021: 77 ML/MIN/1.73
EOSINOPHIL # BLD AUTO: 0.96 10*3/MM3 (ref 0–0.4)
EOSINOPHIL NFR BLD AUTO: 10.3 % (ref 0.3–6.2)
ERYTHROCYTE [DISTWIDTH] IN BLOOD BY AUTOMATED COUNT: 14 % (ref 12.3–15.4)
GLOBULIN UR ELPH-MCNC: 4.1 GM/DL
GLUCOSE SERPL-MCNC: 114 MG/DL (ref 65–99)
HCT VFR BLD AUTO: 39.5 % (ref 37.5–51)
HGB BLD-MCNC: 12.3 G/DL (ref 13–17.7)
IMM GRANULOCYTES # BLD AUTO: 0.02 10*3/MM3 (ref 0–0.05)
IMM GRANULOCYTES NFR BLD AUTO: 0.2 % (ref 0–0.5)
LYMPHOCYTES # BLD AUTO: 1.64 10*3/MM3 (ref 0.7–3.1)
LYMPHOCYTES NFR BLD AUTO: 17.6 % (ref 19.6–45.3)
MCH RBC QN AUTO: 28 PG (ref 26.6–33)
MCHC RBC AUTO-ENTMCNC: 31.1 G/DL (ref 31.5–35.7)
MCV RBC AUTO: 90 FL (ref 79–97)
MONOCYTES # BLD AUTO: 1.24 10*3/MM3 (ref 0.1–0.9)
MONOCYTES NFR BLD AUTO: 13.3 % (ref 5–12)
NEUTROPHILS NFR BLD AUTO: 5.34 10*3/MM3 (ref 1.7–7)
NEUTROPHILS NFR BLD AUTO: 57.4 % (ref 42.7–76)
NRBC BLD AUTO-RTO: 0 /100 WBC (ref 0–0.2)
PLATELET # BLD AUTO: 250 10*3/MM3 (ref 140–450)
PMV BLD AUTO: 9.6 FL (ref 6–12)
POTASSIUM SERPL-SCNC: 4.6 MMOL/L (ref 3.5–5.2)
PROT SERPL-MCNC: 7.7 G/DL (ref 6–8.5)
RBC # BLD AUTO: 4.39 10*6/MM3 (ref 4.14–5.8)
SODIUM SERPL-SCNC: 137 MMOL/L (ref 136–145)
T4 FREE SERPL-MCNC: 1.46 NG/DL (ref 0.92–1.68)
TSH SERPL DL<=0.05 MIU/L-ACNC: 3.45 UIU/ML (ref 0.27–4.2)
WBC NRBC COR # BLD AUTO: 9.31 10*3/MM3 (ref 3.4–10.8)

## 2024-11-26 PROCEDURE — 80053 COMPREHEN METABOLIC PANEL: CPT | Performed by: INTERNAL MEDICINE

## 2024-11-26 PROCEDURE — 85025 COMPLETE CBC W/AUTO DIFF WBC: CPT | Performed by: INTERNAL MEDICINE

## 2024-11-26 PROCEDURE — 25010000002 PEMBROLIZUMAB 100 MG/4ML SOLUTION 4 ML VIAL: Performed by: INTERNAL MEDICINE

## 2024-11-26 PROCEDURE — 84439 ASSAY OF FREE THYROXINE: CPT | Performed by: INTERNAL MEDICINE

## 2024-11-26 PROCEDURE — 84443 ASSAY THYROID STIM HORMONE: CPT | Performed by: INTERNAL MEDICINE

## 2024-11-26 PROCEDURE — 96413 CHEMO IV INFUSION 1 HR: CPT

## 2024-11-26 RX ORDER — SODIUM CHLORIDE 9 MG/ML
20 INJECTION, SOLUTION INTRAVENOUS ONCE
OUTPATIENT
Start: 2024-12-17

## 2024-11-26 RX ADMIN — SODIUM CHLORIDE 200 MG: 9 INJECTION, SOLUTION INTRAVENOUS at 15:55

## 2024-11-26 NOTE — PROGRESS NOTES
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 infrarenal abdominal aortic aneurysm measuring  3.4 cm.  4.  Compression fracture noted of probable L2 vertebral body.    XR Chest 2 View (04/23/2024 16:34)   FINDINGS:  LUNGS: Pleural-based consolidation periphery of the right lung. Mild  fullness in the right suprahilar region  HEART AND MEDIASTINUM: Heart and mediastinal contours are unremarkable  SKELETON: Bony and soft tissue structures are unremarkable.     IMPRESSION:  Pleural-based consolidation periphery of the right lung and fullness in  the right suprahilar region. Recommend CT        Pathology:   06/21/2024        NGS:      PATHOLOGY - SCAN - FINAL RESULTS, GUARDANT, 07.28.2024 (07/28/2024)           Assessment / Plan         Assessment and Plan   Kevin Fonseca is a 89 y.o. year old with history of     DeNovo metastatic non small cell carcinoma, squamous cell. PD-L1 TPS 5%. ERBB2 amplification. MSI-low. Negative EGFR, ALK, ROS1, BRAF, MET, RET, NTRK, KRAS  -Patient with ~1 year of decline in function, weight loss, fatigue, and intermittent hemoptysis. CT May 15 2024 with with concerning right upper lobe peripheral consolidation 2.9 x 7.8 cm with central hilar mass 5 x 4.4 cm, small right upper lobe nodule 2.6 cm, another right upper lobe 1.2 cm ill defined mass  could represent scarring. Also noted liver mass concerning for metastatic disease. Follow up PET/CT 6/14/2024 with hepatic metastasis largest measuring 8.1 cm, and noted L2 compression fracture. 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  -Patient is aware treatment is palliative in nature. After a thorough discussion, patient and family decided to proceed with single agent pembrolizumab q21d given his performance status and co-morbidities. I feel this is reasonable given his functional status.   -C3 9/3-tolerated well. Course complicated due to accident with abrasions and noted to have bradycardia with 2-1 AV block.   -C4 9/24- tolerated well  -C5 10/15- tolerated well  -C6 11/5- proceed today  -CT chest/abdomen/pelvis with stable disease of the R lung and decreased size of the liver lesions. We discussed sending patient to radiation oncology re SBRT to liver and lung lesions for consultation. They are agreeable.   -C7 11/26- proceed today  -Next CT chest/abdomen/pelvis 2/2025     2. Malignancy associated pain  -Currently denies     3. Nutrition  -Recommend patient take in 2-3 boost/day    4. Hyponatremia   - Improved     5. Erythema and pruritus   -Started after C1 of IO  -Recommend H1 blockers: loratidine 10 mg AM and benadryl 25 mg PM  -Increase topical steroid to clobestasol 0.05 BID    6. Hypoxia   - Portable oxygen provided; patient has been oxygen dependent since recent admission for bradycardia and heart block    7. Bradycardia and AV heart block  - Evaluated by EP at [facility], he is discharged home with a cardiac monitor   - recommend pacemaker implant, however cardiology did not think patient was a good candidate for procedure    8. Loose stool  - Recommend holding miralax and benefiber for now   - If  continues to have loose stool, would recommend imodium PRN    9. RLE swelling  -Improvement of abrasions after fall, has surrounding erythema and swelling  -US lower extremity ordered to r/o DVT    Discussed possible differential diagnoses, testing, treatment, recommended non-pharmacological interventions, risks, warning signs to monitor for that would indicate need for follow-up in clinic or ER. If no improvement with these regimens or you have new or worsening symptoms follow-up. Patient verbalizes understanding and agreement with plan of care. Denies further needs or concerns.     Patient was given instructions and counseling regarding her condition and for health maintenance advised.       All questions were answered to his satisfaction.    BMI is within normal parameters. No other follow-up for BMI required.         ACO / MARY/Other  Quality measures  -  Kevin Fonseca did not receive 2024 flu vaccine.  This was recommended.  -  Kevin Fonseca reports a pain score of 0.  Given his pain assessment as noted, treatment options were discussed and the following options were decided upon as a follow-up plan to address the patient's pain: continuation of current treatment plan for pain.  -  Current outpatient and discharge medications have been reconciled for the patient.  Reviewed by: Jennifer Hinds MD        Meds ordered during this visit  No orders of the defined types were placed in this encounter.      Visit Diagnoses    ICD-10-CM ICD-9-CM   1. Metastatic non-small cell lung cancer  C34.90 162.9   2. Localized swelling of right lower leg  R22.41 729.81   3. Pruritus  L29.9 698.9                     Follow Up   In 6 weeks with treatment        This document has been electronically signed by Jennifer Hinds MD   November 26, 2024 14:37 EST    Dictated Utilizing Dragon Dictation: Part of this note may be an electronic transcription/translation of spoken language to printed text using the Dragon Dictation System.

## 2024-11-26 NOTE — PROGRESS NOTES
Venipuncture Blood Specimen Collection  Venipuncture performed in left arm by Angelica Davison MA with good hemostasis. Patient tolerated the procedure well without complications.   11/26/24   Angelica Davison MA

## 2024-12-04 ENCOUNTER — CONSULT (OUTPATIENT)
Dept: RADIATION ONCOLOGY | Facility: HOSPITAL | Age: 89
End: 2024-12-04
Payer: MEDICARE

## 2024-12-04 VITALS
OXYGEN SATURATION: 98 % | DIASTOLIC BLOOD PRESSURE: 61 MMHG | SYSTOLIC BLOOD PRESSURE: 115 MMHG | TEMPERATURE: 97.7 F | RESPIRATION RATE: 18 BRPM | HEART RATE: 71 BPM

## 2024-12-04 DIAGNOSIS — C34.2 MALIGNANT NEOPLASM OF MIDDLE LOBE, BRONCHUS OR LUNG: ICD-10-CM

## 2024-12-04 DIAGNOSIS — C34.90 METASTATIC NON-SMALL CELL LUNG CANCER: Primary | ICD-10-CM

## 2024-12-04 PROCEDURE — G0463 HOSPITAL OUTPT CLINIC VISIT: HCPCS | Performed by: RADIOLOGY

## 2024-12-04 NOTE — PROGRESS NOTES
New Patient Visit       Patient: Kevin Fonseca   YOB: 1934   Medical Record Number: [id number]   Date of Visit  December 4, 2024   Primary Diagnosis: BHONCSTAGE@ Metastatic non-small cell lung cancer [C34.90]                                               History of Present Illness: Mr. Kevin Fonseca is a 89-year-old male with a history of non-small cell carcinoma of the lung metastatic to liver and bone status post 7 cycles of pembrolizumab and has been referred to our department for evaluation for the potential use of consolidative radiation therapy and/or SBRT to the liver.    His pertinent history of lung cancer dates back to May 2024.  At that time a CT scan revealed a right upper lobe peripherally based lesion which measured 7.8 x 2.9 cm and a central hilar mass measuring 5 x 4.4 cm.  Also noted at that time was a mass in the liver consistent with metastatic disease.  A follow-up PET scan performed on 6/14/2024 confirmed the hepatic metastases.  The largest one measured 8.1 cm in greatest dimension and had an additional 1 in the left lobe measured 3 cm in greatest dimension.  PET scan also noticed a L2 compression fracture.  Ultrasound-guided core biopsy of the liver confirmed metastatic non-small cell carcinoma.    It was elected to proceed with pembrolizumab.  He has since received second cycles.  His last cycle began on 11/26/2024.  He has been followed with serial studies.  His most recent imaging of the chest abdomen and pelvis was performed on 11/17/2024.The right upper lobe lesion with subpleural soft tissue density and associated bone destruction looked unchanged from her previous study a previously noted right upper lobe pulmonary nodule remain on stent change.  The right lobe of the liver mass was again noted and appeared only slightly smaller.  It previously had measured 9.9 cm in greatest dimension and was down to 7 cm.  The lesion in the left lobe formally measured 4.7 cm and was down to  3.8 cm.    Today the patient states he is basically stable.  He remains oxygen dependent at 2 L/min.  This is not changed since prior to his diagnosis.  He does have a cough that occasionally is blood tinged and is occasionally bright red and frothy.  He has had no hoarseness or dysphagia.  He is able to eat both solids and liquids without difficulty.  He has no abdominal pain.  He relates no icterus.  He has no skeletal complaints either in the rib or L2.    Review of Systems    All other systems reviewed and are negative.    Vitals:    24 1329   BP: 115/61   Pulse: 71   Resp: 18   Temp: 97.7 °F (36.5 °C)   SpO2: 98%     Past Medical History:   Diagnosis Date    Arthritis     Asthma     Chronic bronchitis     Complete heart block     Emphysema lung     Gastritis     Heartburn     Macular degeneration     Macular degeneration, wet     Reflux esophagitis     Thyroid disease         Past Surgical History:   Procedure Laterality Date    INTRACAPSULAR CATARACT EXTRACTION      Dr. Lesly Gray. Dr. Neto Light.    LIVER BIOPSY        Family History   Problem Relation Age of Onset    Hypertension Mother     Other Mother     Cancer Father     Cancer Brother     Asthma Brother         Social History     Socioeconomic History    Marital status:    Tobacco Use    Smoking status: Former     Current packs/day: 0.00     Average packs/day: 1.5 packs/day for 44.0 years (66.0 ttl pk-yrs)     Types: Cigarettes     Start date:      Quit date:      Years since quittin.9     Passive exposure: Past    Smokeless tobacco: Never   Vaping Use    Vaping status: Never Used   Substance and Sexual Activity    Alcohol use: Not Currently     Comment: 2 week    Drug use: Never    Sexual activity: Defer          Allergies:  Patient has no known allergies.   Prior to Admission medications    Medication Sig Start Date End Date Taking? Authorizing Provider   albuterol sulfate  (90 Base) MCG/ACT inhaler Inhale 2  puffs Every 4 (Four) Hours As Needed for Wheezing or Shortness of Air.  Patient not taking: Reported on 11/5/2024 6/22/23   Elissa Geronimo MD   busPIRone (BUSPAR) 5 MG tablet Take 2 tablets (10 mg) in the AM and 1 tablet (5 mg) in the PM. 11/15/24   Elissa Geronimo MD   cetirizine (zyrTEC) 10 MG tablet Take 1 tablet by mouth Daily.  Patient not taking: Reported on 10/28/2024 10/11/24   Elissa Geronimo MD   clobetasol (CLOBEX) 0.05 % lotion Apply  topically to the appropriate area as directed 2 (Two) Times a Day. 11/5/24   Jennifer Hinds MD   hydrocortisone 2.5 % cream Apply 1 Application topically to the appropriate area as directed 2 (Two) Times a Day As Needed for Irritation.    Rush Denise MD   levothyroxine (SYNTHROID, LEVOTHROID) 88 MCG tablet Take 1 tablet by mouth Daily. 9/16/24   Elissa Geronimo MD   multivitamin with minerals (OCUVITE EXTRA PO) Take 1 tablet by mouth Daily.    Rush Denise MD   ondansetron ODT (ZOFRAN-ODT) 4 MG disintegrating tablet Place 1 tablet on the tongue Every 8 (Eight) Hours As Needed for Nausea or Vomiting.  Patient not taking: Reported on 10/28/2024 9/3/24   Jennifer Hinds MD   polyethylene glycol (MiraLax) 17 GM/SCOOP powder Take 17 g by mouth Daily As Needed (for constipation).  Patient not taking: Reported on 10/28/2024    Rush Denise MD   PSYLLIUM PO Take 1 packet by mouth 2 (Two) Times a Day.  Patient not taking: Reported on 10/28/2024    Rush Denise MD   sennosides-docusate (senna-docusate sodium) 8.6-50 MG per tablet Take 1 tablet by mouth Daily As Needed for Constipation.  Patient not taking: Reported on 10/28/2024    Rush Denise MD   Wheat Dextrin (BENEFIBER PO) Take 1 dose by mouth 2 (Two) Times a Day.  Patient not taking: Reported on 11/26/2024    Rush Denise MD      Pain:(on a scale of 0-10)    Pain Score    12/04/24 1329   PainSc: 0-No pain        Kevin Fonseca reports a pain score of 0.  Given  his pain assessment as noted, treatment options were discussed and the following options were decided upon as a follow-up plan to address the patient's pain: continuation of current treatment plan for pain.       Quality of Life:   ECOG: (2) Ambulatory and capable of self care, unable to carry out work activity, up and about > 50% or waking hours    Advanced Care Plan: N   Advance Care Planning     PHQ-9 Depression Screening:    Little interest or pleasure in doing things?     Feeling down, depressed, or hopeless?     Trouble falling or staying asleep, or sleeping too much?     Feeling tired or having little energy?     Poor appetite or overeating?     Feeling bad about yourself - or that you are a failure or have let yourself or your family down?     Trouble concentrating on things, such as reading the newspaper or watching television?     Moving or speaking so slowly that other people could have noticed? Or the opposite - being so fidgety or restless that you have been moving around a lot more than usual?     Thoughts that you would be better off dead, or of hurting yourself in some way?     PHQ-9 Total Score     If you checked off any problems, how difficult have these problems made it for you to do your work, take care of things at home, or get along with other people?      PHQ-9 Total Score:       Patient screened positive for depression based on a PHQ-9 score of  on . Follow-up recommendations include: Elevated PHQ score reflective of acute illness, not depression.       Physical Examination:  Vitals:  VITALS@    Height:    Weight: Weight: (not recorded)   There is no height or weight on file to calculate BMI.    Physical Exam  Constitutional: The patient is a well-developed, well-nourished 89-year-old male in no acute distress.  Alert and oriented ×3.  HEENT: Atraumatic. Normocephalic. No abnormalities noted.  No icterus.  EOMI.  PERRLA.  Lymphatics: No cervical, supraclavicular, or axillary, or inguinal  lymphadenopathy is palpated.  CV: Regular rate and rhythm.  No murmurs, rubs, or gallops are appreciated.  Respiratory: His breathing is unlabored on 2 L of O2 who lungs clear to auscultation.  Breath sounds equal bilaterally but distant compatible with COPD.  Breasts: No gynecomastia  GI: Abdomen soft, nontender, nondistended, with no hepatosplenomegaly or masses palpated.  Extremities: No clubbing, cyanosis, or edema.  No percussion tenderness over the cervical thoracic lumbar spine or ribs.  Neurologic: Cranial nerves II through XII are grossly intact, with no focal neurological deficits noted on exam.  Psychiatric: Alert and oriented x3. Normal affect, with no anxiety or depression noted.    Radiographs : CT Chest With Contrast Diagnostic    Result Date: 11/17/2024  1.  Again there is right upper lobe fairly extensive subpleural soft tissue density but with associated rib bony destruction that looks unchanged since previous study. 2.  Stable right upper lobe pulmonary nodule measuring 9 mm.   This report was finalized on 11/17/2024 4:28 PM by Dr. Kishor Martinez MD.      CT Abdomen Pelvis With Contrast    Result Date: 11/17/2024  1.  Right lobe of liver mass is again noted appearing slightly smaller at about 7 cm and was about 9.9 cm. A left lobe of liver mass also appears smaller today measuring 3.8 cm and was 4.7 cm. 2.  Infrarenal abdominal aortic aneurysm measuring 3.5 cm. 3.  Small right inguinal hernia containing fluid-filled but nondilated small bowel loop.   This report was finalized on 11/17/2024 4:01 PM by Dr. Kishor Martinez MD.      CT Chest With Contrast Diagnostic    Result Date: 9/24/2024   1. The overall appearance today very similar to the previous exam. Large pleural-based mass right upper lobe and superior segment right lower lobe as well as satellite lesion in the right apex and low-attenuation lesions in the liver.    This report was finalized on 9/24/2024 4:32 PM by Dr. Vinicius Kidd MD.       XR Chest 1 View    Result Date: 9/18/2024  1.  No effusion or pneumothorax. 2.  Radiographic size increase of a peripheral right upper lobe mass that appears to invade adjacent ribs. 3.  Chronic interstitial lung changes are stable. 4.  Cardiomegaly is noted.   This report was finalized on 9/18/2024 4:42 PM by Dr. Silas Zepeda MD.      US Venous Doppler Lower Extremity Left (duplex)    Result Date: 9/13/2024  No DVT in the left lower extremity on today's exam.  This report was finalized on 9/13/2024 3:42 PM by Dr. Vinicius Kidd MD.      US Venous Doppler Lower Extremity Right (duplex)    Result Date: 9/7/2024  Impression:  1.  No evidence of DVT. 2.  A prominent indeterminate complex hypoechoic area again related to hematoma in the right upper thigh.  Correlate clinically. MRI is suggested for further evaluation.  This report was finalized on 9/7/2024 12:25 AM by Manohar Welsh MD.      CT Chest With Contrast Diagnostic    Result Date: 8/26/2024   CT CHEST: PATHOLOGIC FRACTURE IN THE LATERAL ASPECT OF THE RIGHT FIFTH RIB, WHICH IS NOW ERODED AND INVADED BY THE OTHERWISE UNCHANGED RIGHT UPPER LOBE MASS.  COMPARED TO 5/15/2024, NO CHANGE IN PERIPHERAL RIGHT UPPER LOBE MASS WITH EXTENSION INTO THE RIGHT HILUM AND IRREGULARITY IN THE RIGHT MAINSTEM BRONCHUS.  NO ADDITIONAL SIGNIFICANT ACUTE ABNORMALITY IN THE CHEST.  CT ABDOMEN AND PELVIS: PROGRESSION IN HEPATIC METASTASES COMPARED TO 6/14/2024.      This report was finalized on 8/26/2024 6:43 AM by Miguelina Hays MD.      CT Abdomen Pelvis With Contrast    Result Date: 8/26/2024   CT CHEST: PATHOLOGIC FRACTURE IN THE LATERAL ASPECT OF THE RIGHT FIFTH RIB, WHICH IS NOW ERODED AND INVADED BY THE OTHERWISE UNCHANGED RIGHT UPPER LOBE MASS.  COMPARED TO 5/15/2024, NO CHANGE IN PERIPHERAL RIGHT UPPER LOBE MASS WITH EXTENSION INTO THE RIGHT HILUM AND IRREGULARITY IN THE RIGHT MAINSTEM BRONCHUS.  NO ADDITIONAL SIGNIFICANT ACUTE ABNORMALITY IN THE CHEST.  CT ABDOMEN AND PELVIS:  PROGRESSION IN HEPATIC METASTASES COMPARED TO 6/14/2024.      This report was finalized on 8/26/2024 6:43 AM by Miguelina Hays MD.      US Nonvascular Extremity Limited    Result Date: 8/25/2024   1.  Probable hematoma in the soft tissues above the level of the right knee and medial to the right knee. 2.  The area of abnormality appears heterogeneous and measures 8.1 x 4.2 x 5.7 cm. 3.  No features to suggest abscess 4.  No hyperemia.   This report was finalized on 8/25/2024 11:36 PM by Malvin Castillo MD.      XR Wrist 3+ View Right    Result Date: 8/25/2024   1.  No acute fracture. 2.  No acute dislocation.  This report was finalized on 8/25/2024 9:38 PM by Malvin Castillo MD.      CT Cervical Spine Without Contrast    Result Date: 8/25/2024  No acute fracture.   This report was finalized on 8/25/2024 9:37 PM by Alex Pallas, DO.      XR Hips Bilateral With or Without Pelvis 5 View    Result Date: 8/25/2024   1.  Intact pelvis with no acute fracture or dislocation. 2.  Intact right and left hip with no acute fracture or dislocation. 3.  Intact SI joints and intact pubic symphysis 4.  No acute foreign body.  This report was finalized on 8/25/2024 9:37 PM by Malvin Castillo MD.      CT Head Without Contrast    Result Date: 8/25/2024  Chronic appearing changes. No acute intracranial process.   This report was finalized on 8/25/2024 9:36 PM by Alex Pallas, DO.      XR Knee 3 View Right    Result Date: 8/25/2024   1.  Mild tricompartmental osteoarthritis. 2.  No acute fracture or dislocation. 3.  Trace fluid in the suprapatellar recess.  This report was finalized on 8/25/2024 9:35 PM by Malvin Castillo MD.      XR Knee 3 View Left    Result Date: 8/25/2024   1.  No acute fracture. 2.  No acute dislocation. 3.  Mild tricompartmental osteoarthritis.  This report was finalized on 8/25/2024 9:34 PM by Malvin Castillo MD.      XR Foot 3+ View Right    Result Date: 8/25/2024   1.  No acute fracture or dislocation. 2.  Mild  "osteoarthritis at the right first MTP joint. 3.  No foreign body.  This report was finalized on 8/25/2024 9:33 PM by Malvin Castillo MD.      XR Foot 3+ View Left    Result Date: 8/25/2024   1.  No acute fracture or dislocation. 2.  Mild osteoarthritis at the left first MTP joint. 3.  Small plantar spur.  This report was finalized on 8/25/2024 9:31 PM by Malvin Castillo MD.      XR Elbow 3+ View Right    Result Date: 8/25/2024   1.  No acute fracture or location. 2.  No increased fluid in the right elbow joint. 3.  No lytic or blastic lesion. 4.  No foreign body.  This report was finalized on 8/25/2024 9:30 PM by Malvin Castillo MD.      Pathology: No components found for: \"CASEREPORT\"  Labs:   Lab Results   Component Value Date    GLUCOSE 114 (H) 11/26/2024    BUN 16 11/26/2024    CREATININE 0.94 11/26/2024    EGFRIFNONA 59 (L) 01/19/2022    EGFRIFAFRI 71 01/19/2022    BCR 17.0 11/26/2024    K 4.6 11/26/2024    CO2 23.3 11/26/2024    CALCIUM 9.0 11/26/2024    PROTENTOTREF 6.9 04/20/2022    ALBUMIN 3.6 11/26/2024    LABIL2 1.8 04/20/2022    AST 18 11/26/2024    ALT 11 11/26/2024       WBC   Date Value Ref Range Status   11/26/2024 9.31 3.40 - 10.80 10*3/mm3 Final   11/09/2022 8.23 3.40 - 10.80 10*3/mm3 Final     Hemoglobin   Date Value Ref Range Status   11/26/2024 12.3 (L) 13.0 - 17.7 g/dL Final     Hematocrit   Date Value Ref Range Status   11/26/2024 39.5 37.5 - 51.0 % Final     Platelets   Date Value Ref Range Status   11/26/2024 250 140 - 450 10*3/mm3 Final      PSA   Date Value Ref Range Status   09/13/2024 0.917 0.000 - 4.000 ng/mL Final   08/30/2023 1.460 0.000 - 4.000 ng/mL Final   04/12/2021 2.000 0.000 - 4.000 ng/mL Final    No results found for: \"CEA\"     PFTs:FEV1 40% of predicted, FVC 69% of predicted, FEV1/FVC ratio 41% percent of predicted.    Total time spent: 45 minutes was spent reviewing the patient's records, radiographs, laboratory chemistries, pulmonary function studies, examining the patient, " educating the patient and family and documenting today's visit.      ASSESSMENT/PLAN: Mr. Fonseca is an O2 dependent 89-year-old gentleman with metastatic non-small cell carcinoma of the lung with a partial response to pembrolizumab.    I have taken the liberty of ordering a PET scan for further evaluation.  Pending the results I will be better able to make an accurate recommendation to the patient and his family.  Sincerely,        This document has been electronically signed by Vladimir Smith MD   December 4, 2024 13:50 EST    Dictated Utilizing Dragon Dictation: Part of this note may be an electronic transcription/translation of spoken language to printed text using the Dragon Dictation System.

## 2024-12-04 NOTE — PROGRESS NOTES
MD ordered PET to be completed and follow-up in office after. PET has been scheduled for 12/12/24 at 1130. He will follow-up with Dr. Almanzar on 12/13/24 at 1300. Patient and his family aware.

## 2024-12-06 ENCOUNTER — DOCUMENTATION (OUTPATIENT)
Dept: ONCOLOGY | Facility: CLINIC | Age: 89
End: 2024-12-06
Payer: MEDICARE

## 2024-12-06 NOTE — PROGRESS NOTES
SS spoke with patient as initial encounter to complete psychosocial assessment. The role of SS was introduced and contact information was provided.     Please refer to the Social Work flowsheet for assessment details.    SS provided patient with contact information and encouraged patient to call with any questions, concerns, or needs.     Patient verbalized understanding and agreed with resource assistance and plan.    SS will follow and assist as needed.

## 2024-12-09 ENCOUNTER — PATIENT ROUNDING (BHMG ONLY) (OUTPATIENT)
Dept: RADIATION ONCOLOGY | Facility: HOSPITAL | Age: 89
End: 2024-12-09
Payer: MEDICARE

## 2024-12-10 ENCOUNTER — HOSPITAL ENCOUNTER (OUTPATIENT)
Dept: ULTRASOUND IMAGING | Facility: HOSPITAL | Age: 89
Discharge: HOME | End: 2024-12-10
Admitting: INTERNAL MEDICINE
Payer: MEDICARE

## 2024-12-10 DIAGNOSIS — R22.41 LOCALIZED SWELLING OF RIGHT LOWER LEG: ICD-10-CM

## 2024-12-10 DIAGNOSIS — R22.41 LOCALIZED SWELLING OF RIGHT LOWER LEG: Primary | ICD-10-CM

## 2024-12-10 PROCEDURE — 93971 EXTREMITY STUDY: CPT

## 2024-12-11 RX ORDER — LEVOTHYROXINE SODIUM 88 UG/1
88 TABLET ORAL DAILY
Qty: 90 TABLET | Refills: 0 | Status: SHIPPED | OUTPATIENT
Start: 2024-12-11

## 2024-12-12 ENCOUNTER — HOSPITAL ENCOUNTER (OUTPATIENT)
Dept: PET IMAGING | Facility: HOSPITAL | Age: 89
Discharge: HOME | End: 2024-12-12
Admitting: RADIOLOGY
Payer: MEDICARE

## 2024-12-12 DIAGNOSIS — C34.2 MALIGNANT NEOPLASM OF MIDDLE LOBE, BRONCHUS OR LUNG: ICD-10-CM

## 2024-12-12 DIAGNOSIS — C34.90 METASTATIC NON-SMALL CELL LUNG CANCER: ICD-10-CM

## 2024-12-12 PROCEDURE — A9552 F18 FDG: HCPCS | Performed by: RADIOLOGY

## 2024-12-12 PROCEDURE — 34310000005 FLUDEOXYGLUCOSE F18 SOLUTION: Performed by: RADIOLOGY

## 2024-12-12 PROCEDURE — 78815 PET IMAGE W/CT SKULL-THIGH: CPT

## 2024-12-12 RX ADMIN — FLUDEOXYGLUCOSE F 18 1 DOSE: 200 INJECTION, SOLUTION INTRAVENOUS at 11:20

## 2024-12-13 ENCOUNTER — OFFICE VISIT (OUTPATIENT)
Dept: RADIATION ONCOLOGY | Facility: HOSPITAL | Age: 89
End: 2024-12-13
Payer: MEDICARE

## 2024-12-13 VITALS
DIASTOLIC BLOOD PRESSURE: 58 MMHG | SYSTOLIC BLOOD PRESSURE: 112 MMHG | TEMPERATURE: 98.4 F | OXYGEN SATURATION: 97 % | HEART RATE: 69 BPM | RESPIRATION RATE: 18 BRPM

## 2024-12-13 DIAGNOSIS — C34.90 METASTATIC NON-SMALL CELL LUNG CANCER: Primary | ICD-10-CM

## 2024-12-13 PROCEDURE — G0463 HOSPITAL OUTPT CLINIC VISIT: HCPCS | Performed by: RADIOLOGY

## 2024-12-13 NOTE — PROGRESS NOTES
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                                            History Summary:  Mr. 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  The patient has received seven 3-week cycles of pembrolizumab which he is tolerating well.    Surveillance CT scans of the chest, abdomen and pelvis (11/27/2024) showed reduction in size of the metastatic lesions in the right and left lobe of liver.    Mr. Fonseca was referred to this service for consideration of palliative chest and hepatic radiotherapy.  The patient was seen in consultation by Dr. Smith on 12/04/2024.  A PET CT scan was obtained on 12/12/2024 to assess his response to immunotherapy.    The PET CT scan was compared with prior study of 05/30/2024.  Decrease in tumor bulk and hypermetabolism was noted in the right suprahilar/hilar mass and in the pleural-based soft tissue thickening at the right upper lobe periphery.  Radionuclide uptake was noted in right lower paratracheal and hilar lymph nodes.  Reduction in hypermetabolism in the right and left lobe for metastases was reported.    Mr. Fonseca presents to our service today to review imaging results and to discuss palliative radiotherapy options.    Past Medical History:   Diagnosis Date    Arthritis     Asthma     Chronic bronchitis     Complete heart block     Emphysema lung     Gastritis     Heartburn     Macular degeneration     Macular degeneration, wet     Reflux esophagitis     Thyroid disease         Past Surgical History:   Procedure Laterality Date    INTRACAPSULAR CATARACT EXTRACTION       Dr. Lesly Gray. Dr. Neto Light.    LIVER BIOPSY        Family History   Problem Relation Age of Onset    Hypertension Mother     Other Mother     Cancer Father     Cancer Brother     Asthma Brother         Social History     Socioeconomic History    Marital status:    Tobacco Use    Smoking status: Former     Current packs/day: 0.00     Average packs/day: 1.5 packs/day for 44.0 years (66.0 ttl pk-yrs)     Types: Cigarettes     Start date:      Quit date:      Years since quittin.9     Passive exposure: Past    Smokeless tobacco: Never   Vaping Use    Vaping status: Never Used   Substance and Sexual Activity    Alcohol use: Not Currently     Comment: 2 week    Drug use: Never    Sexual activity: Defer     Allergies:  Patient has no known allergies.   Prior to Admission medications    Medication Sig Start Date End Date Taking? Authorizing Provider   busPIRone (BUSPAR) 5 MG tablet Take 2 tablets (10 mg) in the AM and 1 tablet (5 mg) in the PM. 11/15/24  Yes Elissa Geronimo MD   clobetasol (CLOBEX) 0.05 % lotion Apply  topically to the appropriate area as directed 2 (Two) Times a Day. 24  Yes Jennifer Hinds MD   hydrocortisone 2.5 % cream Apply 1 Application topically to the appropriate area as directed 2 (Two) Times a Day As Needed for Irritation.   Yes Rush Denise MD   levothyroxine (SYNTHROID, LEVOTHROID) 88 MCG tablet TAKE 1 TABLET BY MOUTH DAILY 24  Yes Elissa Geronimo MD   multivitamin with minerals (OCUVITE EXTRA PO) Take 1 tablet by mouth Daily.   Yes ProviderRush MD   albuterol sulfate  (90 Base) MCG/ACT inhaler Inhale 2 puffs Every 4 (Four) Hours As Needed for Wheezing or Shortness of Air.  Patient not taking: Reported on 2024   Elissa Geronimo MD   cetirizine (zyrTEC) 10 MG tablet Take 1 tablet by mouth Daily.  Patient not taking: Reported on 10/28/2024 10/11/24   Elissa Geronimo MD   ondansetron ODT (ZOFRAN-ODT) 4  MG disintegrating tablet Place 1 tablet on the tongue Every 8 (Eight) Hours As Needed for Nausea or Vomiting.  Patient not taking: Reported on 10/28/2024 9/3/24   Jennifer Hinds MD   polyethylene glycol (MiraLax) 17 GM/SCOOP powder Take 17 g by mouth Daily As Needed (for constipation).  Patient not taking: Reported on 10/28/2024    Rush Denise MD   PSYLLIUM PO Take 1 packet by mouth 2 (Two) Times a Day.  Patient not taking: Reported on 10/28/2024    Rush Denise MD   sennosides-docusate (senna-docusate sodium) 8.6-50 MG per tablet Take 1 tablet by mouth Daily As Needed for Constipation.  Patient not taking: Reported on 10/28/2024    Rush Denise MD   Wheat Dextrin (BENEFIBER PO) Take 1 dose by mouth 2 (Two) Times a Day.  Patient not taking: Reported on 2024    Rush Denise MD      Pain:(on a scale of 0-10)    Pain Score    24 1309   PainSc: 0-No pain      Kevin Fonseca reports a pain score of 0.  .     Quality of Life:   KPS: 60  ECO    Physical Examination:  Vitals: Blood pressure 112/58, pulse 69, respirations 18, temperature 98.4 °F, pulse oximetry on 2 L/minute supplemental oxygen 97%  Weight: Weight: (not recorded) There is no height or weight on file to calculate BMI.    Constitutional: The patient is a well-developed, well-nourished elderly white male in no acute distress.  Alert and oriented ×3.  The patient is in a wheelchair receiving supplemental oxygen.    Physical examination was not performed (please see Dr. Smith's detailed notes of 2024).    Radiographs :   PET CT scan (2024) described above    Pathology:   Described above    Labs:   Lab Results   Component Value Date    GLUCOSE 114 (H) 2024    BUN 16 2024    CREATININE 0.94 2024     2024    K 4.6 2024     2024    CALCIUM 9.0 2024    PROTEINTOT 7.7 2024    ALBUMIN 3.6 2024    ALT 11 2024    AST 18 2024     "ALKPHOS 103 11/26/2024    BILITOT 0.5 11/26/2024    GLOB 4.1 11/26/2024    AGRATIO 0.9 11/26/2024    BCR 17.0 11/26/2024    ANIONGAP 11.7 11/26/2024    EGFR 77.0 11/26/2024      WBC   Date Value Ref Range Status   11/26/2024 9.31 3.40 - 10.80 10*3/mm3 Final   11/09/2022 8.23 3.40 - 10.80 10*3/mm3 Final     Hemoglobin   Date Value Ref Range Status   11/26/2024 12.3 (L) 13.0 - 17.7 g/dL Final     Hematocrit   Date Value Ref Range Status   11/26/2024 39.5 37.5 - 51.0 % Final     Platelets   Date Value Ref Range Status   11/26/2024 250 140 - 450 10*3/mm3 Final      PSA   Date Value Ref Range Status   09/13/2024 0.917 0.000 - 4.000 ng/mL Final   08/30/2023 1.460 0.000 - 4.000 ng/mL Final   04/12/2021 2.000 0.000 - 4.000 ng/mL Final    No results found for: \"CEA\"     Assessment:  Stage IV (T4 N2 M1c) squamous cell carcinoma of right mid chest/right pleura with multiple hepatic metastases    The patient is clinically stable.  He has had a satisfactory response to immunotherapy.    Recommendations:  I reviewed the recent PET CT scan results with the patient, his wife and granddaughter.  There has been significant reduction in the size of the right suprahilar/hilar and pleural-based masses.  Reduction in hypermetabolism of the hepatic lesions was reported.    Mr. Fonseca appears to be a candidate for a course of palliative chest radiotherapy.  The goal of treatment is to enhance local tumor control.  The pleural-based lesion was reported to involve the underlying right lateral fourth rib (currently asymptomatic).  Palliative radiotherapy would also prevent further bone destruction.  I explained to the patient and accompanying family members that radiotherapy would consist in the administration of 3000 cGy in 10 treatment fractions to the central and peripheral components of tumor over a 2-week period.  Radiotherapy would be administered using volumetric modulated arc beam radiotherapy to reduce the radiation dose to adjacent " noninvolved lung and cardiac tissues.    I discussed the rationale for consolidative chest radiotherapy, duration of treatment, expected outcomes, possible acute and chronic side effects, and posttreatment surveillance measures with Mr. Fonseca.  I stressed to the patient that radiotherapy is not curative treatment.    The patient was also referred for consideration of stereotactic body radiotherapy (SBRT) to the hepatic lesions.  The hepatic metastases are well-visualized on the PET CT scan but poorly visualized on CT scans.  The placement of marker clips around the hepatic lesion(s) would be necessary for visualization to perform SBRT.  I was informed by the [facility] radiologist that only large deployment clips, which are usually used for breast tumors, are available at this hospital.  Smaller clips which are placed with narrow gauge needles would be recommended to reduce the chance of hepatic hemorrhage.  Referral to the interventional radiology services of [facility] or [facility] was suggested.    Mr. Fonseca and accompanying family members stated that their treatment related questions had been answered to their satisfaction.  Mr. Fonseca wishes to consider his radiotherapy options.  The patient or his wife will contact our service on 12/16/2024 with their decision.    Time spent with patient 20 minutes (time included previsit review of medical records, review of imaging studies, consultation with radiology, patient counseling, and coordination of care).    Thank you for allowing our participation in this very pleasant gentleman's treatment.      Electronically signed by Omid Almanzar MD 12/13/2024  14:28 EST    cc:    MD Frances Caceres MD

## 2024-12-16 ENCOUNTER — TELEPHONE (OUTPATIENT)
Dept: ONCOLOGY | Facility: CLINIC | Age: 89
End: 2024-12-16
Payer: MEDICARE

## 2024-12-16 NOTE — TELEPHONE ENCOUNTER
Caller: DILAN JOVEL    Relationship: Emergency Contact    Best call back number: [phone]    What is the best time to reach you: ANYTIME    Who are you requesting to speak with (clinical staff, provider,  specific staff member): DR COLLADO / CLINICAL    What was the call regarding: PT'S WIFE REQUESTING A CALL BACK IN REGARDS TO THERE APPOINTMENT THEY HAD WITH RADIOLOGY ONCOLOGY     REQUESTING A CALL BACK TODAY

## 2024-12-17 ENCOUNTER — APPOINTMENT (OUTPATIENT)
Dept: ONCOLOGY | Facility: HOSPITAL | Age: 89
End: 2024-12-17
Payer: MEDICARE

## 2024-12-17 ENCOUNTER — OFFICE VISIT (OUTPATIENT)
Dept: ONCOLOGY | Facility: CLINIC | Age: 89
End: 2024-12-17
Payer: OTHER GOVERNMENT

## 2024-12-17 ENCOUNTER — INFUSION (OUTPATIENT)
Dept: ONCOLOGY | Facility: HOSPITAL | Age: 89
End: 2024-12-17
Payer: MEDICARE

## 2024-12-17 VITALS
HEART RATE: 68 BPM | SYSTOLIC BLOOD PRESSURE: 135 MMHG | WEIGHT: 163.9 LBS | DIASTOLIC BLOOD PRESSURE: 63 MMHG | TEMPERATURE: 97.1 F | OXYGEN SATURATION: 97 % | RESPIRATION RATE: 18 BRPM | BODY MASS INDEX: 22.23 KG/M2

## 2024-12-17 VITALS
TEMPERATURE: 97.1 F | HEART RATE: 68 BPM | DIASTOLIC BLOOD PRESSURE: 63 MMHG | RESPIRATION RATE: 18 BRPM | SYSTOLIC BLOOD PRESSURE: 135 MMHG | WEIGHT: 163.9 LBS | OXYGEN SATURATION: 97 % | BODY MASS INDEX: 22.23 KG/M2

## 2024-12-17 DIAGNOSIS — R04.2 HEMOPTYSIS: ICD-10-CM

## 2024-12-17 DIAGNOSIS — C34.90 METASTATIC NON-SMALL CELL LUNG CANCER: Primary | ICD-10-CM

## 2024-12-17 DIAGNOSIS — L03.115 CELLULITIS OF RIGHT LOWER EXTREMITY: ICD-10-CM

## 2024-12-17 LAB
ALBUMIN SERPL-MCNC: 3.4 G/DL (ref 3.5–5.2)
ALBUMIN/GLOB SERPL: 0.9 G/DL
ALP SERPL-CCNC: 93 U/L (ref 39–117)
ALT SERPL W P-5'-P-CCNC: 14 U/L (ref 1–41)
ANION GAP SERPL CALCULATED.3IONS-SCNC: 9.5 MMOL/L (ref 5–15)
AST SERPL-CCNC: 25 U/L (ref 1–40)
BASOPHILS # BLD AUTO: 0.08 10*3/MM3 (ref 0–0.2)
BASOPHILS NFR BLD AUTO: 1 % (ref 0–1.5)
BILIRUB SERPL-MCNC: 0.4 MG/DL (ref 0–1.2)
BUN SERPL-MCNC: 16 MG/DL (ref 8–23)
BUN/CREAT SERPL: 18.2 (ref 7–25)
CALCIUM SPEC-SCNC: 8.6 MG/DL (ref 8.2–9.6)
CHLORIDE SERPL-SCNC: 102 MMOL/L (ref 98–107)
CO2 SERPL-SCNC: 25.5 MMOL/L (ref 22–29)
CREAT SERPL-MCNC: 0.88 MG/DL (ref 0.76–1.27)
DEPRECATED RDW RBC AUTO: 47.1 FL (ref 37–54)
EGFRCR SERPLBLD CKD-EPI 2021: 81.7 ML/MIN/1.73
EOSINOPHIL # BLD AUTO: 0.84 10*3/MM3 (ref 0–0.4)
EOSINOPHIL NFR BLD AUTO: 10.6 % (ref 0.3–6.2)
ERYTHROCYTE [DISTWIDTH] IN BLOOD BY AUTOMATED COUNT: 14.6 % (ref 12.3–15.4)
GLOBULIN UR ELPH-MCNC: 3.8 GM/DL
GLUCOSE SERPL-MCNC: 102 MG/DL (ref 65–99)
HCT VFR BLD AUTO: 37.4 % (ref 37.5–51)
HGB BLD-MCNC: 11.7 G/DL (ref 13–17.7)
IMM GRANULOCYTES # BLD AUTO: 0.02 10*3/MM3 (ref 0–0.05)
IMM GRANULOCYTES NFR BLD AUTO: 0.3 % (ref 0–0.5)
LYMPHOCYTES # BLD AUTO: 1.75 10*3/MM3 (ref 0.7–3.1)
LYMPHOCYTES NFR BLD AUTO: 22.1 % (ref 19.6–45.3)
MCH RBC QN AUTO: 27.7 PG (ref 26.6–33)
MCHC RBC AUTO-ENTMCNC: 31.3 G/DL (ref 31.5–35.7)
MCV RBC AUTO: 88.4 FL (ref 79–97)
MONOCYTES # BLD AUTO: 1.14 10*3/MM3 (ref 0.1–0.9)
MONOCYTES NFR BLD AUTO: 14.4 % (ref 5–12)
NEUTROPHILS NFR BLD AUTO: 4.09 10*3/MM3 (ref 1.7–7)
NEUTROPHILS NFR BLD AUTO: 51.6 % (ref 42.7–76)
NRBC BLD AUTO-RTO: 0 /100 WBC (ref 0–0.2)
PLATELET # BLD AUTO: 264 10*3/MM3 (ref 140–450)
PMV BLD AUTO: 10.1 FL (ref 6–12)
POTASSIUM SERPL-SCNC: 5.3 MMOL/L (ref 3.5–5.2)
PROT SERPL-MCNC: 7.2 G/DL (ref 6–8.5)
RBC # BLD AUTO: 4.23 10*6/MM3 (ref 4.14–5.8)
SODIUM SERPL-SCNC: 137 MMOL/L (ref 136–145)
WBC NRBC COR # BLD AUTO: 7.92 10*3/MM3 (ref 3.4–10.8)

## 2024-12-17 PROCEDURE — 85025 COMPLETE CBC W/AUTO DIFF WBC: CPT

## 2024-12-17 PROCEDURE — 96413 CHEMO IV INFUSION 1 HR: CPT

## 2024-12-17 PROCEDURE — 80053 COMPREHEN METABOLIC PANEL: CPT

## 2024-12-17 PROCEDURE — 25010000002 PEMBROLIZUMAB 100 MG/4ML SOLUTION 4 ML VIAL: Performed by: INTERNAL MEDICINE

## 2024-12-17 PROCEDURE — 99214 OFFICE O/P EST MOD 30 MIN: CPT | Performed by: NURSE PRACTITIONER

## 2024-12-17 PROCEDURE — 77263 THER RADIOLOGY TX PLNG CPLX: CPT | Performed by: RADIOLOGY

## 2024-12-17 RX ORDER — SULFAMETHOXAZOLE AND TRIMETHOPRIM 800; 160 MG/1; MG/1
1 TABLET ORAL 2 TIMES DAILY
Qty: 14 TABLET | Refills: 0 | Status: SHIPPED | OUTPATIENT
Start: 2024-12-17 | End: 2024-12-24

## 2024-12-17 RX ORDER — SODIUM CHLORIDE 9 MG/ML
20 INJECTION, SOLUTION INTRAVENOUS ONCE
Status: DISCONTINUED | OUTPATIENT
Start: 2024-12-17 | End: 2024-12-17 | Stop reason: RX

## 2024-12-17 RX ADMIN — SODIUM CHLORIDE 200 MG: 9 INJECTION, SOLUTION INTRAVENOUS at 15:51

## 2024-12-17 NOTE — TELEPHONE ENCOUNTER
RN spoke with Faby who stated the patient saw Dr. Almanzar for consultation last week and wanted Dr. Hinds's opinion to see if she thinks it would be beneficial for patient to travel to [facility] for radiation to liver as well as receiving radiation here.     Patient's spouse also informed RN that patient spit up a lot blood yesterday morning and stated he does this sometimes, but had not in a while. Denies shortness of breath or increased fatigue/weakness today.     RN discussed with MD who stated she agrees with Dr. Almanzar and would recommend the patient go through with radiation both to liver and ribs. As for coughing up blood, MD recommended patient see an APRN for an acute visit today while here for keytruda.     RN relayed this to patient and patient's spouse who both voiced understanding and denies any further questions or concerns at this time.    verbal cues

## 2024-12-17 NOTE — PROGRESS NOTES
DATE:  2024    DIAGNOSIS: Metastatic Lung Cancer    CHIEF COMPLAINT:  Hemoptysis        Interval History:  Mr. Fonseca follows with Dr. Hinds for metastatic lung cancer and has been receiving palliative Pembrolizumab. He most recently completed seven cycles on 2024. He presents today for cycle 8 Pembrolizumab along with reports of a couple episodes of hemoptysis yesterday. He does report intermittent episodes of hemoptysis for at least a year now. He tells me that he primarily wanted to be seen today because he has several questions regarding radiation therapy and he would like the results of the ultrasound done on his leg on 12/10. Otherwise, he is doing well today. He reports a good appetite, stable weight. Denies any fever/chills. No rash. He denies any hemoptysis today. He is without any specific complaints at this time.     The following portions of the patient's history were reviewed and updated as appropriate: allergies, current medications, past family history, past medical history, past social history, past surgical history and problem list.    PAST MEDICAL HISTORY:  Past Medical History:   Diagnosis Date    Arthritis     Asthma     Chronic bronchitis     Complete heart block     Emphysema lung     Gastritis     Heartburn     Macular degeneration     Macular degeneration, wet     Reflux esophagitis     Thyroid disease        PAST SURGICAL HISTORY:  Past Surgical History:   Procedure Laterality Date    INTRACAPSULAR CATARACT EXTRACTION      Dr. Lesly Gray. Dr. Neto Light.    LIVER BIOPSY         SOCIAL HISTORY:  Social History     Socioeconomic History    Marital status:    Tobacco Use    Smoking status: Former     Current packs/day: 0.00     Average packs/day: 1.5 packs/day for 44.0 years (66.0 ttl pk-yrs)     Types: Cigarettes     Start date:      Quit date:      Years since quittin.9     Passive exposure: Past    Smokeless tobacco: Never   Vaping Use    Vaping  status: Never Used   Substance and Sexual Activity    Alcohol use: Not Currently     Comment: 2 week    Drug use: Never    Sexual activity: Defer                  FAMILY HISTORY:  Family History   Problem Relation Age of Onset    Hypertension Mother     Other Mother     Cancer Father     Cancer Brother     Asthma Brother          MEDICATIONS:  The current medication list was reviewed in the EMR    Current Outpatient Medications:     albuterol sulfate  (90 Base) MCG/ACT inhaler, Inhale 2 puffs Every 4 (Four) Hours As Needed for Wheezing or Shortness of Air. (Patient not taking: Reported on 11/5/2024), Disp: 18 g, Rfl: 8    busPIRone (BUSPAR) 5 MG tablet, Take 2 tablets (10 mg) in the AM and 1 tablet (5 mg) in the PM., Disp: 180 tablet, Rfl: 1    cetirizine (zyrTEC) 10 MG tablet, Take 1 tablet by mouth Daily. (Patient not taking: Reported on 10/28/2024), Disp: 30 tablet, Rfl: 0    clobetasol (CLOBEX) 0.05 % lotion, Apply  topically to the appropriate area as directed 2 (Two) Times a Day., Disp: 118 mL, Rfl: 3    hydrocortisone 2.5 % cream, Apply 1 Application topically to the appropriate area as directed 2 (Two) Times a Day As Needed for Irritation., Disp: , Rfl:     levothyroxine (SYNTHROID, LEVOTHROID) 88 MCG tablet, TAKE 1 TABLET BY MOUTH DAILY, Disp: 90 tablet, Rfl: 0    multivitamin with minerals (OCUVITE EXTRA PO), Take 1 tablet by mouth Daily., Disp: , Rfl:     ondansetron ODT (ZOFRAN-ODT) 4 MG disintegrating tablet, Place 1 tablet on the tongue Every 8 (Eight) Hours As Needed for Nausea or Vomiting. (Patient not taking: Reported on 10/28/2024), Disp: 30 tablet, Rfl: 0    polyethylene glycol (MiraLax) 17 GM/SCOOP powder, Take 17 g by mouth Daily As Needed (for constipation). (Patient not taking: Reported on 10/28/2024), Disp: , Rfl:     PSYLLIUM PO, Take 1 packet by mouth 2 (Two) Times a Day. (Patient not taking: Reported on 10/28/2024), Disp: , Rfl:     sennosides-docusate (senna-docusate sodium) 8.6-50  MG per tablet, Take 1 tablet by mouth Daily As Needed for Constipation. (Patient not taking: Reported on 10/28/2024), Disp: , Rfl:     Wheat Dextrin (BENEFIBER PO), Take 1 dose by mouth 2 (Two) Times a Day. (Patient not taking: Reported on 12/13/2024), Disp: , Rfl:   No current facility-administered medications for this visit.    Facility-Administered Medications Ordered in Other Visits:     Pembrolizumab (KEYTRUDA) 200 mg in sodium chloride 0.9 % 58 mL chemo IVPB, 200 mg, Intravenous, Once, Jennifer Hinds MD    sodium chloride 0.9 % infusion 500 mL, 500 mL, Intravenous, Once, Laina Livingston APRN    ALLERGIES:  No Known Allergies      REVIEW OF SYSTEMS:    A comprehensive 14 point review of systems was performed.  Significant findings as mentioned above.  All other systems reviewed and are negative.        Physical Exam   Vital Signs:   Vitals:    12/17/24 1439   BP: 135/63   Pulse: 68   Resp: 18   Temp: 97.1 °F (36.2 °C)   SpO2: 97%   BMI is within normal parameters. No other follow-up for BMI required.    General: Well developed, well nourished, alert and oriented x 3, in no acute distress.   Head: ATNC   Eyes: PERRL, No evidence of conjunctivitis.   Nose: No nasal discharge.   Mouth: Oral mucosal membranes moist. No oral ulceration or hemorrhages.   Neck: Neck supple. No thyromegaly. No JVD.   Lungs: Clear in all fields to A&P without rales, rhonchi or wheezing.   Heart: S1, S2. Regular rate and rhythm. No murmurs, rubs, or gallops.   Abdomen: Soft. Bowel sounds are normoactive. Nontender with palpation.   Extremities: Bilateral lower extremity edema. RLE with redness, warmth and swelling.   Integumentary: Warm, dry, intact.    Neurologic: Grossly non-focal exam.    Pain Score:  Pain Score    12/17/24 1439   PainSc: 0-No pain               IMAGING:  US Venous Doppler Lower Extremity Right (duplex) (12/10/2024 14:57)   IMPRESSION: Cystic-appearing mass of the lower lateral leg incompletely imaged  and  could represent large hematoma but MRI or CT may be beneficial for  further evaluation.    RECENT LABS:  Lab Results   Component Value Date    WBC 7.92 12/17/2024    HGB 11.7 (L) 12/17/2024    HCT 37.4 (L) 12/17/2024    MCV 88.4 12/17/2024    RDW 14.6 12/17/2024     12/17/2024    NEUTRORELPCT 51.6 12/17/2024    LYMPHORELPCT 22.1 12/17/2024    MONORELPCT 14.4 (H) 12/17/2024    EOSRELPCT 10.6 (H) 12/17/2024    BASORELPCT 1.0 12/17/2024    NEUTROABS 4.09 12/17/2024    LYMPHSABS 1.75 12/17/2024       Lab Results   Component Value Date     12/17/2024    K 5.3 (H) 12/17/2024    CO2 25.5 12/17/2024     12/17/2024    BUN 16 12/17/2024    CREATININE 0.88 12/17/2024    EGFRIFNONA 59 (L) 01/19/2022    EGFRIFAFRI 71 01/19/2022    GLUCOSE 102 (H) 12/17/2024    CALCIUM 8.6 12/17/2024    ALKPHOS 93 12/17/2024    AST 25 12/17/2024    ALT 14 12/17/2024    BILITOT 0.4 12/17/2024    ALBUMIN 3.4 (L) 12/17/2024    PROTEINTOT 7.2 12/17/2024    MG 2.5 (H) 09/18/2024    PHOS 3.1 08/26/2024           Lab Results   Component Value Date    FERRITIN 367.30 10/15/2024    IRON 53 (L) 10/15/2024    TIBC 335 10/15/2024    LABIRON 16 (L) 10/15/2024    PTCNWNNG29 447 10/15/2024    FOLATE 11.30 10/15/2024      ASSESSMENT & PLAN:  Kevin Fonseca is a very pleasant 89 y.o. male with    1. Metastatic Lung Cancer  - Please see Dr. Hinds's most recent follow up note from 11/26/2024 for full details regarding oncology history. He is being seen today for below issues. He will follow up with Dr. Hinds as previously planned.  - Will proceed with cycle 8 Pembrolizumab today as planned.    2. Hemoptysis  - This is a chronic, intermittent issue for Mr. Fonseca. He reports that he had about three episodes of hemoptysis on 12/16/2024. No clots were noted. Denies any worsening.   - Denies any further hemoptysis at this time.  - Hg today 11.7, stable. Will continue to monitor.     3. Cellulitis RLE  - Chronic, recurrent issue.   -   Guzman ordered venous duplex on 12/10/2024 which showed cystic appearing mass of the lower lateral leg incompletely imaged and could represent large hematoma but MRI or CT may be beneficial for further evaluation. CT was ordered per Dr. Hinds but this has not been scheduled. Discussed with scheduling today and appointment is now arranged for CT right lower extremity on 12/27/2024. Patient is aware.  - RLE is red, swollen and warm to touch today. Denies any fever/chills. With concern for cellulitis will start Bactrim DS today.  - Also recommended that they follow up with PCP for ongoing management.    4. XRT Chest Wall  - Patient was evaluated by radiation oncology on 12/13/2024 and now has multiple questions. Questions include: 1. Can I use lotion/deodarants? 2. Will I be able to place a pillow under my head? I discussed with patient that these are all questions that he will need to ask radiation oncology. He states that he is agreeable to proceed with XRT. Appointment was arranged for 12/18/2024 for CT sim. Encouraged patient to write questions down and bring to appointment.       I spent 30 minutes caring for Kevin on this date of service. This time includes time spent by me in the following activities: preparing for the visit, reviewing tests, performing a medically appropriate examination and/or evaluation, counseling and educating the patient/family/caregiver, ordering medications, tests, or procedures, referring and communicating with other health care professionals, documenting information in the medical record, independently interpreting results and communicating that information with the patient/family/caregiver, and care coordination          Electronically Signed by: DENICE Ashford , DENICE December 17, 2024 15:46 EST       CC:   No ref. provider found  Elissa Geronimo MD

## 2024-12-17 NOTE — PROGRESS NOTES
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  Compression fracture noted of probable L2 vertebral body.    XR Chest 2 View (04/23/2024 16:34)   FINDINGS:  LUNGS: Pleural-based consolidation periphery of the right lung. Mild  fullness in the right suprahilar region  HEART AND MEDIASTINUM: Heart and mediastinal contours are unremarkable  SKELETON: Bony and soft tissue structures are unremarkable.     IMPRESSION:  Pleural-based consolidation periphery of the right lung and fullness in  the right suprahilar region. Recommend CT        Pathology:   06/21/2024        NGS:      PATHOLOGY - SCAN - FINAL RESULTS, GUARDANT, 07.28.2024 (07/28/2024)           Assessment / Plan         Assessment and Plan   Kevin Fonseca is a 89 y.o. year old with history of     [place]ovo metastatic non small cell carcinoma, squamous cell. PD-L1 TPS 5%. ERBB2 amplification. MSI-low. Negative EGFR, ALK, ROS1, BRAF, MET, RET, NTRK, KRAS  -Patient with ~1 year of decline in function, weight loss, fatigue, and intermittent hemoptysis. CT May 15 2024 with with concerning right upper lobe peripheral consolidation 2.9 x 7.8 cm with central hilar mass 5 x 4.4 cm, small right upper lobe nodule 2.6 cm, another right upper lobe 1.2 cm ill defined mass could represent scarring. Also noted liver mass concerning for  metastatic disease. Follow up PET/CT 6/14/2024 with hepatic metastasis largest measuring 8.1 cm, and noted L2 compression fracture. 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  -Patient is aware treatment is palliative in nature. After a thorough discussion, patient and family decided to proceed with single agent pembrolizumab q21d given his performance status and co-morbidities. I feel this is reasonable given his functional status.   -C3 9/3-tolerated well. Course complicated due to accident with abrasions and noted to have bradycardia with 2-1 AV block.   -C4 9/24- tolerated well  -C5 10/15- tolerated well  -C6 11/5- proceed today  -CT chest/abdomen/pelvis with stable disease of the R lung and decreased size of the liver lesions. We discussed sending patient to radiation oncology re SBRT to liver and lung lesions for consultation. They are agreeable.   -C7 11/26- proceed today  -Next CT chest/abdomen/pelvis 2/2025     2. Malignancy associated pain  -Currently denies     3. Nutrition  -Recommend patient take in 2-3 boost/day    4. Hyponatremia   - Improved     5. Erythema and pruritus   -Started after C1 of IO  -Recommend H1 blockers: loratidine 10 mg AM and benadryl 25 mg PM  -Increase topical steroid to clobestasol 0.05 BID    6. Hypoxia   - Portable oxygen provided; patient has been oxygen dependent since recent admission for bradycardia and heart block    7. Bradycardia and AV heart block  - Evaluated by EP at [facility], he is discharged home with a cardiac monitor   - recommend pacemaker implant, however cardiology did not think patient was a good candidate for procedure    8. Loose stool  - Recommend holding miralax and benefiber for now   - If continues to have loose stool, would recommend imodium PRN    9. RLE  swelling  -Improvement of abrasions after fall, has surrounding erythema and swelling  -US lower extremity ordered to r/o DVT    Discussed possible differential diagnoses, testing, treatment, recommended non-pharmacological interventions, risks, warning signs to monitor for that would indicate need for follow-up in clinic or ER. If no improvement with these regimens or you have new or worsening symptoms follow-up. Patient verbalizes understanding and agreement with plan of care. Denies further needs or concerns.     Patient was given instructions and counseling regarding her condition and for health maintenance advised.       All questions were answered to his satisfaction.    BMI is within normal parameters. No other follow-up for BMI required.         ACO / MARY/Other  Quality measures  -  Kevin Fonseca did not receive 2024 flu vaccine.  This was recommended.  -  Kevin Fonseca reports a pain score of 0.  Given his pain assessment as noted, treatment options were discussed and the following options were decided upon as a follow-up plan to address the patient's pain: continuation of current treatment plan for pain.  -  Current outpatient and discharge medications have been reconciled for the patient.  Reviewed by: DENICE Smith        Meds ordered during this visit  No orders of the defined types were placed in this encounter.      Visit Diagnoses  No diagnosis found.                    Follow Up   In 6 weeks with treatment        This document has been electronically signed by DENICE Ashford   December 17, 2024 15:13 EST    Dictated Utilizing Dragon Dictation: Part of this note may be an electronic transcription/translation of spoken language to printed text using the Dragon Dictation System.

## 2024-12-18 ENCOUNTER — HOSPITAL ENCOUNTER (OUTPATIENT)
Dept: RADIATION ONCOLOGY | Facility: HOSPITAL | Age: 89
Discharge: HOME | End: 2024-12-18

## 2024-12-18 ENCOUNTER — HOSPITAL ENCOUNTER (OUTPATIENT)
Dept: RADIATION ONCOLOGY | Facility: HOSPITAL | Age: 89
Setting detail: RADIATION/ONCOLOGY SERIES
End: 2024-12-18
Payer: MEDICARE

## 2024-12-18 PROCEDURE — 77332 RADIATION TREATMENT AID(S): CPT | Performed by: RADIOLOGY

## 2024-12-18 PROCEDURE — 77334 RADIATION TREATMENT AID(S): CPT | Performed by: RADIOLOGY

## 2024-12-18 PROCEDURE — 77290 THER RAD SIMULAJ FIELD CPLX: CPT | Performed by: RADIOLOGY

## 2024-12-23 ENCOUNTER — TELEPHONE (OUTPATIENT)
Dept: FAMILY MEDICINE CLINIC | Facility: CLINIC | Age: 89
End: 2024-12-23
Payer: MEDICARE

## 2024-12-23 ENCOUNTER — TELEPHONE (OUTPATIENT)
Dept: ONCOLOGY | Facility: CLINIC | Age: 89
End: 2024-12-23
Payer: MEDICARE

## 2024-12-23 NOTE — TELEPHONE ENCOUNTER
I would monitor and put some warm compresses over it to help it drain. If it starts forming again, would worry. He's having a CT scan of it on Friday, correct? I would schedule him a follow up after that (leave room so they can read the CT before I see him). Thanks.

## 2024-12-23 NOTE — TELEPHONE ENCOUNTER
"Patients wife called requesting advice...She says he has \"large lump\" on the back of his right leg near his calf muscle. She says it has been present for a couple of months now but has been scabbed over until this morning,  he noticed his pants were wet and the \"lump\" had started draining. She reports it is mostly clear fluid draining from the spot and it is yellow in color where the scab once was. She says it doesn't have a bad odor and denies any fever. Please advise.   "

## 2024-12-23 NOTE — TELEPHONE ENCOUNTER
Faby called and ask to speak to Laina because she saw his leg while he was getting infusion last week. He told her that pants leg was wet. When she checked his pants were wet. she said, that he had a sore on it from a fall a couple of months ago. She said it was huge and swollen and had a scab on it till this morning it  has started oozing and his pants were like he had poured water on it . But it was the sore oozing

## 2024-12-23 NOTE — TELEPHONE ENCOUNTER
RN spoke with Faby and advised that patient should follow up with PCP about this concern, as it is not treatment related and it was recommended by DENICE Marina, to follow up with PCP for ongoing management at their last visit. She voiced understanding and stated she would call Dr. Geronimo's office to make an appointment. RN informed her to please call us with any further questions or concerns.

## 2024-12-26 ENCOUNTER — TELEPHONE (OUTPATIENT)
Dept: ONCOLOGY | Facility: CLINIC | Age: 89
End: 2024-12-26
Payer: MEDICARE

## 2024-12-26 PROCEDURE — 77300 RADIATION THERAPY DOSE PLAN: CPT | Performed by: RADIOLOGY

## 2024-12-26 PROCEDURE — 77301 RADIOTHERAPY DOSE PLAN IMRT: CPT | Performed by: RADIOLOGY

## 2024-12-26 PROCEDURE — 77338 DESIGN MLC DEVICE FOR IMRT: CPT | Performed by: RADIOLOGY

## 2024-12-26 NOTE — TELEPHONE ENCOUNTER
RN returned Faby's call. Faby says the patient was concerned that the next time he is in the infusion center for treatment, he won't be able to sit up like he did last time; last time, the nurses set up blankets and/or pillows to help sit him up and help support his neck because he cannot lie down. RN advised [facility] to let the nurse know who has him next treatment that they were able to accommodate and make him comfortable last visit, and they can try to set up his chair the same way. aFby verbalized understanding.

## 2024-12-26 NOTE — TELEPHONE ENCOUNTER
Caller: DILAN JOVEL    Relationship: Emergency Contact    Best call back number:     [phone]     What is the best time to reach you: IN THE COUPLE OF HOURS IF POSSIBLE     Who are you requesting to speak with (clinical staff, provider,  specific staff member): DR COLLADO'S PA    What was the call regarding: PT HAS SOME QUESTIONS ABOUT HIS UPCOMING TREATMENT.     Is it okay if the provider responds through [facility]: PLEASE CALL BACK.

## 2024-12-27 ENCOUNTER — HOSPITAL ENCOUNTER (OUTPATIENT)
Dept: CT IMAGING | Facility: HOSPITAL | Age: 89
Discharge: HOME | End: 2024-12-27
Payer: MEDICARE

## 2024-12-27 ENCOUNTER — OFFICE VISIT (OUTPATIENT)
Dept: FAMILY MEDICINE CLINIC | Facility: CLINIC | Age: 89
End: 2024-12-27
Payer: MEDICARE

## 2024-12-27 VITALS
BODY MASS INDEX: 21.67 KG/M2 | DIASTOLIC BLOOD PRESSURE: 54 MMHG | SYSTOLIC BLOOD PRESSURE: 106 MMHG | HEART RATE: 77 BPM | RESPIRATION RATE: 16 BRPM | OXYGEN SATURATION: 100 % | HEIGHT: 72 IN | WEIGHT: 160 LBS | TEMPERATURE: 97.8 F

## 2024-12-27 DIAGNOSIS — C34.90 METASTATIC NON-SMALL CELL LUNG CANCER: Primary | ICD-10-CM

## 2024-12-27 DIAGNOSIS — E03.8 OTHER SPECIFIED HYPOTHYROIDISM: ICD-10-CM

## 2024-12-27 DIAGNOSIS — I49.5 SICK SINUS SYNDROME: ICD-10-CM

## 2024-12-27 DIAGNOSIS — R22.41 LOCALIZED SWELLING OF RIGHT LOWER LEG: ICD-10-CM

## 2024-12-27 DIAGNOSIS — M19.90 ARTHRITIS: ICD-10-CM

## 2024-12-27 PROCEDURE — 1126F AMNT PAIN NOTED NONE PRSNT: CPT | Performed by: FAMILY MEDICINE

## 2024-12-27 PROCEDURE — 99214 OFFICE O/P EST MOD 30 MIN: CPT | Performed by: FAMILY MEDICINE

## 2024-12-27 PROCEDURE — 73702 CT LWR EXTREMITY W/O&W/DYE: CPT

## 2024-12-27 PROCEDURE — 1159F MED LIST DOCD IN RCRD: CPT | Performed by: FAMILY MEDICINE

## 2024-12-27 PROCEDURE — G2211 COMPLEX E/M VISIT ADD ON: HCPCS | Performed by: FAMILY MEDICINE

## 2024-12-27 PROCEDURE — 1160F RVW MEDS BY RX/DR IN RCRD: CPT | Performed by: FAMILY MEDICINE

## 2024-12-27 PROCEDURE — 25510000001 IOPAMIDOL 61 % SOLUTION: Performed by: INTERNAL MEDICINE

## 2024-12-27 RX ORDER — IOPAMIDOL 612 MG/ML
100 INJECTION, SOLUTION INTRAVASCULAR
Status: COMPLETED | OUTPATIENT
Start: 2024-12-27 | End: 2024-12-27

## 2024-12-27 RX ORDER — LORATADINE 10 MG/1
10 TABLET ORAL DAILY
COMMUNITY

## 2024-12-27 RX ADMIN — IOPAMIDOL 100 ML: 612 INJECTION, SOLUTION INTRAVENOUS at 14:23

## 2024-12-30 ENCOUNTER — HOSPITAL ENCOUNTER (OUTPATIENT)
Dept: RADIATION ONCOLOGY | Facility: HOSPITAL | Age: 89
Discharge: HOME | End: 2024-12-30
Payer: MEDICARE

## 2024-12-30 PROCEDURE — 77300 RADIATION THERAPY DOSE PLAN: CPT | Performed by: RADIOLOGY

## 2024-12-30 PROCEDURE — 77295 3-D RADIOTHERAPY PLAN: CPT | Performed by: RADIOLOGY

## 2024-12-30 PROCEDURE — 77334 RADIATION TREATMENT AID(S): CPT | Performed by: RADIOLOGY

## 2024-12-31 ENCOUNTER — PATIENT OUTREACH (OUTPATIENT)
Dept: CASE MANAGEMENT | Facility: OTHER | Age: 89
End: 2024-12-31
Payer: MEDICARE

## 2024-12-31 NOTE — OUTREACH NOTE
AMBULATORY CASE MANAGEMENT NOTE    Names and Relationships of Patient/Support Persons: Contact: BECKADILAN; Relationship: Emergency Contact -     Patient Outreach    Mrs. Fonseca reports patient is feeling pretty well. Patient has a good appetite and is hydrating adequately. Patient has not complained of pain, per wife. Mrs. Fonseca reports his left lower extremity is starting to swell again, denies weeping. Patient encouraged to elevate feet while sitting or lying and gently skin care.  Mrs. Fonseca denies needs or concerns at this time.      Arlyn COLE  Ambulatory Case Management    12/31/2024, 15:16 EST

## 2025-01-02 ENCOUNTER — HOSPITAL ENCOUNTER (OUTPATIENT)
Dept: RADIATION ONCOLOGY | Facility: HOSPITAL | Age: OVER 89
Setting detail: RADIATION/ONCOLOGY SERIES
End: 2025-01-02
Payer: MEDICARE

## 2025-01-02 ENCOUNTER — TELEPHONE (OUTPATIENT)
Dept: ONCOLOGY | Facility: CLINIC | Age: OVER 89
End: 2025-01-02
Payer: MEDICARE

## 2025-01-02 ENCOUNTER — HOSPITAL ENCOUNTER (OUTPATIENT)
Dept: RADIATION ONCOLOGY | Facility: HOSPITAL | Age: OVER 89
Discharge: HOME | End: 2025-01-02

## 2025-01-02 LAB
RAD ONC ARIA COURSE ID: NORMAL
RAD ONC ARIA COURSE INTENT: NORMAL
RAD ONC ARIA COURSE LAST TREATMENT DATE: NORMAL
RAD ONC ARIA COURSE START DATE: NORMAL
RAD ONC ARIA COURSE TREATMENT ELAPSED DAYS: 0
RAD ONC ARIA FIRST TREATMENT DATE: NORMAL
RAD ONC ARIA PLAN FRACTIONS TREATED TO DATE: 1
RAD ONC ARIA PLAN ID: NORMAL
RAD ONC ARIA PLAN PRESCRIBED DOSE PER FRACTION: 3 GY
RAD ONC ARIA PLAN PRIMARY REFERENCE POINT: NORMAL
RAD ONC ARIA PLAN TOTAL FRACTIONS PRESCRIBED: 10
RAD ONC ARIA PLAN TOTAL PRESCRIBED DOSE: 3000 CGY
RAD ONC ARIA REFERENCE POINT DOSAGE GIVEN TO DATE: 3 GY
RAD ONC ARIA REFERENCE POINT ID: NORMAL
RAD ONC ARIA REFERENCE POINT SESSION DOSAGE GIVEN: 3 GY

## 2025-01-02 PROCEDURE — 77412 RADIATION TX DELIVERY LVL 3: CPT | Performed by: RADIOLOGY

## 2025-01-02 PROCEDURE — 77280 THER RAD SIMULAJ FIELD SMPL: CPT | Performed by: RADIOLOGY

## 2025-01-02 PROCEDURE — 77427 RADIATION TX MANAGEMENT X5: CPT | Performed by: RADIOLOGY

## 2025-01-02 PROCEDURE — G6002 STEREOSCOPIC X-RAY GUIDANCE: HCPCS | Performed by: RADIOLOGY

## 2025-01-02 PROCEDURE — 77387 GUIDANCE FOR RADJ TX DLVR: CPT | Performed by: RADIOLOGY

## 2025-01-02 NOTE — TELEPHONE ENCOUNTER
Recent CT RLE shows a large hematoma. Patient with full range of motion of RLE and no pain. Recommend come to the ED or call us if develops worsening swelling, limited range of motion, or pain in leg.

## 2025-01-03 ENCOUNTER — HOSPITAL ENCOUNTER (OUTPATIENT)
Dept: RADIATION ONCOLOGY | Facility: HOSPITAL | Age: OVER 89
Discharge: HOME | End: 2025-01-03

## 2025-01-03 LAB
RAD ONC ARIA COURSE ID: NORMAL
RAD ONC ARIA COURSE INTENT: NORMAL
RAD ONC ARIA COURSE LAST TREATMENT DATE: NORMAL
RAD ONC ARIA COURSE START DATE: NORMAL
RAD ONC ARIA COURSE TREATMENT ELAPSED DAYS: 1
RAD ONC ARIA FIRST TREATMENT DATE: NORMAL
RAD ONC ARIA PLAN FRACTIONS TREATED TO DATE: 1
RAD ONC ARIA PLAN ID: NORMAL
RAD ONC ARIA PLAN PRESCRIBED DOSE PER FRACTION: 3 GY
RAD ONC ARIA PLAN PRIMARY REFERENCE POINT: NORMAL
RAD ONC ARIA PLAN TOTAL FRACTIONS PRESCRIBED: 9
RAD ONC ARIA PLAN TOTAL PRESCRIBED DOSE: 2700 CGY
RAD ONC ARIA REFERENCE POINT DOSAGE GIVEN TO DATE: 6 GY
RAD ONC ARIA REFERENCE POINT ID: NORMAL
RAD ONC ARIA REFERENCE POINT SESSION DOSAGE GIVEN: 3 GY

## 2025-01-03 PROCEDURE — 77387 GUIDANCE FOR RADJ TX DLVR: CPT | Performed by: RADIOLOGY

## 2025-01-03 PROCEDURE — G6002 STEREOSCOPIC X-RAY GUIDANCE: HCPCS | Performed by: RADIOLOGY

## 2025-01-03 PROCEDURE — 77412 RADIATION TX DELIVERY LVL 3: CPT | Performed by: RADIOLOGY

## 2025-01-06 ENCOUNTER — HOSPITAL ENCOUNTER (OUTPATIENT)
Dept: RADIATION ONCOLOGY | Facility: HOSPITAL | Age: OVER 89
Discharge: HOME | End: 2025-01-06
Payer: MEDICARE

## 2025-01-06 LAB
RAD ONC ARIA COURSE ID: NORMAL
RAD ONC ARIA COURSE INTENT: NORMAL
RAD ONC ARIA COURSE LAST TREATMENT DATE: NORMAL
RAD ONC ARIA COURSE START DATE: NORMAL
RAD ONC ARIA COURSE TREATMENT ELAPSED DAYS: 4
RAD ONC ARIA FIRST TREATMENT DATE: NORMAL
RAD ONC ARIA PLAN FRACTIONS TREATED TO DATE: 2
RAD ONC ARIA PLAN ID: NORMAL
RAD ONC ARIA PLAN PRESCRIBED DOSE PER FRACTION: 3 GY
RAD ONC ARIA PLAN PRIMARY REFERENCE POINT: NORMAL
RAD ONC ARIA PLAN TOTAL FRACTIONS PRESCRIBED: 9
RAD ONC ARIA PLAN TOTAL PRESCRIBED DOSE: 2700 CGY
RAD ONC ARIA REFERENCE POINT DOSAGE GIVEN TO DATE: 9 GY
RAD ONC ARIA REFERENCE POINT ID: NORMAL
RAD ONC ARIA REFERENCE POINT SESSION DOSAGE GIVEN: 3 GY

## 2025-01-06 PROCEDURE — 77336 RADIATION PHYSICS CONSULT: CPT | Performed by: RADIOLOGY

## 2025-01-06 PROCEDURE — G6002 STEREOSCOPIC X-RAY GUIDANCE: HCPCS | Performed by: RADIOLOGY

## 2025-01-06 PROCEDURE — 77387 GUIDANCE FOR RADJ TX DLVR: CPT | Performed by: RADIOLOGY

## 2025-01-06 PROCEDURE — 77412 RADIATION TX DELIVERY LVL 3: CPT | Performed by: RADIOLOGY

## 2025-01-07 ENCOUNTER — HOSPITAL ENCOUNTER (OUTPATIENT)
Dept: RADIATION ONCOLOGY | Facility: HOSPITAL | Age: OVER 89
Discharge: HOME | End: 2025-01-07

## 2025-01-07 VITALS
DIASTOLIC BLOOD PRESSURE: 68 MMHG | SYSTOLIC BLOOD PRESSURE: 147 MMHG | OXYGEN SATURATION: 99 % | RESPIRATION RATE: 18 BRPM | HEART RATE: 65 BPM | TEMPERATURE: 97.3 F

## 2025-01-07 DIAGNOSIS — C34.90 METASTATIC NON-SMALL CELL LUNG CANCER: Primary | ICD-10-CM

## 2025-01-07 LAB
RAD ONC ARIA COURSE ID: NORMAL
RAD ONC ARIA COURSE INTENT: NORMAL
RAD ONC ARIA COURSE LAST TREATMENT DATE: NORMAL
RAD ONC ARIA COURSE START DATE: NORMAL
RAD ONC ARIA COURSE TREATMENT ELAPSED DAYS: 5
RAD ONC ARIA FIRST TREATMENT DATE: NORMAL
RAD ONC ARIA PLAN FRACTIONS TREATED TO DATE: 3
RAD ONC ARIA PLAN ID: NORMAL
RAD ONC ARIA PLAN PRESCRIBED DOSE PER FRACTION: 3 GY
RAD ONC ARIA PLAN PRIMARY REFERENCE POINT: NORMAL
RAD ONC ARIA PLAN TOTAL FRACTIONS PRESCRIBED: 9
RAD ONC ARIA PLAN TOTAL PRESCRIBED DOSE: 2700 CGY
RAD ONC ARIA REFERENCE POINT DOSAGE GIVEN TO DATE: 12 GY
RAD ONC ARIA REFERENCE POINT ID: NORMAL
RAD ONC ARIA REFERENCE POINT SESSION DOSAGE GIVEN: 3 GY

## 2025-01-07 PROCEDURE — 77387 GUIDANCE FOR RADJ TX DLVR: CPT | Performed by: RADIOLOGY

## 2025-01-07 PROCEDURE — G6002 STEREOSCOPIC X-RAY GUIDANCE: HCPCS | Performed by: RADIOLOGY

## 2025-01-07 PROCEDURE — 77412 RADIATION TX DELIVERY LVL 3: CPT | Performed by: RADIOLOGY

## 2025-01-07 NOTE — PROGRESS NOTES
On Treatment Visit     Patient: Kevin Fonseca   YOB: 1934   Medical Record Number: [id number]     Date of Visit  January 7, 2025   Primary Diagnosis:  Cancer Staging   Metastatic non-small cell lung cancer [C34.90]       Mr. Fonseca was seen today for an on treatment visit. He is receiving radiation therapy to the metastases to the chest wall.     Treatment Site Modality Dose per fraction (cGy) Fractions Total dose (cGy)   Right chest wall 3D 300 4 of 10 1200 of 3000     Concurrent therapy: Pembrolizumab    He is doing well today.  He is having no pain.  He has developed a rash on the back of his thighs most likely secondary to his immunotherapy.    There were no vitals filed for this visit.  Kevin Fonseca reports a pain score of  0. .    There were no vitals filed for this visit.  Wt Readings from Last 3 Encounters:   12/27/24 72.6 kg (160 lb)   12/17/24 74.3 kg (163 lb 14.4 oz)   12/17/24 74.3 kg (163 lb 14.4 oz)     On exam, he is sitting up in no distress, breathing comfortably on room air.  A female nurse chaperone was present for the exam.    Plan: I have reviewed treatment setup notes and checked and approved the daily guidance images. I reviewed dose delivery and treatment parameters and deemed them appropriate. Continue radiation as prescribed.    MARIANELA Smith MD

## 2025-01-08 ENCOUNTER — HOSPITAL ENCOUNTER (OUTPATIENT)
Dept: RADIATION ONCOLOGY | Facility: HOSPITAL | Age: OVER 89
Discharge: HOME | End: 2025-01-08

## 2025-01-08 LAB
RAD ONC ARIA COURSE ID: NORMAL
RAD ONC ARIA COURSE INTENT: NORMAL
RAD ONC ARIA COURSE LAST TREATMENT DATE: NORMAL
RAD ONC ARIA COURSE START DATE: NORMAL
RAD ONC ARIA COURSE TREATMENT ELAPSED DAYS: 6
RAD ONC ARIA FIRST TREATMENT DATE: NORMAL
RAD ONC ARIA PLAN FRACTIONS TREATED TO DATE: 4
RAD ONC ARIA PLAN ID: NORMAL
RAD ONC ARIA PLAN PRESCRIBED DOSE PER FRACTION: 3 GY
RAD ONC ARIA PLAN PRIMARY REFERENCE POINT: NORMAL
RAD ONC ARIA PLAN TOTAL FRACTIONS PRESCRIBED: 9
RAD ONC ARIA PLAN TOTAL PRESCRIBED DOSE: 2700 CGY
RAD ONC ARIA REFERENCE POINT DOSAGE GIVEN TO DATE: 15 GY
RAD ONC ARIA REFERENCE POINT ID: NORMAL
RAD ONC ARIA REFERENCE POINT SESSION DOSAGE GIVEN: 3 GY

## 2025-01-08 PROCEDURE — 77387 GUIDANCE FOR RADJ TX DLVR: CPT | Performed by: RADIOLOGY

## 2025-01-08 PROCEDURE — G6002 STEREOSCOPIC X-RAY GUIDANCE: HCPCS | Performed by: RADIOLOGY

## 2025-01-08 PROCEDURE — 77412 RADIATION TX DELIVERY LVL 3: CPT | Performed by: RADIOLOGY

## 2025-01-09 ENCOUNTER — HOSPITAL ENCOUNTER (OUTPATIENT)
Dept: RADIATION ONCOLOGY | Facility: HOSPITAL | Age: OVER 89
Discharge: HOME | End: 2025-01-09

## 2025-01-09 LAB
RAD ONC ARIA COURSE ID: NORMAL
RAD ONC ARIA COURSE INTENT: NORMAL
RAD ONC ARIA COURSE LAST TREATMENT DATE: NORMAL
RAD ONC ARIA COURSE START DATE: NORMAL
RAD ONC ARIA COURSE TREATMENT ELAPSED DAYS: 7
RAD ONC ARIA FIRST TREATMENT DATE: NORMAL
RAD ONC ARIA PLAN FRACTIONS TREATED TO DATE: 5
RAD ONC ARIA PLAN ID: NORMAL
RAD ONC ARIA PLAN PRESCRIBED DOSE PER FRACTION: 3 GY
RAD ONC ARIA PLAN PRIMARY REFERENCE POINT: NORMAL
RAD ONC ARIA PLAN TOTAL FRACTIONS PRESCRIBED: 9
RAD ONC ARIA PLAN TOTAL PRESCRIBED DOSE: 2700 CGY
RAD ONC ARIA REFERENCE POINT DOSAGE GIVEN TO DATE: 18 GY
RAD ONC ARIA REFERENCE POINT ID: NORMAL
RAD ONC ARIA REFERENCE POINT SESSION DOSAGE GIVEN: 3 GY

## 2025-01-09 PROCEDURE — 77412 RADIATION TX DELIVERY LVL 3: CPT | Performed by: RADIOLOGY

## 2025-01-09 PROCEDURE — 77387 GUIDANCE FOR RADJ TX DLVR: CPT | Performed by: RADIOLOGY

## 2025-01-09 PROCEDURE — 77427 RADIATION TX MANAGEMENT X5: CPT | Performed by: RADIOLOGY

## 2025-01-09 PROCEDURE — G6002 STEREOSCOPIC X-RAY GUIDANCE: HCPCS | Performed by: RADIOLOGY

## 2025-01-10 NOTE — PROGRESS NOTES
"Kevin Fonseca     VITALS: Blood pressure 106/54, pulse 77, temperature 97.8 °F (36.6 °C), temperature source Temporal, resp. rate 16, height 182.9 cm (72\"), weight 72.6 kg (160 lb), SpO2 100%.    Subjective  Chief Complaint  Knot on Leg    Subjective          History of Present Illness:    History of Present Illness  The patient is a 89-year-old male with medical conditions significant for a recent diagnosis of lung cancer, arthritis, and GERD who presents for a medical follow-up.  His son, wife, and granddaughter accompany him today.    History is reported by other person (usually granddaughter) in the presence of the patient.  He experienced a fall while attempting to use the restroom, resulting in a right leg injury. The fall occurred around 09/2024 or 08/2024. Emergency services were called, and he was advised to have the wound lanced and start intravenous antibiotics. However, the wound self-lanced the following day, releasing a copious amount of straw-colored fluid. The wound, initially large and red, has since reduced to half its original size, causing occasional mild pain. He has been on Keytruda since 06/2024, and the nurse practitioner noted redness at the infusion site during his last treatment. He has a history of cellulitis. He has a recurring lump on his upper back, which was not present on the PET scan. He always sleeps on his left side. He has not yet decided on the location for his upcoming radiation therapy. He has a scheduled appointment with radiology on Monday for his first lung treatment. He received a letter from the lab requesting a signature for an appeal, but no one has contacted him regarding this matter. He has been using a heating pad for warmth due to a lack of heating at home.    Approximately 1.5 weeks ago, he experienced an episode of hemoptysis, which was discussed with Elizabeth at oncology. It was suggested that this could be alleviated by radiation treatment or could be due to lung " "inflammation unrelated to the tumor. He has not had any further episodes of hemoptysis since then.    He reports experiencing cramping sensations, which he believes may be related to his hernia. He also reports that the hernia has increased in size.    MEDICATIONS  Current: Keytruda    No complaints regarding medications.     The following portions of the patient's history were reviewed and updated as appropriate: allergies, current medications, past family history, past medical history, past social history, past surgical history and problem list.    Past Medical History  Past Medical History:   Diagnosis Date    Arthritis     Asthma     Chronic bronchitis     Complete heart block     Emphysema lung     Gastritis     Heartburn     Macular degeneration     Macular degeneration, wet     Reflux esophagitis     Thyroid disease        Surgical History  Past Surgical History:   Procedure Laterality Date    INTRACAPSULAR CATARACT EXTRACTION      Dr. Lesly Gray. Dr. Neto Light.    LIVER BIOPSY         Family History  Family History   Problem Relation Age of Onset    Hypertension Mother     Other Mother     Cancer Father     Cancer Brother     Asthma Brother        Social History  Social History     Socioeconomic History    Marital status:    Tobacco Use    Smoking status: Former     Current packs/day: 0.00     Average packs/day: 1.5 packs/day for 44.0 years (66.0 ttl pk-yrs)     Types: Cigarettes     Start date:      Quit date:      Years since quittin.0     Passive exposure: Past    Smokeless tobacco: Never   Vaping Use    Vaping status: Never Used   Substance and Sexual Activity    Alcohol use: Not Currently     Comment: 2 week    Drug use: Never    Sexual activity: Defer       Objective   Vital Signs:   /54 (BP Location: Right arm, Patient Position: Sitting, Cuff Size: Adult)   Pulse 77   Temp 97.8 °F (36.6 °C) (Temporal)   Resp 16   Ht 182.9 cm (72\")   Wt 72.6 kg (160 lb)   SpO2 " 100%   BMI 21.70 kg/m²       Physical Exam     Physical Exam      Gen: Patient in NAD. Pleasant and answers appropriately. A&Ox3.    Skin: Warm and dry with normal turgor. No purpura, rashes, or unusual pigmentation noted. Hair is normal in appearance and distribution.  Lipoma over his left upper back.  Hematoma over his left leg.    HEENT: NC/AT. No lesions noted. Conjunctiva clear, sclera nonicteric. PERRL. EOMI without nystagmus or strabismus. Fundi appear benign. No hemorrhages or exudates of eyes. Auditory canals are patent bilaterally without lesions. TMs intact,  nonerythematous, bulging without lesions. Nasal mucosa pink, nonerythematous, and nonedematous. Frontal and maxillary sinuses are nontender. O/P erythematous and moist without exudate.    Neck: Supple without lymph nodes palpated. FROM. No carotid bruits appreciated bilaterally.    Lungs: Decreased B/L without rales, rhonchi, crackles, or wheezes.    Heart: RRR. S1 and S2 normal. No S3 or S4. No MRGT.    Abd: Soft, nontender,nondistended. (+)BSx4 quadrants.     Extrem: No CCE. Radial pulses 2+/4 and equal B/L. FROMx4.  Joint tenderness noted.    Neuro: No focal motor/sensory deficits.    Procedures    Result Review :   The following data was reviewed by: Elissa Geronimo MD on 12/27/2024:       Results  Laboratory Studies  December 17, 2024.  Hematocrit and platelets are stable.    Imaging  December 12, 2024.  PET scan showed improvement in liver and lung activity, with liver lesions decreasing and lung activity reducing from 15.1 to 4.7. Lymph node in lung also decreased from 21 to 5. Hypermetabolism observed in left shoulder.           Assessment and Plan      Kevin Fonseca is a 89 y.o. here for medical followup.    Diagnoses and all orders for this visit:    1. Metastatic non-small cell lung cancer (Primary)  Per oncology.    2. Other specified hypothyroidism  Currently on Synthroid 88 mcg orally daily.    3. Sick sinus syndrome  Not having any  exacerbations.    4. Arthritis            Assessment & Plan  1. Lung Cancer.  The recent PET scan shows significant improvement in lung activity, with activity levels decreasing from 15.1 to 4.7. The lymph node in the lung has also reduced in size. He has been on Keytruda since June 2024, and it is suspected that the sores on his back may be a side effect of Keytruda. The patient has a scheduled appointment with radiology on Monday for his first lung treatment. He will continue with his scheduled radiation treatment for the lungs on Monday. If there are any changes in the sores, such as increased redness, swelling, or fever, he should contact the clinic immediately.    2. Liver Mass.  The PET scan indicates a decrease in liver mass activity. The patient has not yet decided on the location for his upcoming radiation therapy. The patient was informed that the liver masses are not well visualized on CT scans, but are more apparent on PET scans. The placement of clips around the liver mass was discussed to monitor the size and rate of shrinkage. The patient expressed concerns about traveling for the procedure. The patient will be contacted after further discussion with the radiologist regarding the possibility of performing the procedure locally.    3. Arthritis.  The PET scan shows some hypermetabolism in the left shoulder, which could be due to arthritis or a new bone lesion. The patient was informed that this could also be due to recent muscular activity. No immediate intervention is planned, but the patient should monitor for any new symptoms.    4. GERD.    5. Hemoptysis.  The patient had an episode of coughing up blood about a week and a half ago. His blood numbers are stable, and the platelets are responding well. The patient was advised that occasional episodes might not be a significant concern due to various factors. If the patient notices dark or rust-colored blood, he should contact the clinic  immediately.    6. Hernia.  The patient reported a cramping sensation, which could be related to his hernia. No immediate intervention is planned, but the patient should monitor for any changes.    Follow-up  The patient will follow up on 13th.      BMI is within normal parameters. No other follow-up for BMI required.               Patient or patient representative verbalized consent for the use of Ambient Listening during the visit with  Elissa Geronimo MD for chart documentation. 1/13/2025  08:22 EST      I spent 45 minutes caring for Kevin on this date of service. This time includes time spent by me in the following activities:obtaining and/or reviewing a separately obtained history, performing a medically appropriate examination and/or evaluation , and counseling and educating the patient/family/caregiver  Follow Up   Return (already has appt).  Findings and plans discussed with patient who verbalizes understanding and agreement. Will followup with patient once results are in. Patient was given instructions and counseling regarding his condition or for health maintenance advice. Please see specific information pulled into the AVS if appropriate.       Elissa Geronimo MD

## 2025-01-13 ENCOUNTER — OFFICE VISIT (OUTPATIENT)
Dept: FAMILY MEDICINE CLINIC | Facility: CLINIC | Age: OVER 89
End: 2025-01-13
Payer: MEDICARE

## 2025-01-13 ENCOUNTER — HOSPITAL ENCOUNTER (OUTPATIENT)
Dept: RADIATION ONCOLOGY | Facility: HOSPITAL | Age: OVER 89
Discharge: HOME | End: 2025-01-13
Payer: MEDICARE

## 2025-01-13 VITALS
OXYGEN SATURATION: 97 % | DIASTOLIC BLOOD PRESSURE: 58 MMHG | SYSTOLIC BLOOD PRESSURE: 124 MMHG | WEIGHT: 165.6 LBS | BODY MASS INDEX: 22.43 KG/M2 | RESPIRATION RATE: 16 BRPM | HEIGHT: 72 IN | HEART RATE: 63 BPM | TEMPERATURE: 96.6 F

## 2025-01-13 DIAGNOSIS — F41.9 ANXIETY: ICD-10-CM

## 2025-01-13 DIAGNOSIS — Z13.220 ENCOUNTER FOR LIPID SCREENING FOR CARDIOVASCULAR DISEASE: ICD-10-CM

## 2025-01-13 DIAGNOSIS — S81.801D WOUND OF RIGHT LOWER EXTREMITY, SUBSEQUENT ENCOUNTER: ICD-10-CM

## 2025-01-13 DIAGNOSIS — E87.5 HYPERKALEMIA: Primary | ICD-10-CM

## 2025-01-13 DIAGNOSIS — L21.0 DANDRUFF: ICD-10-CM

## 2025-01-13 DIAGNOSIS — Z13.6 ENCOUNTER FOR LIPID SCREENING FOR CARDIOVASCULAR DISEASE: ICD-10-CM

## 2025-01-13 LAB
RAD ONC ARIA COURSE ID: NORMAL
RAD ONC ARIA COURSE INTENT: NORMAL
RAD ONC ARIA COURSE LAST TREATMENT DATE: NORMAL
RAD ONC ARIA COURSE START DATE: NORMAL
RAD ONC ARIA COURSE TREATMENT ELAPSED DAYS: 11
RAD ONC ARIA FIRST TREATMENT DATE: NORMAL
RAD ONC ARIA PLAN FRACTIONS TREATED TO DATE: 6
RAD ONC ARIA PLAN ID: NORMAL
RAD ONC ARIA PLAN PRESCRIBED DOSE PER FRACTION: 3 GY
RAD ONC ARIA PLAN PRIMARY REFERENCE POINT: NORMAL
RAD ONC ARIA PLAN TOTAL FRACTIONS PRESCRIBED: 9
RAD ONC ARIA PLAN TOTAL PRESCRIBED DOSE: 2700 CGY
RAD ONC ARIA REFERENCE POINT DOSAGE GIVEN TO DATE: 21 GY
RAD ONC ARIA REFERENCE POINT ID: NORMAL
RAD ONC ARIA REFERENCE POINT SESSION DOSAGE GIVEN: 3 GY

## 2025-01-13 PROCEDURE — G6002 STEREOSCOPIC X-RAY GUIDANCE: HCPCS | Performed by: RADIOLOGY

## 2025-01-13 PROCEDURE — 77387 GUIDANCE FOR RADJ TX DLVR: CPT | Performed by: RADIOLOGY

## 2025-01-13 PROCEDURE — 77412 RADIATION TX DELIVERY LVL 3: CPT | Performed by: RADIOLOGY

## 2025-01-13 PROCEDURE — 77336 RADIATION PHYSICS CONSULT: CPT | Performed by: RADIOLOGY

## 2025-01-13 RX ORDER — BUSPIRONE HYDROCHLORIDE 5 MG/1
TABLET ORAL
Qty: 180 TABLET | Refills: 1 | Status: SHIPPED | OUTPATIENT
Start: 2025-01-13

## 2025-01-13 RX ORDER — KETOCONAZOLE 20 MG/ML
SHAMPOO, SUSPENSION TOPICAL 2 TIMES WEEKLY
Qty: 360 ML | Refills: 0 | Status: SHIPPED | OUTPATIENT
Start: 2025-01-13

## 2025-01-14 ENCOUNTER — HOSPITAL ENCOUNTER (OUTPATIENT)
Dept: RADIATION ONCOLOGY | Facility: HOSPITAL | Age: OVER 89
Discharge: HOME | End: 2025-01-14

## 2025-01-14 ENCOUNTER — TELEPHONE (OUTPATIENT)
Dept: ONCOLOGY | Facility: CLINIC | Age: OVER 89
End: 2025-01-14

## 2025-01-14 VITALS
OXYGEN SATURATION: 98 % | SYSTOLIC BLOOD PRESSURE: 121 MMHG | RESPIRATION RATE: 16 BRPM | HEART RATE: 76 BPM | DIASTOLIC BLOOD PRESSURE: 56 MMHG | TEMPERATURE: 97.3 F

## 2025-01-14 DIAGNOSIS — C34.90 METASTATIC NON-SMALL CELL LUNG CANCER: Primary | ICD-10-CM

## 2025-01-14 LAB
ALBUMIN SERPL-MCNC: 3.5 G/DL (ref 3.5–5.2)
ALBUMIN/GLOB SERPL: 1.1 G/DL
ALP SERPL-CCNC: 85 U/L (ref 39–117)
ALT SERPL-CCNC: 14 U/L (ref 1–41)
AST SERPL-CCNC: 22 U/L (ref 1–40)
BILIRUB SERPL-MCNC: 0.4 MG/DL (ref 0–1.2)
BUN SERPL-MCNC: 21 MG/DL (ref 8–23)
BUN/CREAT SERPL: 25.9 (ref 7–25)
CALCIUM SERPL-MCNC: 9 MG/DL (ref 8.2–9.6)
CHLORIDE SERPL-SCNC: 102 MMOL/L (ref 98–107)
CHOLEST SERPL-MCNC: 123 MG/DL (ref 0–200)
CO2 SERPL-SCNC: 27.8 MMOL/L (ref 22–29)
CREAT SERPL-MCNC: 0.81 MG/DL (ref 0.76–1.27)
EGFRCR SERPLBLD CKD-EPI 2021: 83.8 ML/MIN/1.73
GLOBULIN SER CALC-MCNC: 3.2 GM/DL
GLUCOSE SERPL-MCNC: 89 MG/DL (ref 65–99)
HDLC SERPL-MCNC: 58 MG/DL (ref 40–60)
IMP & REVIEW OF LAB RESULTS: NORMAL
LDLC SERPL CALC-MCNC: 54 MG/DL (ref 0–100)
POTASSIUM SERPL-SCNC: 4.9 MMOL/L (ref 3.5–5.2)
PROT SERPL-MCNC: 6.7 G/DL (ref 6–8.5)
RAD ONC ARIA COURSE ID: NORMAL
RAD ONC ARIA COURSE INTENT: NORMAL
RAD ONC ARIA COURSE LAST TREATMENT DATE: NORMAL
RAD ONC ARIA COURSE START DATE: NORMAL
RAD ONC ARIA COURSE TREATMENT ELAPSED DAYS: 12
RAD ONC ARIA FIRST TREATMENT DATE: NORMAL
RAD ONC ARIA PLAN FRACTIONS TREATED TO DATE: 7
RAD ONC ARIA PLAN ID: NORMAL
RAD ONC ARIA PLAN PRESCRIBED DOSE PER FRACTION: 3 GY
RAD ONC ARIA PLAN PRIMARY REFERENCE POINT: NORMAL
RAD ONC ARIA PLAN TOTAL FRACTIONS PRESCRIBED: 9
RAD ONC ARIA PLAN TOTAL PRESCRIBED DOSE: 2700 CGY
RAD ONC ARIA REFERENCE POINT DOSAGE GIVEN TO DATE: 24 GY
RAD ONC ARIA REFERENCE POINT ID: NORMAL
RAD ONC ARIA REFERENCE POINT SESSION DOSAGE GIVEN: 3 GY
SODIUM SERPL-SCNC: 138 MMOL/L (ref 136–145)
TRIGL SERPL-MCNC: 48 MG/DL (ref 0–150)
VLDLC SERPL CALC-MCNC: 11 MG/DL (ref 5–40)

## 2025-01-14 PROCEDURE — 77387 GUIDANCE FOR RADJ TX DLVR: CPT | Performed by: RADIOLOGY

## 2025-01-14 PROCEDURE — G6002 STEREOSCOPIC X-RAY GUIDANCE: HCPCS | Performed by: RADIOLOGY

## 2025-01-14 PROCEDURE — 77412 RADIATION TX DELIVERY LVL 3: CPT | Performed by: RADIOLOGY

## 2025-01-14 NOTE — TELEPHONE ENCOUNTER
Caller: DILAN JOVEL    Relationship to patient: Emergency Contact    Best call back number: [phone]    Type of visit: LAB, FOLLOW UP, AND INFUSION    Requested date: SAME DAY BUT AROUND 1PM     If rescheduling, when is the original appointment: 01/21     Additional notes: PLEASE CALL TO R/S.

## 2025-01-14 NOTE — PROGRESS NOTES
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  Patient is receiving ongoing pembrolizumab therapy.     This patient was seen today for an on treatment visit.  To date, the patient has received 2700 cGy of a 3000 cGy regimen to residual malignancy/left upper lobe chest wall invasion.    Radiation Treatments       Active   Plans   Rt Lung 3EZ0   Most recent treatment: Dose planned: 300 cGy (fraction 1 on 2025)   Total: Dose planned: 3,000 cGy (10 fractions)   Elapsed Days: 0      Rt Lung 3EZ1   Most recent treatment: Dose planned: 300 cGy (fraction 7 on 2025)   Total: Dose planned: 2,700 cGy (9 fractions)   Elapsed Days: 12      Reference Points   PTV RT LUNG   Most recent treatment: Dose given: 300 cGy (on 2025)   Total: Dose given: 2,400 cGy   Elapsed Days: 12           Historical   No historical radiation treatments to show.              Subjective      Treatment Tolerance:  Mr. Fonseca is tolerating palliative radiotherapy well.  He has no side effects to report from treatment.  He does note occasional blood-tinged sputum.  Patient denies changes in respiratory function and chest wall pain.    Medications:     Current Outpatient Medications:     busPIRone (BUSPAR) 5 MG tablet, Take 2 tablets (10 mg) in the AM and 1 tablet (5 mg) in the PM., Disp: 180 tablet, Rfl: 1    clobetasol (CLOBEX) 0.05 % lotion, Apply  topically to the appropriate area as directed 2 (Two) Times a Day. (Patient taking differently: Apply 1 Application topically to the appropriate area as directed 2 (Two) Times a Day As Needed.), Disp: 118 mL, Rfl: 3    hydrocortisone 2.5 % cream, Apply 1 Application topically to the appropriate area as directed 2  (Two) Times a Day As Needed for Irritation., Disp: , Rfl:     ketoconazole (NIZORAL) 2 % shampoo, Apply  topically to the appropriate area as directed 2 (Two) Times a Week., Disp: 360 mL, Rfl: 0    levothyroxine (SYNTHROID, LEVOTHROID) 88 MCG tablet, TAKE 1 TABLET BY MOUTH DAILY, Disp: 90 tablet, Rfl: 0    loratadine (CLARITIN) 10 MG tablet, Take 1 tablet by mouth Daily., Disp: , Rfl:     multivitamin with minerals (OCUVITE EXTRA PO), Take 1 tablet by mouth Daily., Disp: , Rfl:     Pembrolizumab (KEYTRUDA) 100 MG/4ML solution, Infuse 8 mL into a venous catheter Every 21 (Twenty-One) Days., Disp: , Rfl:     polyethylene glycol (MiraLax) 17 GM/SCOOP powder, Take 17 g by mouth Daily As Needed (for constipation)., Disp: , Rfl:   No current facility-administered medications for this encounter.    Facility-Administered Medications Ordered in Other Encounters:     sodium chloride 0.9 % infusion 500 mL, 500 mL, Intravenous, Once, Laina Livingston APRN    Allergies:   No Known Allergies  Objective     Physical Exam:  Patient is a pleasant elderly white male in no acute distress.  Vital signs as below.  KPS 80    Vital Signs:   Vitals:    01/14/25 1330   BP: 121/56   Pulse: 76   Resp: 16   Temp: 97.3 °F (36.3 °C)   TempSrc: Temporal   SpO2: 98%   PainSc: 0-No pain     There is no height or weight on file to calculate BMI.     Neck: Short, supple.  No detectable cervical or periclavicular adenopathy  Heart: Regular rate and rhythm  Lungs: Clear to auscultation  Integumentary: No radiation changes noted over anterior posterior right chest walls    Current Total XRT Dose (cGY):    Radiation Treatments       Active   Plans   Rt Lung 3EZ0   Most recent treatment: Dose planned: 300 cGy (fraction 1 on 1/2/2025)   Total: Dose planned: 3,000 cGy (10 fractions)   Elapsed Days: 0      Rt Lung 3EZ1   Most recent treatment: Dose planned: 300 cGy (fraction 7 on 1/14/2025)   Total: Dose planned: 2,700 cGy (9 fractions)   Elapsed Days:  12      Reference Points   PTV RT LUNG   Most recent treatment: Dose given: 300 cGy (on 1/14/2025)   Total: Dose given: 2,400 cGy   Elapsed Days: 12           Historical   No historical radiation treatments to show.              Plan      Plan:   Patient was seen today for an on treatment visit. Patient is receiving radiation therapy to the residual malignancy in the right lung/pleura. Patient is stable and tolerating radiation therapy well with minimal side effects. Continue with radiation therapy.  The patient has 2 treatment fractions pending    I have reviewed treatment setup notes, checked and approved the daily guidance images. I reviewed dose delivery, treatment parameters and deemed them appropriate. We plan to continue radiation therapy as prescribed.     Digital speech recognition software was used to dictate parts of this note, some dictation errors may occur.    Omid Almanzar MD   01/14/25 13:43 EST

## 2025-01-15 ENCOUNTER — HOSPITAL ENCOUNTER (OUTPATIENT)
Dept: RADIATION ONCOLOGY | Facility: HOSPITAL | Age: OVER 89
Discharge: HOME | End: 2025-01-15

## 2025-01-15 LAB
RAD ONC ARIA COURSE ID: NORMAL
RAD ONC ARIA COURSE INTENT: NORMAL
RAD ONC ARIA COURSE LAST TREATMENT DATE: NORMAL
RAD ONC ARIA COURSE START DATE: NORMAL
RAD ONC ARIA COURSE TREATMENT ELAPSED DAYS: 13
RAD ONC ARIA FIRST TREATMENT DATE: NORMAL
RAD ONC ARIA PLAN FRACTIONS TREATED TO DATE: 8
RAD ONC ARIA PLAN ID: NORMAL
RAD ONC ARIA PLAN PRESCRIBED DOSE PER FRACTION: 3 GY
RAD ONC ARIA PLAN PRIMARY REFERENCE POINT: NORMAL
RAD ONC ARIA PLAN TOTAL FRACTIONS PRESCRIBED: 9
RAD ONC ARIA PLAN TOTAL PRESCRIBED DOSE: 2700 CGY
RAD ONC ARIA REFERENCE POINT DOSAGE GIVEN TO DATE: 27 GY
RAD ONC ARIA REFERENCE POINT ID: NORMAL
RAD ONC ARIA REFERENCE POINT SESSION DOSAGE GIVEN: 3 GY

## 2025-01-15 PROCEDURE — G6002 STEREOSCOPIC X-RAY GUIDANCE: HCPCS | Performed by: RADIOLOGY

## 2025-01-15 PROCEDURE — 77412 RADIATION TX DELIVERY LVL 3: CPT | Performed by: RADIOLOGY

## 2025-01-15 PROCEDURE — 77387 GUIDANCE FOR RADJ TX DLVR: CPT | Performed by: RADIOLOGY

## 2025-01-16 ENCOUNTER — HOSPITAL ENCOUNTER (OUTPATIENT)
Dept: RADIATION ONCOLOGY | Facility: HOSPITAL | Age: OVER 89
Discharge: HOME | End: 2025-01-16

## 2025-01-16 LAB
BACTERIA SPEC CULT: NORMAL
Lab: NORMAL
Lab: NORMAL
MICROORGANISM/AGENT SPEC: NORMAL
RAD ONC ARIA COURSE ID: NORMAL
RAD ONC ARIA COURSE INTENT: NORMAL
RAD ONC ARIA COURSE LAST TREATMENT DATE: NORMAL
RAD ONC ARIA COURSE START DATE: NORMAL
RAD ONC ARIA COURSE TREATMENT ELAPSED DAYS: 14
RAD ONC ARIA FIRST TREATMENT DATE: NORMAL
RAD ONC ARIA PLAN FRACTIONS TREATED TO DATE: 9
RAD ONC ARIA PLAN ID: NORMAL
RAD ONC ARIA PLAN PRESCRIBED DOSE PER FRACTION: 3 GY
RAD ONC ARIA PLAN PRIMARY REFERENCE POINT: NORMAL
RAD ONC ARIA PLAN TOTAL FRACTIONS PRESCRIBED: 9
RAD ONC ARIA PLAN TOTAL PRESCRIBED DOSE: 2700 CGY
RAD ONC ARIA REFERENCE POINT DOSAGE GIVEN TO DATE: 30 GY
RAD ONC ARIA REFERENCE POINT ID: NORMAL
RAD ONC ARIA REFERENCE POINT SESSION DOSAGE GIVEN: 3 GY

## 2025-01-16 PROCEDURE — G6002 STEREOSCOPIC X-RAY GUIDANCE: HCPCS | Performed by: RADIOLOGY

## 2025-01-16 PROCEDURE — 77412 RADIATION TX DELIVERY LVL 3: CPT | Performed by: RADIOLOGY

## 2025-01-16 PROCEDURE — 77387 GUIDANCE FOR RADJ TX DLVR: CPT | Performed by: RADIOLOGY

## 2025-01-17 NOTE — TELEPHONE ENCOUNTER
Caller: DILAN JOVEL    Relationship: Emergency Contact    Best call back number:     [phone]     Who are you requesting to speak with (clinical staff, provider,  specific staff member): NON-CLINICAL    What was the call regarding: THEY HAVE NOT HEARD ANYTHING BACK ABOUT R/S THIS APPT. THE TIME FOR 01/21 DOES NOT WORK FOR THEM, THE DATE IS OKAY, BUT THEY NEED LATER APPT. PLEASE SEE PREVIOUS MESSAGE.

## 2025-01-20 LAB
RAD ONC ARIA COURSE END DATE: NORMAL
RAD ONC ARIA COURSE ID: NORMAL
RAD ONC ARIA COURSE INTENT: NORMAL
RAD ONC ARIA COURSE LAST TREATMENT DATE: NORMAL
RAD ONC ARIA COURSE START DATE: NORMAL
RAD ONC ARIA COURSE TREATMENT ELAPSED DAYS: 14
RAD ONC ARIA FIRST TREATMENT DATE: NORMAL
RAD ONC ARIA PLAN FRACTIONS TREATED TO DATE: 1
RAD ONC ARIA PLAN FRACTIONS TREATED TO DATE: 9
RAD ONC ARIA PLAN ID: NORMAL
RAD ONC ARIA PLAN ID: NORMAL
RAD ONC ARIA PLAN NAME: NORMAL
RAD ONC ARIA PLAN NAME: NORMAL
RAD ONC ARIA PLAN PRESCRIBED DOSE PER FRACTION: 3 GY
RAD ONC ARIA PLAN PRESCRIBED DOSE PER FRACTION: 3 GY
RAD ONC ARIA PLAN PRIMARY REFERENCE POINT: NORMAL
RAD ONC ARIA PLAN PRIMARY REFERENCE POINT: NORMAL
RAD ONC ARIA PLAN TOTAL FRACTIONS PRESCRIBED: 10
RAD ONC ARIA PLAN TOTAL FRACTIONS PRESCRIBED: 9
RAD ONC ARIA PLAN TOTAL PRESCRIBED DOSE: 2700 CGY
RAD ONC ARIA PLAN TOTAL PRESCRIBED DOSE: 3000 CGY
RAD ONC ARIA REFERENCE POINT DOSAGE GIVEN TO DATE: 30 GY
RAD ONC ARIA REFERENCE POINT ID: NORMAL

## 2025-01-21 ENCOUNTER — LAB (OUTPATIENT)
Dept: ONCOLOGY | Facility: CLINIC | Age: OVER 89
End: 2025-01-21
Payer: MEDICARE

## 2025-01-21 ENCOUNTER — INFUSION (OUTPATIENT)
Dept: ONCOLOGY | Facility: HOSPITAL | Age: OVER 89
End: 2025-01-21
Payer: MEDICARE

## 2025-01-21 ENCOUNTER — OFFICE VISIT (OUTPATIENT)
Dept: ONCOLOGY | Facility: CLINIC | Age: OVER 89
End: 2025-01-21
Payer: MEDICARE

## 2025-01-21 VITALS
HEART RATE: 78 BPM | DIASTOLIC BLOOD PRESSURE: 61 MMHG | WEIGHT: 166 LBS | OXYGEN SATURATION: 100 % | TEMPERATURE: 98 F | BODY MASS INDEX: 22.48 KG/M2 | RESPIRATION RATE: 20 BRPM | HEIGHT: 72 IN | SYSTOLIC BLOOD PRESSURE: 116 MMHG

## 2025-01-21 VITALS
SYSTOLIC BLOOD PRESSURE: 112 MMHG | HEART RATE: 67 BPM | TEMPERATURE: 97.7 F | DIASTOLIC BLOOD PRESSURE: 54 MMHG | OXYGEN SATURATION: 97 % | RESPIRATION RATE: 16 BRPM

## 2025-01-21 DIAGNOSIS — C34.90 METASTATIC NON-SMALL CELL LUNG CANCER: ICD-10-CM

## 2025-01-21 DIAGNOSIS — C34.90 METASTATIC NON-SMALL CELL LUNG CANCER: Primary | ICD-10-CM

## 2025-01-21 DIAGNOSIS — L13.9 BULLOUS DISORDER, UNSPECIFIED: ICD-10-CM

## 2025-01-21 DIAGNOSIS — S80.851D: ICD-10-CM

## 2025-01-21 LAB
ALBUMIN SERPL-MCNC: 3.6 G/DL (ref 3.5–5.2)
ALBUMIN/GLOB SERPL: 1.1 G/DL
ALP SERPL-CCNC: 80 U/L (ref 39–117)
ALT SERPL W P-5'-P-CCNC: 12 U/L (ref 1–41)
ANION GAP SERPL CALCULATED.3IONS-SCNC: 9.4 MMOL/L (ref 5–15)
AST SERPL-CCNC: 18 U/L (ref 1–40)
BASOPHILS # BLD AUTO: 0.05 10*3/MM3 (ref 0–0.2)
BASOPHILS NFR BLD AUTO: 0.7 % (ref 0–1.5)
BILIRUB SERPL-MCNC: 0.4 MG/DL (ref 0–1.2)
BUN SERPL-MCNC: 25 MG/DL (ref 8–23)
BUN/CREAT SERPL: 27.5 (ref 7–25)
CALCIUM SPEC-SCNC: 8.8 MG/DL (ref 8.2–9.6)
CHLORIDE SERPL-SCNC: 105 MMOL/L (ref 98–107)
CO2 SERPL-SCNC: 23.6 MMOL/L (ref 22–29)
CREAT SERPL-MCNC: 0.91 MG/DL (ref 0.76–1.27)
DEPRECATED RDW RBC AUTO: 51.1 FL (ref 37–54)
EGFRCR SERPLBLD CKD-EPI 2021: 80.1 ML/MIN/1.73
EOSINOPHIL # BLD AUTO: 0.95 10*3/MM3 (ref 0–0.4)
EOSINOPHIL NFR BLD AUTO: 13.5 % (ref 0.3–6.2)
ERYTHROCYTE [DISTWIDTH] IN BLOOD BY AUTOMATED COUNT: 15.5 % (ref 12.3–15.4)
GLOBULIN UR ELPH-MCNC: 3.2 GM/DL
GLUCOSE SERPL-MCNC: 126 MG/DL (ref 65–99)
HCT VFR BLD AUTO: 35.2 % (ref 37.5–51)
HGB BLD-MCNC: 10.8 G/DL (ref 13–17.7)
IMM GRANULOCYTES # BLD AUTO: 0.01 10*3/MM3 (ref 0–0.05)
IMM GRANULOCYTES NFR BLD AUTO: 0.1 % (ref 0–0.5)
LYMPHOCYTES # BLD AUTO: 0.94 10*3/MM3 (ref 0.7–3.1)
LYMPHOCYTES NFR BLD AUTO: 13.3 % (ref 19.6–45.3)
MCH RBC QN AUTO: 28 PG (ref 26.6–33)
MCHC RBC AUTO-ENTMCNC: 30.7 G/DL (ref 31.5–35.7)
MCV RBC AUTO: 91.2 FL (ref 79–97)
MONOCYTES # BLD AUTO: 0.98 10*3/MM3 (ref 0.1–0.9)
MONOCYTES NFR BLD AUTO: 13.9 % (ref 5–12)
NEUTROPHILS NFR BLD AUTO: 4.12 10*3/MM3 (ref 1.7–7)
NEUTROPHILS NFR BLD AUTO: 58.5 % (ref 42.7–76)
NRBC BLD AUTO-RTO: 0 /100 WBC (ref 0–0.2)
PLATELET # BLD AUTO: 193 10*3/MM3 (ref 140–450)
PMV BLD AUTO: 10.2 FL (ref 6–12)
POTASSIUM SERPL-SCNC: 4.3 MMOL/L (ref 3.5–5.2)
PROT SERPL-MCNC: 6.8 G/DL (ref 6–8.5)
RBC # BLD AUTO: 3.86 10*6/MM3 (ref 4.14–5.8)
SODIUM SERPL-SCNC: 138 MMOL/L (ref 136–145)
T4 FREE SERPL-MCNC: 1.45 NG/DL (ref 0.92–1.68)
TSH SERPL DL<=0.05 MIU/L-ACNC: 1.58 UIU/ML (ref 0.27–4.2)
WBC NRBC COR # BLD AUTO: 7.05 10*3/MM3 (ref 3.4–10.8)

## 2025-01-21 PROCEDURE — 80053 COMPREHEN METABOLIC PANEL: CPT | Performed by: INTERNAL MEDICINE

## 2025-01-21 PROCEDURE — 96413 CHEMO IV INFUSION 1 HR: CPT

## 2025-01-21 PROCEDURE — 84439 ASSAY OF FREE THYROXINE: CPT | Performed by: INTERNAL MEDICINE

## 2025-01-21 PROCEDURE — 85025 COMPLETE CBC W/AUTO DIFF WBC: CPT | Performed by: INTERNAL MEDICINE

## 2025-01-21 PROCEDURE — 84443 ASSAY THYROID STIM HORMONE: CPT | Performed by: INTERNAL MEDICINE

## 2025-01-21 PROCEDURE — 25010000002 PEMBROLIZUMAB 100 MG/4ML SOLUTION 4 ML VIAL: Performed by: INTERNAL MEDICINE

## 2025-01-21 RX ORDER — SODIUM CHLORIDE 9 MG/ML
20 INJECTION, SOLUTION INTRAVENOUS ONCE
Status: CANCELLED | OUTPATIENT
Start: 2025-01-21

## 2025-01-21 RX ORDER — SODIUM CHLORIDE 9 MG/ML
20 INJECTION, SOLUTION INTRAVENOUS ONCE
Status: DISCONTINUED | OUTPATIENT
Start: 2025-01-21 | End: 2025-01-21

## 2025-01-21 RX ADMIN — SODIUM CHLORIDE 200 MG: 9 INJECTION, SOLUTION INTRAVENOUS at 11:57

## 2025-01-21 NOTE — PROGRESS NOTES
Venipuncture Blood Specimen Collection  Venipuncture performed in right arm by Angelica Davison MA with good hemostasis. Patient tolerated the procedure well without complications.   01/21/25   Angelica Davison MA

## 2025-01-21 NOTE — PROGRESS NOTES
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  appears to invade the right upper ribs of the right upper lobe measuring  about 8.6 cm and with PET hypermetabolism mSUV 15.1.  Compression  fracture of L2 vertebral body is again noted.  No dislocation.    SOFT TISSUES:  Unremarkable as visualized.    VASCULATURE:  Unremarkable as visualized.  No aortic aneurysm.    LYMPH NODES:  Unremarkable as visualized.  No lymphadenopathy.  No  hypermetabolic activity.     IMPRESSION:  1.  Again there is a peripheral consolidated mass that appears to invade  the right upper ribs of the right upper lobe measuring about 8.6 cm and  with PET hypermetabolism mSUV 15.1.  2.  Consolidative more central and elongated masslike consolidation near  the superior right hilum measures about 7 cm and again shows PET  hypermetabolism mSUV 21. The mass extends into the right hilum.  3.  PET hypermetabolic liver masses are identified with the largest in  the right lobe measuring about 8.3 cm and with PET hypermetabolism mSUV  14.2. A smaller liver masses noted in the left lobe of the liver.  4.  Compression fracture of L2 vertebral body is again noted.  5.  A 8 mm right upper lobe spiculated pulmonary nodule shows no PET  hypermetabolism at this time.    CT Chest With Contrast Diagnostic (05/15/2024 14:24)   FINDINGS:    LUNGS AND PLEURAL SPACES:   Right upper lobe peripheral pleural-based  consolidative masslike opacity measuring about 2.9 x 7.8 cm with a more  central hilar mass on the right side measuring 5.1 x 4.4 cm and a  smaller right upper lobe nodule component measuring 2.6 cm. The  peripheral mass does appear to cause fourth rib erosion or destruction.   Emphysematous lungs noted.  Right upper lobe 1.2 cm ill-defined nodule  that could represent scarring.  No pneumothorax.  No significant  effusion.    HEART:  Unremarkable as visualized.  No cardiomegaly.  No significant  pericardial effusion.  No significant coronary artery calcifications.    BONES/JOINTS:  Compression fracture noted of probable L2 vertebral  body.  No dislocation.    SOFT TISSUES:  Unremarkable as visualized.    VASCULATURE:  Partially visualized infrarenal abdominal aortic  aneurysm measuring 3.4 cm.    LYMPH NODES:  Unremarkable as visualized.  No enlarged lymph nodes.    LIVER:  Right lobe of liver mass concerning for metastatic disease  measuring about 7 mm.    OTHER FINDINGS:  .     IMPRESSION:  1.  Right upper lobe peripheral pleural-based consolidative masslike  opacity measuring about 2.9 x 7.8 cm with a more central hilar mass on  the right side measuring 5.1 x 4.4 cm and a smaller right upper lobe  nodule component measuring 2.6 cm. The peripheral mass does appear to  cause fourth rib erosion or destruction.  2.  Right lobe of liver mass concerning for metastatic disease measuring  about 7 mm.  3.  Partially visualized infrarenal abdominal aortic aneurysm measuring  3.4 cm.  4.  Compression fracture noted of probable L2 vertebral body.    XR Chest 2 View (04/23/2024 16:34)   FINDINGS:  LUNGS: Pleural-based consolidation periphery of the right lung. Mild  fullness in the right suprahilar region  HEART AND MEDIASTINUM: Heart and mediastinal contours are unremarkable  SKELETON: Bony and soft tissue structures are unremarkable.     IMPRESSION:  Pleural-based consolidation  periphery of the right lung and fullness in  the right suprahilar region. Recommend CT        Pathology:   06/21/2024        NGS:      PATHOLOGY - SCAN - FINAL RESULTS, [facility]ARDANT, 07.28.2024 (07/28/2024)           Assessment / Plan         Assessment and Plan   Kevin Fonseca is a 89 y.o. year old with history of     DeNovo metastatic non small cell carcinoma, squamous cell. PD-L1 TPS 5%. ERBB2 amplification. MSI-low. Negative EGFR, ALK, ROS1, BRAF, MET, RET, NTRK, KRAS  -Patient with ~1 year of decline in function, weight loss, fatigue, and intermittent hemoptysis. CT May 15 2024 with with concerning right upper lobe peripheral consolidation 2.9 x 7.8 cm with central hilar mass 5 x 4.4 cm, small right upper lobe nodule 2.6 cm, another right upper lobe 1.2 cm ill defined mass could represent scarring. Also noted liver mass concerning for metastatic disease. Follow up PET/CT 6/14/2024 with hepatic metastasis largest measuring 8.1 cm, and noted L2 compression fracture. 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  -Patient is aware treatment is palliative in nature. After a thorough discussion, patient and family decided to proceed with single agent pembrolizumab q21d given his performance status and co-morbidities. I feel this is reasonable given his functional status.   -C3 9/3-tolerated well. Course complicated due to accident with abrasions and noted to have bradycardia with 2-1 AV block.   -C4 9/24- tolerated well  -C5 10/15- tolerated well  -C6 11/5- proceed today  -CT chest/abdomen/pelvis with stable disease of the R lung and decreased size of the liver lesions. We discussed sending patient to radiation oncology re SBRT to liver and lung lesions for consultation. They are agreeable.   -C7 11/26- completed  -C9 1/21/25-  proceed today  -Next CT chest/abdomen/pelvis 2/2025     2. Malignancy associated pain  -Currently denies     3. Nutrition  -Recommend patient take in 2-3 boost/day    4. Hyponatremia   - Improved     5. Erythema and pruritus   -Started after C1 of IO  -Recommend H1 blockers: loratidine 10 mg AM and benadryl 25 mg PM  -Increase topical steroid to clobestasol 0.05 BID    6. Hypoxia   - Portable oxygen provided; patient has been oxygen dependent since recent admission for bradycardia and heart block    7. Bradycardia and AV heart block  - Evaluated by EP at [facility], he is discharged home with a cardiac monitor   - recommend pacemaker implant, however cardiology did not think patient was a good candidate for procedure    8. Loose stool  - Recommend holding miralax and benefiber for now   - If continues to have loose stool, would recommend imodium PRN    9. RLE swelling  -Improvement of abrasions after fall, has surrounding erythema and swelling  -CT RLE with probable hematoma. Improvement in swelling    10. RLE wound   - R calf wound after injury, with intermittent drainage. Currently with small superficial bullae   -Refer to wound care and dermatology     Discussed possible differential diagnoses, testing, treatment, recommended non-pharmacological interventions, risks, warning signs to monitor for that would indicate need for follow-up in clinic or ER. If no improvement with these regimens or you have new or worsening symptoms follow-up. Patient verbalizes understanding and agreement with plan of care. Denies further needs or concerns.     Patient was given instructions and counseling regarding her condition and for health maintenance advised.       All questions were answered to his satisfaction.    BMI is within normal parameters. No other follow-up for BMI required.         ACO / MARY/Other  Quality measures  -  Kevin Fonseca did not receive 2024 flu vaccine.  This was recommended.  -  Kevin Fonseca reports a pain  score of 0.  Given his pain assessment as noted, treatment options were discussed and the following options were decided upon as a follow-up plan to address the patient's pain: continuation of current treatment plan for pain.  -  Current outpatient and discharge medications have been reconciled for the patient.  Reviewed by: Jennifer Hinds MD        Meds ordered during this visit  No orders of the defined types were placed in this encounter.      Visit Diagnoses    ICD-10-CM ICD-9-CM   1. Metastatic non-small cell lung cancer  C34.90 162.9   2. Superficial foreign body of right lower extremity without major open wound without infection, subsequent encounter  S80.851D V58.89   3. Bullous disorder, unspecified  L13.9 694.9                       Follow Up   In 6 weeks with treatment    FU CT chest/abdomen/pelvis 3/2025         This document has been electronically signed by Jennifer Hinds MD   January 21, 2025 10:58 EST    Dictated Utilizing Dragon Dictation: Part of this note may be an electronic transcription/translation of spoken language to printed text using the Dragon Dictation System.

## 2025-02-03 NOTE — PROGRESS NOTES
"Kevin Fonseca     VITALS: Blood pressure 124/58, pulse 63, temperature 96.6 °F (35.9 °C), temperature source Temporal, resp. rate 16, height 182.9 cm (72\"), weight 75.1 kg (165 lb 9.6 oz), SpO2 97%.    Subjective  Chief Complaint  Leg Swelling (With Weeping/Drainage)    Subjective          History of Present Illness:    History of Present Illness  The patient is a 89-year-old male with medical conditions significant for lung cancer, arthritis, and GERD, who presents for a medical follow-up.    He is currently undergoing radiation therapy for his lung cancer. He has not yet received any information regarding the placement of clips. He reports a reduction in the size of his tumor, which has not significantly regrown. This is the second instance of such an occurrence. He expresses concern about the potential recurrence of the tumor. He also reports experiencing pruritus on his back.    He admits to a high salt intake but compensates by consuming large amounts of water. He expresses concern about the need for sodium supplementation prior to his Keytruda infusions, as this necessitates frequent bathroom visits. He recalls an incident where he was administered IV fluids due to low sodium levels during one of his infusion sessions, which resulted in urinary incontinence.      No complaints regarding medications.     The following portions of the patient's history were reviewed and updated as appropriate: allergies, current medications, past family history, past medical history, past social history, past surgical history and problem list.    Past Medical History  Past Medical History:   Diagnosis Date    Arthritis     Asthma     Chronic bronchitis     Complete heart block     Emphysema lung     Gastritis     Heartburn     Macular degeneration     Macular degeneration, wet     Metastatic non-small cell lung cancer     Reflux esophagitis     Thyroid disease        Surgical History  Past Surgical History:   Procedure Laterality " "Date    INTRACAPSULAR CATARACT EXTRACTION      Dr. Lesly Gray. Dr. Neto Light.    LIVER BIOPSY         Family History  Family History   Problem Relation Age of Onset    Hypertension Mother     Other Mother     Cancer Father     Cancer Brother     Asthma Brother        Social History  Social History     Socioeconomic History    Marital status:    Tobacco Use    Smoking status: Former     Current packs/day: 0.00     Average packs/day: 1.5 packs/day for 44.0 years (66.0 ttl pk-yrs)     Types: Cigarettes     Start date:      Quit date:      Years since quittin.1     Passive exposure: Past    Smokeless tobacco: Never   Vaping Use    Vaping status: Never Used   Substance and Sexual Activity    Alcohol use: Not Currently     Comment: 2 week    Drug use: Never    Sexual activity: Defer       Objective   Vital Signs:   /58 (BP Location: Right arm, Patient Position: Sitting, Cuff Size: Adult)   Pulse 63   Temp 96.6 °F (35.9 °C) (Temporal)   Resp 16   Ht 182.9 cm (72\")   Wt 75.1 kg (165 lb 9.6 oz)   SpO2 97% Comment: 2 LPM  BMI 22.46 kg/m²       Physical Exam     Physical Exam      Gen: Patient in NAD. Pleasant and answers appropriately. A&Ox3.    Skin: Warm and dry with normal turgor. No purpura, rashes, or unusual pigmentation noted. Hair is normal in appearance and distribution.    HEENT: NC/AT. No lesions noted. Conjunctiva clear, sclera nonicteric. PERRL. EOMI without nystagmus or strabismus. Fundi appear benign. No hemorrhages or exudates of eyes. Auditory canals are patent bilaterally without lesions. TMs intact,  nonerythematous, nonbulging without lesions. Nasal mucosa pink, nonerythematous, and nonedematous. Frontal and maxillary sinuses are nontender. O/P nonerythematous and moist without exudate.    Neck: Supple without lymph nodes palpated. FROM. No carotid bruits appreciated bilaterally.    Lungs: Decreased B/L without rales, rhonchi, crackles, or wheezes.    Heart: RRR. " S1 and S2 normal. No S3 or S4. No MRGT.    Abd: Soft, nontender,nondistended. (+)BSx4 quadrants.     Extrem: No CC.  +1/4 pitting edema bilateral lower extremities.  Wrapped right leg.  Radial pulses 2+/4 and equal B/L. FROMx4.  Positive joint tenderness noted.    Neuro: No focal motor/sensory deficits.    Procedures    Result Review :   The following data was reviewed by: Elissa Geronimo MD on 01/13/2025:       Results  Laboratory Studies  December 17, 2024 potassium was 5.3. Sodium was 137.           Assessment and Plan      Kevin Fonseca is a 89 y.o. here for medical followup.    Diagnoses and all orders for this visit:    1. Hyperkalemia (Primary)  -     Cancel: Comprehensive Metabolic Panel; Future  -     Comprehensive Metabolic Panel    2. Encounter for lipid screening for cardiovascular disease  -     Cancel: Lipid Panel; Future  -     Lipid Panel  -     Lipid Panel  -     Cardiovascular Risk Assessment    3. Wound of right lower extremity, subsequent encounter  -     Culture, Routine - Swab, Leg, Right; Future  -     Wound Culture - Wound, Leg    4. Anxiety  -     busPIRone (BUSPAR) 5 MG tablet; Take 2 tablets (10 mg) in the AM and 1 tablet (5 mg) in the PM.  Dispense: 180 tablet; Refill: 1    5. Dandruff  -     ketoconazole (NIZORAL) 2 % shampoo; Apply  topically to the appropriate area as directed 2 (Two) Times a Week.  Dispense: 360 mL; Refill: 0        Assessment & Plan  1. Lung Cancer.  He is currently undergoing radiation treatments. The fluid sample has been sent for analysis to rule out any abnormalities. The skin dryness and redness on the back of his thighs are likely due to Keytruda treatment. He is advised to apply a thick cream to alleviate the dryness and itchiness. A special shampoo has been prescribed to manage these symptoms. He should continue to prop up his leg when at home to reduce swelling.    2. Elevated Potassium.  His potassium level was slightly elevated at 5.3 during the last  check. A recheck of his potassium levels has been ordered to monitor this condition. He is advised to maintain his current diet and hydration habits.    3. Cholesterol Management.  It has been over a year since his last cholesterol check. A cholesterol panel has been ordered to assess his current lipid levels.    Follow-up  The patient will follow up in 3 months.      BMI is within normal parameters. No other follow-up for BMI required.             Patient or patient representative verbalized consent for the use of Ambient Listening during the visit with  Elissa Geronimo MD for chart documentation. 2/2/2025  23:21 EST        Follow Up   Return in about 3 months (around 4/13/2025).  Findings and plans discussed with patient who verbalizes understanding and agreement. Will followup with patient once results are in. Patient was given instructions and counseling regarding his condition or for health maintenance advice. Please see specific information pulled into the AVS if appropriate.       Elissa Geronimo MD

## 2025-02-05 ENCOUNTER — HOSPITAL ENCOUNTER (OUTPATIENT)
Dept: WOUND CARE | Facility: HOSPITAL | Age: OVER 89
Discharge: HOME | End: 2025-02-05
Payer: MEDICARE

## 2025-02-05 VITALS
BODY MASS INDEX: 22.48 KG/M2 | HEART RATE: 68 BPM | WEIGHT: 166 LBS | OXYGEN SATURATION: 98 % | DIASTOLIC BLOOD PRESSURE: 65 MMHG | HEIGHT: 72 IN | SYSTOLIC BLOOD PRESSURE: 133 MMHG | TEMPERATURE: 98.5 F

## 2025-02-05 DIAGNOSIS — L97.912 NON-PRESSURE CHRONIC ULCER OF RIGHT LOWER LEG WITH FAT LAYER EXPOSED: ICD-10-CM

## 2025-02-05 DIAGNOSIS — T14.8XXA OPEN WOUND: Primary | ICD-10-CM

## 2025-02-05 PROCEDURE — G0463 HOSPITAL OUTPT CLINIC VISIT: HCPCS

## 2025-02-05 RX ORDER — NYSTATIN 100000 [USP'U]/G
1 POWDER TOPICAL ONCE
OUTPATIENT
Start: 2025-02-05 | End: 2025-02-05

## 2025-02-05 RX ORDER — SODIUM HYPOCHLORITE 1.25 MG/ML
1 SOLUTION TOPICAL AS NEEDED
OUTPATIENT
Start: 2025-02-05

## 2025-02-05 RX ORDER — LIDOCAINE HYDROCHLORIDE 20 MG/ML
10 INJECTION, SOLUTION INFILTRATION; PERINEURAL ONCE
OUTPATIENT
Start: 2025-02-05 | End: 2025-02-05

## 2025-02-05 RX ORDER — DIAPER,BRIEF,INFANT-TODD,DISP
1 EACH MISCELLANEOUS ONCE
OUTPATIENT
Start: 2025-02-05 | End: 2025-02-05

## 2025-02-05 RX ORDER — SILVER SULFADIAZINE 10 MG/G
1 CREAM TOPICAL AS NEEDED
OUTPATIENT
Start: 2025-02-05

## 2025-02-05 RX ORDER — SODIUM HYPOCHLORITE 2.5 MG/ML
1 SOLUTION TOPICAL AS NEEDED
OUTPATIENT
Start: 2025-02-05

## 2025-02-05 RX ORDER — LIDOCAINE HYDROCHLORIDE 20 MG/ML
1 JELLY TOPICAL ONCE
OUTPATIENT
Start: 2025-02-05 | End: 2025-02-05

## 2025-02-05 RX ORDER — MUPIROCIN 20 MG/G
1 OINTMENT TOPICAL AS NEEDED
OUTPATIENT
Start: 2025-02-05

## 2025-02-05 RX ORDER — LIDOCAINE HYDROCHLORIDE AND EPINEPHRINE BITARTRATE 20; .01 MG/ML; MG/ML
10 INJECTION, SOLUTION SUBCUTANEOUS ONCE
OUTPATIENT
Start: 2025-02-05 | End: 2025-02-05

## 2025-02-05 RX ORDER — LIDOCAINE HYDROCHLORIDE 20 MG/ML
JELLY TOPICAL AS NEEDED
OUTPATIENT
Start: 2025-02-05

## 2025-02-05 RX ORDER — CASTOR OIL AND BALSAM, PERU 788; 87 MG/G; MG/G
1 OINTMENT TOPICAL AS NEEDED
OUTPATIENT
Start: 2025-02-05

## 2025-02-05 RX ORDER — LIDOCAINE HYDROCHLORIDE 20 MG/ML
1 JELLY TOPICAL ONCE
Status: COMPLETED | OUTPATIENT
Start: 2025-02-05 | End: 2025-02-05

## 2025-02-05 RX ADMIN — LIDOCAINE HYDROCHLORIDE 1 ML: 20 JELLY TOPICAL at 14:03

## 2025-02-05 NOTE — PROGRESS NOTES
Wound Clinic Note  Patient Identification:  Name:  Kevin Fonseca  Age:  89 y.o.  Sex:  male  :  1934  MRN:  [id number]   Visit Number:  [id number]  Primary Care Physician:  Elissa Geronimo MD     Subjective     Chief complaint:     Right lower leg wounds    History of presenting illness:     Patient is a 89 y.o. male with past medical history significant for  that presented today for evaluation of right lower leg X 2 ulcers. Reports he had a fall 6-8 months ago. Copious amount of serous drainage without odor. Denies recent antibiotics. Denies history of vascular disease or recent vascular workup. Wrapping with kerlix. Denies wearing compression wraps. Denies any fever or chills.     ---------------------------------------------------------------------------------------------------------------------   Review of Systems   Respiratory:  Negative for cough and shortness of breath.    Cardiovascular:  Positive for leg swelling. Negative for chest pain.   Gastrointestinal:  Negative for nausea and vomiting.   Musculoskeletal:  Positive for back pain and gait problem.   Skin:  Positive for wound.      ---------------------------------------------------------------------------------------------------------------------   Past Medical History:   Diagnosis Date    Arthritis     Asthma     Chronic bronchitis     Complete heart block     Emphysema lung     Gastritis     Heartburn     Macular degeneration     Macular degeneration, wet     Metastatic non-small cell lung cancer     Reflux esophagitis     Thyroid disease      Past Surgical History:   Procedure Laterality Date    INTRACAPSULAR CATARACT EXTRACTION      Dr. Lesly Gray. Dr. Neto Light.    LIVER BIOPSY       Family History   Problem Relation Age of Onset    Hypertension Mother     Other Mother     Cancer Father     Cancer Brother     Asthma Brother      Social History     Socioeconomic History    Marital status:    Tobacco Use    Smoking  "status: Former     Current packs/day: 0.00     Average packs/day: 1.5 packs/day for 44.0 years (66.0 ttl pk-yrs)     Types: Cigarettes     Start date:      Quit date:      Years since quittin.1     Passive exposure: Past    Smokeless tobacco: Never   Vaping Use    Vaping status: Never Used   Substance and Sexual Activity    Alcohol use: Not Currently     Comment: 2 week    Drug use: Never    Sexual activity: Defer     ---------------------------------------------------------------------------------------------------------------------   Allergies:  Patient has no known allergies.  ---------------------------------------------------------------------------------------------------------------------  Objective     ---------------------------------------------------------------------------------------------------------------------   Vital Signs:  /65   Pulse 68   Temp 98.5 °F (36.9 °C) (Infrared)   Ht 182.9 cm (72\")   Wt 75.3 kg (166 lb)   SpO2 98%   BMI 22.51 kg/m²   Estimated body mass index is 22.51 kg/m² as calculated from the following:    Height as of this encounter: 182.9 cm (72\").    Weight as of this encounter: 75.3 kg (166 lb).  Body mass index is 22.51 kg/m².  Wt Readings from Last 3 Encounters:   25 75.3 kg (166 lb)   25 75.3 kg (166 lb)   25 75.1 kg (165 lb 9.6 oz)       ---------------------------------------------------------------------------------------------------------------------   Physical Exam  Wound Assessment:  Location: Right, lower, leg  Wound Measurements: 1 X 0.5 X 0.2cm   Tunneling: No Undermining: No  Dressing Appearance: dressingappearance: clean  Closure: NA  Changes since last exam: this is initial exam  Etiology and classification: venous ulcers, non-pressure chronic ulcer   Wound bed structures/characteristics: full-thickness (subcutaneous tissue is exposed in at least a portion of the wound), moist, pink, slough  Edges moist  Periwound " characteristics: edematous  Periwound Temperature: normal turgor and temperature  Drainage characteristics: moderate, serous   Perfusion characteristics: suggest some degree of PAD    Wound Assessment:  Location: Right medial lower, leg  Wound Measurements: 2 X 8 X 0.1cm   Tunneling: No Undermining: No  Dressing Appearance: dressingappearance: clean  Closure: NA  Changes since last exam: this is initial exam  Etiology and classification: venous ulcers, non-pressure chronic ulcer   Wound bed structures/characteristics: full-thickness (subcutaneous tissue is exposed in at least a portion of the wound), moist, pink, slough  Edges moist  Periwound characteristics: edematous  Periwound Temperature: normal turgor and temperature  Drainage characteristics: moderate, serous   Perfusion characteristics: suggest some degree of PAD    Wound Goal (s):Free of infection and No further symptoms  Assessment & Plan      Patient Active Problem List    Diagnosis     Non-pressure chronic ulcer of other part of right lower leg with fat layer exposed [L97.812]  -Debridement completed, see below for procedure details  -clean with wound cleanser, apply honeygel to base, cover with ABD pad for drainage control and secure with kerlix and Short stretch  -Measure for farrow wraps   -SAULO- to assess for underlying vascular disease that can negatively impact wound healing   -Discussed option for referral to lymphedema, not interested at this time  -Recommend ludivina protein diet 0.75g/kg/day along with vitamin C 2000mg/day, vitamin A 5000 Units/day, vitamin D3 5000 Units/day, zinc 50mg/day to help promote wound healing      Non-pressure chronic ulcer of right medial lower leg with fat layer exposed [L97.812]  -Debridement completed, see below for procedure details  -clean with wound cleanser, apply honeygel to base, cover with ABD pad for drainage control and secure with kerlix and Short stretch  -Measure for farrow wraps   -SAULO- to assess for  underlying vascular disease that can negatively impact wound healing   -Discussed option for referral to lymphedema, not interested at this time  -Recommend ludivina protein diet 0.75g/kg/day along with vitamin C 2000mg/day, vitamin A 5000 Units/day, vitamin D3 5000 Units/day, zinc 50mg/day to help promote wound healing      Wound Care Debridement Note   Pre-Procedure  Pre-Procedure Diagnosis: Non-pressure chronic ulcer of other part of right lower leg with fat layer exposed [L97.812]  Checked for Allergies: yes  Consent:Consent obtained, consent given by Patient,Risks Discussed, Alternatives Discussed  Indication: slough  Vascular status:Pedal pulses right were found to be adequate and safe for debridment.  Time out was called prior to procedure.   Pre procedure Pain assessment: a 2 on a scale of 0-10  Pre debridement measurements: 1 X 0.5 X 0.2cm, volume: 0.1, surface: 0.5  sinus/tunnelNo, undermining No    Post Procedure  Post-Procedure Diagnosis: Non-pressure chronic ulcer of other part of right lower leg with fat layer exposed [L97.812]  Post debridement measurements: 1.1 X 0.51 X 0.3cm   sinus/tunnelNo, undermining No  Post procedure Pain assessment: a 1 on a scale of 0-10  Graft/Implant/Prosthetics/Implanted Device/Transplants:  None  Complication(s):  None    Procedure details:  Method of Debridement: excissional (Surgical removal or cutting away, outside or beyond the wound margin devitalized tissue, necrosis or slough.)  Procedure: The site was prepared using clean techniques, subcutaneous tissue was removed by surgical excision.  Instrument(s) used: Curette 4mm  Anesthesia:After checking patient allergies,lidocaine topical 2% was administered to provide anesthesia.  Tissue removed: subuctaneous, Percent Removed 100%  Culture or Biopsy:  None  Estimated Blood Loss: Small  Hemostasis Obtained: pressure    Clinical Impression:Moderate Complexity    Follow-up: 1 week    Paloma Burroughs  GURDEEP GALDAMEZ  [facility]  02/05/25  14:25 EST

## 2025-02-06 ENCOUNTER — TELEPHONE (OUTPATIENT)
Dept: FAMILY MEDICINE CLINIC | Facility: CLINIC | Age: OVER 89
End: 2025-02-06
Payer: MEDICARE

## 2025-02-06 DIAGNOSIS — C34.90 METASTATIC NON-SMALL CELL LUNG CANCER: Primary | ICD-10-CM

## 2025-02-06 RX ORDER — SODIUM CHLORIDE 9 MG/ML
20 INJECTION, SOLUTION INTRAVENOUS ONCE
OUTPATIENT
Start: 2025-02-11

## 2025-02-07 PROBLEM — L97.812 NON-PRESSURE CHRONIC ULCER OF OTHER PART OF RIGHT LOWER LEG WITH FAT LAYER EXPOSED: Status: ACTIVE | Noted: 2025-02-07

## 2025-02-11 ENCOUNTER — LAB (OUTPATIENT)
Dept: ONCOLOGY | Facility: HOSPITAL | Age: OVER 89
End: 2025-02-11
Payer: MEDICARE

## 2025-02-11 ENCOUNTER — INFUSION (OUTPATIENT)
Dept: ONCOLOGY | Facility: HOSPITAL | Age: OVER 89
End: 2025-02-11
Payer: MEDICARE

## 2025-02-11 VITALS
DIASTOLIC BLOOD PRESSURE: 57 MMHG | OXYGEN SATURATION: 97 % | HEART RATE: 71 BPM | BODY MASS INDEX: 22.92 KG/M2 | RESPIRATION RATE: 20 BRPM | TEMPERATURE: 97.6 F | WEIGHT: 169 LBS | SYSTOLIC BLOOD PRESSURE: 118 MMHG

## 2025-02-11 DIAGNOSIS — C34.90 METASTATIC NON-SMALL CELL LUNG CANCER: ICD-10-CM

## 2025-02-11 DIAGNOSIS — C34.90 METASTATIC NON-SMALL CELL LUNG CANCER: Primary | ICD-10-CM

## 2025-02-11 LAB
ALBUMIN SERPL-MCNC: 3.5 G/DL (ref 3.5–5.2)
ALBUMIN/GLOB SERPL: 0.9 G/DL
ALP SERPL-CCNC: 84 U/L (ref 39–117)
ALT SERPL W P-5'-P-CCNC: 9 U/L (ref 1–41)
ANION GAP SERPL CALCULATED.3IONS-SCNC: 9.7 MMOL/L (ref 5–15)
AST SERPL-CCNC: 18 U/L (ref 1–40)
BASOPHILS # BLD AUTO: 0.05 10*3/MM3 (ref 0–0.2)
BASOPHILS NFR BLD AUTO: 0.7 % (ref 0–1.5)
BILIRUB SERPL-MCNC: 0.3 MG/DL (ref 0–1.2)
BUN SERPL-MCNC: 21 MG/DL (ref 8–23)
BUN/CREAT SERPL: 23.3 (ref 7–25)
CALCIUM SPEC-SCNC: 9.1 MG/DL (ref 8.2–9.6)
CHLORIDE SERPL-SCNC: 102 MMOL/L (ref 98–107)
CO2 SERPL-SCNC: 27.3 MMOL/L (ref 22–29)
CREAT SERPL-MCNC: 0.9 MG/DL (ref 0.76–1.27)
DEPRECATED RDW RBC AUTO: 50.5 FL (ref 37–54)
EGFRCR SERPLBLD CKD-EPI 2021: 81.1 ML/MIN/1.73
EOSINOPHIL # BLD AUTO: 0.85 10*3/MM3 (ref 0–0.4)
EOSINOPHIL NFR BLD AUTO: 11.9 % (ref 0.3–6.2)
ERYTHROCYTE [DISTWIDTH] IN BLOOD BY AUTOMATED COUNT: 15 % (ref 12.3–15.4)
GLOBULIN UR ELPH-MCNC: 4 GM/DL
GLUCOSE SERPL-MCNC: 99 MG/DL (ref 65–99)
HCT VFR BLD AUTO: 34.9 % (ref 37.5–51)
HGB BLD-MCNC: 10.9 G/DL (ref 13–17.7)
IMM GRANULOCYTES # BLD AUTO: 0.01 10*3/MM3 (ref 0–0.05)
IMM GRANULOCYTES NFR BLD AUTO: 0.1 % (ref 0–0.5)
LYMPHOCYTES # BLD AUTO: 1.13 10*3/MM3 (ref 0.7–3.1)
LYMPHOCYTES NFR BLD AUTO: 15.8 % (ref 19.6–45.3)
MCH RBC QN AUTO: 28.6 PG (ref 26.6–33)
MCHC RBC AUTO-ENTMCNC: 31.2 G/DL (ref 31.5–35.7)
MCV RBC AUTO: 91.6 FL (ref 79–97)
MONOCYTES # BLD AUTO: 1.05 10*3/MM3 (ref 0.1–0.9)
MONOCYTES NFR BLD AUTO: 14.7 % (ref 5–12)
NEUTROPHILS NFR BLD AUTO: 4.04 10*3/MM3 (ref 1.7–7)
NEUTROPHILS NFR BLD AUTO: 56.8 % (ref 42.7–76)
NRBC BLD AUTO-RTO: 0 /100 WBC (ref 0–0.2)
PLATELET # BLD AUTO: 229 10*3/MM3 (ref 140–450)
PMV BLD AUTO: 10.4 FL (ref 6–12)
POTASSIUM SERPL-SCNC: 4.9 MMOL/L (ref 3.5–5.2)
PROT SERPL-MCNC: 7.5 G/DL (ref 6–8.5)
RBC # BLD AUTO: 3.81 10*6/MM3 (ref 4.14–5.8)
SODIUM SERPL-SCNC: 139 MMOL/L (ref 136–145)
WBC NRBC COR # BLD AUTO: 7.13 10*3/MM3 (ref 3.4–10.8)

## 2025-02-11 PROCEDURE — 96413 CHEMO IV INFUSION 1 HR: CPT

## 2025-02-11 PROCEDURE — 25010000002 PEMBROLIZUMAB 100 MG/4ML SOLUTION 4 ML VIAL: Performed by: INTERNAL MEDICINE

## 2025-02-11 PROCEDURE — 80053 COMPREHEN METABOLIC PANEL: CPT

## 2025-02-11 PROCEDURE — 85025 COMPLETE CBC W/AUTO DIFF WBC: CPT

## 2025-02-11 RX ADMIN — SODIUM CHLORIDE 200 MG: 9 INJECTION, SOLUTION INTRAVENOUS at 14:28

## 2025-02-12 ENCOUNTER — HOSPITAL ENCOUNTER (OUTPATIENT)
Dept: WOUND CARE | Facility: HOSPITAL | Age: OVER 89
Discharge: HOME | End: 2025-02-12
Admitting: NURSE PRACTITIONER
Payer: MEDICARE

## 2025-02-12 DIAGNOSIS — T14.8XXA OPEN WOUND: Primary | ICD-10-CM

## 2025-02-12 PROCEDURE — 97602 WOUND(S) CARE NON-SELECTIVE: CPT

## 2025-02-12 RX ORDER — MUPIROCIN 20 MG/G
1 OINTMENT TOPICAL AS NEEDED
OUTPATIENT
Start: 2025-02-12

## 2025-02-12 RX ORDER — SODIUM HYPOCHLORITE 1.25 MG/ML
1 SOLUTION TOPICAL AS NEEDED
OUTPATIENT
Start: 2025-02-12

## 2025-02-12 RX ORDER — SODIUM HYPOCHLORITE 2.5 MG/ML
1 SOLUTION TOPICAL AS NEEDED
OUTPATIENT
Start: 2025-02-12

## 2025-02-12 RX ORDER — LIDOCAINE HYDROCHLORIDE 20 MG/ML
1 JELLY TOPICAL ONCE
OUTPATIENT
Start: 2025-02-12 | End: 2025-02-12

## 2025-02-12 RX ORDER — NYSTATIN 100000 [USP'U]/G
1 POWDER TOPICAL ONCE
OUTPATIENT
Start: 2025-02-12 | End: 2025-02-12

## 2025-02-12 RX ORDER — LIDOCAINE HYDROCHLORIDE 20 MG/ML
JELLY TOPICAL AS NEEDED
OUTPATIENT
Start: 2025-02-12

## 2025-02-12 RX ORDER — CASTOR OIL AND BALSAM, PERU 788; 87 MG/G; MG/G
1 OINTMENT TOPICAL AS NEEDED
OUTPATIENT
Start: 2025-02-12

## 2025-02-12 RX ORDER — SILVER SULFADIAZINE 10 MG/G
1 CREAM TOPICAL AS NEEDED
OUTPATIENT
Start: 2025-02-12

## 2025-02-12 RX ORDER — DIAPER,BRIEF,INFANT-TODD,DISP
1 EACH MISCELLANEOUS ONCE
OUTPATIENT
Start: 2025-02-12 | End: 2025-02-12

## 2025-02-12 RX ORDER — LIDOCAINE HYDROCHLORIDE 20 MG/ML
1 JELLY TOPICAL ONCE
Status: COMPLETED | OUTPATIENT
Start: 2025-02-12 | End: 2025-02-12

## 2025-02-12 RX ORDER — LIDOCAINE HYDROCHLORIDE AND EPINEPHRINE BITARTRATE 20; .01 MG/ML; MG/ML
10 INJECTION, SOLUTION SUBCUTANEOUS ONCE
OUTPATIENT
Start: 2025-02-12 | End: 2025-02-12

## 2025-02-12 RX ORDER — LIDOCAINE HYDROCHLORIDE 20 MG/ML
10 INJECTION, SOLUTION INFILTRATION; PERINEURAL ONCE
OUTPATIENT
Start: 2025-02-12 | End: 2025-02-12

## 2025-02-12 RX ADMIN — LIDOCAINE HYDROCHLORIDE 1 ML: 20 JELLY TOPICAL at 15:01

## 2025-02-17 ENCOUNTER — HOSPITAL ENCOUNTER (OUTPATIENT)
Dept: ULTRASOUND IMAGING | Facility: HOSPITAL | Age: OVER 89
Discharge: HOME | End: 2025-02-17
Admitting: NURSE PRACTITIONER
Payer: MEDICARE

## 2025-02-17 DIAGNOSIS — L97.912 NON-PRESSURE CHRONIC ULCER OF RIGHT LOWER LEG WITH FAT LAYER EXPOSED: ICD-10-CM

## 2025-02-17 PROCEDURE — 93922 UPR/L XTREMITY ART 2 LEVELS: CPT

## 2025-02-17 PROCEDURE — 93922 UPR/L XTREMITY ART 2 LEVELS: CPT | Performed by: RADIOLOGY

## 2025-02-24 NOTE — PROGRESS NOTES
Wound Clinic Note  Patient Identification:  Name:  Kevin Fonseca  Age:  89 y.o.  Sex:  male  :  1934  MRN:  [id number]   Visit Number:  [id number]  Primary Care Physician:  Elissa Geronimo MD     Subjective     Chief complaint:     Right lower leg wounds    History of presenting illness:     Patient is a 89 y.o. male with past medical history significant for  that presented today for evaluation of right lower leg X 2 ulcers. Reports he had a fall 6-8 months ago. Copious amount of serous drainage without odor. Denies recent antibiotics. Denies history of vascular disease or recent vascular workup. Wrapping with kerlix. Denies wearing compression wraps. Denies any fever or chills.     Interval History:   2025: Seen in clinic today for follow-up to right lower leg ulcer. Right calf continues with yellow slough. Right medial leg with superficial opening. Denies any fever or chills. SAULO scheduled for 2025.   ---------------------------------------------------------------------------------------------------------------------   Review of Systems   Respiratory:  Negative for cough and shortness of breath.    Cardiovascular:  Positive for leg swelling. Negative for chest pain.   Gastrointestinal:  Negative for nausea and vomiting.   Musculoskeletal:  Positive for back pain and gait problem.   Skin:  Positive for wound.      ---------------------------------------------------------------------------------------------------------------------   Past Medical History:   Diagnosis Date    Arthritis     Asthma     Chronic bronchitis     Complete heart block     Emphysema lung     Gastritis     Heartburn     Macular degeneration     Macular degeneration, wet     Metastatic non-small cell lung cancer     Reflux esophagitis     Thyroid disease      Past Surgical History:   Procedure Laterality Date    INTRACAPSULAR CATARACT EXTRACTION      Dr. Lesly Gray. Dr. Neto Light.    LIVER BIOPSY       Family  "History   Problem Relation Age of Onset    Hypertension Mother     Other Mother     Cancer Father     Cancer Brother     Asthma Brother      Social History     Socioeconomic History    Marital status:    Tobacco Use    Smoking status: Former     Current packs/day: 0.00     Average packs/day: 1.5 packs/day for 44.0 years (66.0 ttl pk-yrs)     Types: Cigarettes     Start date:      Quit date:      Years since quittin.1     Passive exposure: Past    Smokeless tobacco: Never   Vaping Use    Vaping status: Never Used   Substance and Sexual Activity    Alcohol use: Not Currently     Comment: 2 week    Drug use: Never    Sexual activity: Defer     ---------------------------------------------------------------------------------------------------------------------   Allergies:  Patient has no known allergies.  ---------------------------------------------------------------------------------------------------------------------  Objective     ---------------------------------------------------------------------------------------------------------------------   Vital Signs:  There were no vitals taken for this visit.  Estimated body mass index is 22.92 kg/m² as calculated from the following:    Height as of 25: 182.9 cm (72\").    Weight as of 25: 76.7 kg (169 lb).  There is no height or weight on file to calculate BMI.  Wt Readings from Last 3 Encounters:   25 76.7 kg (169 lb)   25 75.3 kg (166 lb)   25 75.3 kg (166 lb)       ---------------------------------------------------------------------------------------------------------------------   Physical Exam  Wound Assessment:  Location: Right, lower, leg  Wound Measurements: 1.5 X 0.5 X 0.2cm   Tunneling: No Undermining: No  Dressing Appearance: dressingappearance: clean  Closure: NA  Changes since last exam: no changes  Etiology and classification: venous ulcers, non-pressure chronic ulcer   Wound bed structures/characteristics: " full-thickness (subcutaneous tissue is exposed in at least a portion of the wound), moist, pink, slough  Edges moist  Periwound characteristics: edematous  Periwound Temperature: normal turgor and temperature  Drainage characteristics: moderate, serous   Perfusion characteristics: suggest some degree of PAD    Wound Assessment:  Location: Right medial lower, leg  Wound Measurements: 1.5 X 8 X 0.1cm   Tunneling: No Undermining: No  Dressing Appearance: dressingappearance: clean  Closure: NA  Changes since last exam: without significant changes   Etiology and classification: venous ulcers, non-pressure chronic ulcer   Wound bed structures/characteristics: full-thickness (subcutaneous tissue is exposed in at least a portion of the wound), moist, pink, slough  Edges moist  Periwound characteristics: edematous  Periwound Temperature: normal turgor and temperature  Drainage characteristics: moderate, serous   Perfusion characteristics: suggest some degree of PAD    Wound Goal (s):Free of infection and No further symptoms  Assessment & Plan      Patient Active Problem List    Diagnosis     Non-pressure chronic ulcer of other part of right lower leg with fat layer exposed [L97.812]  -clean with wound cleanser, apply honeygel to base, cover with ABD pad for drainage control and secure with kerlix and Short stretch  -Measure for farrow wraps   -SAULO- to assess for underlying vascular disease that can negatively impact wound healing, scheduled for 02/17/2025  -Discussed option for referral to lymphedema, not interested at this time  -Recommend ludivina protein diet 0.75g/kg/day along with vitamin C 2000mg/day, vitamin A 5000 Units/day, vitamin D3 5000 Units/day, zinc 50mg/day to help promote wound healing      Non-pressure chronic ulcer of right medial lower leg with fat layer exposed [L97.812]  -clean with wound cleanser, apply honeygel to base, cover with ABD pad for drainage control and secure with kerlix and Short  stretch  -Measure for farrow wraps   -SAULO- to assess for underlying vascular disease that can negatively impact wound healing   -Discussed option for referral to lymphedema, not interested at this time  -Recommend ludivina protein diet 0.75g/kg/day along with vitamin C 2000mg/day, vitamin A 5000 Units/day, vitamin D3 5000 Units/day, zinc 50mg/day to help promote wound healing      Lymphedema  -Will measure for farrow wraps to assist with edema control which is likely contributing to ulcerations  -Recommend referral to lymphedema therapy      Clinical Impression:Moderate Complexity    Follow-up: 1 week    DENICE Zurita,CWS  [facility]  02/12/2025  [id number]

## 2025-02-26 ENCOUNTER — HOSPITAL ENCOUNTER (OUTPATIENT)
Dept: CT IMAGING | Facility: HOSPITAL | Age: OVER 89
Discharge: HOME | End: 2025-02-26
Payer: MEDICARE

## 2025-02-26 ENCOUNTER — HOSPITAL ENCOUNTER (OUTPATIENT)
Dept: WOUND CARE | Facility: HOSPITAL | Age: OVER 89
Discharge: HOME | End: 2025-02-26
Payer: MEDICARE

## 2025-02-26 VITALS
HEIGHT: 72 IN | WEIGHT: 169 LBS | TEMPERATURE: 99.1 F | DIASTOLIC BLOOD PRESSURE: 60 MMHG | HEART RATE: 80 BPM | RESPIRATION RATE: 20 BRPM | OXYGEN SATURATION: 97 % | SYSTOLIC BLOOD PRESSURE: 121 MMHG | BODY MASS INDEX: 22.89 KG/M2

## 2025-02-26 DIAGNOSIS — C34.90 METASTATIC NON-SMALL CELL LUNG CANCER: ICD-10-CM

## 2025-02-26 DIAGNOSIS — T14.8XXA OPEN WOUND: Primary | ICD-10-CM

## 2025-02-26 PROCEDURE — 97602 WOUND(S) CARE NON-SELECTIVE: CPT

## 2025-02-26 PROCEDURE — 25510000001 IOPAMIDOL 61 % SOLUTION: Performed by: INTERNAL MEDICINE

## 2025-02-26 PROCEDURE — 71260 CT THORAX DX C+: CPT

## 2025-02-26 PROCEDURE — 74177 CT ABD & PELVIS W/CONTRAST: CPT

## 2025-02-26 RX ORDER — DIAPER,BRIEF,INFANT-TODD,DISP
1 EACH MISCELLANEOUS ONCE
OUTPATIENT
Start: 2025-02-26 | End: 2025-02-26

## 2025-02-26 RX ORDER — MUPIROCIN 20 MG/G
1 OINTMENT TOPICAL AS NEEDED
OUTPATIENT
Start: 2025-02-26

## 2025-02-26 RX ORDER — NYSTATIN 100000 [USP'U]/G
1 POWDER TOPICAL ONCE
OUTPATIENT
Start: 2025-02-26 | End: 2025-02-26

## 2025-02-26 RX ORDER — LIDOCAINE HYDROCHLORIDE 20 MG/ML
1 JELLY TOPICAL ONCE
OUTPATIENT
Start: 2025-02-26 | End: 2025-02-26

## 2025-02-26 RX ORDER — CASTOR OIL AND BALSAM, PERU 788; 87 MG/G; MG/G
1 OINTMENT TOPICAL AS NEEDED
OUTPATIENT
Start: 2025-02-26

## 2025-02-26 RX ORDER — LIDOCAINE HYDROCHLORIDE 20 MG/ML
1 JELLY TOPICAL ONCE
Status: COMPLETED | OUTPATIENT
Start: 2025-02-26 | End: 2025-02-26

## 2025-02-26 RX ORDER — LIDOCAINE HYDROCHLORIDE AND EPINEPHRINE BITARTRATE 20; .01 MG/ML; MG/ML
10 INJECTION, SOLUTION SUBCUTANEOUS ONCE
OUTPATIENT
Start: 2025-02-26 | End: 2025-02-26

## 2025-02-26 RX ORDER — LIDOCAINE HYDROCHLORIDE 20 MG/ML
JELLY TOPICAL AS NEEDED
OUTPATIENT
Start: 2025-02-26

## 2025-02-26 RX ORDER — LIDOCAINE HYDROCHLORIDE 20 MG/ML
10 INJECTION, SOLUTION INFILTRATION; PERINEURAL ONCE
OUTPATIENT
Start: 2025-02-26 | End: 2025-02-26

## 2025-02-26 RX ORDER — SODIUM HYPOCHLORITE 1.25 MG/ML
1 SOLUTION TOPICAL AS NEEDED
OUTPATIENT
Start: 2025-02-26

## 2025-02-26 RX ORDER — IOPAMIDOL 612 MG/ML
100 INJECTION, SOLUTION INTRAVASCULAR
Status: COMPLETED | OUTPATIENT
Start: 2025-02-26 | End: 2025-02-26

## 2025-02-26 RX ORDER — SODIUM HYPOCHLORITE 2.5 MG/ML
1 SOLUTION TOPICAL AS NEEDED
OUTPATIENT
Start: 2025-02-26

## 2025-02-26 RX ORDER — SILVER SULFADIAZINE 10 MG/G
1 CREAM TOPICAL AS NEEDED
OUTPATIENT
Start: 2025-02-26

## 2025-02-26 RX ADMIN — IOPAMIDOL 100 ML: 612 INJECTION, SOLUTION INTRAVENOUS at 10:50

## 2025-02-26 RX ADMIN — LIDOCAINE HYDROCHLORIDE 1 ML: 20 JELLY TOPICAL at 13:28

## 2025-02-27 DIAGNOSIS — C34.90 METASTATIC NON-SMALL CELL LUNG CANCER: Primary | ICD-10-CM

## 2025-02-27 RX ORDER — SODIUM CHLORIDE 9 MG/ML
20 INJECTION, SOLUTION INTRAVENOUS ONCE
OUTPATIENT
Start: 2025-03-04

## 2025-03-04 ENCOUNTER — OFFICE VISIT (OUTPATIENT)
Dept: ONCOLOGY | Facility: CLINIC | Age: OVER 89
End: 2025-03-04
Payer: MEDICARE

## 2025-03-04 ENCOUNTER — INFUSION (OUTPATIENT)
Dept: ONCOLOGY | Facility: HOSPITAL | Age: OVER 89
End: 2025-03-04
Payer: MEDICARE

## 2025-03-04 ENCOUNTER — LAB (OUTPATIENT)
Dept: ONCOLOGY | Facility: CLINIC | Age: OVER 89
End: 2025-03-04
Payer: MEDICARE

## 2025-03-04 VITALS
HEART RATE: 78 BPM | SYSTOLIC BLOOD PRESSURE: 138 MMHG | RESPIRATION RATE: 18 BRPM | OXYGEN SATURATION: 98 % | TEMPERATURE: 97.1 F | DIASTOLIC BLOOD PRESSURE: 71 MMHG | BODY MASS INDEX: 23.3 KG/M2 | WEIGHT: 171.8 LBS

## 2025-03-04 VITALS
RESPIRATION RATE: 20 BRPM | HEART RATE: 72 BPM | OXYGEN SATURATION: 99 % | SYSTOLIC BLOOD PRESSURE: 125 MMHG | TEMPERATURE: 97.6 F | DIASTOLIC BLOOD PRESSURE: 60 MMHG

## 2025-03-04 DIAGNOSIS — L29.9 PRURITUS: ICD-10-CM

## 2025-03-04 DIAGNOSIS — C34.90 METASTATIC NON-SMALL CELL LUNG CANCER: Primary | ICD-10-CM

## 2025-03-04 DIAGNOSIS — L13.9 BULLOUS DISORDER, UNSPECIFIED: Primary | ICD-10-CM

## 2025-03-04 DIAGNOSIS — C34.90 METASTATIC NON-SMALL CELL LUNG CANCER: ICD-10-CM

## 2025-03-04 LAB
ALBUMIN SERPL-MCNC: 3.5 G/DL (ref 3.5–5.2)
ALBUMIN/GLOB SERPL: 0.9 G/DL
ALP SERPL-CCNC: 88 U/L (ref 39–117)
ALT SERPL W P-5'-P-CCNC: 12 U/L (ref 1–41)
ANION GAP SERPL CALCULATED.3IONS-SCNC: 9 MMOL/L (ref 5–15)
AST SERPL-CCNC: 21 U/L (ref 1–40)
BASOPHILS # BLD AUTO: 0.08 10*3/MM3 (ref 0–0.2)
BASOPHILS NFR BLD AUTO: 1.1 % (ref 0–1.5)
BILIRUB SERPL-MCNC: 0.3 MG/DL (ref 0–1.2)
BUN SERPL-MCNC: 25 MG/DL (ref 8–23)
BUN/CREAT SERPL: 24.5 (ref 7–25)
CALCIUM SPEC-SCNC: 9 MG/DL (ref 8.2–9.6)
CHLORIDE SERPL-SCNC: 102 MMOL/L (ref 98–107)
CO2 SERPL-SCNC: 27 MMOL/L (ref 22–29)
CREAT SERPL-MCNC: 1.02 MG/DL (ref 0.76–1.27)
DEPRECATED RDW RBC AUTO: 47.4 FL (ref 37–54)
EGFRCR SERPLBLD CKD-EPI 2021: 69.8 ML/MIN/1.73
EOSINOPHIL # BLD AUTO: 0.87 10*3/MM3 (ref 0–0.4)
EOSINOPHIL NFR BLD AUTO: 11.8 % (ref 0.3–6.2)
ERYTHROCYTE [DISTWIDTH] IN BLOOD BY AUTOMATED COUNT: 14 % (ref 12.3–15.4)
GLOBULIN UR ELPH-MCNC: 3.9 GM/DL
GLUCOSE SERPL-MCNC: 141 MG/DL (ref 65–99)
HCT VFR BLD AUTO: 33.4 % (ref 37.5–51)
HGB BLD-MCNC: 10.5 G/DL (ref 13–17.7)
IMM GRANULOCYTES # BLD AUTO: 0.03 10*3/MM3 (ref 0–0.05)
IMM GRANULOCYTES NFR BLD AUTO: 0.4 % (ref 0–0.5)
LYMPHOCYTES # BLD AUTO: 1.06 10*3/MM3 (ref 0.7–3.1)
LYMPHOCYTES NFR BLD AUTO: 14.3 % (ref 19.6–45.3)
MCH RBC QN AUTO: 28.8 PG (ref 26.6–33)
MCHC RBC AUTO-ENTMCNC: 31.4 G/DL (ref 31.5–35.7)
MCV RBC AUTO: 91.8 FL (ref 79–97)
MONOCYTES # BLD AUTO: 1.33 10*3/MM3 (ref 0.1–0.9)
MONOCYTES NFR BLD AUTO: 18 % (ref 5–12)
NEUTROPHILS NFR BLD AUTO: 4.02 10*3/MM3 (ref 1.7–7)
NEUTROPHILS NFR BLD AUTO: 54.4 % (ref 42.7–76)
NRBC BLD AUTO-RTO: 0 /100 WBC (ref 0–0.2)
PLATELET # BLD AUTO: 241 10*3/MM3 (ref 140–450)
PMV BLD AUTO: 10.3 FL (ref 6–12)
POTASSIUM SERPL-SCNC: 4.8 MMOL/L (ref 3.5–5.2)
PROT SERPL-MCNC: 7.4 G/DL (ref 6–8.5)
RBC # BLD AUTO: 3.64 10*6/MM3 (ref 4.14–5.8)
SODIUM SERPL-SCNC: 138 MMOL/L (ref 136–145)
T4 FREE SERPL-MCNC: 1.47 NG/DL (ref 0.92–1.68)
TSH SERPL DL<=0.05 MIU/L-ACNC: 1.51 UIU/ML (ref 0.27–4.2)
WBC NRBC COR # BLD AUTO: 7.39 10*3/MM3 (ref 3.4–10.8)

## 2025-03-04 PROCEDURE — 84443 ASSAY THYROID STIM HORMONE: CPT | Performed by: INTERNAL MEDICINE

## 2025-03-04 PROCEDURE — 85025 COMPLETE CBC W/AUTO DIFF WBC: CPT | Performed by: INTERNAL MEDICINE

## 2025-03-04 PROCEDURE — 84439 ASSAY OF FREE THYROXINE: CPT | Performed by: INTERNAL MEDICINE

## 2025-03-04 PROCEDURE — 96413 CHEMO IV INFUSION 1 HR: CPT

## 2025-03-04 PROCEDURE — 80053 COMPREHEN METABOLIC PANEL: CPT | Performed by: INTERNAL MEDICINE

## 2025-03-04 PROCEDURE — 25010000002 PEMBROLIZUMAB 100 MG/4ML SOLUTION 4 ML VIAL: Performed by: INTERNAL MEDICINE

## 2025-03-04 RX ORDER — SODIUM CHLORIDE 9 MG/ML
20 INJECTION, SOLUTION INTRAVENOUS ONCE
Status: DISCONTINUED | OUTPATIENT
Start: 2025-03-04 | End: 2025-03-04

## 2025-03-04 RX ADMIN — SODIUM CHLORIDE 200 MG: 9 INJECTION, SOLUTION INTRAVENOUS at 14:22

## 2025-03-04 NOTE — PROGRESS NOTES
Venipuncture Blood Specimen Collection  Venipuncture performed in left arm by Apoorva Goode MA with good hemostasis. Patient tolerated the procedure well without complications.   03/04/25   Apoorva Goode MA

## 2025-03-04 NOTE — PROGRESS NOTES
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 Bone windows reveal an  L2 compression fracture which is unchanged compared to the prior exam.  No new osseous abnormalities are identified. The extraperitoneal soft  tissues are unremarkable.     IMPRESSION:  1. Hepatic metastases not significantly changed compared to the prior  exam.  2. 3.3 cm abdominal aortic aneurysm.  3. Stable L2 compression fracture.    NM PET/CT Skull Base to Mid Thigh (05/30/2024 10:53)   FINDINGS:      HEAD:    BRAIN AND EXTRA-AXIAL SPACES:  Unremarkable as visualized.    NASOPHARYNX:  Unremarkable as visualized.      NECK:    HYPOPHARYNX:  Unremarkable as visualized.    LARYNX:  Unremarkable as visualized.    TRACHEA:  Unremarkable as visualized.    RETROPHARYNGEAL SPACE:  Unremarkable as visualized.    SUBMANDIBULAR/PAROTID GLANDS:  Unremarkable as visualized.  Glands are  normal in size.    THYROID:  Unremarkable as visualized.  No enlarged or calcified  nodules.      CHEST:    LUNGS AND PLEURAL SPACES:  Consolidative more central and elongated  masslike consolidation near the superior right hilum measures about 7 cm  and again shows PET hypermetabolism mSUV 21. The mass extends into the  right hilum.  A 8 mm right upper lobe spiculated pulmonary nodule shows  no PET hypermetabolism at this time.    HEART:  Unremarkable as  visualized.  No cardiomegaly.  No significant  pericardial effusion.    MEDIASTINUM:  Unremarkable as visualized.  No mass.      ABDOMEN:    LIVER:  PET hypermetabolic liver masses are identified with the  largest in the right lobe measuring about 8.3 cm and with PET  hypermetabolism mSUV 14.2. A smaller liver masses noted in the left lobe  of the liver.    GALLBLADDER AND BILE DUCTS:  Unremarkable as visualized.  No calcified  stones.  No ductal dilation.    PANCREAS:  Unremarkable as visualized.  No ductal dilation.    SPLEEN:  Unremarkable as visualized.  No splenomegaly.    ADRENALS:  Unremarkable as visualized.  No mass.    KIDNEYS AND URETERS:  Unremarkable as visualized.    STOMACH AND BOWEL:  Unremarkable as visualized.  No obstruction.      PELVIS:    APPENDIX:  No findings to suggest acute appendicitis.    BLADDER:  Unremarkable as visualized.    REPRODUCTIVE:  Unremarkable as visualized.      WHOLE BODY:    INTRAPERITONEAL SPACE:  Unremarkable as visualized.  No significant  fluid collection.  No free air.    BONES/JOINTS:  Again there is a peripheral consolidated mass that  appears to invade the right upper ribs of the right upper lobe measuring  about 8.6 cm and with PET hypermetabolism mSUV 15.1.  Compression  fracture of L2 vertebral body is again noted.  No dislocation.    SOFT TISSUES:  Unremarkable as visualized.    VASCULATURE:  Unremarkable as visualized.  No aortic aneurysm.    LYMPH NODES:  Unremarkable as visualized.  No lymphadenopathy.  No  hypermetabolic activity.     IMPRESSION:  1.  Again there is a peripheral consolidated mass that appears to invade  the right upper ribs of the right upper lobe measuring about 8.6 cm and  with PET hypermetabolism mSUV 15.1.  2.  Consolidative more central and elongated masslike consolidation near  the superior right hilum measures about 7 cm and again shows PET  hypermetabolism mSUV 21. The mass extends into the right hilum.  3.  PET hypermetabolic liver  masses are identified with the largest in  the right lobe measuring about 8.3 cm and with PET hypermetabolism mSUV  14.2. A smaller liver masses noted in the left lobe of the liver.  4.  Compression fracture of L2 vertebral body is again noted.  5.  A 8 mm right upper lobe spiculated pulmonary nodule shows no PET  hypermetabolism at this time.    CT Chest With Contrast Diagnostic (05/15/2024 14:24)   FINDINGS:    LUNGS AND PLEURAL SPACES:  Right upper lobe peripheral pleural-based  consolidative masslike opacity measuring about 2.9 x 7.8 cm with a more  central hilar mass on the right side measuring 5.1 x 4.4 cm and a  smaller right upper lobe nodule component measuring 2.6 cm. The  peripheral mass does appear to cause fourth rib erosion or destruction.   Emphysematous lungs noted.  Right upper lobe 1.2 cm ill-defined nodule  that could represent scarring.  No pneumothorax.  No significant  effusion.    HEART:  Unremarkable as visualized.  No cardiomegaly.  No significant  pericardial effusion.  No significant coronary artery calcifications.    BONES/JOINTS:  Compression fracture noted of probable L2 vertebral  body.  No dislocation.    SOFT TISSUES:  Unremarkable as visualized.    VASCULATURE:  Partially visualized infrarenal abdominal aortic  aneurysm measuring 3.4 cm.    LYMPH NODES:  Unremarkable as visualized.  No enlarged lymph nodes.    LIVER:  Right lobe of liver mass concerning for metastatic disease  measuring about 7 mm.    OTHER FINDINGS:  .     IMPRESSION:  1.  Right upper lobe peripheral pleural-based consolidative masslike  opacity measuring about 2.9 x 7.8 cm with a more central hilar mass on  the right side measuring 5.1 x 4.4 cm and a smaller right upper lobe  nodule component measuring 2.6 cm. The peripheral mass does appear to  cause fourth rib erosion or destruction.  2.  Right lobe of liver mass concerning for metastatic disease measuring  about 7 mm.  3.  Partially visualized infrarenal  abdominal aortic aneurysm measuring  3.4 cm.  4.  Compression fracture noted of probable L2 vertebral body.    XR Chest 2 View (04/23/2024 16:34)   FINDINGS:  LUNGS: Pleural-based consolidation periphery of the right lung. Mild  fullness in the right suprahilar region  HEART AND MEDIASTINUM: Heart and mediastinal contours are unremarkable  SKELETON: Bony and soft tissue structures are unremarkable.     IMPRESSION:  Pleural-based consolidation periphery of the right lung and fullness in  the right suprahilar region. Recommend CT        Pathology:   06/21/2024        NGS:      PATHOLOGY - SCAN - FINAL RESULTS, GUARDANT, 07.28.2024 (07/28/2024)           Assessment / Plan         Assessment and Plan   Kevin Fonseca is a 89 y.o. year old with history of     [place]ovo metastatic non small cell carcinoma, squamous cell. PD-L1 TPS 5%. ERBB2 amplification. MSI-low. Negative EGFR, ALK, ROS1, BRAF, MET, RET, NTRK, KRAS  -Patient with ~1 year of decline in function, weight loss, fatigue, and intermittent hemoptysis. CT May 15 2024 with with concerning right upper lobe peripheral consolidation 2.9 x 7.8 cm with central hilar mass 5 x 4.4 cm, small right upper lobe nodule 2.6 cm, another right upper lobe 1.2 cm ill defined mass could represent scarring. Also noted liver mass concerning for metastatic disease. Follow up PET/CT 6/14/2024 with hepatic metastasis largest measuring 8.1 cm, and noted L2 compression fracture. 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  -Patient is aware treatment is palliative. After a thorough discussion, patient and family decided to proceed with single agent pembrolizumab q21d given his performance status and co-morbidities. I feel this is reasonable given his functional status.   -C3 9/3-tolerated well.  Course complicated due to accident with abrasions and noted to have bradycardia with 2-1 AV block.   -C4 9/24- tolerated well  -C5 10/15- tolerated well  -C6 11/5- proceed today  -C7 11/26- completed  -C9 1/21/25- tolerated well  -C11 3/4/25- proceed today  -Next CT chest/abdomen/pelvis 5/2025     2. Malignancy associated pain  -Currently denies     3. Nutrition  -Recommend patient take in 2-3 boost/day    4. Hyponatremia   - Improved     5. Erythema and pruritus   -Started after C1 of IO  -Recommend H1 blockers: loratidine 10 mg AM and benadryl 25 mg PM  -Increase topical steroid to clobestasol 0.05 BID    6. Hypoxia   - Portable oxygen provided; patient has been oxygen dependent since recent admission for bradycardia and heart block    7. Bradycardia and AV heart block  - Evaluated by EP at [facility], he is discharged home with a cardiac monitor   - recommend pacemaker implant, however cardiology did not think patient was a good candidate for procedure    8. Loose stool  - Recommend holding miralax and benefiber for now   - If continues to have loose stool, would recommend imodium PRN    9. RLE swelling  -Improvement of abrasions after fall, has surrounding erythema and swelling  -CT RLE with probable hematoma. Improvement in swelling    10. RLE wound   - R calf wound after injury, with intermittent drainage. Currently with small superficial bullae   -Refer to wound care and dermatology     Discussed possible differential diagnoses, testing, treatment, recommended non-pharmacological interventions, risks, warning signs to monitor for that would indicate need for follow-up in clinic or ER. If no improvement with these regimens or you have new or worsening symptoms follow-up. Patient verbalizes understanding and agreement with plan of care. Denies further needs or concerns.     Patient was given instructions and counseling regarding her condition and for health maintenance advised.       All questions were answered  to his satisfaction.    BMI is within normal parameters. No other follow-up for BMI required.         ACO / MARY/Other  Quality measures  -  Kevin Fonseca did not receive 2024 flu vaccine.  This was recommended.  -  Kevin Fonseca reports a pain score of 0.  Given his pain assessment as noted, treatment options were discussed and the following options were decided upon as a follow-up plan to address the patient's pain: continuation of current treatment plan for pain.  -  Current outpatient and discharge medications have been reconciled for the patient.  Reviewed by: Jennifer Hinds MD        Meds ordered during this visit  No orders of the defined types were placed in this encounter.      Visit Diagnoses    ICD-10-CM ICD-9-CM   1. Bullous disorder, unspecified  L13.9 694.9   2. Pruritus  L29.9 698.9                         Follow Up   In 6 weeks with treatment          This document has been electronically signed by Jennifer Hinds MD   March 4, 2025 13:53 EST    Dictated Utilizing Dragon Dictation: Part of this note may be an electronic transcription/translation of spoken language to printed text using the Dragon Dictation System.

## 2025-03-07 NOTE — PROGRESS NOTES
Wound Clinic Note  Patient Identification:  Name:  Kevin Fonseca  Age:  89 y.o.  Sex:  male  :  1934  MRN:  [id number]   Visit Number:  [id number]  Primary Care Physician:  Elissa Geronimo MD     Subjective     Chief complaint:     Right lower leg wounds    History of presenting illness:     Patient is a 89 y.o. male with past medical history significant for  that presented today for evaluation of right lower leg X 2 ulcers. Reports he had a fall 6-8 months ago. Copious amount of serous drainage without odor. Denies recent antibiotics. Denies history of vascular disease or recent vascular workup. Wrapping with kerlix. Denies wearing compression wraps. Denies any fever or chills.     Interval History:   2025: Seen in clinic today for follow-up to right lower leg ulcer. Right calf continues with yellow slough. Right medial leg with superficial opening. Denies any fever or chills. SAULO scheduled for 2025.     2025: Seen in clinic today for follow-up to right lower leg ulcer. Right calf continues with yellow slough. Right medial leg with superficial opening. Denies any fever or chills. Tolerating current treatment without complications.   ---------------------------------------------------------------------------------------------------------------------   Review of Systems   Respiratory:  Negative for cough and shortness of breath.    Cardiovascular:  Positive for leg swelling. Negative for chest pain.   Gastrointestinal:  Negative for nausea and vomiting.   Musculoskeletal:  Positive for back pain and gait problem.   Skin:  Positive for wound.      ---------------------------------------------------------------------------------------------------------------------   Past Medical History:   Diagnosis Date    Arthritis     Asthma     Chronic bronchitis     Complete heart block     Emphysema lung     Gastritis     Heartburn     Macular degeneration     Macular degeneration, wet      "Metastatic non-small cell lung cancer     Reflux esophagitis     Thyroid disease      Past Surgical History:   Procedure Laterality Date    INTRACAPSULAR CATARACT EXTRACTION      Dr. Lesly Gray. Dr. Neto Light.    LIVER BIOPSY       Family History   Problem Relation Age of Onset    Hypertension Mother     Other Mother     Cancer Father     Cancer Brother     Asthma Brother      Social History     Socioeconomic History    Marital status:    Tobacco Use    Smoking status: Former     Current packs/day: 0.00     Average packs/day: 1.5 packs/day for 44.0 years (66.0 ttl pk-yrs)     Types: Cigarettes     Start date:      Quit date:      Years since quittin.1     Passive exposure: Past    Smokeless tobacco: Never   Vaping Use    Vaping status: Never Used   Substance and Sexual Activity    Alcohol use: Not Currently     Comment: 2 week    Drug use: Never    Sexual activity: Defer     ---------------------------------------------------------------------------------------------------------------------   Allergies:  Patient has no known allergies.  ---------------------------------------------------------------------------------------------------------------------  Objective     ---------------------------------------------------------------------------------------------------------------------   Vital Signs:  /60   Pulse 80   Temp 99.1 °F (37.3 °C) (Infrared)   Resp 20   Ht 182.9 cm (72\")   Wt 76.7 kg (169 lb)   SpO2 97%   BMI 22.92 kg/m²   Estimated body mass index is 22.92 kg/m² as calculated from the following:    Height as of this encounter: 182.9 cm (72\").    Weight as of this encounter: 76.7 kg (169 lb).  Body mass index is 22.92 kg/m².  Wt Readings from Last 3 Encounters:   25 77.9 kg (171 lb 12.8 oz)   25 76.7 kg (169 lb)   25 76.7 kg (169 lb)       " ---------------------------------------------------------------------------------------------------------------------   Physical Exam  Wound Assessment:  Location: Right, lower, leg  Wound Measurements: 0.5 X 0.2 X 0.2cm   Tunneling: No Undermining: No  Dressing Appearance: dressingappearance: clean  Closure: NA  Changes since last exam: no changes  Etiology and classification: venous ulcers, non-pressure chronic ulcer   Wound bed structures/characteristics: full-thickness (subcutaneous tissue is exposed in at least a portion of the wound), moist, pink, slough  Edges moist  Periwound characteristics: edematous  Periwound Temperature: normal turgor and temperature  Drainage characteristics: moderate, serous   Perfusion characteristics: suggest some degree of PAD    Wound Assessment:  Location: Right medial lower, leg  Wound Measurements: 1.5 X 8 X 0.1cm   Tunneling: No Undermining: No  Dressing Appearance: dressingappearance: clean  Closure: NA  Changes since last exam: without significant changes   Etiology and classification: venous ulcers, non-pressure chronic ulcer   Wound bed structures/characteristics: full-thickness (subcutaneous tissue is exposed in at least a portion of the wound), moist, pink, slough  Edges moist  Periwound characteristics: edematous  Periwound Temperature: normal turgor and temperature  Drainage characteristics: moderate, serous   Perfusion characteristics: suggest some degree of PAD    Wound Goal (s):Free of infection and No further symptoms  Assessment & Plan      Patient Active Problem List    Diagnosis     Non-pressure chronic ulcer of other part of right lower leg with fat layer exposed [L97.812]  -clean with wound cleanser, apply honeygel to base, cover with ABD pad for drainage control and secure with kerlix and Short stretch  -Measure for farrow wraps   -SAULO- to assess for underlying vascular disease that can negatively impact wound healing, scheduled for 02/17/2025  -Discussed  option for referral to lymphedema, not interested at this time  -Recommend ludivina protein diet 0.75g/kg/day along with vitamin C 2000mg/day, vitamin A 5000 Units/day, vitamin D3 5000 Units/day, zinc 50mg/day to help promote wound healing      Non-pressure chronic ulcer of right medial lower leg with fat layer exposed [L97.812]  -clean with wound cleanser, apply honeygel to base, cover with ABD pad for drainage control and secure with kerlix and Short stretch  -Measure for farrow wraps   -SAULO- to assess for underlying vascular disease that can negatively impact wound healing   -Discussed option for referral to lymphedema, not interested at this time  -Recommend ludivina protein diet 0.75g/kg/day along with vitamin C 2000mg/day, vitamin A 5000 Units/day, vitamin D3 5000 Units/day, zinc 50mg/day to help promote wound healing      Lymphedema  -Will measure for farrow wraps to assist with edema control which is likely contributing to ulcerations  -Recommend referral to lymphedema therapy      Clinical Impression:Moderate Complexity    Follow-up: 1 week    DENICE Zurita,CWS  [facility]  02/26/2025  [id number]

## 2025-03-11 ENCOUNTER — TELEPHONE (OUTPATIENT)
Dept: ONCOLOGY | Facility: CLINIC | Age: OVER 89
End: 2025-03-11

## 2025-03-11 NOTE — TELEPHONE ENCOUNTER
Caller: DILAN JOVEL    Relationship: Emergency Contact    Best call back number: [phone]    What is the best time to reach you: ANY    Who are you requesting to speak with (clinical staff, provider,  specific staff member): CLINICAL       What was the call regarding: DILAN IS CALLING BACK STATES THAT [facility]A HAS A DERMATOLOGY APPOINTMENT ON 4-18 IN [place]      PLEASE ADVISE

## 2025-03-12 ENCOUNTER — HOSPITAL ENCOUNTER (OUTPATIENT)
Dept: WOUND CARE | Facility: HOSPITAL | Age: OVER 89
Discharge: HOME | End: 2025-03-12
Payer: MEDICARE

## 2025-03-12 VITALS
WEIGHT: 171 LBS | HEIGHT: 72 IN | DIASTOLIC BLOOD PRESSURE: 58 MMHG | TEMPERATURE: 98.7 F | HEART RATE: 66 BPM | BODY MASS INDEX: 23.16 KG/M2 | SYSTOLIC BLOOD PRESSURE: 121 MMHG

## 2025-03-12 DIAGNOSIS — T14.8XXA OPEN WOUND: Primary | ICD-10-CM

## 2025-03-12 RX ORDER — SODIUM HYPOCHLORITE 2.5 MG/ML
1 SOLUTION TOPICAL AS NEEDED
OUTPATIENT
Start: 2025-03-12

## 2025-03-12 RX ORDER — DIAPER,BRIEF,INFANT-TODD,DISP
1 EACH MISCELLANEOUS ONCE
OUTPATIENT
Start: 2025-03-12 | End: 2025-03-12

## 2025-03-12 RX ORDER — LIDOCAINE HYDROCHLORIDE AND EPINEPHRINE BITARTRATE 20; .01 MG/ML; MG/ML
10 INJECTION, SOLUTION SUBCUTANEOUS ONCE
OUTPATIENT
Start: 2025-03-12 | End: 2025-03-12

## 2025-03-12 RX ORDER — LIDOCAINE HYDROCHLORIDE 20 MG/ML
1 JELLY TOPICAL ONCE
OUTPATIENT
Start: 2025-03-12 | End: 2025-03-12

## 2025-03-12 RX ORDER — SODIUM HYPOCHLORITE 1.25 MG/ML
1 SOLUTION TOPICAL AS NEEDED
OUTPATIENT
Start: 2025-03-12

## 2025-03-12 RX ORDER — NYSTATIN 100000 [USP'U]/G
1 POWDER TOPICAL ONCE
OUTPATIENT
Start: 2025-03-12 | End: 2025-03-12

## 2025-03-12 RX ORDER — CASTOR OIL AND BALSAM, PERU 788; 87 MG/G; MG/G
1 OINTMENT TOPICAL AS NEEDED
OUTPATIENT
Start: 2025-03-12

## 2025-03-12 RX ORDER — LIDOCAINE HYDROCHLORIDE 20 MG/ML
1 JELLY TOPICAL ONCE
Status: DISCONTINUED | OUTPATIENT
Start: 2025-03-12 | End: 2025-03-13 | Stop reason: HOSPADM

## 2025-03-12 RX ORDER — MUPIROCIN 20 MG/G
1 OINTMENT TOPICAL AS NEEDED
OUTPATIENT
Start: 2025-03-12

## 2025-03-12 RX ORDER — LIDOCAINE HYDROCHLORIDE 20 MG/ML
10 INJECTION, SOLUTION INFILTRATION; PERINEURAL ONCE
OUTPATIENT
Start: 2025-03-12 | End: 2025-03-12

## 2025-03-12 RX ORDER — LIDOCAINE HYDROCHLORIDE 20 MG/ML
JELLY TOPICAL AS NEEDED
OUTPATIENT
Start: 2025-03-12

## 2025-03-12 RX ORDER — SILVER SULFADIAZINE 10 MG/G
1 CREAM TOPICAL AS NEEDED
OUTPATIENT
Start: 2025-03-12

## 2025-03-13 RX ORDER — LEVOTHYROXINE SODIUM 88 UG/1
88 TABLET ORAL DAILY
Qty: 90 TABLET | Refills: 0 | Status: SHIPPED | OUTPATIENT
Start: 2025-03-13

## 2025-03-13 NOTE — ADDENDUM NOTE
Encounter addended by: Paloma Burroughs APRN on: 3/13/2025 5:36 AM   Actions taken: Clinical Note Signed

## 2025-03-13 NOTE — PROGRESS NOTES
Wound Clinic Note  Patient Identification:  Name:  Kevin Fonseca  Age:  89 y.o.  Sex:  male  :  1934  MRN:  [id number]   Visit Number:  [id number]  Primary Care Physician:  Elissa Geronimo MD     Subjective     Chief complaint:     Right lower leg wounds    History of presenting illness:     Patient is a 89 y.o. male with past medical history significant for  that presented today for evaluation of right lower leg X 2 ulcers. Reports he had a fall 6-8 months ago. Copious amount of serous drainage without odor. Denies recent antibiotics. Denies history of vascular disease or recent vascular workup. Wrapping with kerlix. Denies wearing compression wraps. Denies any fever or chills.     Interval History:   2025: Seen in clinic today for follow-up to right lower leg ulcer. Right calf continues with yellow slough. Right medial leg with superficial opening. Denies any fever or chills. SAULO scheduled for 2025.     2025: Seen in clinic today for follow-up to right lower leg ulcer. Right calf continues with yellow slough. Right medial leg with superficial opening. Denies any fever or chills. Tolerating current treatment without complications.     2025: Seen in clinic today for follow-up to right lower leg ulcers. Only one remains ulcer present. This covered with moist slough. Edges rolled. Edema present. Denies any fever or chills.   ---------------------------------------------------------------------------------------------------------------------   Review of Systems   Respiratory:  Negative for cough and shortness of breath.    Cardiovascular:  Positive for leg swelling. Negative for chest pain.   Gastrointestinal:  Negative for nausea and vomiting.   Musculoskeletal:  Positive for back pain and gait problem.   Skin:  Positive for wound.      ---------------------------------------------------------------------------------------------------------------------   Past Medical History:  "  Diagnosis Date    Arthritis     Asthma     Chronic bronchitis     Complete heart block     Emphysema lung     Gastritis     Heartburn     Macular degeneration     Macular degeneration, wet     Metastatic non-small cell lung cancer     Reflux esophagitis     Thyroid disease      Past Surgical History:   Procedure Laterality Date    INTRACAPSULAR CATARACT EXTRACTION      Dr. Lesly Gray. Dr. Neto Light.    LIVER BIOPSY       Family History   Problem Relation Age of Onset    Hypertension Mother     Other Mother     Cancer Father     Cancer Brother     Asthma Brother      Social History     Socioeconomic History    Marital status:    Tobacco Use    Smoking status: Former     Current packs/day: 0.00     Average packs/day: 1.5 packs/day for 44.0 years (66.0 ttl pk-yrs)     Types: Cigarettes     Start date:      Quit date:      Years since quittin.2     Passive exposure: Past    Smokeless tobacco: Never   Vaping Use    Vaping status: Never Used   Substance and Sexual Activity    Alcohol use: Not Currently     Comment: 2 week    Drug use: Never    Sexual activity: Defer     ---------------------------------------------------------------------------------------------------------------------   Allergies:  Patient has no known allergies.  ---------------------------------------------------------------------------------------------------------------------  Objective     ---------------------------------------------------------------------------------------------------------------------   Vital Signs:  /58   Pulse 66   Temp 98.7 °F (37.1 °C) (Infrared)   Ht 182.9 cm (72\")   Wt 77.6 kg (171 lb)   BMI 23.19 kg/m²   Estimated body mass index is 23.19 kg/m² as calculated from the following:    Height as of this encounter: 182.9 cm (72\").    Weight as of this encounter: 77.6 kg (171 lb).  Body mass index is 23.19 kg/m².  Wt Readings from Last 3 Encounters:   25 77.6 kg (171 lb) "   03/04/25 77.9 kg (171 lb 12.8 oz)   02/26/25 76.7 kg (169 lb)       ---------------------------------------------------------------------------------------------------------------------   Physical Exam  Wound Assessment:  Location: Right, lower, leg  Wound Measurements: 0.7 X 0.3 X 0.1cm   Tunneling: No Undermining: No  Dressing Appearance: dressingappearance: clean  Closure: NA  Changes since last exam: no changes  Etiology and classification: venous ulcers, non-pressure chronic ulcer   Wound bed structures/characteristics: full-thickness (subcutaneous tissue is exposed in at least a portion of the wound), moist, pink, slough  Edges moist  Periwound characteristics: edematous  Periwound Temperature: normal turgor and temperature  Drainage characteristics: moderate, serous   Perfusion characteristics: suggest some degree of PAD    Wound Assessment:  Location: Right medial lower, leg- healed    Wound Goal (s):Free of infection and No further symptoms  Assessment & Plan      Patient Active Problem List    Diagnosis     Non-pressure chronic ulcer of other part of right lower leg with fat layer exposed [L97.812]  -clean with wound cleanser, apply honeygel to base, cover with ABD pad for drainage control and secure with kerlix and Short stretch  -Measure for farrow wraps   -SAULO- to assess for underlying vascular disease that can negatively impact wound healing, scheduled for 02/17/2025  -Discussed option for referral to lymphedema, not interested at this time  -Recommend ludivina protein diet 0.75g/kg/day along with vitamin C 2000mg/day, vitamin A 5000 Units/day, vitamin D3 5000 Units/day, zinc 50mg/day to help promote wound healing      Non-pressure chronic ulcer of right medial lower leg with fat layer exposed [L97.812]  -Healed    Lymphedema  -Will measure for farrow wraps to assist with edema control which is likely contributing to ulcerations  -Recommend referral to lymphedema therapy    Wound Care Debridement Note    Pre-Procedure  Pre-Procedure Diagnosis: Non-pressure chronic ulcer of other part of right lower leg with fat layer exposed [L97.812]  Checked for Allergies: yes  Consent:Consent obtained, consent given by Patient,Risks Discussed, Alternatives Discussed  Indication: slough  Vascular status:Pedal pulses right were found to be adequate and safe for debridment.  Time out was called prior to procedure.   Pre procedure Pain assessment: a 2 on a scale of 0-10  Pre debridement measurements: 0.7 X 0.3 X 0.1cm, volume: 0.011, surface: 0.16  sinus/tunnelNo, undermining No    Post Procedure  Post-Procedure Diagnosis: Non-pressure chronic ulcer of other part of right lower leg with fat layer exposed [L97.812]  Post debridement measurements: 0.8 X 0.4 X 0.2cm, volume 0.034, surface 0.8  sinus/tunnelNo, undermining No  Post procedure Pain assessment: a 1 on a scale of 0-10  Graft/Implant/Prosthetics/Implanted Device/Transplants:  None  Complication(s):  None    Procedure details:  Method of Debridement: excissional (Surgical removal or cutting away, outside or beyond the wound margin devitalized tissue, necrosis or slough.)  Procedure: The site was prepared using clean techniques, subcutaneous tissue was removed by surgical excision.  Instrument(s) used: Curette 4mm  Anesthesia:After checking patient allergies,lidocaine topical 2% was administered to provide anesthesia.  Tissue removed: subuctaneous, Percent Removed 100%  Culture or Biopsy: None  Estimated Blood Loss: Small  Hemostasis Obtained: pressure    Clinical Impression:Moderate Complexity    Follow-up: 1 week    DENICE Zurita,CWS  [facility]  03/12/2025  1400

## 2025-03-20 DIAGNOSIS — C34.90 METASTATIC NON-SMALL CELL LUNG CANCER: Primary | ICD-10-CM

## 2025-03-20 RX ORDER — SODIUM CHLORIDE 9 MG/ML
20 INJECTION, SOLUTION INTRAVENOUS ONCE
OUTPATIENT
Start: 2025-03-25

## 2025-03-25 ENCOUNTER — LAB (OUTPATIENT)
Dept: ONCOLOGY | Facility: HOSPITAL | Age: OVER 89
End: 2025-03-25
Payer: MEDICARE

## 2025-03-25 ENCOUNTER — INFUSION (OUTPATIENT)
Dept: ONCOLOGY | Facility: HOSPITAL | Age: OVER 89
End: 2025-03-25
Payer: MEDICARE

## 2025-03-25 VITALS
SYSTOLIC BLOOD PRESSURE: 108 MMHG | OXYGEN SATURATION: 97 % | DIASTOLIC BLOOD PRESSURE: 47 MMHG | TEMPERATURE: 97.6 F | WEIGHT: 174.8 LBS | HEART RATE: 70 BPM | RESPIRATION RATE: 20 BRPM | BODY MASS INDEX: 23.71 KG/M2

## 2025-03-25 DIAGNOSIS — C34.90 METASTATIC NON-SMALL CELL LUNG CANCER: ICD-10-CM

## 2025-03-25 DIAGNOSIS — C34.90 METASTATIC NON-SMALL CELL LUNG CANCER: Primary | ICD-10-CM

## 2025-03-25 LAB
ALBUMIN SERPL-MCNC: 3.7 G/DL (ref 3.5–5.2)
ALBUMIN/GLOB SERPL: 1.1 G/DL
ALP SERPL-CCNC: 82 U/L (ref 39–117)
ALT SERPL W P-5'-P-CCNC: 9 U/L (ref 1–41)
ANION GAP SERPL CALCULATED.3IONS-SCNC: 8.3 MMOL/L (ref 5–15)
AST SERPL-CCNC: 16 U/L (ref 1–40)
BASOPHILS # BLD AUTO: 0.07 10*3/MM3 (ref 0–0.2)
BASOPHILS NFR BLD AUTO: 1 % (ref 0–1.5)
BILIRUB SERPL-MCNC: 0.3 MG/DL (ref 0–1.2)
BUN SERPL-MCNC: 25 MG/DL (ref 8–23)
BUN/CREAT SERPL: 24.8 (ref 7–25)
CALCIUM SPEC-SCNC: 8.5 MG/DL (ref 8.2–9.6)
CHLORIDE SERPL-SCNC: 103 MMOL/L (ref 98–107)
CO2 SERPL-SCNC: 28.7 MMOL/L (ref 22–29)
CREAT SERPL-MCNC: 1.01 MG/DL (ref 0.76–1.27)
DEPRECATED RDW RBC AUTO: 45.6 FL (ref 37–54)
EGFRCR SERPLBLD CKD-EPI 2021: 70.6 ML/MIN/1.73
EOSINOPHIL # BLD AUTO: 0.6 10*3/MM3 (ref 0–0.4)
EOSINOPHIL NFR BLD AUTO: 8.5 % (ref 0.3–6.2)
ERYTHROCYTE [DISTWIDTH] IN BLOOD BY AUTOMATED COUNT: 13.4 % (ref 12.3–15.4)
GLOBULIN UR ELPH-MCNC: 3.5 GM/DL
GLUCOSE SERPL-MCNC: 116 MG/DL (ref 65–99)
HCT VFR BLD AUTO: 33.5 % (ref 37.5–51)
HGB BLD-MCNC: 10.3 G/DL (ref 13–17.7)
IMM GRANULOCYTES # BLD AUTO: 0.02 10*3/MM3 (ref 0–0.05)
IMM GRANULOCYTES NFR BLD AUTO: 0.3 % (ref 0–0.5)
LYMPHOCYTES # BLD AUTO: 1.01 10*3/MM3 (ref 0.7–3.1)
LYMPHOCYTES NFR BLD AUTO: 14.2 % (ref 19.6–45.3)
MCH RBC QN AUTO: 28.5 PG (ref 26.6–33)
MCHC RBC AUTO-ENTMCNC: 30.7 G/DL (ref 31.5–35.7)
MCV RBC AUTO: 92.5 FL (ref 79–97)
MONOCYTES # BLD AUTO: 1.15 10*3/MM3 (ref 0.1–0.9)
MONOCYTES NFR BLD AUTO: 16.2 % (ref 5–12)
NEUTROPHILS NFR BLD AUTO: 4.24 10*3/MM3 (ref 1.7–7)
NEUTROPHILS NFR BLD AUTO: 59.8 % (ref 42.7–76)
NRBC BLD AUTO-RTO: 0 /100 WBC (ref 0–0.2)
PLATELET # BLD AUTO: 248 10*3/MM3 (ref 140–450)
PMV BLD AUTO: 10.3 FL (ref 6–12)
POTASSIUM SERPL-SCNC: 4.7 MMOL/L (ref 3.5–5.2)
PROT SERPL-MCNC: 7.2 G/DL (ref 6–8.5)
RBC # BLD AUTO: 3.62 10*6/MM3 (ref 4.14–5.8)
SODIUM SERPL-SCNC: 140 MMOL/L (ref 136–145)
WBC NRBC COR # BLD AUTO: 7.09 10*3/MM3 (ref 3.4–10.8)

## 2025-03-25 PROCEDURE — 25010000002 PEMBROLIZUMAB 100 MG/4ML SOLUTION 4 ML VIAL: Performed by: INTERNAL MEDICINE

## 2025-03-25 PROCEDURE — 85025 COMPLETE CBC W/AUTO DIFF WBC: CPT

## 2025-03-25 PROCEDURE — 80053 COMPREHEN METABOLIC PANEL: CPT

## 2025-03-25 PROCEDURE — 96413 CHEMO IV INFUSION 1 HR: CPT

## 2025-03-25 RX ORDER — SODIUM CHLORIDE 9 MG/ML
20 INJECTION, SOLUTION INTRAVENOUS ONCE
Status: DISCONTINUED | OUTPATIENT
Start: 2025-03-25 | End: 2025-03-25 | Stop reason: HOSPADM

## 2025-03-25 RX ADMIN — SODIUM CHLORIDE 200 MG: 9 INJECTION, SOLUTION INTRAVENOUS at 14:35

## 2025-03-26 ENCOUNTER — HOSPITAL ENCOUNTER (OUTPATIENT)
Dept: WOUND CARE | Facility: HOSPITAL | Age: OVER 89
Discharge: HOME | End: 2025-03-26
Admitting: NURSE PRACTITIONER
Payer: MEDICARE

## 2025-03-26 DIAGNOSIS — T14.8XXA OPEN WOUND: Primary | ICD-10-CM

## 2025-03-26 PROCEDURE — 97602 WOUND(S) CARE NON-SELECTIVE: CPT

## 2025-03-26 RX ORDER — DIAPER,BRIEF,INFANT-TODD,DISP
1 EACH MISCELLANEOUS ONCE
OUTPATIENT
Start: 2025-03-26 | End: 2025-03-26

## 2025-03-26 RX ORDER — NYSTATIN 100000 [USP'U]/G
1 POWDER TOPICAL ONCE
OUTPATIENT
Start: 2025-03-26 | End: 2025-03-26

## 2025-03-26 RX ORDER — LIDOCAINE HYDROCHLORIDE 20 MG/ML
1 JELLY TOPICAL ONCE
OUTPATIENT
Start: 2025-03-26 | End: 2025-03-26

## 2025-03-26 RX ORDER — LIDOCAINE HYDROCHLORIDE 20 MG/ML
JELLY TOPICAL AS NEEDED
OUTPATIENT
Start: 2025-03-26

## 2025-03-26 RX ORDER — SODIUM HYPOCHLORITE 2.5 MG/ML
1 SOLUTION TOPICAL AS NEEDED
OUTPATIENT
Start: 2025-03-26

## 2025-03-26 RX ORDER — SILVER SULFADIAZINE 10 MG/G
1 CREAM TOPICAL AS NEEDED
OUTPATIENT
Start: 2025-03-26

## 2025-03-26 RX ORDER — SODIUM HYPOCHLORITE 1.25 MG/ML
1 SOLUTION TOPICAL AS NEEDED
OUTPATIENT
Start: 2025-03-26

## 2025-03-26 RX ORDER — LIDOCAINE HYDROCHLORIDE 20 MG/ML
10 INJECTION, SOLUTION INFILTRATION; PERINEURAL ONCE
OUTPATIENT
Start: 2025-03-26 | End: 2025-03-26

## 2025-03-26 RX ORDER — LIDOCAINE HYDROCHLORIDE AND EPINEPHRINE BITARTRATE 20; .01 MG/ML; MG/ML
10 INJECTION, SOLUTION SUBCUTANEOUS ONCE
OUTPATIENT
Start: 2025-03-26 | End: 2025-03-26

## 2025-03-26 RX ORDER — CASTOR OIL AND BALSAM, PERU 788; 87 MG/G; MG/G
1 OINTMENT TOPICAL AS NEEDED
OUTPATIENT
Start: 2025-03-26

## 2025-03-26 RX ORDER — LIDOCAINE HYDROCHLORIDE 20 MG/ML
1 JELLY TOPICAL ONCE
Status: COMPLETED | OUTPATIENT
Start: 2025-03-26 | End: 2025-03-26

## 2025-03-26 RX ORDER — MUPIROCIN 20 MG/G
1 OINTMENT TOPICAL AS NEEDED
OUTPATIENT
Start: 2025-03-26

## 2025-03-26 RX ADMIN — LIDOCAINE HYDROCHLORIDE 1 ML: 20 JELLY TOPICAL at 14:36

## 2025-03-26 NOTE — PROGRESS NOTES
Wound Clinic Note  Patient Identification:  Name:  Kevin Fonseca  Age:  89 y.o.  Sex:  male  :  1934  MRN:  [id number]   Visit Number:  [id number]  Primary Care Physician:  Elissa Geronimo MD     Subjective     Chief complaint:     Right lower leg wounds    History of presenting illness:     Patient is a 89 y.o. male with past medical history significant for  that presented today for evaluation of right lower leg X 2 ulcers. Reports he had a fall 6-8 months ago. Copious amount of serous drainage without odor. Denies recent antibiotics. Denies history of vascular disease or recent vascular workup. Wrapping with kerlix. Denies wearing compression wraps. Denies any fever or chills.     Interval History:   2025: Seen in clinic today for follow-up to right lower leg ulcer. Right calf continues with yellow slough. Right medial leg with superficial opening. Denies any fever or chills. SAULO scheduled for 2025.     2025: Seen in clinic today for follow-up to right lower leg ulcer. Right calf continues with yellow slough. Right medial leg with superficial opening. Denies any fever or chills. Tolerating current treatment without complications.     2025: Seen in clinic today for follow-up to right lower leg ulcers. Only one remains ulcer present. This covered with moist slough. Edges rolled. Edema present. Denies any fever or chills.     2025: Seen in clinic today follow-up to right lower leg ulcers. Only one remains ulcer present. This covered with moist slough. Edges rolled. Edema present. Denies any fever or chills. Reports compliance with recommended compression therapy.   ---------------------------------------------------------------------------------------------------------------------   Review of Systems   Respiratory:  Negative for cough and shortness of breath.    Cardiovascular:  Positive for leg swelling. Negative for chest pain.   Gastrointestinal:  Negative for nausea and  vomiting.   Musculoskeletal:  Positive for back pain and gait problem.   Skin:  Positive for wound.      ---------------------------------------------------------------------------------------------------------------------   Past Medical History:   Diagnosis Date    Arthritis     Asthma     Chronic bronchitis     Complete heart block     Emphysema lung     Gastritis     Heartburn     Macular degeneration     Macular degeneration, wet     Metastatic non-small cell lung cancer     Reflux esophagitis     Thyroid disease      Past Surgical History:   Procedure Laterality Date    INTRACAPSULAR CATARACT EXTRACTION      Dr. Lesly Gray. Dr. Neto Light.    LIVER BIOPSY       Family History   Problem Relation Age of Onset    Hypertension Mother     Other Mother     Cancer Father     Cancer Brother     Asthma Brother      Social History     Socioeconomic History    Marital status:    Tobacco Use    Smoking status: Former     Current packs/day: 0.00     Average packs/day: 1.5 packs/day for 44.0 years (66.0 ttl pk-yrs)     Types: Cigarettes     Start date:      Quit date:      Years since quittin.2     Passive exposure: Past    Smokeless tobacco: Never   Vaping Use    Vaping status: Never Used   Substance and Sexual Activity    Alcohol use: Not Currently     Comment: 2 week    Drug use: Never    Sexual activity: Defer     ---------------------------------------------------------------------------------------------------------------------   Allergies:  Patient has no known allergies.  ---------------------------------------------------------------------------------------------------------------------  Objective     ---------------------------------------------------------------------------------------------------------------------   Vital Signs:  There were no vitals taken for this visit.  Estimated body mass index is 23.71 kg/m² as calculated from the following:    Height as of 3/12/25: 182.9 cm  "(72\").    Weight as of 3/25/25: 79.3 kg (174 lb 12.8 oz).  There is no height or weight on file to calculate BMI.  Wt Readings from Last 3 Encounters:   03/25/25 79.3 kg (174 lb 12.8 oz)   03/12/25 77.6 kg (171 lb)   03/04/25 77.9 kg (171 lb 12.8 oz)       ---------------------------------------------------------------------------------------------------------------------   Physical Exam  Wound Assessment:  Location: Right, lower, leg  Wound Measurements: 1 X 0.3 X 0.2cm   Tunneling: No Undermining: No  Dressing Appearance: dressingappearance: clean  Closure: NA  Changes since last exam: no changes  Etiology and classification: venous ulcers, non-pressure chronic ulcer   Wound bed structures/characteristics: full-thickness (subcutaneous tissue is exposed in at least a portion of the wound), moist, pink, slough  Edges moist  Periwound characteristics: edematous  Periwound Temperature: normal turgor and temperature  Drainage characteristics: moderate, serous   Perfusion characteristics: suggest some degree of PAD    Wound Assessment:  Location: Right medial lower, leg- healed    Wound Goal (s):Free of infection and No further symptoms  Assessment & Plan      Patient Active Problem List    Diagnosis     Non-pressure chronic ulcer of other part of right lower leg with fat layer exposed [L97.812]  -clean with wound cleanser, paint area with betaidne to base, cover with ABD pad for drainage control and secure with kerlix and Short stretch  -Measure for farrow wraps   -SAULO- to assess for underlying vascular disease that can negatively impact wound healing, scheduled for 02/17/2025  -Discussed option for referral to lymphedema, not interested at this time  -Recommend ludivina protein diet 0.75g/kg/day along with vitamin C 2000mg/day, vitamin A 5000 Units/day, vitamin D3 5000 Units/day, zinc 50mg/day to help promote wound healing      Non-pressure chronic ulcer of right medial lower leg with fat layer exposed " [L97.812]  -Healed    Lymphedema  -Will measure for farrow wraps to assist with edema control which is likely contributing to ulcerations  -Recommend referral to lymphedema therapy      Clinical Impression:Low Complexity    Follow-up: 1 week    DENICE Zurita,CWS  [facility]  03/26/2025  1400

## 2025-04-07 ENCOUNTER — PATIENT OUTREACH (OUTPATIENT)
Dept: CASE MANAGEMENT | Facility: OTHER | Age: OVER 89
End: 2025-04-07
Payer: MEDICARE

## 2025-04-07 NOTE — PLAN OF CARE
Problem: COPD  Goal: Optimal Care Coordination of a Patient Experiencing COPD  Outcome: Not Progressing  Intervention: Identify and Minimize Risk of COPD Exacerbation  Flowsheets (Taken 4/7/2025 1457)  Identify and Minimize Risk of COPD Exacerbation:   modification of home and work environment encouraged   signs/symptoms of worsening disease assessed     Problem: Cancer Treatment Phase  Goal: Manage Fatigue (Tiredness)  Outcome: Not Progressing  Intervention: My Fatigue Management To Do List  Description: Why is this important?Cancer treatment and its side effects can drain your energy. It can keep you from doing things you would like to do.There are many things that you can do to manage fatigue.  Flowsheets (Taken 4/7/2025 1457)  My Fatigue Management To Do List:   eat healthy   maintain healthy weight  Goal: Optimal Care Coordination of Patient With Cancer: Treatment Phase  Outcome: Not Progressing  Intervention: Manage Fatigue and Maintain Strength  Flowsheets (Taken 4/7/2025 1457)  Manage Fatigue and Maintain Strength:   activity or exercise based on tolerance encouraged   energy conservation techniques promoted   quality of sleep assessed

## 2025-04-07 NOTE — OUTREACH NOTE
AMBULATORY CASE MANAGEMENT NOTE    Names and Relationships of Patient/Support Persons: Contact: DILAN FONSECA; Relationship: Emergency Contact -     Patient Outreach    Mrs. Fonseca reports patient remains very fatigued, states he has a hard time keeping his eyes open. Patient is up to potty chair with assistance. Wife reports every little movement causes shortness of breath which he recovers with rest.  Patient questioned increasing oxygen flow, currently on 2l. Wife instructed to check oxygenation status with finger pulse oximeter which she hs on hand. She is instructed to call Dr. Hinds for levels below 90%. Patient asks if it is okay to start taking Benefiber or Miralax for occasional constipation. Reports occasionally goes 2-3 days without a movement. Patient and wife advised that they may try OTC bowel remedies but to choose 1 and follow package instructions and monitor BM for diarrhea or worsening constipation.  Wife verbalized instructions.    Education Documentation  Medication Management - constipation, taught by Arlyn Chisholm, RN at 4/7/2025  3:16 PM.  Learner: Significant Other  Readiness: Acceptance  Method: Explanation  Response: Verbalizes Understanding    Regular Elimination, taught by Arlyn Chisholm, RN at 4/7/2025  3:16 PM.  Learner: Significant Other  Readiness: Acceptance  Method: Explanation  Response: Verbalizes Understanding    Diet Modification, taught by Arlyn Chisholm, RN at 4/7/2025  3:16 PM.  Learner: Significant Other  Readiness: Acceptance  Method: Explanation  Response: Verbalizes Understanding          Arlyn COLE  Ambulatory Case Management    4/7/2025, 15:17 EDT

## 2025-04-09 ENCOUNTER — HOSPITAL ENCOUNTER (OUTPATIENT)
Dept: WOUND CARE | Facility: HOSPITAL | Age: OVER 89
Discharge: HOME | End: 2025-04-09
Admitting: NURSE PRACTITIONER
Payer: MEDICARE

## 2025-04-09 DIAGNOSIS — T14.8XXA OPEN WOUND: Primary | ICD-10-CM

## 2025-04-09 PROCEDURE — 97602 WOUND(S) CARE NON-SELECTIVE: CPT

## 2025-04-09 RX ORDER — CASTOR OIL AND BALSAM, PERU 788; 87 MG/G; MG/G
1 OINTMENT TOPICAL AS NEEDED
OUTPATIENT
Start: 2025-04-09

## 2025-04-09 RX ORDER — SODIUM HYPOCHLORITE 2.5 MG/ML
1 SOLUTION TOPICAL AS NEEDED
OUTPATIENT
Start: 2025-04-09

## 2025-04-09 RX ORDER — DIAPER,BRIEF,INFANT-TODD,DISP
1 EACH MISCELLANEOUS ONCE
OUTPATIENT
Start: 2025-04-09 | End: 2025-04-09

## 2025-04-09 RX ORDER — SODIUM HYPOCHLORITE 1.25 MG/ML
1 SOLUTION TOPICAL AS NEEDED
OUTPATIENT
Start: 2025-04-09

## 2025-04-09 RX ORDER — LIDOCAINE HYDROCHLORIDE 20 MG/ML
10 INJECTION, SOLUTION INFILTRATION; PERINEURAL ONCE
OUTPATIENT
Start: 2025-04-09 | End: 2025-04-09

## 2025-04-09 RX ORDER — SILVER SULFADIAZINE 10 MG/G
1 CREAM TOPICAL AS NEEDED
OUTPATIENT
Start: 2025-04-09

## 2025-04-09 RX ORDER — LIDOCAINE HYDROCHLORIDE 20 MG/ML
JELLY TOPICAL AS NEEDED
OUTPATIENT
Start: 2025-04-09

## 2025-04-09 RX ORDER — MUPIROCIN 20 MG/G
1 OINTMENT TOPICAL AS NEEDED
OUTPATIENT
Start: 2025-04-09

## 2025-04-09 RX ORDER — LIDOCAINE HYDROCHLORIDE 20 MG/ML
1 JELLY TOPICAL ONCE
Status: COMPLETED | OUTPATIENT
Start: 2025-04-09 | End: 2025-04-09

## 2025-04-09 RX ORDER — LIDOCAINE HYDROCHLORIDE 20 MG/ML
1 JELLY TOPICAL ONCE
OUTPATIENT
Start: 2025-04-09 | End: 2025-04-09

## 2025-04-09 RX ORDER — LIDOCAINE HYDROCHLORIDE AND EPINEPHRINE BITARTRATE 20; .01 MG/ML; MG/ML
10 INJECTION, SOLUTION SUBCUTANEOUS ONCE
OUTPATIENT
Start: 2025-04-09 | End: 2025-04-09

## 2025-04-09 RX ORDER — NYSTATIN 100000 [USP'U]/G
1 POWDER TOPICAL ONCE
OUTPATIENT
Start: 2025-04-09 | End: 2025-04-09

## 2025-04-09 RX ADMIN — LIDOCAINE HYDROCHLORIDE 1 ML: 20 JELLY TOPICAL at 14:14

## 2025-04-11 DIAGNOSIS — C34.90 METASTATIC NON-SMALL CELL LUNG CANCER: Primary | ICD-10-CM

## 2025-04-11 RX ORDER — SODIUM CHLORIDE 9 MG/ML
20 INJECTION, SOLUTION INTRAVENOUS ONCE
OUTPATIENT
Start: 2025-04-15

## 2025-04-15 ENCOUNTER — INFUSION (OUTPATIENT)
Dept: ONCOLOGY | Facility: HOSPITAL | Age: OVER 89
End: 2025-04-15
Payer: MEDICARE

## 2025-04-15 ENCOUNTER — OFFICE VISIT (OUTPATIENT)
Dept: ONCOLOGY | Facility: CLINIC | Age: OVER 89
End: 2025-04-15
Payer: MEDICARE

## 2025-04-15 ENCOUNTER — APPOINTMENT (OUTPATIENT)
Dept: ONCOLOGY | Facility: HOSPITAL | Age: OVER 89
End: 2025-04-15
Payer: MEDICARE

## 2025-04-15 ENCOUNTER — LAB (OUTPATIENT)
Dept: ONCOLOGY | Facility: CLINIC | Age: OVER 89
End: 2025-04-15
Payer: MEDICARE

## 2025-04-15 VITALS
TEMPERATURE: 98 F | HEART RATE: 61 BPM | OXYGEN SATURATION: 97 % | SYSTOLIC BLOOD PRESSURE: 120 MMHG | RESPIRATION RATE: 20 BRPM | DIASTOLIC BLOOD PRESSURE: 42 MMHG

## 2025-04-15 VITALS
TEMPERATURE: 97.7 F | HEART RATE: 60 BPM | DIASTOLIC BLOOD PRESSURE: 53 MMHG | SYSTOLIC BLOOD PRESSURE: 115 MMHG | OXYGEN SATURATION: 96 % | WEIGHT: 177.4 LBS | BODY MASS INDEX: 24.06 KG/M2 | RESPIRATION RATE: 18 BRPM

## 2025-04-15 DIAGNOSIS — L30.9 DERMATITIS: ICD-10-CM

## 2025-04-15 DIAGNOSIS — C34.90 METASTATIC NON-SMALL CELL LUNG CANCER: Primary | ICD-10-CM

## 2025-04-15 DIAGNOSIS — C34.90 METASTATIC NON-SMALL CELL LUNG CANCER: ICD-10-CM

## 2025-04-15 LAB
ALBUMIN SERPL-MCNC: 3.5 G/DL (ref 3.5–5.2)
ALBUMIN/GLOB SERPL: 1 G/DL
ALP SERPL-CCNC: 75 U/L (ref 39–117)
ALT SERPL W P-5'-P-CCNC: 10 U/L (ref 1–41)
ANION GAP SERPL CALCULATED.3IONS-SCNC: 8.2 MMOL/L (ref 5–15)
AST SERPL-CCNC: 16 U/L (ref 1–40)
BASOPHILS # BLD AUTO: 0.06 10*3/MM3 (ref 0–0.2)
BASOPHILS NFR BLD AUTO: 0.9 % (ref 0–1.5)
BILIRUB SERPL-MCNC: 0.3 MG/DL (ref 0–1.2)
BUN SERPL-MCNC: 19 MG/DL (ref 8–23)
BUN/CREAT SERPL: 20.2 (ref 7–25)
CALCIUM SPEC-SCNC: 8.8 MG/DL (ref 8.2–9.6)
CHLORIDE SERPL-SCNC: 98 MMOL/L (ref 98–107)
CO2 SERPL-SCNC: 27.8 MMOL/L (ref 22–29)
CREAT SERPL-MCNC: 0.94 MG/DL (ref 0.76–1.27)
DEPRECATED RDW RBC AUTO: 42.5 FL (ref 37–54)
EGFRCR SERPLBLD CKD-EPI 2021: 77 ML/MIN/1.73
EOSINOPHIL # BLD AUTO: 0.54 10*3/MM3 (ref 0–0.4)
EOSINOPHIL NFR BLD AUTO: 7.7 % (ref 0.3–6.2)
ERYTHROCYTE [DISTWIDTH] IN BLOOD BY AUTOMATED COUNT: 12.9 % (ref 12.3–15.4)
GLOBULIN UR ELPH-MCNC: 3.5 GM/DL
GLUCOSE SERPL-MCNC: 128 MG/DL (ref 65–99)
HCT VFR BLD AUTO: 33.8 % (ref 37.5–51)
HGB BLD-MCNC: 10.4 G/DL (ref 13–17.7)
IMM GRANULOCYTES # BLD AUTO: 0.01 10*3/MM3 (ref 0–0.05)
IMM GRANULOCYTES NFR BLD AUTO: 0.1 % (ref 0–0.5)
LYMPHOCYTES # BLD AUTO: 0.9 10*3/MM3 (ref 0.7–3.1)
LYMPHOCYTES NFR BLD AUTO: 12.9 % (ref 19.6–45.3)
MCH RBC QN AUTO: 28 PG (ref 26.6–33)
MCHC RBC AUTO-ENTMCNC: 30.8 G/DL (ref 31.5–35.7)
MCV RBC AUTO: 90.9 FL (ref 79–97)
MONOCYTES # BLD AUTO: 1.16 10*3/MM3 (ref 0.1–0.9)
MONOCYTES NFR BLD AUTO: 16.6 % (ref 5–12)
NEUTROPHILS NFR BLD AUTO: 4.3 10*3/MM3 (ref 1.7–7)
NEUTROPHILS NFR BLD AUTO: 61.8 % (ref 42.7–76)
NRBC BLD AUTO-RTO: 0 /100 WBC (ref 0–0.2)
PLATELET # BLD AUTO: 248 10*3/MM3 (ref 140–450)
PMV BLD AUTO: 9.7 FL (ref 6–12)
POTASSIUM SERPL-SCNC: 4.6 MMOL/L (ref 3.5–5.2)
PROT SERPL-MCNC: 7 G/DL (ref 6–8.5)
RBC # BLD AUTO: 3.72 10*6/MM3 (ref 4.14–5.8)
SODIUM SERPL-SCNC: 134 MMOL/L (ref 136–145)
T4 FREE SERPL-MCNC: 1.47 NG/DL (ref 0.92–1.68)
TSH SERPL DL<=0.05 MIU/L-ACNC: 2.62 UIU/ML (ref 0.27–4.2)
WBC NRBC COR # BLD AUTO: 6.97 10*3/MM3 (ref 3.4–10.8)

## 2025-04-15 PROCEDURE — 80053 COMPREHEN METABOLIC PANEL: CPT | Performed by: INTERNAL MEDICINE

## 2025-04-15 PROCEDURE — 25010000002 PEMBROLIZUMAB 100 MG/4ML SOLUTION 4 ML VIAL: Performed by: INTERNAL MEDICINE

## 2025-04-15 PROCEDURE — 84443 ASSAY THYROID STIM HORMONE: CPT | Performed by: INTERNAL MEDICINE

## 2025-04-15 PROCEDURE — 96413 CHEMO IV INFUSION 1 HR: CPT

## 2025-04-15 PROCEDURE — 84439 ASSAY OF FREE THYROXINE: CPT | Performed by: INTERNAL MEDICINE

## 2025-04-15 PROCEDURE — 85025 COMPLETE CBC W/AUTO DIFF WBC: CPT | Performed by: INTERNAL MEDICINE

## 2025-04-15 RX ORDER — SODIUM CHLORIDE 9 MG/ML
20 INJECTION, SOLUTION INTRAVENOUS ONCE
Status: DISCONTINUED | OUTPATIENT
Start: 2025-04-15 | End: 2025-04-15 | Stop reason: HOSPADM

## 2025-04-15 RX ADMIN — SODIUM CHLORIDE 200 MG: 9 INJECTION, SOLUTION INTRAVENOUS at 14:01

## 2025-04-15 NOTE — PROGRESS NOTES
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 consistent with metastatic disease. The spleen is  homogenous. No adrenal mass is present. The pancreas is unremarkable.  The kidneys are normal. There is a 3.3 cm abdominal aortic aneurysm. The  mesenteric vascualture is patent. There is no free fluid or adenopathy.  The abdominal portions of the GI tract are unremarkable with no evidence  of obstruction.     PELVIS: The appendix is normal. The urinary bladder is unremarkable.  There is no significant free fluid or adenopathy. Bone windows reveal an  L2 compression fracture which is unchanged compared to the prior exam.  No new osseous abnormalities are identified. The extraperitoneal soft  tissues are unremarkable.     IMPRESSION:  1. Hepatic metastases not significantly changed compared to the prior  exam.  2. 3.3 cm abdominal aortic aneurysm.  3. Stable L2 compression fracture.    NM PET/CT Skull Base to Mid Thigh (05/30/2024 10:53)   FINDINGS:      HEAD:    BRAIN AND EXTRA-AXIAL SPACES:  Unremarkable as visualized.    NASOPHARYNX:  Unremarkable as visualized.      NECK:    HYPOPHARYNX:  Unremarkable as visualized.    LARYNX:  Unremarkable as visualized.    TRACHEA:  Unremarkable as visualized.    RETROPHARYNGEAL SPACE:  Unremarkable as visualized.    SUBMANDIBULAR/PAROTID GLANDS:  Unremarkable as visualized.   Glands are  normal in size.    THYROID:  Unremarkable as visualized.  No enlarged or calcified  nodules.      CHEST:    LUNGS AND PLEURAL SPACES:  Consolidative more central and elongated  masslike consolidation near the superior right hilum measures about 7 cm  and again shows PET hypermetabolism mSUV 21. The mass extends into the  right hilum.  A 8 mm right upper lobe spiculated pulmonary nodule shows  no PET hypermetabolism at this time.    HEART:  Unremarkable as visualized.  No cardiomegaly.  No significant  pericardial effusion.    MEDIASTINUM:  Unremarkable as visualized.  No mass.      ABDOMEN:    LIVER:  PET hypermetabolic liver masses are identified with the  largest in the right lobe measuring about 8.3 cm and with PET  hypermetabolism mSUV 14.2. A smaller liver masses noted in the left lobe  of the liver.    GALLBLADDER AND BILE DUCTS:  Unremarkable as visualized.  No calcified  stones.  No ductal dilation.    PANCREAS:  Unremarkable as visualized.  No ductal dilation.    SPLEEN:  Unremarkable as visualized.  No splenomegaly.    ADRENALS:  Unremarkable as visualized.  No mass.    KIDNEYS AND URETERS:  Unremarkable as visualized.    STOMACH AND BOWEL:  Unremarkable as visualized.  No obstruction.      PELVIS:    APPENDIX:  No findings to suggest acute appendicitis.    BLADDER:  Unremarkable as visualized.    REPRODUCTIVE:  Unremarkable as visualized.      WHOLE BODY:    INTRAPERITONEAL SPACE:  Unremarkable as visualized.  No significant  fluid collection.  No free air.    BONES/JOINTS:  Again there is a peripheral consolidated mass that  appears to invade the right upper ribs of the right upper lobe measuring  about 8.6 cm and with PET hypermetabolism mSUV 15.1.  Compression  fracture of L2 vertebral body is again noted.  No dislocation.    SOFT TISSUES:  Unremarkable as visualized.    VASCULATURE:  Unremarkable as visualized.  No aortic aneurysm.    LYMPH NODES:  Unremarkable as visualized.  No  lymphadenopathy.  No  hypermetabolic activity.     IMPRESSION:  1.  Again there is a peripheral consolidated mass that appears to invade  the right upper ribs of the right upper lobe measuring about 8.6 cm and  with PET hypermetabolism mSUV 15.1.  2.  Consolidative more central and elongated masslike consolidation near  the superior right hilum measures about 7 cm and again shows PET  hypermetabolism mSUV 21. The mass extends into the right hilum.  3.  PET hypermetabolic liver masses are identified with the largest in  the right lobe measuring about 8.3 cm and with PET hypermetabolism mSUV  14.2. A smaller liver masses noted in the left lobe of the liver.  4.  Compression fracture of L2 vertebral body is again noted.  5.  A 8 mm right upper lobe spiculated pulmonary nodule shows no PET  hypermetabolism at this time.    CT Chest With Contrast Diagnostic (05/15/2024 14:24)   FINDINGS:    LUNGS AND PLEURAL SPACES:  Right upper lobe peripheral pleural-based  consolidative masslike opacity measuring about 2.9 x 7.8 cm with a more  central hilar mass on the right side measuring 5.1 x 4.4 cm and a  smaller right upper lobe nodule component measuring 2.6 cm. The  peripheral mass does appear to cause fourth rib erosion or destruction.   Emphysematous lungs noted.  Right upper lobe 1.2 cm ill-defined nodule  that could represent scarring.  No pneumothorax.  No significant  effusion.    HEART:  Unremarkable as visualized.  No cardiomegaly.  No significant  pericardial effusion.  No significant coronary artery calcifications.    BONES/JOINTS:  Compression fracture noted of probable L2 vertebral  body.  No dislocation.    SOFT TISSUES:  Unremarkable as visualized.    VASCULATURE:  Partially visualized infrarenal abdominal aortic  aneurysm measuring 3.4 cm.    LYMPH NODES:  Unremarkable as visualized.  No enlarged lymph nodes.    LIVER:  Right lobe of liver mass concerning for metastatic disease  measuring about 7 mm.    OTHER  FINDINGS:  .     IMPRESSION:  1.  Right upper lobe peripheral pleural-based consolidative masslike  opacity measuring about 2.9 x 7.8 cm with a more central hilar mass on  the right side measuring 5.1 x 4.4 cm and a smaller right upper lobe  nodule component measuring 2.6 cm. The peripheral mass does appear to  cause fourth rib erosion or destruction.  2.  Right lobe of liver mass concerning for metastatic disease measuring  about 7 mm.  3.  Partially visualized infrarenal abdominal aortic aneurysm measuring  3.4 cm.  4.  Compression fracture noted of probable L2 vertebral body.    XR Chest 2 View (04/23/2024 16:34)   FINDINGS:  LUNGS: Pleural-based consolidation periphery of the right lung. Mild  fullness in the right suprahilar region  HEART AND MEDIASTINUM: Heart and mediastinal contours are unremarkable  SKELETON: Bony and soft tissue structures are unremarkable.     IMPRESSION:  Pleural-based consolidation periphery of the right lung and fullness in  the right suprahilar region. Recommend CT        Pathology:   06/21/2024        NGS:      PATHOLOGY - SCAN - FINAL RESULTS, GUARDANT, 07.28.2024 (07/28/2024)           Assessment / Plan         Assessment and Plan   Kevin Fonseca is a 89 y.o. year old with history of     DeNovo metastatic non small cell carcinoma, squamous cell. PD-L1 TPS 5%. ERBB2 amplification. MSI-low. Negative EGFR, ALK, ROS1, BRAF, MET, RET, NTRK, KRAS  -Patient with ~1 year of decline in function, weight loss, fatigue, and intermittent hemoptysis. CT May 15 2024 with with concerning right upper lobe peripheral consolidation 2.9 x 7.8 cm with central hilar mass 5 x 4.4 cm, small right upper lobe nodule 2.6 cm, another right upper lobe 1.2 cm ill defined mass could represent scarring. Also noted liver mass concerning for metastatic disease. Follow up PET/CT 6/14/2024 with hepatic metastasis largest measuring 8.1 cm, and noted L2 compression fracture. 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  -Patient is aware treatment is palliative. After a thorough discussion, patient and family decided to proceed with single agent pembrolizumab q21d given his performance status and co-morbidities. I feel this is reasonable given his functional status.   -C3 9/3-tolerated well. Course complicated due to accident with abrasions and noted to have bradycardia with 2-1 AV block.   -C4 9/24- tolerated well  -C5 10/15- tolerated well  -C6 11/5- proceed today  -C7 11/26- completed  -C9 1/21/25- tolerated well  -C11 3/4/25- tolerated well  -C 13 4/15/25- proceed today  -Next CT chest/abdomen/pelvis 5/2025     2. Malignancy associated pain  -Currently denies     3. Nutrition  -Recommend patient take in 2-3 boost/day    4. Hyponatremia   - Improved     5. Erythema and pruritus   -Started after C1 of IO  -Recommend H1 blockers: loratidine 10 mg AM and benadryl 25 mg PM  -Increase topical steroid to clobestasol 0.05 BID    6. Hypoxia   - Portable oxygen provided; patient has been oxygen dependent since recent admission for bradycardia and heart block    7. Bradycardia and AV heart block  - Evaluated by EP at [facility], he is discharged home with a cardiac monitor   - recommend pacemaker implant, however cardiology did not think patient was a good candidate for procedure    8. Loose stool  - Recommend holding miralax and benefiber for now   - If continues to have loose stool, would recommend imodium PRN    9. RLE swelling  -Improvement of abrasions after fall, has surrounding erythema and swelling  -CT RLE with probable hematoma. Improvement in swelling    10. RLE wound   - R calf wound after injury, with intermittent drainage. Currently with small superficial bullae   -Refer to wound care and dermatology     Discussed possible  differential diagnoses, testing, treatment, recommended non-pharmacological interventions, risks, warning signs to monitor for that would indicate need for follow-up in clinic or ER. If no improvement with these regimens or you have new or worsening symptoms follow-up. Patient verbalizes understanding and agreement with plan of care. Denies further needs or concerns.     Patient was given instructions and counseling regarding her condition and for health maintenance advised.       All questions were answered to his satisfaction.    BMI is within normal parameters. No other follow-up for BMI required.         ACO / MARY/Other  Quality measures  -  Kevin Fonseca did not receive 2024 flu vaccine.  This was recommended.  -  Kevin Fonseca reports a pain score of 0.  Given his pain assessment as noted, treatment options were discussed and the following options were decided upon as a follow-up plan to address the patient's pain: continuation of current treatment plan for pain.  -  Current outpatient and discharge medications have been reconciled for the patient.  Reviewed by: Jennifer Hinds MD        Meds ordered during this visit  No orders of the defined types were placed in this encounter.      Visit Diagnoses    ICD-10-CM ICD-9-CM   1. Metastatic non-small cell lung cancer  C34.90 162.9   2. Dermatitis  L30.9 692.9                           Follow Up   In 3 weeks with treatment  FU recommendations by dermatology, Dr. Fauzia POSADA CT chest/abdomen/pelvis        This document has been electronically signed by Jennifer Hinds MD   April 15, 2025 14:44 EDT    Dictated Utilizing Dragon Dictation: Part of this note may be an electronic transcription/translation of spoken language to printed text using the Dragon Dictation System.

## 2025-04-15 NOTE — PROGRESS NOTES
Venipuncture Blood Specimen Collection  Venipuncture performed in Left arm by Bonny Ho MA with good hemostasis. Patient tolerated the procedure well without complications.   04/15/25   Bonny Ho MA

## 2025-04-16 ENCOUNTER — TELEPHONE (OUTPATIENT)
Dept: ONCOLOGY | Facility: CLINIC | Age: OVER 89
End: 2025-04-16

## 2025-04-16 NOTE — TELEPHONE ENCOUNTER
Caller: DILAN JOVEL    Relationship to patient: Emergency Contact    Best call back number: [phone]    Type of visit: LAB, FU, INFUSION    Requested date: NEEDING APPT TIME NO EARLIER THAN 11AM     If rescheduling, when is the original appointment: 5/6

## 2025-04-21 NOTE — PROGRESS NOTES
Wound Clinic Note  Patient Identification:  Name:  Kevin Fonseca  Age:  89 y.o.  Sex:  male  :  1934  MRN:  [id number]   Visit Number:  [id number]  Primary Care Physician:  Elissa Geronimo MD     Subjective     Chief complaint:     Right lower leg wounds    History of presenting illness:     Patient is a 89 y.o. male with past medical history significant for  that presented today for evaluation of right lower leg X 2 ulcers. Reports he had a fall 6-8 months ago. Copious amount of serous drainage without odor. Denies recent antibiotics. Denies history of vascular disease or recent vascular workup. Wrapping with kerlix. Denies wearing compression wraps. Denies any fever or chills.     Interval History:   2025: Seen in clinic today for follow-up to right lower leg ulcer. Right calf continues with yellow slough. Right medial leg with superficial opening. Denies any fever or chills. SAULO scheduled for 2025.     2025: Seen in clinic today for follow-up to right lower leg ulcer. Right calf continues with yellow slough. Right medial leg with superficial opening. Denies any fever or chills. Tolerating current treatment without complications.     2025: Seen in clinic today for follow-up to right lower leg ulcers. Only one remains ulcer present. This covered with moist slough. Edges rolled. Edema present. Denies any fever or chills.     2025: Seen in clinic today follow-up to right lower leg ulcers. Only one remains ulcer present. This covered with moist slough. Edges rolled. Edema present. Denies any fever or chills. Reports compliance with recommended compression therapy.     : Seen in clinic today follow-up to right lower leg ulcers. Only one remains ulcer present. This covered with moist slough. Edges rolled. Edema present. Denies any fever or chills. Reports compliance with recommended compression therapy.    ---------------------------------------------------------------------------------------------------------------------   Review of Systems   Respiratory:  Negative for cough and shortness of breath.    Cardiovascular:  Positive for leg swelling. Negative for chest pain.   Gastrointestinal:  Negative for nausea and vomiting.   Musculoskeletal:  Positive for back pain and gait problem.   Skin:  Positive for wound.      ---------------------------------------------------------------------------------------------------------------------   Past Medical History:   Diagnosis Date    Arthritis     Asthma     Chronic bronchitis     Complete heart block     Emphysema lung     Gastritis     Heartburn     Macular degeneration     Macular degeneration, wet     Metastatic non-small cell lung cancer     Reflux esophagitis     Thyroid disease      Past Surgical History:   Procedure Laterality Date    INTRACAPSULAR CATARACT EXTRACTION      Dr. Lesly Gray. Dr. Neto Light.    LIVER BIOPSY       Family History   Problem Relation Age of Onset    Hypertension Mother     Other Mother     Cancer Father     Cancer Brother     Asthma Brother      Social History     Socioeconomic History    Marital status:    Tobacco Use    Smoking status: Former     Current packs/day: 0.00     Average packs/day: 1.5 packs/day for 44.0 years (66.0 ttl pk-yrs)     Types: Cigarettes     Start date:      Quit date:      Years since quittin.3     Passive exposure: Past    Smokeless tobacco: Never   Vaping Use    Vaping status: Never Used   Substance and Sexual Activity    Alcohol use: Not Currently     Comment: 2 week    Drug use: Never    Sexual activity: Defer     ---------------------------------------------------------------------------------------------------------------------   Allergies:  Patient has no known  "allergies.  ---------------------------------------------------------------------------------------------------------------------  Objective     ---------------------------------------------------------------------------------------------------------------------   Vital Signs:  There were no vitals taken for this visit.  Estimated body mass index is 24.06 kg/m² as calculated from the following:    Height as of 3/12/25: 182.9 cm (72\").    Weight as of 4/15/25: 80.5 kg (177 lb 6.4 oz).  There is no height or weight on file to calculate BMI.  Wt Readings from Last 3 Encounters:   04/15/25 80.5 kg (177 lb 6.4 oz)   03/25/25 79.3 kg (174 lb 12.8 oz)   03/12/25 77.6 kg (171 lb)       ---------------------------------------------------------------------------------------------------------------------   Physical Exam  Wound Assessment:  Location: Right, lower, leg  Wound Measurements: 0.3 X 0.3 X 0.1cm   Tunneling: No Undermining: No  Dressing Appearance: dressingappearance: clean  Closure: NA  Changes since last exam: no changes  Etiology and classification: venous ulcers, non-pressure chronic ulcer   Wound bed structures/characteristics: full-thickness (subcutaneous tissue is exposed in at least a portion of the wound), moist, pink, slough  Edges moist  Periwound characteristics: edematous  Periwound Temperature: normal turgor and temperature  Drainage characteristics: moderate, serous   Perfusion characteristics: suggest some degree of PAD    Wound Assessment:  Location: Right medial lower, leg- healed    Wound Goal (s):Free of infection and No further symptoms  Assessment & Plan      Patient Active Problem List    Diagnosis     Non-pressure chronic ulcer of other part of right lower leg with fat layer exposed [L97.812]  -clean with wound cleanser, paint area with betaidne to base, cover with ABD pad for drainage control and secure with kerlix and Short stretch  -Measure for farrow wraps   -SAULO- to assess for underlying " vascular disease that can negatively impact wound healing, scheduled for 02/17/2025  -Discussed option for referral to lymphedema, not interested at this time  -Recommend ludivina protein diet 0.75g/kg/day along with vitamin C 2000mg/day, vitamin A 5000 Units/day, vitamin D3 5000 Units/day, zinc 50mg/day to help promote wound healing      Non-pressure chronic ulcer of right medial lower leg with fat layer exposed [L97.812]  -Healed    Lymphedema  -Will measure for farrow wraps to assist with edema control which is likely contributing to ulcerations  -Recommend referral to lymphedema therapy      Clinical Impression:Low Complexity    Follow-up: 1 week    DENICE Zurita,CWS  [facility]  04/09/2025  1400

## 2025-04-22 ENCOUNTER — OFFICE VISIT (OUTPATIENT)
Dept: FAMILY MEDICINE CLINIC | Facility: CLINIC | Age: OVER 89
End: 2025-04-22
Payer: MEDICARE

## 2025-04-22 VITALS
DIASTOLIC BLOOD PRESSURE: 54 MMHG | RESPIRATION RATE: 16 BRPM | WEIGHT: 182.6 LBS | HEART RATE: 44 BPM | OXYGEN SATURATION: 94 % | BODY MASS INDEX: 24.73 KG/M2 | SYSTOLIC BLOOD PRESSURE: 112 MMHG | TEMPERATURE: 97.5 F | HEIGHT: 72 IN

## 2025-04-22 DIAGNOSIS — R00.1 BRADYCARDIA: ICD-10-CM

## 2025-04-22 DIAGNOSIS — R60.0 BILATERAL EDEMA OF LOWER EXTREMITY: ICD-10-CM

## 2025-04-22 DIAGNOSIS — R35.0 FREQUENT URINATION: ICD-10-CM

## 2025-04-22 DIAGNOSIS — R06.02 SHORTNESS OF BREATH: Primary | ICD-10-CM

## 2025-04-22 DIAGNOSIS — G47.33 OSA (OBSTRUCTIVE SLEEP APNEA): ICD-10-CM

## 2025-04-22 DIAGNOSIS — R05.3 CHRONIC COUGH: ICD-10-CM

## 2025-04-22 DIAGNOSIS — L21.0 DANDRUFF: ICD-10-CM

## 2025-04-22 RX ORDER — KETOCONAZOLE 20 MG/ML
SHAMPOO, SUSPENSION TOPICAL 2 TIMES WEEKLY
Qty: 360 ML | Refills: 0 | Status: SHIPPED | OUTPATIENT
Start: 2025-04-24 | End: 2025-05-04

## 2025-04-22 RX ORDER — GUAIFENESIN 600 MG/1
1200 TABLET, EXTENDED RELEASE ORAL DAILY
COMMUNITY
End: 2025-05-04

## 2025-04-22 RX ORDER — AMOXICILLIN 500 MG/1
500 CAPSULE ORAL 3 TIMES DAILY
COMMUNITY
Start: 2025-04-19 | End: 2025-05-04

## 2025-04-24 ENCOUNTER — TELEPHONE (OUTPATIENT)
Dept: FAMILY MEDICINE CLINIC | Facility: CLINIC | Age: OVER 89
End: 2025-04-24
Payer: MEDICARE

## 2025-04-24 ENCOUNTER — OFFICE VISIT (OUTPATIENT)
Dept: RADIATION ONCOLOGY | Facility: HOSPITAL | Age: OVER 89
End: 2025-04-24
Payer: MEDICARE

## 2025-04-24 VITALS
HEART RATE: 71 BPM | TEMPERATURE: 97.3 F | DIASTOLIC BLOOD PRESSURE: 63 MMHG | SYSTOLIC BLOOD PRESSURE: 109 MMHG | OXYGEN SATURATION: 97 % | RESPIRATION RATE: 18 BRPM

## 2025-04-24 DIAGNOSIS — C34.90 METASTATIC NON-SMALL CELL LUNG CANCER: Primary | ICD-10-CM

## 2025-04-24 PROCEDURE — G0463 HOSPITAL OUTPT CLINIC VISIT: HCPCS | Performed by: RADIOLOGY

## 2025-04-24 RX ORDER — FUROSEMIDE 20 MG/1
20 TABLET ORAL DAILY PRN
Qty: 10 TABLET | Refills: 0 | Status: SHIPPED | OUTPATIENT
Start: 2025-04-24

## 2025-04-24 NOTE — PROGRESS NOTES
FOLLOW UP NOTE    PATIENT:                                                      Kevin Fonseca  MEDICAL RECORD #:                        [id number]  :                                                          1934  COMPLETION DATE:   1/15/2025  DIAGNOSIS:     Metastatic squamous carcinoma  CANCER STAGING:      Cancer Staging   No matching staging information was found for the patient.        BRIEF HISTORY: Patient is an extremely pleasant 89-year-old gentleman senting with Denovo non-small cell carcinoma lung metastatic to liver and bone seen by Dr. Almanzar in our department post 7 cycles of Pembro.  He subsequently received 3000 cGy in 10 fractions directed to his right lung which was tolerated well.  He denied any side effects.  He has remained under the care of Dr. Borja in medical oncology receiving continued Pembro single agent    He is seen today with his wife.  They have noted to Dr. Phillips that he sleeps most of the day.  He has no specific complaints however such as difficulty swallowing shortness of breath hemoptysis chest wall pain distal bone pain headache seizures etc.    His last CBC was done on 4/15/2025 with a hemoglobin of 10.4 she is stable from 1 month ago versus 10.92 months ago and a commander great of 33.8 versus a high of 35.23 months ago.  A CMP was performed on 415 is significant for elevated glucose of 128 this is 116 1 month ago and 141 1 month ago and 126 3 months ago.  The patient or wife believes he received any steroids explanatory of the elevated glucose level.  His liver enzymes are stable        MEDICATIONS: Medication reconciliation for the patient was reviewed and confirmed in the electronic medical record.    Review of Systems:   He denies headache change in mentation vision hearing smell shortness of breath hemoptysis chest wall pain cough change in bladder or bowel function.  His wife relates that his appetite is good his energy is poor remainder of comprehensive review of  systems diffusely negative.    KPS 70:  Cares for self; unable to carry on nomal activity    Physical Exam:  Extremely pleasant male sitting in wheelchair coherent and conversant.  There is no icterus no jaundice no generalized rashes no pallor there is no peripheral lymphadenopathy there is no residual radiation dermatitis.  Chest sounds are diminished all lung fields but clear there is no rales rhonchi wheezes or rubs.  There is no S3-S4 murmur or rub there is no hepatosplenomegaly no abdominal mass no JV distention EXTR trace edema.  Cranial nerves II through XII are intact.    VITAL SIGNS:   Vitals:    04/24/25 1245   BP: 109/63   Pulse: 71   Resp: 18   Temp: 97.3 °F (36.3 °C)   TempSrc: Temporal   SpO2: 97%   Weight: Comment: wc   PainSc: 0-No pain       The following portions of the patient's history were reviewed and updated as appropriate: allergies, current medications, past family history, past medical history, past social history, past surgical history and problem list.         Metastatic non-small cell lung cancer [C34.90]    IMPRESSION: 89-year-old gentleman with metastatic non-small cell lung cancer receiving Pembro.  He has elevated I presume nonfasting glucose.  Subjectively his major quality-of-life issue is sleepiness.  He relates that he is seeing on same with Dr. Borja.    RECOMMENDATIONS: Continue Pembro.  Consult Dr. Phillips regarding his glucose level    QUALITY OF LIFE: 90 - Limited Activity              No follow-ups on file.     This document has been electronically signed by Manohar Martin MD   April 24, 2025 13:00 EDT    Dictated Utilizing Dragon Dictation: Part of this note may be an electronic transcription/translation of spoken language to printed text using the Dragon Dictation System.

## 2025-04-28 ENCOUNTER — PATIENT ROUNDING (BHMG ONLY) (OUTPATIENT)
Dept: RADIATION ONCOLOGY | Facility: HOSPITAL | Age: OVER 89
End: 2025-04-28
Payer: MEDICARE

## 2025-04-28 ENCOUNTER — TELEPHONE (OUTPATIENT)
Dept: FAMILY MEDICINE CLINIC | Facility: CLINIC | Age: OVER 89
End: 2025-04-28
Payer: MEDICARE

## 2025-04-28 DIAGNOSIS — C34.90 METASTATIC NON-SMALL CELL LUNG CANCER: Primary | ICD-10-CM

## 2025-04-28 RX ORDER — SODIUM CHLORIDE 9 MG/ML
20 INJECTION, SOLUTION INTRAVENOUS ONCE
OUTPATIENT
Start: 2025-05-06

## 2025-04-28 NOTE — TELEPHONE ENCOUNTER
Spoke with Faby she will try to weigh him & call me back,verbalized understanding of instructions.

## 2025-04-28 NOTE — TELEPHONE ENCOUNTER
Caller: SHAYNE JOVEL    Relationship: Emergency Contact    Best call back number: [phone] (DILAN)      What test was performed: CHEST XRAY    When was the test performed: 04/22/25    Where was the test performed: [facility]    Additional notes: GRANDDAUGHTER WOULD LIKE A CALL BACK TO DISCUSS THE RESULTS OF THIS IMAGING WHEN THEY ARE AVAILABLE.

## 2025-04-28 NOTE — TELEPHONE ENCOUNTER
When is his heart appointment today? I would keep that. His labs are okay (kidney function) so we can diurese him more effectively if we need to.

## 2025-04-28 NOTE — TELEPHONE ENCOUNTER
"Faby called stated Kevin was having \"Heavy Breathing & Gasping for breath last night\",some better this am but his legs are swollen tight all the way up to his thighs,eyes are swollen & swollen in bladder region,she was only able to get the Lasix yesterday so today is his second dose,he has a cardiology appointment in [place] today but she is unsure if he is able to go,she checked his BP-111/47 HR-47,checked it a second time BP-114/59 HR-46 O2 sat=98%.  "

## 2025-04-28 NOTE — TELEPHONE ENCOUNTER
His appointment is at 2 PM,she will try to keep that appointment & will let you know what they say.

## 2025-04-29 ENCOUNTER — TELEPHONE (OUTPATIENT)
Dept: FAMILY MEDICINE CLINIC | Facility: CLINIC | Age: OVER 89
End: 2025-04-29
Payer: MEDICARE

## 2025-04-29 NOTE — TELEPHONE ENCOUNTER
Can you get an update?   Transition of care note: 
 
RUR 16% I contacted Dixie Becerra @ the group home who is pt.'s primary caretaker to find out what pt.'s prior level of functioning was prior to admission. Caretaker informed me that pt had home health for PT and OT previously but that therapists closed her case because pt was not able to follow directions and commands even with one step directions. At the home,the staff performs passive ROM with pt.'s lower and upper extremities. The staff places sensory items in front of pt on a table and they try to cue pt to reach for these items. Pt remains intubated (Peep 6,fio2 40%) via her trach, 
Per group home,family visits pt  Infrequently. [facility] Case management [phone]

## 2025-04-29 NOTE — TELEPHONE ENCOUNTER
I had waited on that until labs. So he had been given amoxicillin 4/19 by someone (not by us), but I think it was for something else. Is he still on that? It helps with the pneumonia, but not enough.

## 2025-04-29 NOTE — TELEPHONE ENCOUNTER
Spoke with Faby Kearney who was present she reports Cardiology told them it was CHF Doubled his Lasix to 40 mg daily,told them he had Aortic Stenosis on a previous Echo & may repeat it in the future to see if had worsened,told to contact primary for treatment for Pneumonia.

## 2025-04-29 NOTE — TELEPHONE ENCOUNTER
Caller: SHAYNE JOVEL    Relationship: Emergency Contact    Best call back number: [phone]     What is the best time to reach you: ANYTIME     Who are you requesting to speak with (clinical staff, provider,  specific staff member): PROVIDER    Do you know the name of the person who called: NA    What was the call regarding: PATIENT GRANDDAUGHTER CALLING AND WOULD LIKE A CALL BACK TO DISCUSS TREATMENT PLAN FOR THE PNEUMONIA SHOWN ON THE X RAYS.     Is it okay if the provider responds through MyChart: NO

## 2025-04-30 RX ORDER — AZITHROMYCIN 250 MG/1
TABLET, FILM COATED ORAL
Qty: 6 TABLET | Refills: 0 | Status: SHIPPED | OUTPATIENT
Start: 2025-04-30 | End: 2025-05-05 | Stop reason: HOSPADM

## 2025-04-30 NOTE — TELEPHONE ENCOUNTER
Is he having any diarrhea with it? It's not completely treating what I want it to treat, but I have to add in another antibiotic and I really don't want to give him C. Diff on top of everything else (will have had 3 antibiotics within the month).

## 2025-04-30 NOTE — TELEPHONE ENCOUNTER
Not having diarrhea at this time. Faby stated that the Amoxicillin was given by the son for an infected loose tooth. States that it is still not completely healed but still has 3 days worth left out of a 10 day TID prescription.

## 2025-04-30 NOTE — TELEPHONE ENCOUNTER
I am sending in a zpack. Usually we use augmentin + a zpack for the pneumonia. If a patient has lesser risk factors, we can use amoxicillin and a zpack. I'm not sure if the amoxicillin has been strong enough, but we can wait and watch. I don't want to give him C. Diff for being on three antibiotics in such a short amount of time.

## 2025-05-02 NOTE — TELEPHONE ENCOUNTER
Faby stated that they really haven't noticed much difference in his swelling. Also stated that they have noticed 2 small, swollen placed above his eyelid, between the eyelid and eyebrow, that have been there about 4 days. He isn't coughing and spitting as much but the swelling hasn't got away.

## 2025-05-03 ENCOUNTER — APPOINTMENT (OUTPATIENT)
Dept: GENERAL RADIOLOGY | Facility: HOSPITAL | Age: OVER 89
End: 2025-05-03
Payer: MEDICARE

## 2025-05-03 ENCOUNTER — HOSPITAL ENCOUNTER (OUTPATIENT)
Facility: HOSPITAL | Age: OVER 89
Setting detail: OBSERVATION
Discharge: HOME HEALTH | End: 2025-05-05
Attending: FAMILY MEDICINE | Admitting: STUDENT IN AN ORGANIZED HEALTH CARE EDUCATION/TRAINING PROGRAM
Payer: MEDICARE

## 2025-05-03 ENCOUNTER — APPOINTMENT (OUTPATIENT)
Dept: CT IMAGING | Facility: HOSPITAL | Age: OVER 89
End: 2025-05-03
Payer: MEDICARE

## 2025-05-03 DIAGNOSIS — J96.11 CHRONIC RESPIRATORY FAILURE WITH HYPOXIA: ICD-10-CM

## 2025-05-03 DIAGNOSIS — R53.81 DEBILITY: ICD-10-CM

## 2025-05-03 DIAGNOSIS — J96.01 ACUTE HYPOXIC RESPIRATORY FAILURE: Primary | ICD-10-CM

## 2025-05-03 DIAGNOSIS — C34.90 METASTATIC NON-SMALL CELL LUNG CANCER: ICD-10-CM

## 2025-05-03 DIAGNOSIS — R60.9 EDEMA, UNSPECIFIED TYPE: ICD-10-CM

## 2025-05-03 DIAGNOSIS — J44.9 CHRONIC OBSTRUCTIVE PULMONARY DISEASE, UNSPECIFIED COPD TYPE: ICD-10-CM

## 2025-05-03 DIAGNOSIS — R06.02 SHORTNESS OF BREATH: ICD-10-CM

## 2025-05-03 LAB
A-A DO2: 51.8 MMHG (ref 0–300)
ALBUMIN SERPL-MCNC: 3.4 G/DL (ref 3.5–5.2)
ALBUMIN/GLOB SERPL: 0.9 G/DL
ALP SERPL-CCNC: 76 U/L (ref 39–117)
ALT SERPL W P-5'-P-CCNC: 14 U/L (ref 1–41)
ANION GAP SERPL CALCULATED.3IONS-SCNC: 9.8 MMOL/L (ref 5–15)
ARTERIAL PATENCY WRIST A: ABNORMAL
AST SERPL-CCNC: 20 U/L (ref 1–40)
ATMOSPHERIC PRESS: 722 MMHG
B PARAPERT DNA SPEC QL NAA+PROBE: NOT DETECTED
B PERT DNA SPEC QL NAA+PROBE: NOT DETECTED
BASE EXCESS BLDA CALC-SCNC: 5.6 MMOL/L (ref 0–2)
BASOPHILS # BLD AUTO: 0.07 10*3/MM3 (ref 0–0.2)
BASOPHILS NFR BLD AUTO: 0.9 % (ref 0–1.5)
BDY SITE: ABNORMAL
BILIRUB SERPL-MCNC: 0.2 MG/DL (ref 0–1.2)
BUN SERPL-MCNC: 20 MG/DL (ref 8–23)
BUN/CREAT SERPL: 21.5 (ref 7–25)
C PNEUM DNA NPH QL NAA+NON-PROBE: NOT DETECTED
CALCIUM SPEC-SCNC: 8.7 MG/DL (ref 8.2–9.6)
CHLORIDE SERPL-SCNC: 95 MMOL/L (ref 98–107)
CO2 BLDA-SCNC: 34.9 MMOL/L (ref 22–33)
CO2 SERPL-SCNC: 29.2 MMOL/L (ref 22–29)
COHGB MFR BLD: 1.6 % (ref 0–5)
CREAT SERPL-MCNC: 0.93 MG/DL (ref 0.76–1.27)
DEPRECATED RDW RBC AUTO: 42.2 FL (ref 37–54)
EGFRCR SERPLBLD CKD-EPI 2021: 78 ML/MIN/1.73
EOSINOPHIL # BLD AUTO: 0.57 10*3/MM3 (ref 0–0.4)
EOSINOPHIL NFR BLD AUTO: 7.3 % (ref 0.3–6.2)
ERYTHROCYTE [DISTWIDTH] IN BLOOD BY AUTOMATED COUNT: 12.8 % (ref 12.3–15.4)
FLUAV SUBTYP SPEC NAA+PROBE: NOT DETECTED
FLUBV RNA ISLT QL NAA+PROBE: NOT DETECTED
GAS FLOW AIRWAY: 2 LPM
GEN 5 1HR TROPONIN T REFLEX: 48 NG/L
GLOBULIN UR ELPH-MCNC: 3.9 GM/DL
GLUCOSE SERPL-MCNC: 119 MG/DL (ref 65–99)
HADV DNA SPEC NAA+PROBE: NOT DETECTED
HCO3 BLDA-SCNC: 33 MMOL/L (ref 20–26)
HCOV 229E RNA SPEC QL NAA+PROBE: NOT DETECTED
HCOV HKU1 RNA SPEC QL NAA+PROBE: NOT DETECTED
HCOV NL63 RNA SPEC QL NAA+PROBE: NOT DETECTED
HCOV OC43 RNA SPEC QL NAA+PROBE: NOT DETECTED
HCT VFR BLD AUTO: 32 % (ref 37.5–51)
HCT VFR BLD CALC: 33.7 % (ref 38–51)
HGB BLD-MCNC: 10.1 G/DL (ref 13–17.7)
HGB BLDA-MCNC: 11 G/DL (ref 14–18)
HMPV RNA NPH QL NAA+NON-PROBE: NOT DETECTED
HOLD SPECIMEN: NORMAL
HOLD SPECIMEN: NORMAL
HPIV1 RNA ISLT QL NAA+PROBE: NOT DETECTED
HPIV2 RNA SPEC QL NAA+PROBE: NOT DETECTED
HPIV3 RNA NPH QL NAA+PROBE: NOT DETECTED
HPIV4 P GENE NPH QL NAA+PROBE: NOT DETECTED
IMM GRANULOCYTES # BLD AUTO: 0.03 10*3/MM3 (ref 0–0.05)
IMM GRANULOCYTES NFR BLD AUTO: 0.4 % (ref 0–0.5)
INHALED O2 CONCENTRATION: 28 %
LYMPHOCYTES # BLD AUTO: 0.78 10*3/MM3 (ref 0.7–3.1)
LYMPHOCYTES NFR BLD AUTO: 10.1 % (ref 19.6–45.3)
Lab: ABNORMAL
Lab: ABNORMAL
M PNEUMO IGG SER IA-ACNC: NOT DETECTED
MCH RBC QN AUTO: 28.3 PG (ref 26.6–33)
MCHC RBC AUTO-ENTMCNC: 31.6 G/DL (ref 31.5–35.7)
MCV RBC AUTO: 89.6 FL (ref 79–97)
METHGB BLD QL: <-0.1 % (ref 0–3)
MODALITY: ABNORMAL
MONOCYTES # BLD AUTO: 1.24 10*3/MM3 (ref 0.1–0.9)
MONOCYTES NFR BLD AUTO: 16 % (ref 5–12)
NEUTROPHILS NFR BLD AUTO: 5.07 10*3/MM3 (ref 1.7–7)
NEUTROPHILS NFR BLD AUTO: 65.3 % (ref 42.7–76)
NOTIFIED BY: ABNORMAL
NOTIFIED WHO: ABNORMAL
NRBC BLD AUTO-RTO: 0 /100 WBC (ref 0–0.2)
NT-PROBNP SERPL-MCNC: 3055 PG/ML (ref 0–1800)
OXYHGB MFR BLDV: 91.1 % (ref 94–99)
PCO2 BLDA: 62.1 MM HG (ref 35–45)
PCO2 TEMP ADJ BLD: ABNORMAL MM[HG]
PH BLDA: 7.33 PH UNITS (ref 7.35–7.45)
PH, TEMP CORRECTED: ABNORMAL
PLATELET # BLD AUTO: 263 10*3/MM3 (ref 140–450)
PMV BLD AUTO: 9.9 FL (ref 6–12)
PO2 BLDA: 67.8 MM HG (ref 83–108)
PO2 TEMP ADJ BLD: ABNORMAL MM[HG]
POTASSIUM SERPL-SCNC: 4.8 MMOL/L (ref 3.5–5.2)
PROT SERPL-MCNC: 7.3 G/DL (ref 6–8.5)
RBC # BLD AUTO: 3.57 10*6/MM3 (ref 4.14–5.8)
RHINOVIRUS RNA SPEC NAA+PROBE: NOT DETECTED
RSV RNA NPH QL NAA+NON-PROBE: NOT DETECTED
SAO2 % BLDCOA: 91.9 % (ref 94–99)
SARS-COV-2 RNA RESP QL NAA+PROBE: NOT DETECTED
SODIUM SERPL-SCNC: 134 MMOL/L (ref 136–145)
TROPONIN T % DELTA: 0
TROPONIN T NUMERIC DELTA: 0 NG/L
TROPONIN T SERPL HS-MCNC: 48 NG/L
VENTILATOR MODE: ABNORMAL
WBC NRBC COR # BLD AUTO: 7.76 10*3/MM3 (ref 3.4–10.8)
WHOLE BLOOD HOLD COAG: NORMAL
WHOLE BLOOD HOLD SPECIMEN: NORMAL

## 2025-05-03 PROCEDURE — 94640 AIRWAY INHALATION TREATMENT: CPT

## 2025-05-03 PROCEDURE — 82805 BLOOD GASES W/O2 SATURATION: CPT

## 2025-05-03 PROCEDURE — 71045 X-RAY EXAM CHEST 1 VIEW: CPT | Performed by: RADIOLOGY

## 2025-05-03 PROCEDURE — 82375 ASSAY CARBOXYHB QUANT: CPT

## 2025-05-03 PROCEDURE — 94799 UNLISTED PULMONARY SVC/PX: CPT

## 2025-05-03 PROCEDURE — 99285 EMERGENCY DEPT VISIT HI MDM: CPT

## 2025-05-03 PROCEDURE — 96375 TX/PRO/DX INJ NEW DRUG ADDON: CPT

## 2025-05-03 PROCEDURE — 71045 X-RAY EXAM CHEST 1 VIEW: CPT

## 2025-05-03 PROCEDURE — G0378 HOSPITAL OBSERVATION PER HR: HCPCS

## 2025-05-03 PROCEDURE — 84484 ASSAY OF TROPONIN QUANT: CPT

## 2025-05-03 PROCEDURE — 25010000002 DEXAMETHASONE PER 1 MG: Performed by: PHYSICIAN ASSISTANT

## 2025-05-03 PROCEDURE — 80053 COMPREHEN METABOLIC PANEL: CPT

## 2025-05-03 PROCEDURE — 0202U NFCT DS 22 TRGT SARS-COV-2: CPT | Performed by: PHYSICIAN ASSISTANT

## 2025-05-03 PROCEDURE — 83880 ASSAY OF NATRIURETIC PEPTIDE: CPT

## 2025-05-03 PROCEDURE — 36600 WITHDRAWAL OF ARTERIAL BLOOD: CPT

## 2025-05-03 PROCEDURE — 93005 ELECTROCARDIOGRAM TRACING: CPT | Performed by: FAMILY MEDICINE

## 2025-05-03 PROCEDURE — 94664 DEMO&/EVAL PT USE INHALER: CPT

## 2025-05-03 PROCEDURE — 71250 CT THORAX DX C-: CPT

## 2025-05-03 PROCEDURE — 93010 ELECTROCARDIOGRAM REPORT: CPT | Performed by: INTERNAL MEDICINE

## 2025-05-03 PROCEDURE — 83050 HGB METHEMOGLOBIN QUAN: CPT

## 2025-05-03 PROCEDURE — 84443 ASSAY THYROID STIM HORMONE: CPT

## 2025-05-03 PROCEDURE — 84484 ASSAY OF TROPONIN QUANT: CPT | Performed by: FAMILY MEDICINE

## 2025-05-03 PROCEDURE — 36415 COLL VENOUS BLD VENIPUNCTURE: CPT

## 2025-05-03 PROCEDURE — 85025 COMPLETE CBC W/AUTO DIFF WBC: CPT

## 2025-05-03 RX ORDER — DEXAMETHASONE SODIUM PHOSPHATE 10 MG/ML
10 INJECTION, SOLUTION INTRA-ARTICULAR; INTRALESIONAL; INTRAMUSCULAR; INTRAVENOUS; SOFT TISSUE ONCE
Status: COMPLETED | OUTPATIENT
Start: 2025-05-03 | End: 2025-05-03

## 2025-05-03 RX ORDER — DEXAMETHASONE SODIUM PHOSPHATE 10 MG/ML
10 INJECTION, SOLUTION INTRA-ARTICULAR; INTRALESIONAL; INTRAMUSCULAR; INTRAVENOUS; SOFT TISSUE ONCE
Status: DISCONTINUED | OUTPATIENT
Start: 2025-05-03 | End: 2025-05-03

## 2025-05-03 RX ORDER — SODIUM CHLORIDE 0.9 % (FLUSH) 0.9 %
10 SYRINGE (ML) INJECTION AS NEEDED
Status: DISCONTINUED | OUTPATIENT
Start: 2025-05-03 | End: 2025-05-05 | Stop reason: HOSPADM

## 2025-05-03 RX ORDER — IPRATROPIUM BROMIDE AND ALBUTEROL SULFATE 2.5; .5 MG/3ML; MG/3ML
3 SOLUTION RESPIRATORY (INHALATION) ONCE
Status: COMPLETED | OUTPATIENT
Start: 2025-05-03 | End: 2025-05-03

## 2025-05-03 RX ADMIN — IPRATROPIUM BROMIDE AND ALBUTEROL SULFATE 3 ML: 2.5; .5 SOLUTION RESPIRATORY (INHALATION) at 23:46

## 2025-05-03 RX ADMIN — IPRATROPIUM BROMIDE AND ALBUTEROL SULFATE 3 ML: 2.5; .5 SOLUTION RESPIRATORY (INHALATION) at 22:51

## 2025-05-03 RX ADMIN — DEXAMETHASONE SODIUM PHOSPHATE 10 MG: 10 INJECTION INTRAMUSCULAR; INTRAVENOUS at 22:59

## 2025-05-03 NOTE — LETTER
[facility]  [address] [facility] [address] [facility] [place] [address]  [phone]        May 5, 2025      Patient: Kevin Fonseca  YOB: 1934  Date of Visit: 5/3/2025      Return call to Monique at [phone]        DARYL ButlerW

## 2025-05-04 ENCOUNTER — APPOINTMENT (OUTPATIENT)
Dept: CARDIOLOGY | Facility: HOSPITAL | Age: OVER 89
End: 2025-05-04
Payer: MEDICARE

## 2025-05-04 PROBLEM — J96.11 CHRONIC RESPIRATORY FAILURE WITH HYPOXIA: Status: ACTIVE | Noted: 2025-05-04

## 2025-05-04 PROBLEM — R06.00 DYSPNEA: Status: ACTIVE | Noted: 2025-05-04

## 2025-05-04 LAB
ALBUMIN SERPL-MCNC: 3.1 G/DL (ref 3.5–5.2)
ALBUMIN/GLOB SERPL: 0.8 G/DL
ALP SERPL-CCNC: 72 U/L (ref 39–117)
ALT SERPL W P-5'-P-CCNC: 12 U/L (ref 1–41)
AMPHET+METHAMPHET UR QL: NEGATIVE
AMPHETAMINES UR QL: NEGATIVE
ANION GAP SERPL CALCULATED.3IONS-SCNC: 8.9 MMOL/L (ref 5–15)
AORTIC DIMENSIONLESS INDEX: 0.35 (DI)
AST SERPL-CCNC: 19 U/L (ref 1–40)
AV MEAN PRESS GRAD SYS DOP V1V2: 15.4 MMHG
AV VMAX SYS DOP: 263.2 CM/SEC
BARBITURATES UR QL SCN: NEGATIVE
BASOPHILS # BLD AUTO: 0.02 10*3/MM3 (ref 0–0.2)
BASOPHILS NFR BLD AUTO: 0.4 % (ref 0–1.5)
BENZODIAZ UR QL SCN: NEGATIVE
BH CV ECHO MEAS - ACS: 1.1 CM
BH CV ECHO MEAS - AI P1/2T: 534.8 MSEC
BH CV ECHO MEAS - AO MAX PG: 27.8 MMHG
BH CV ECHO MEAS - AO ROOT DIAM: 3.9 CM
BH CV ECHO MEAS - AO V2 VTI: 55.8 CM
BH CV ECHO MEAS - AVA(I,D): 1.2 CM2
BH CV ECHO MEAS - EDV(CUBED): 59.3 ML
BH CV ECHO MEAS - EDV(MOD-SP2): 149 ML
BH CV ECHO MEAS - EDV(MOD-SP4): 104 ML
BH CV ECHO MEAS - EF(MOD-SP2): 55 %
BH CV ECHO MEAS - EF(MOD-SP4): 57 %
BH CV ECHO MEAS - ESV(CUBED): 19.7 ML
BH CV ECHO MEAS - ESV(MOD-SP2): 67 ML
BH CV ECHO MEAS - ESV(MOD-SP4): 44.7 ML
BH CV ECHO MEAS - FS: 30.8 %
BH CV ECHO MEAS - IVS/LVPW: 0.9 CM
BH CV ECHO MEAS - IVSD: 0.9 CM
BH CV ECHO MEAS - LA DIMENSION: 3.6 CM
BH CV ECHO MEAS - LAT PEAK E' VEL: 13.1 CM/SEC
BH CV ECHO MEAS - LV DIASTOLIC VOL/BSA (35-75): 50.1 CM2
BH CV ECHO MEAS - LV MASS(C)D: 113.6 GRAMS
BH CV ECHO MEAS - LV MAX PG: 2.9 MMHG
BH CV ECHO MEAS - LV MEAN PG: 2 MMHG
BH CV ECHO MEAS - LV SYSTOLIC VOL/BSA (12-30): 21.5 CM2
BH CV ECHO MEAS - LV V1 MAX: 85 CM/SEC
BH CV ECHO MEAS - LV V1 VTI: 19.3 CM
BH CV ECHO MEAS - LVIDD: 3.9 CM
BH CV ECHO MEAS - LVIDS: 2.7 CM
BH CV ECHO MEAS - LVOT AREA: 3.5 CM2
BH CV ECHO MEAS - LVOT DIAM: 2.1 CM
BH CV ECHO MEAS - LVPWD: 1 CM
BH CV ECHO MEAS - MED PEAK E' VEL: 11.2 CM/SEC
BH CV ECHO MEAS - MR MAX PG: 54.3 MMHG
BH CV ECHO MEAS - MR MAX VEL: 368 CM/SEC
BH CV ECHO MEAS - MV A MAX VEL: 72.7 CM/SEC
BH CV ECHO MEAS - MV DEC SLOPE: 1265 CM/SEC2
BH CV ECHO MEAS - MV DEC TIME: 0.15 SEC
BH CV ECHO MEAS - MV E MAX VEL: 188 CM/SEC
BH CV ECHO MEAS - MV E/A: 2.6
BH CV ECHO MEAS - MV MAX PG: 14.4 MMHG
BH CV ECHO MEAS - MV MEAN PG: 5 MMHG
BH CV ECHO MEAS - MV V2 VTI: 43.1 CM
BH CV ECHO MEAS - MVA(VTI): 1.55 CM2
BH CV ECHO MEAS - PA ACC TIME: 0.14 SEC
BH CV ECHO MEAS - PA V2 MAX: 121 CM/SEC
BH CV ECHO MEAS - RAP SYSTOLE: 10 MMHG
BH CV ECHO MEAS - RVSP: 36.4 MMHG
BH CV ECHO MEAS - SV(LVOT): 66.8 ML
BH CV ECHO MEAS - SV(MOD-SP2): 82 ML
BH CV ECHO MEAS - SV(MOD-SP4): 59.3 ML
BH CV ECHO MEAS - SVI(LVOT): 32.2 ML/M2
BH CV ECHO MEAS - SVI(MOD-SP2): 39.5 ML/M2
BH CV ECHO MEAS - SVI(MOD-SP4): 28.6 ML/M2
BH CV ECHO MEAS - TAPSE (>1.6): 1.68 CM
BH CV ECHO MEAS - TR MAX PG: 26.4 MMHG
BH CV ECHO MEAS - TR MAX VEL: 257 CM/SEC
BH CV ECHO MEASUREMENTS AVERAGE E/E' RATIO: 15.47
BILIRUB SERPL-MCNC: 0.2 MG/DL (ref 0–1.2)
BILIRUB UR QL STRIP: NEGATIVE
BUN SERPL-MCNC: 20 MG/DL (ref 8–23)
BUN/CREAT SERPL: 20.4 (ref 7–25)
BUPRENORPHINE SERPL-MCNC: NEGATIVE NG/ML
CALCIUM SPEC-SCNC: 8.5 MG/DL (ref 8.2–9.6)
CANNABINOIDS SERPL QL: NEGATIVE
CHLORIDE SERPL-SCNC: 93 MMOL/L (ref 98–107)
CLARITY UR: CLEAR
CO2 SERPL-SCNC: 29.1 MMOL/L (ref 22–29)
COCAINE UR QL: NEGATIVE
COLOR UR: YELLOW
CREAT SERPL-MCNC: 0.98 MG/DL (ref 0.76–1.27)
DEPRECATED RDW RBC AUTO: 42.1 FL (ref 37–54)
EGFRCR SERPLBLD CKD-EPI 2021: 73.3 ML/MIN/1.73
EOSINOPHIL # BLD AUTO: 0.01 10*3/MM3 (ref 0–0.4)
EOSINOPHIL NFR BLD AUTO: 0.2 % (ref 0.3–6.2)
ERYTHROCYTE [DISTWIDTH] IN BLOOD BY AUTOMATED COUNT: 12.8 % (ref 12.3–15.4)
FENTANYL UR-MCNC: NEGATIVE NG/ML
GLOBULIN UR ELPH-MCNC: 3.7 GM/DL
GLUCOSE SERPL-MCNC: 204 MG/DL (ref 65–99)
GLUCOSE UR STRIP-MCNC: NEGATIVE MG/DL
HCT VFR BLD AUTO: 31.7 % (ref 37.5–51)
HGB BLD-MCNC: 10 G/DL (ref 13–17.7)
HGB UR QL STRIP.AUTO: NEGATIVE
IMM GRANULOCYTES # BLD AUTO: 0.01 10*3/MM3 (ref 0–0.05)
IMM GRANULOCYTES NFR BLD AUTO: 0.2 % (ref 0–0.5)
KETONES UR QL STRIP: NEGATIVE
LEFT ATRIUM VOLUME INDEX: 25.1 ML/M2
LEUKOCYTE ESTERASE UR QL STRIP.AUTO: NEGATIVE
LV EF BIPLANE MOD: 56 %
LYMPHOCYTES # BLD AUTO: 0.28 10*3/MM3 (ref 0.7–3.1)
LYMPHOCYTES NFR BLD AUTO: 5.7 % (ref 19.6–45.3)
MCH RBC QN AUTO: 28.2 PG (ref 26.6–33)
MCHC RBC AUTO-ENTMCNC: 31.5 G/DL (ref 31.5–35.7)
MCV RBC AUTO: 89.5 FL (ref 79–97)
METHADONE UR QL SCN: NEGATIVE
MONOCYTES # BLD AUTO: 0.11 10*3/MM3 (ref 0.1–0.9)
MONOCYTES NFR BLD AUTO: 2.2 % (ref 5–12)
NEUTROPHILS NFR BLD AUTO: 4.48 10*3/MM3 (ref 1.7–7)
NEUTROPHILS NFR BLD AUTO: 91.3 % (ref 42.7–76)
NITRITE UR QL STRIP: NEGATIVE
NRBC BLD AUTO-RTO: 0 /100 WBC (ref 0–0.2)
OPIATES UR QL: POSITIVE
OXYCODONE UR QL SCN: NEGATIVE
PCP UR QL SCN: NEGATIVE
PH UR STRIP.AUTO: 7 [PH] (ref 5–8)
PLATELET # BLD AUTO: 260 10*3/MM3 (ref 140–450)
PMV BLD AUTO: 10.1 FL (ref 6–12)
POTASSIUM SERPL-SCNC: 4.5 MMOL/L (ref 3.5–5.2)
PROT SERPL-MCNC: 6.8 G/DL (ref 6–8.5)
PROT UR QL STRIP: NEGATIVE
QT INTERVAL: 426 MS
QTC INTERVAL: 462 MS
RBC # BLD AUTO: 3.54 10*6/MM3 (ref 4.14–5.8)
SODIUM SERPL-SCNC: 131 MMOL/L (ref 136–145)
SP GR UR STRIP: 1.01 (ref 1–1.03)
TRICYCLICS UR QL SCN: NEGATIVE
TSH SERPL DL<=0.05 MIU/L-ACNC: 3.11 UIU/ML (ref 0.27–4.2)
UROBILINOGEN UR QL STRIP: NORMAL
WBC NRBC COR # BLD AUTO: 4.91 10*3/MM3 (ref 3.4–10.8)

## 2025-05-04 PROCEDURE — 85025 COMPLETE CBC W/AUTO DIFF WBC: CPT | Performed by: INTERNAL MEDICINE

## 2025-05-04 PROCEDURE — 80307 DRUG TEST PRSMV CHEM ANLYZR: CPT | Performed by: INTERNAL MEDICINE

## 2025-05-04 PROCEDURE — 93306 TTE W/DOPPLER COMPLETE: CPT | Performed by: INTERNAL MEDICINE

## 2025-05-04 PROCEDURE — 96365 THER/PROPH/DIAG IV INF INIT: CPT

## 2025-05-04 PROCEDURE — 96375 TX/PRO/DX INJ NEW DRUG ADDON: CPT

## 2025-05-04 PROCEDURE — 25010000002 METHYLPREDNISOLONE PER 40 MG: Performed by: INTERNAL MEDICINE

## 2025-05-04 PROCEDURE — 96372 THER/PROPH/DIAG INJ SC/IM: CPT

## 2025-05-04 PROCEDURE — 96366 THER/PROPH/DIAG IV INF ADDON: CPT

## 2025-05-04 PROCEDURE — 25010000002 FUROSEMIDE PER 20 MG: Performed by: INTERNAL MEDICINE

## 2025-05-04 PROCEDURE — 94761 N-INVAS EAR/PLS OXIMETRY MLT: CPT

## 2025-05-04 PROCEDURE — 93005 ELECTROCARDIOGRAM TRACING: CPT

## 2025-05-04 PROCEDURE — 25010000002 PIPERACILLIN SOD-TAZOBACTAM PER 1 G: Performed by: INTERNAL MEDICINE

## 2025-05-04 PROCEDURE — 25010000002 FUROSEMIDE PER 20 MG: Performed by: PHYSICIAN ASSISTANT

## 2025-05-04 PROCEDURE — 94799 UNLISTED PULMONARY SVC/PX: CPT

## 2025-05-04 PROCEDURE — 25010000002 HEPARIN (PORCINE) PER 1000 UNITS: Performed by: INTERNAL MEDICINE

## 2025-05-04 PROCEDURE — 93010 ELECTROCARDIOGRAM REPORT: CPT | Performed by: STUDENT IN AN ORGANIZED HEALTH CARE EDUCATION/TRAINING PROGRAM

## 2025-05-04 PROCEDURE — G0378 HOSPITAL OBSERVATION PER HR: HCPCS

## 2025-05-04 PROCEDURE — 94664 DEMO&/EVAL PT USE INHALER: CPT

## 2025-05-04 PROCEDURE — 81003 URINALYSIS AUTO W/O SCOPE: CPT

## 2025-05-04 PROCEDURE — 96376 TX/PRO/DX INJ SAME DRUG ADON: CPT

## 2025-05-04 PROCEDURE — 71250 CT THORAX DX C-: CPT | Performed by: RADIOLOGY

## 2025-05-04 PROCEDURE — 93306 TTE W/DOPPLER COMPLETE: CPT

## 2025-05-04 PROCEDURE — 99223 1ST HOSP IP/OBS HIGH 75: CPT

## 2025-05-04 PROCEDURE — 80053 COMPREHEN METABOLIC PANEL: CPT | Performed by: INTERNAL MEDICINE

## 2025-05-04 RX ORDER — POLYETHYLENE GLYCOL 3350 17 G/17G
17 POWDER, FOR SOLUTION ORAL DAILY PRN
Status: DISCONTINUED | OUTPATIENT
Start: 2025-05-04 | End: 2025-05-05 | Stop reason: HOSPADM

## 2025-05-04 RX ORDER — SODIUM CHLORIDE 0.9 % (FLUSH) 0.9 %
10 SYRINGE (ML) INJECTION EVERY 12 HOURS SCHEDULED
Status: DISCONTINUED | OUTPATIENT
Start: 2025-05-04 | End: 2025-05-05 | Stop reason: HOSPADM

## 2025-05-04 RX ORDER — ACETAMINOPHEN 325 MG/1
650 TABLET ORAL EVERY 6 HOURS PRN
Status: DISCONTINUED | OUTPATIENT
Start: 2025-05-04 | End: 2025-05-05 | Stop reason: HOSPADM

## 2025-05-04 RX ORDER — LEVOTHYROXINE SODIUM 88 UG/1
88 TABLET ORAL DAILY
Status: DISCONTINUED | OUTPATIENT
Start: 2025-05-04 | End: 2025-05-05 | Stop reason: HOSPADM

## 2025-05-04 RX ORDER — FUROSEMIDE 10 MG/ML
40 INJECTION INTRAMUSCULAR; INTRAVENOUS ONCE
Status: COMPLETED | OUTPATIENT
Start: 2025-05-04 | End: 2025-05-04

## 2025-05-04 RX ORDER — BISACODYL 10 MG
10 SUPPOSITORY, RECTAL RECTAL DAILY PRN
Status: DISCONTINUED | OUTPATIENT
Start: 2025-05-04 | End: 2025-05-05 | Stop reason: HOSPADM

## 2025-05-04 RX ORDER — HEPARIN SODIUM 5000 [USP'U]/ML
5000 INJECTION, SOLUTION INTRAVENOUS; SUBCUTANEOUS EVERY 12 HOURS SCHEDULED
Status: DISCONTINUED | OUTPATIENT
Start: 2025-05-04 | End: 2025-05-05 | Stop reason: HOSPADM

## 2025-05-04 RX ORDER — BISACODYL 5 MG/1
5 TABLET, DELAYED RELEASE ORAL DAILY PRN
Status: DISCONTINUED | OUTPATIENT
Start: 2025-05-04 | End: 2025-05-05 | Stop reason: HOSPADM

## 2025-05-04 RX ORDER — BUSPIRONE HYDROCHLORIDE 5 MG/1
5 TABLET ORAL DAILY
Status: DISCONTINUED | OUTPATIENT
Start: 2025-05-04 | End: 2025-05-05 | Stop reason: HOSPADM

## 2025-05-04 RX ORDER — SODIUM CHLORIDE 0.9 % (FLUSH) 0.9 %
10 SYRINGE (ML) INJECTION AS NEEDED
Status: DISCONTINUED | OUTPATIENT
Start: 2025-05-04 | End: 2025-05-05 | Stop reason: HOSPADM

## 2025-05-04 RX ORDER — AMOXICILLIN 250 MG
2 CAPSULE ORAL 2 TIMES DAILY PRN
Status: DISCONTINUED | OUTPATIENT
Start: 2025-05-04 | End: 2025-05-05 | Stop reason: HOSPADM

## 2025-05-04 RX ORDER — FUROSEMIDE 10 MG/ML
40 INJECTION INTRAMUSCULAR; INTRAVENOUS
Status: COMPLETED | OUTPATIENT
Start: 2025-05-04 | End: 2025-05-04

## 2025-05-04 RX ORDER — BUSPIRONE HYDROCHLORIDE 5 MG/1
5 TABLET ORAL DAILY
COMMUNITY
End: 2025-05-15 | Stop reason: SDUPTHER

## 2025-05-04 RX ORDER — AZITHROMYCIN 250 MG/1
250 TABLET, FILM COATED ORAL
Status: CANCELLED | OUTPATIENT
Start: 2025-05-04 | End: 2025-05-05

## 2025-05-04 RX ORDER — SODIUM CHLORIDE 9 MG/ML
40 INJECTION, SOLUTION INTRAVENOUS AS NEEDED
Status: DISCONTINUED | OUTPATIENT
Start: 2025-05-04 | End: 2025-05-05 | Stop reason: HOSPADM

## 2025-05-04 RX ORDER — NITROGLYCERIN 0.4 MG/1
0.4 TABLET SUBLINGUAL
Status: DISCONTINUED | OUTPATIENT
Start: 2025-05-04 | End: 2025-05-05 | Stop reason: HOSPADM

## 2025-05-04 RX ORDER — IPRATROPIUM BROMIDE AND ALBUTEROL SULFATE 2.5; .5 MG/3ML; MG/3ML
3 SOLUTION RESPIRATORY (INHALATION)
Status: DISCONTINUED | OUTPATIENT
Start: 2025-05-04 | End: 2025-05-05 | Stop reason: HOSPADM

## 2025-05-04 RX ORDER — BENZONATATE 100 MG/1
200 CAPSULE ORAL 3 TIMES DAILY PRN
Status: DISCONTINUED | OUTPATIENT
Start: 2025-05-04 | End: 2025-05-05 | Stop reason: HOSPADM

## 2025-05-04 RX ADMIN — LEVOTHYROXINE SODIUM 88 MCG: 0.09 TABLET ORAL at 08:54

## 2025-05-04 RX ADMIN — PIPERACILLIN AND TAZOBACTAM 3.38 G: 3; .375 INJECTION, POWDER, FOR SOLUTION INTRAVENOUS at 10:56

## 2025-05-04 RX ADMIN — METHYLPREDNISOLONE SODIUM SUCCINATE 40 MG: 40 INJECTION, POWDER, LYOPHILIZED, FOR SOLUTION INTRAMUSCULAR; INTRAVENOUS at 04:41

## 2025-05-04 RX ADMIN — IPRATROPIUM BROMIDE AND ALBUTEROL SULFATE 3 ML: 2.5; .5 SOLUTION RESPIRATORY (INHALATION) at 18:32

## 2025-05-04 RX ADMIN — METHYLPREDNISOLONE SODIUM SUCCINATE 40 MG: 40 INJECTION, POWDER, LYOPHILIZED, FOR SOLUTION INTRAMUSCULAR; INTRAVENOUS at 17:03

## 2025-05-04 RX ADMIN — PIPERACILLIN AND TAZOBACTAM 3.38 G: 3; .375 INJECTION, POWDER, FOR SOLUTION INTRAVENOUS at 04:40

## 2025-05-04 RX ADMIN — PIPERACILLIN AND TAZOBACTAM 3.38 G: 3; .375 INJECTION, POWDER, FOR SOLUTION INTRAVENOUS at 21:27

## 2025-05-04 RX ADMIN — FUROSEMIDE 40 MG: 10 INJECTION, SOLUTION INTRAMUSCULAR; INTRAVENOUS at 17:03

## 2025-05-04 RX ADMIN — BUSPIRONE HYDROCHLORIDE 5 MG: 5 TABLET ORAL at 08:54

## 2025-05-04 RX ADMIN — ACETAMINOPHEN 650 MG: 325 TABLET ORAL at 23:41

## 2025-05-04 RX ADMIN — Medication 10 ML: at 21:27

## 2025-05-04 RX ADMIN — FUROSEMIDE 40 MG: 10 INJECTION, SOLUTION INTRAMUSCULAR; INTRAVENOUS at 01:40

## 2025-05-04 RX ADMIN — FUROSEMIDE 40 MG: 10 INJECTION, SOLUTION INTRAMUSCULAR; INTRAVENOUS at 08:53

## 2025-05-04 RX ADMIN — Medication 10 ML: at 08:54

## 2025-05-04 RX ADMIN — HEPARIN SODIUM 5000 UNITS: 5000 INJECTION INTRAVENOUS; SUBCUTANEOUS at 21:27

## 2025-05-04 RX ADMIN — HEPARIN SODIUM 5000 UNITS: 5000 INJECTION INTRAVENOUS; SUBCUTANEOUS at 08:53

## 2025-05-04 RX ADMIN — IPRATROPIUM BROMIDE AND ALBUTEROL SULFATE 3 ML: 2.5; .5 SOLUTION RESPIRATORY (INHALATION) at 12:49

## 2025-05-04 NOTE — ED PROVIDER NOTES
Subjective   History of Present Illness  89-year-old male presents to ED with shortness of breath and wheezing. Pertinent medical history significant for metastatic non-small cell lung cancer with mets to liver. Patient receives Catruda for treatmet, last dose was 3 weeks ago, has appointment next week for another dose. Patient also has emphysema, GERD, HTN and thyroid disorder. Family states patient was recently treated for a tooth infection with amoxicillin, he just finished antibiotics for that 3 days ago. Patient currently on azithromycin for pneumonia. Family states about 2 hours ago they were getting him up to wash his hair when he started shaking and couldn't breath. Patient has audible wheezing. Patient normally on 2L oxygen at home. Family states this is not his baseline, he usually sounds clear when breathing. Patient denies fever, chills, nausea or vomiting. Denies any other symptoms.         Review of Systems   Constitutional: Negative.  Negative for fever.   HENT: Negative.     Respiratory:  Positive for chest tightness, shortness of breath and wheezing.    Cardiovascular: Negative.  Negative for chest pain.   Gastrointestinal: Negative.  Negative for abdominal pain.   Endocrine: Negative.    Genitourinary: Negative.  Negative for dysuria.   Skin: Negative.    Neurological: Negative.    Psychiatric/Behavioral: Negative.     All other systems reviewed and are negative.      Past Medical History:   Diagnosis Date    Arthritis     Asthma     Chronic bronchitis     Complete heart block     Emphysema lung     Gastritis     Heartburn     Macular degeneration     Macular degeneration, wet     Metastatic non-small cell lung cancer     Reflux esophagitis     Thyroid disease        No Known Allergies    Past Surgical History:   Procedure Laterality Date    INTRACAPSULAR CATARACT EXTRACTION      Dr. Lesly Gray. Dr. Neto Light.    LIVER BIOPSY         Family History   Problem Relation Age of Onset     Hypertension Mother     Other Mother     Cancer Father     Cancer Brother     Asthma Brother        Social History     Socioeconomic History    Marital status:    Tobacco Use    Smoking status: Former     Current packs/day: 0.00     Average packs/day: 1.5 packs/day for 44.0 years (66.0 ttl pk-yrs)     Types: Cigarettes     Start date:      Quit date:      Years since quittin.3     Passive exposure: Past    Smokeless tobacco: Never   Vaping Use    Vaping status: Never Used   Substance and Sexual Activity    Alcohol use: Not Currently     Comment: 2 week    Drug use: Never    Sexual activity: Defer           Objective   Physical Exam  Vitals and nursing note reviewed.   Constitutional:       General: He is not in acute distress.     Appearance: He is well-developed. He is not diaphoretic.   HENT:      Head: Normocephalic and atraumatic.      Right Ear: External ear normal.      Left Ear: External ear normal.      Nose: Nose normal.   Eyes:      Conjunctiva/sclera: Conjunctivae normal.      Pupils: Pupils are equal, round, and reactive to light.   Neck:      Vascular: No JVD.      Trachea: No tracheal deviation.   Cardiovascular:      Rate and Rhythm: Normal rate and regular rhythm.      Heart sounds: Normal heart sounds. No murmur heard.  Pulmonary:      Effort: Pulmonary effort is normal. No respiratory distress.      Breath sounds: Normal breath sounds. No wheezing.   Abdominal:      General: Bowel sounds are normal.      Palpations: Abdomen is soft.      Tenderness: There is no abdominal tenderness.   Musculoskeletal:         General: No deformity. Normal range of motion.      Cervical back: Normal range of motion and neck supple.   Skin:     General: Skin is warm and dry.      Coloration: Skin is not pale.      Findings: No erythema or rash.   Neurological:      Mental Status: He is alert and oriented to person, place, and time.      Cranial Nerves: No cranial nerve deficit.   Psychiatric:          Behavior: Behavior normal.         Thought Content: Thought content normal.         Procedures           ED Course  ED Course as of 05/05/25 0929   Sun May 04, 2025   0113 Patient has metastatic lung cancer to the liver.  Patient has had increased swelling and increased weight gain over the past couple weeks. He was started on lasix 20mg initially but 3 days ago it was increased to lasix 40mg qd. Patient still has 3+ pitting edema. Lasix 40mg not helping take off the fluid. Patient does admit to being able to breath better since he first got here. He was unable to communicate with me at time of arrival due to shortness of breath and wheezing. Patient has slightly improved after 2 duonebs. He is able to communicate without getting winded.       [CV]      ED Course User Index  [CV] Aurora Saenz, SERAFIN                        XR Chest 1 View  Result Date: 5/4/2025   Superimposed infection on background of lung disease.  This report was finalized on 5/4/2025 11:31 AM by Constance Adame MD.      CT Chest Without Contrast Diagnostic  Result Date: 5/4/2025  Impression:  1.  Irregular lateral pleural thickening, 6 x 2 cm, with multifocal irregular scarring in the right upper lobe, unchanged from 2/26/2025. 2.  Nodular pleural thickening along the inferior major fissure with adjacent scarring in the lingula, also unchanged. 3.  Trace right pleural effusion. 4.  No consolidative infiltrate or pneumothorax. 5.  COPD. 6.  Infrarenal fusiform aortic aneurysm measuring up to 3.6 cm in diameter, unchanged. 7.  A stable 6.6 x 3.5 cm lobulated calcified mass in the right hepatic lobe, with a 2.2 cm new calcified lesion in the left hepatic lobe. Followup CT abdomen/pelvis using liver protocol is recommended.  This report was finalized on 5/4/2025 12:29 AM by Manohar Welsh MD.      Results for orders placed or performed during the hospital encounter of 05/03/25   Comprehensive Metabolic Panel    Collection Time: 05/03/25  9:26 PM     Specimen: Arm, Left; Blood   Result Value Ref Range    Glucose 119 (H) 65 - 99 mg/dL    BUN 20 8 - 23 mg/dL    Creatinine 0.93 0.76 - 1.27 mg/dL    Sodium 134 (L) 136 - 145 mmol/L    Potassium 4.8 3.5 - 5.2 mmol/L    Chloride 95 (L) 98 - 107 mmol/L    CO2 29.2 (H) 22.0 - 29.0 mmol/L    Calcium 8.7 8.2 - 9.6 mg/dL    Total Protein 7.3 6.0 - 8.5 g/dL    Albumin 3.4 (L) 3.5 - 5.2 g/dL    ALT (SGPT) 14 1 - 41 U/L    AST (SGOT) 20 1 - 40 U/L    Alkaline Phosphatase 76 39 - 117 U/L    Total Bilirubin 0.2 0.0 - 1.2 mg/dL    Globulin 3.9 gm/dL    A/G Ratio 0.9 g/dL    BUN/Creatinine Ratio 21.5 7.0 - 25.0    Anion Gap 9.8 5.0 - 15.0 mmol/L    eGFR 78.0 >60.0 mL/min/1.73   BNP    Collection Time: 05/03/25  9:26 PM    Specimen: Arm, Left; Blood   Result Value Ref Range    proBNP 3,055.0 (H) 0.0 - 1,800.0 pg/mL   High Sensitivity Troponin T    Collection Time: 05/03/25  9:26 PM    Specimen: Arm, Left; Blood   Result Value Ref Range    HS Troponin T 48 (H) <22 ng/L   CBC Auto Differential    Collection Time: 05/03/25  9:26 PM    Specimen: Arm, Left; Blood   Result Value Ref Range    WBC 7.76 3.40 - 10.80 10*3/mm3    RBC 3.57 (L) 4.14 - 5.80 10*6/mm3    Hemoglobin 10.1 (L) 13.0 - 17.7 g/dL    Hematocrit 32.0 (L) 37.5 - 51.0 %    MCV 89.6 79.0 - 97.0 fL    MCH 28.3 26.6 - 33.0 pg    MCHC 31.6 31.5 - 35.7 g/dL    RDW 12.8 12.3 - 15.4 %    RDW-SD 42.2 37.0 - 54.0 fl    MPV 9.9 6.0 - 12.0 fL    Platelets 263 140 - 450 10*3/mm3    Neutrophil % 65.3 42.7 - 76.0 %    Lymphocyte % 10.1 (L) 19.6 - 45.3 %    Monocyte % 16.0 (H) 5.0 - 12.0 %    Eosinophil % 7.3 (H) 0.3 - 6.2 %    Basophil % 0.9 0.0 - 1.5 %    Immature Grans % 0.4 0.0 - 0.5 %    Neutrophils, Absolute 5.07 1.70 - 7.00 10*3/mm3    Lymphocytes, Absolute 0.78 0.70 - 3.10 10*3/mm3    Monocytes, Absolute 1.24 (H) 0.10 - 0.90 10*3/mm3    Eosinophils, Absolute 0.57 (H) 0.00 - 0.40 10*3/mm3    Basophils, Absolute 0.07 0.00 - 0.20 10*3/mm3    Immature Grans, Absolute 0.03 0.00 -  0.05 10*3/mm3    nRBC 0.0 0.0 - 0.2 /100 WBC   Green Top (Gel)    Collection Time: 05/03/25  9:26 PM   Result Value Ref Range    Extra Tube Hold for add-ons.    Lavender Top    Collection Time: 05/03/25  9:26 PM   Result Value Ref Range    Extra Tube hold for add-on    Gold Top - SST    Collection Time: 05/03/25  9:26 PM   Result Value Ref Range    Extra Tube Hold for add-ons.    Light Blue Top    Collection Time: 05/03/25  9:26 PM   Result Value Ref Range    Extra Tube Hold for add-ons.    ECG 12 Lead Dyspnea    Collection Time: 05/03/25 10:06 PM   Result Value Ref Range    QT Interval 426 ms    QTC Interval 462 ms   Respiratory Panel PCR w/COVID-19(SARS-CoV-2) [facility] In-House, NP Swab in UTM/VTM, 2 HR TAT - Swab, Nasopharynx    Collection Time: 05/03/25 10:29 PM    Specimen: Nasopharynx; Swab   Result Value Ref Range    ADENOVIRUS, PCR Not Detected Not Detected    Coronavirus 229E Not Detected Not Detected    Coronavirus HKU1 Not Detected Not Detected    Coronavirus NL63 Not Detected Not Detected    Coronavirus OC43 Not Detected Not Detected    COVID19 Not Detected Not Detected - Ref. Range    Human Metapneumovirus Not Detected Not Detected    Human Rhinovirus/Enterovirus Not Detected Not Detected    Influenza A PCR Not Detected Not Detected    Influenza B PCR Not Detected Not Detected    Parainfluenza Virus 1 Not Detected Not Detected    Parainfluenza Virus 2 Not Detected Not Detected    Parainfluenza Virus 3 Not Detected Not Detected    Parainfluenza Virus 4 Not Detected Not Detected    RSV, PCR Not Detected Not Detected    Bordetella pertussis pcr Not Detected Not Detected    Bordetella parapertussis PCR Not Detected Not Detected    Chlamydophila pneumoniae PCR Not Detected Not Detected    Mycoplasma pneumo by PCR Not Detected Not Detected   High Sensitivity Troponin T 1Hr    Collection Time: 05/03/25 10:29 PM    Specimen: Arm, Right; Blood   Result Value Ref Range    HS Troponin T 48 (H) <22  ng/L    Troponin T Numeric Delta 0 ng/L    Troponin T % Delta 0 Abnormal if >/= 20%   TSH    Collection Time: 05/03/25 10:29 PM    Specimen: Arm, Right; Blood   Result Value Ref Range    TSH 3.110 0.270 - 4.200 uIU/mL   Blood Gas, Arterial With Co-Ox    Collection Time: 05/03/25 11:01 PM    Specimen: Arterial Blood   Result Value Ref Range    Site Right Brachial     Vikash's Test N/A     pH, Arterial 7.334 (L) 7.350 - 7.450 pH units    pCO2, Arterial 62.1 (H) 35.0 - 45.0 mm Hg    pO2, Arterial 67.8 (L) 83.0 - 108.0 mm Hg    HCO3, Arterial 33.0 (H) 20.0 - 26.0 mmol/L    Base Excess, Arterial 5.6 (H) 0.0 - 2.0 mmol/L    O2 Saturation, Arterial 91.9 (L) 94.0 - 99.0 %    Hemoglobin, Blood Gas 11.0 (L) 14 - 18 g/dL    Hematocrit, Blood Gas 33.7 (L) 38.0 - 51.0 %    Oxyhemoglobin 91.1 (L) 94 - 99 %    Methemoglobin <-0.10 (L) 0.00 - 3.00 %    Carboxyhemoglobin 1.6 0 - 5 %    A-a DO2 51.8 0.0 - 300.0 mmHg    CO2 Content 34.9 (H) 22 - 33 mmol/L    Barometric Pressure for Blood Gas 722 mmHg    Modality Nasal Cannula     FIO2 28 %    Flow Rate 2.0 lpm    Ventilator Mode NA     Notified Who ER  AND RN     Notified By [id number]     Notified Time 05/03/2025 23:06     Collected by [id number]     pH, Temp Corrected      pCO2, Temperature Corrected      pO2, Temperature Corrected     Comprehensive Metabolic Panel    Collection Time: 05/04/25  5:18 AM    Specimen: Blood   Result Value Ref Range    Glucose 204 (H) 65 - 99 mg/dL    BUN 20 8 - 23 mg/dL    Creatinine 0.98 0.76 - 1.27 mg/dL    Sodium 131 (L) 136 - 145 mmol/L    Potassium 4.5 3.5 - 5.2 mmol/L    Chloride 93 (L) 98 - 107 mmol/L    CO2 29.1 (H) 22.0 - 29.0 mmol/L    Calcium 8.5 8.2 - 9.6 mg/dL    Total Protein 6.8 6.0 - 8.5 g/dL    Albumin 3.1 (L) 3.5 - 5.2 g/dL    ALT (SGPT) 12 1 - 41 U/L    AST (SGOT) 19 1 - 40 U/L    Alkaline Phosphatase 72 39 - 117 U/L    Total Bilirubin 0.2 0.0 - 1.2 mg/dL    Globulin 3.7 gm/dL    A/G Ratio 0.8 g/dL    BUN/Creatinine Ratio 20.4 7.0 - 25.0     Anion Gap 8.9 5.0 - 15.0 mmol/L    eGFR 73.3 >60.0 mL/min/1.73   CBC Auto Differential    Collection Time: 05/04/25  5:18 AM    Specimen: Blood   Result Value Ref Range    WBC 4.91 3.40 - 10.80 10*3/mm3    RBC 3.54 (L) 4.14 - 5.80 10*6/mm3    Hemoglobin 10.0 (L) 13.0 - 17.7 g/dL    Hematocrit 31.7 (L) 37.5 - 51.0 %    MCV 89.5 79.0 - 97.0 fL    MCH 28.2 26.6 - 33.0 pg    MCHC 31.5 31.5 - 35.7 g/dL    RDW 12.8 12.3 - 15.4 %    RDW-SD 42.1 37.0 - 54.0 fl    MPV 10.1 6.0 - 12.0 fL    Platelets 260 140 - 450 10*3/mm3    Neutrophil % 91.3 (H) 42.7 - 76.0 %    Lymphocyte % 5.7 (L) 19.6 - 45.3 %    Monocyte % 2.2 (L) 5.0 - 12.0 %    Eosinophil % 0.2 (L) 0.3 - 6.2 %    Basophil % 0.4 0.0 - 1.5 %    Immature Grans % 0.2 0.0 - 0.5 %    Neutrophils, Absolute 4.48 1.70 - 7.00 10*3/mm3    Lymphocytes, Absolute 0.28 (L) 0.70 - 3.10 10*3/mm3    Monocytes, Absolute 0.11 0.10 - 0.90 10*3/mm3    Eosinophils, Absolute 0.01 0.00 - 0.40 10*3/mm3    Basophils, Absolute 0.02 0.00 - 0.20 10*3/mm3    Immature Grans, Absolute 0.01 0.00 - 0.05 10*3/mm3    nRBC 0.0 0.0 - 0.2 /100 WBC   Urinalysis With Microscopic If Indicated (No Culture) - Urine, Clean Catch    Collection Time: 05/04/25 11:07 AM    Specimen: Urine, Clean Catch   Result Value Ref Range    Color, UA Yellow Yellow, Straw    Appearance, UA Clear Clear    pH, UA 7.0 5.0 - 8.0    Specific Gravity, UA 1.007 1.005 - 1.030    Glucose, UA Negative Negative    Ketones, UA Negative Negative    Bilirubin, UA Negative Negative    Blood, UA Negative Negative    Protein, UA Negative Negative    Leuk Esterase, UA Negative Negative    Nitrite, UA Negative Negative    Urobilinogen, UA 0.2 E.U./dL 0.2 - 1.0 E.U./dL   Adult Transthoracic Echo Complete W/ Cont if Necessary Per Protocol    Collection Time: 05/04/25 11:16 AM   Result Value Ref Range    EF(MOD-bp) 56.0 %    LVIDd 3.9 cm    LVIDs 2.7 cm    IVSd 0.90 cm    LVPWd 1.00 cm    FS 30.8 %    IVS/LVPW 0.90 cm    ESV(cubed) 19.7 ml    LV Sys  Vol (BSA corrected) 21.5 cm2    EDV(cubed) 59.3 ml    LV Katz Vol (BSA corrected) 50.1 cm2    LV mass(C)d 113.6 grams    LVOT area 3.5 cm2    LVOT diam 2.10 cm    EDV(MOD-sp2) 149.0 ml    EDV(MOD-sp4) 104.0 ml    ESV(MOD-sp2) 67.0 ml    ESV(MOD-sp4) 44.7 ml    SV(MOD-sp2) 82.0 ml    SV(MOD-sp4) 59.3 ml    SVi(MOD-SP2) 39.5 ml/m2    SVi(MOD-SP4) 28.6 ml/m2    SVi (LVOT) 32.2 ml/m2    EF(MOD-sp2) 55.0 %    EF(MOD-sp4) 57.0 %    MV E max last 188.0 cm/sec    MV A max last 72.7 cm/sec    MV dec time 0.15 sec    MV E/A 2.6     LA ESV Index (BP) 25.1 ml/m2    Med Peak E' Last 11.2 cm/sec    Lat Peak E' Last 13.1 cm/sec    TR max last 257.0 cm/sec    Avg E/e' ratio 15.47     SV(LVOT) 66.8 ml    TAPSE (>1.6) 1.68 cm    LA dimension (2D)  3.6 cm    LV V1 max 85.0 cm/sec    LV V1 max PG 2.9 mmHg    LV V1 mean PG 2.00 mmHg    LV V1 VTI 19.3 cm    Ao pk last 263.2 cm/sec    Ao max PG 27.8 mmHg    Ao mean PG 15.4 mmHg    Ao V2 VTI 55.8 cm    MILA(I,D) 1.20 cm2    Dimensionless Index 0.35 (DI)    AI P1/2t 534.8 msec    MV max PG 14.4 mmHg    MV mean PG 5.0 mmHg    MV V2 VTI 43.1 cm    MVA(VTI) 1.55 cm2    MV dec slope 1,265 cm/sec2    MR max last 368.0 cm/sec    MR max PG 54.3 mmHg    TR max PG 26.4 mmHg    RVSP(TR) 36.4 mmHg    RAP systole 10.0 mmHg    PA V2 max 121.0 cm/sec    PA acc time 0.14 sec    Ao root diam 3.9 cm    ACS 1.10 cm   Urine Drug Screen - Urine, Clean Catch    Collection Time: 05/04/25 11:14 PM    Specimen: Urine, Clean Catch   Result Value Ref Range    THC, Screen, Urine Negative Negative    Phencyclidine (PCP), Urine Negative Negative    Cocaine Screen, Urine Negative Negative    Methamphetamine, Ur Negative Negative    Opiate Screen Positive (A) Negative    Amphetamine Screen, Urine Negative Negative    Benzodiazepine Screen, Urine Negative Negative    Tricyclic Antidepressants Screen Negative Negative    Methadone Screen, Urine Negative Negative    Barbiturates Screen, Urine Negative Negative    Oxycodone Screen,  Urine Negative Negative    Buprenorphine, Screen, Urine Negative Negative   Fentanyl, Urine - Urine, Clean Catch    Collection Time: 05/04/25 11:14 PM    Specimen: Urine, Clean Catch   Result Value Ref Range    Fentanyl, Urine Negative Negative   ECG 12 Lead Rhythm Change    Collection Time: 05/04/25 11:27 PM   Result Value Ref Range    QT Interval 444 ms    QTC Interval 475 ms   CBC (No Diff)    Collection Time: 05/05/25  1:34 AM    Specimen: Blood   Result Value Ref Range    WBC 8.19 3.40 - 10.80 10*3/mm3    RBC 3.16 (L) 4.14 - 5.80 10*6/mm3    Hemoglobin 9.0 (L) 13.0 - 17.7 g/dL    Hematocrit 28.2 (L) 37.5 - 51.0 %    MCV 89.2 79.0 - 97.0 fL    MCH 28.5 26.6 - 33.0 pg    MCHC 31.9 31.5 - 35.7 g/dL    RDW 12.8 12.3 - 15.4 %    RDW-SD 42.0 37.0 - 54.0 fl    MPV 10.4 6.0 - 12.0 fL    Platelets 256 140 - 450 10*3/mm3   Basic Metabolic Panel    Collection Time: 05/05/25  1:34 AM    Specimen: Blood   Result Value Ref Range    Glucose 165 (H) 65 - 99 mg/dL    BUN 32 (H) 8 - 23 mg/dL    Creatinine 1.21 0.76 - 1.27 mg/dL    Sodium 134 (L) 136 - 145 mmol/L    Potassium 4.5 3.5 - 5.2 mmol/L    Chloride 96 (L) 98 - 107 mmol/L    CO2 30.0 (H) 22.0 - 29.0 mmol/L    Calcium 8.2 8.2 - 9.6 mg/dL    BUN/Creatinine Ratio 26.4 (H) 7.0 - 25.0    Anion Gap 8.0 5.0 - 15.0 mmol/L    eGFR 56.9 (L) >60.0 mL/min/1.73                                    Medical Decision Making  89-year-old male presents to ED with shortness of breath and wheezing. Pertinent medical history significant for metastatic non-small cell lung cancer with mets to liver. Patient receives Catruda for treatmet, last dose was 3 weeks ago, has appointment next week for another dose. Patient also has emphysema, GERD, HTN and thyroid disorder. Family states patient was recently treated for a tooth infection with amoxicillin, he just finished antibiotics for that 3 days ago. Patient currently on azithromycin for pneumonia. Family states about 2 hours ago they were getting  him up to wash his hair when he started shaking and couldn't breath. Patient has audible wheezing. Patient normally on 2L oxygen at home. Family states this is not his baseline, he usually sounds clear when breathing. Patient denies fever, chills, nausea or vomiting. Denies any other symptoms.         Problems Addressed:  Acute hypoxic respiratory failure: complicated acute illness or injury  Edema, unspecified type: complicated acute illness or injury    Amount and/or Complexity of Data Reviewed  Labs: ordered.  Radiology: ordered.  ECG/medicine tests: ordered.    Risk  Prescription drug management.  Decision regarding hospitalization.        Final diagnoses:   Acute hypoxic respiratory failure   Edema, unspecified type       ED Disposition  ED Disposition       ED Disposition   Decision to Admit    Condition   --    Comment   Level of Care: Telemetry [5]   Diagnosis: Dyspnea [938173]   Admitting Physician: EMILI SARAVIA [[id number]]                 No follow-up provider specified.       Medication List        ASK your doctor about these medications      busPIRone 5 MG tablet  Commonly known as: BUSPAR  Ask about: Which instructions should I use?                 Aurora Saenz PAREINALDO  05/05/25 [id number]

## 2025-05-04 NOTE — PLAN OF CARE
Goal Outcome Evaluation:  Plan of Care Reviewed With: patient, spouse        Progress: no change          Pt admitted to 3S from ER d/t dyspnea, hypoxia and respiratory failure. Pt is on 2-4L of oxygen via N/C, currently at 3L and tolerating well. Sats >90%. Pt is A&Ox4, wife at bedside. Resp ENL, Expiratory wheezing in bilateral lungs. Cardiac Rhythm afib sinus bradycardia. Abd soft non-distended, stage 2 to left outer ankle, fluid filled blister to left shin. Edema noted to bilateral feet. Tolerating interventions at this time. Plan of care on going.

## 2025-05-04 NOTE — H&P
"  [facility] Medicine Services  History & Physical    Patient Identification:  Name:  Kevin Fonseca  Age:  89 y.o.  Sex:  male  :  1934  MRN:  [id number]   Visit Number:  [id number]  Admit Date: 5/3/2025   Primary Care Physician:  Elissa Geronimo MD    Subjective     Chief complaint: Shortness of Breath    History of presenting illness:      Kevin Fonseca is a 89 y.o. male with past medical history significant for non-small cell lung cancer with metastatic disease to the liver status post chemotherapy and radiation, chronic non-pressure related right lower extremity wound, chronic respiratory failure on supplemental O2, history of BENNIE, COPD, former tobacco abuse, GERD, hypothyroidism, sick sinus syndrome, history of intermittent second-degree type II AV block and intermittent third-degree anabelle, moderate aortic stenosis, AAA, arthritis, physical debility, and advanced age. Patient was brought to [facility] Emergency Department via EMS on the night of 2025 due to shortness of breath. Patient states that shortness of breath has been progressively worse than usual for about 2-3 weeks. He also reports increasingly frequent productive cough with thick sputum. Denies fever or chills. States that he's also been experiencing lower extremity edema for the past 3 weeks or so. Outpatient provider recently placed patient on Lasix 20 mg daily; increased to 40 mg daily 3 days ago although swelling has not improved. Patient denies any chest discomfort. Spouse states that patient has been very fatigued and week. She states that he does not sleep well. Patient says that he now is short of breath with \"any little movement\" and has difficulty tolerating even light activity or performing ADLs. He reports using 2-3 L nasal cannula continuously at baseline. States that he is a former smoker. Lives at home with his spouse. Workup most consistent with COPD exacerbation so far. CT chest noted trace " right pleural effusion but no significant edema. Auscultation of lung sounds revealed bilateral expiratory wheezing and decreased air movement along with rales. He is stable on baseline O2. Patient was given IV steroids and duo nebs in the ED which he states did help to improve his breathing. Just before my evaluation, ED provider had ordered 40 mg IV Lasix and patient was responding well with ~400 mL urine in bedside container (external cath in place). Patient will be admitted for further monitoring, evaluation, and treatment. Discussed plans with patient and family at bedside. They voiced agreement and understanding with no further questions or concerns at this time.      Upon arrival to the ED, vital signs were temperature 97.7, pulse 74, respirations 20, blood pressure 143/70 lying, SpO2 saturation 100% on 2 L nasal cannula.  ABG with pH 7.334, pCO2 62.1, pO2 67.8, O2 saturation 91.9% on 2 L.  High-sensitivity troponin T 48 with a 0 numeric delta.  proBNP 3055.  CMP with sodium 134, chloride 95, CO2 29.2, glucose 119, albumin 3.4, otherwise unremarkable.  CBC with RBCs 3.57, hemoglobin 10.1, hematocrit 32.0, otherwise unremarkable.  Respiratory panel negative.  CT of the chest without contrast noted irregular lateral pleural thickening, 6 x 2 cm, with multifocal irregular scarring in the right upper lobe, unchanged from 2/26/2025.  Nodular pleural thickening along the inferior major fissure with adjacent scarring in the lingula, also unchanged.  Trace right pleural effusion.  No consolidative infiltrate or pneumothorax.  COPD.  Infrarenal fusiform aortic aneurysm measuring up to 3.6 cm in diameter, unchanged.  Stable 6.6 x 3.5 cm lobulated calcified mass in the right hepatic lobe, with a 2.2 cm new calcified lesion in the left hepatic lobe.    Known Emergency Department medications received prior to my evaluation included 10 mg IV Decadron, 40 mg IV Lasix, DuoNeb x 2. Emergency Department Room location at the  time of my evaluation was 104. Discussed with admitting physician, Luisa Larson DO.     ---------------------------------------------------------------------------------------------------------------------   Review of Systems   Constitutional:  Positive for fatigue. Negative for fever.   Respiratory:  Positive for cough (w/ sputum production) and shortness of breath.    Cardiovascular:  Positive for leg swelling.   Gastrointestinal:  Negative for abdominal pain, constipation, diarrhea, nausea and vomiting.   Genitourinary:  Negative for difficulty urinating and hematuria.   Musculoskeletal:  Positive for gait problem (due to SOA).   Neurological:  Positive for tremors and weakness (generalized).   Psychiatric/Behavioral:  Negative for confusion.      ---------------------------------------------------------------------------------------------------------------------   Past Medical History:   Diagnosis Date    Arthritis     Asthma     Chronic bronchitis     Complete heart block     Emphysema lung     Gastritis     Heartburn     Macular degeneration     Macular degeneration, wet     Metastatic non-small cell lung cancer     Reflux esophagitis     Thyroid disease      Past Surgical History:   Procedure Laterality Date    INTRACAPSULAR CATARACT EXTRACTION      Dr. Lesly Gray. Dr. Neto Light.    LIVER BIOPSY       Family History   Problem Relation Age of Onset    Hypertension Mother     Other Mother     Cancer Father     Cancer Brother     Asthma Brother      Social History     Socioeconomic History    Marital status:    Tobacco Use    Smoking status: Former     Current packs/day: 0.00     Average packs/day: 1.5 packs/day for 44.0 years (66.0 ttl pk-yrs)     Types: Cigarettes     Start date:      Quit date:      Years since quittin.3     Passive exposure: Past    Smokeless tobacco: Never   Vaping Use    Vaping status: Never Used   Substance and Sexual Activity    Alcohol use: Not  Currently     Comment: 2 week    Drug use: Never    Sexual activity: Defer     ---------------------------------------------------------------------------------------------------------------------   Allergies:  Patient has no known allergies.  ---------------------------------------------------------------------------------------------------------------------   Home medications:    Medications below are reported home medications pulling from within the system; at this time, these medications have not been reconciled unless otherwise specified and are in the verification process for further verifcation as current home medications.  Medications Prior to Admission   Medication Sig Dispense Refill Last Dose/Taking    azithromycin (Zithromax Z-Zafar) 250 MG tablet Take 2 tablets the first day, then 1 tablet daily for 4 days. 6 tablet 0 Past Week    busPIRone (BUSPAR) 5 MG tablet Take 1 tablet by mouth Daily.   5/3/2025    levothyroxine (SYNTHROID, LEVOTHROID) 88 MCG tablet TAKE 1 TABLET BY MOUTH DAILY 90 tablet 0 5/3/2025       Hospital Scheduled Meds:  heparin (porcine), 5,000 Units, Subcutaneous, Q12H  ipratropium-albuterol, 3 mL, Nebulization, Q6H While Awake - RT  methylPREDNISolone sodium succinate, 40 mg, Intravenous, Q12H  piperacillin-tazobactam, 3.375 g, Intravenous, Once  piperacillin-tazobactam, 3.375 g, Intravenous, Q8H  sodium chloride, 10 mL, Intravenous, Q12H           Current listed hospital scheduled medications may not yet reflect those currently placed in orders that are signed and held awaiting patient's arrival to floor.   ---------------------------------------------------------------------------------------------------------------------     Objective     Vital Signs:  Temp:  [97.7 °F (36.5 °C)] 97.7 °F (36.5 °C)  Heart Rate:  [54-98] 80  Resp:  [20-28] 20  BP: (114-165)/(53-82) 141/69      05/03/25 2108   Weight: 122 kg (270 lb)     Body mass index is 36.62  kg/m².  ---------------------------------------------------------------------------------------------------------------------       Physical Exam  Vitals and nursing note reviewed.   Constitutional:       General: He is awake. He is not in acute distress.     Appearance: He is ill-appearing. He is not diaphoretic.      Interventions: Nasal cannula in place.      Comments: On 2 L nasal cannula at the time of my evaluation.   HENT:      Head: Normocephalic and atraumatic.      Mouth/Throat:      Mouth: Mucous membranes are moist.      Pharynx: Oropharynx is clear.   Eyes:      Extraocular Movements: Extraocular movements intact.      Pupils: Pupils are equal, round, and reactive to light.   Cardiovascular:      Rate and Rhythm: Normal rate. Rhythm irregular.      Pulses: Normal pulses.           Dorsalis pedis pulses are 2+ on the left side.      Heart sounds: Murmur heard.      Systolic murmur is present.      No friction rub.      Comments: Right lower extremity wrapped with wound dressing.  Appears to be similar in size to the left lower extremity.  LLE has 2+ pitting edema.  Pulmonary:      Effort: No accessory muscle usage or retractions.      Breath sounds: Wheezing and rales present.   Abdominal:      General: Bowel sounds are normal. There is no distension.      Palpations: Abdomen is soft.      Tenderness: There is no abdominal tenderness. There is no guarding.   Musculoskeletal:         General: Swelling present.      Cervical back: Neck supple. No rigidity.      Left lower le+ Pitting Edema present.   Skin:     General: Skin is warm and dry.      Capillary Refill: Capillary refill takes 2 to 3 seconds.      Coloration: Skin is pale.      Findings: Wound (RLE) present.   Neurological:      Mental Status: He is alert and oriented to person, place, and time. Mental status is at baseline.      Cranial Nerves: No dysarthria or facial asymmetry.      Sensory: Sensation is intact.      Motor: Weakness  "(Generalized) present. No tremor or seizure activity.   Psychiatric:         Attention and Perception: Attention normal.         Mood and Affect: Mood normal.         Speech: Speech normal.         Behavior: Behavior normal. Behavior is cooperative.         Thought Content: Thought content normal.         Cognition and Memory: Cognition normal.       ---------------------------------------------------------------------------------------------------------------------  EKG:  Pending formal Cardiology interpretation. Per my review, appears normal sinus rhythm with sinus arrhythmia.     Telemetry:  Appearing normal sinus rhythm w/ sinus arrhythmia 60-70s during my evaluation.   I have personally looked at both the EKG and the telemetry strips.  ---------------------------------------------------------------------------------------------------------------------   Results from last 7 days   Lab Units 05/03/25 2126   WBC 10*3/mm3 7.76   HEMOGLOBIN g/dL 10.1*   HEMATOCRIT % 32.0*   MCV fL 89.6   MCHC g/dL 31.6   PLATELETS 10*3/mm3 263     Results from last 7 days   Lab Units 05/03/25  2301   PH, ARTERIAL pH units 7.334*   PO2 ART mm Hg 67.8*   PCO2, ARTERIAL mm Hg 62.1*   HCO3 ART mmol/L 33.0*     Results from last 7 days   Lab Units 05/03/25 2126   SODIUM mmol/L 134*   POTASSIUM mmol/L 4.8   CHLORIDE mmol/L 95*   CO2 mmol/L 29.2*   BUN mg/dL 20   CREATININE mg/dL 0.93   CALCIUM mg/dL 8.7   GLUCOSE mg/dL 119*   ALBUMIN g/dL 3.4*   BILIRUBIN mg/dL 0.2   ALK PHOS U/L 76   AST (SGOT) U/L 20   ALT (SGPT) U/L 14   Estimated Creatinine Clearance: 71.2 mL/min (by C-G formula based on SCr of 0.93 mg/dL).  No results found for: \"AMMONIA\"  Results from last 7 days   Lab Units 05/03/25 2229 05/03/25 2126   HSTROP T ng/L 48* 48*     Results from last 7 days   Lab Units 05/03/25 2126   PROBNP pg/mL 3,055.0*     Lab Results   Component Value Date    HGBA1C 5.90 (H) 11/03/2022     Lab Results   Component Value Date    TSH 2.620 " "04/15/2025    FREET4 1.47 04/15/2025     No results found for: \"PREGTESTUR\", \"PREGSERUM\", \"HCG\", \"HCGQUANT\"  Pain Management Panel          Latest Ref Rng & Units 7/9/2024   Pain Management Panel   Creatinine, Urine mg/dL 31.8          ---------------------------------------------------------------------------------------------------------------------  Imaging Results (Last 7 Days)       Procedure Component Value Units Date/Time    CT Chest Without Contrast Diagnostic [[id number]] Collected: 05/04/25 0023     Updated: 05/04/25 0031    Narrative:      CLINICAL INDICATION: shortness of breath  CONTRAST: No Contrast  PROCEDURE DATE: 5/3/2025     CT CHEST W/O  Technique: Standard axial collimation through the chest without  intravenous contrast.  Reconstructed images were submitted for  interpretation. Limited exposure control, adjustment of the mA and/or KV  according to patient size or use of iterative reconstruction technique  was utilized.     Comparison: CT chest 2/26/2025     Findings:     Lateral irregular pleural thickening, 6 x 2 cm, with multifocal  irregular scarring in the right upper lobe, unchanged from 2/26/2025.  Nodular pleural thickening along the inferior major fissure with  adjacent scarring in the lingula, also unchanged.  Noncontrast CT chest in 6 months is recommended.  Trace right pleural effusion.  No consolidative infiltrate or pneumothorax.  COPD and emphysematous changes with hyperinflation of both lungs.  Normal heart size without significant pericardial effusion.  Diffuse coronary atherosclerosis.  No thoracic aortic aneurysm.  No lymphadenopathy.  Infrarenal fusiform aortic aneurysm measuring up to 3.6 cm in diameter,  unchanged. The inferior portion of the aortic aneurysm is not included  on the images.  Chronic 80% L2 vertebral compression fracture, unchanged.  A stable 6.6 x 3.5 cm lobulated calcified mass in the right hepatic  lobe, with a 2.2 cm new calcified lesion in the left hepatic " lobe.       Impression:      Impression:     1.  Irregular lateral pleural thickening, 6 x 2 cm, with multifocal  irregular scarring in the right upper lobe, unchanged from 2/26/2025.  2.  Nodular pleural thickening along the inferior major fissure with  adjacent scarring in the lingula, also unchanged.  3.  Trace right pleural effusion.  4.  No consolidative infiltrate or pneumothorax.  5.  COPD.  6.  Infrarenal fusiform aortic aneurysm measuring up to 3.6 cm in  diameter, unchanged.   7.  A stable 6.6 x 3.5 cm lobulated calcified mass in the right hepatic  lobe, with a 2.2 cm new calcified lesion in the left hepatic lobe.  Followup CT abdomen/pelvis using liver protocol is recommended.     This report was finalized on 5/4/2025 12:29 AM by Manohar Welsh MD.       XR Chest 1 View [[id number]] Resulted: 05/03/25 2127     Updated: 05/03/25 2136          Last echocardiogram:  Results for orders placed during the hospital encounter of 09/18/24    Adult Transthoracic Echo Complete W/ Cont if Necessary Per Protocol    Interpretation Summary    Left ventricular systolic function is normal. Estimated left ventricular EF = 55%    Normal right ventricular cavity size and systolic function noted.    Moderate aortic valve stenosis is present. MILA 1.2 cm2, V max of 2.3 m/sec, MG of 11 mmHg, DI of 0.4.    Estimated right ventricular systolic pressure from tricuspid regurgitation is mildly elevated (35-45 mmHg).    Mild to moderate pulmonary hypertension is present.    There is no evidence of pericardial effusion    As compared to the prior study from 5/11/2023 the degree of aortic stenosis is now worse.    I have personally reviewed the above radiology images and read the final radiology report on 05/04/25  ---------------------------------------------------------------------------------------------------------------------  Assessment / Plan     Active Hospital Problems    Diagnosis  POA    **Dyspnea [R06.00]  Yes    Chronic  respiratory failure with hypoxia [J96.11]  Yes     ASSESSMENT/PLAN:  #COPD with suspected exacerbation on admission  #Chronic respiratory failure, currently stable on baseline O2  #History of BENNIE  #Former tobacco abuse  --89 y.o. chronically ill male patient presents to [facility] ED w/ 2-3 week hx of progressively worsening shortness of breath, cough, and sputum production. States that he can no longer tolerate even light activity or performing ADLs without becoming severely short of breath. Also reports worsening lower extremity edema x 2-3 weeks despite Lasix use.   --CT chest w/o contrast noted no evidence of consolidative infiltrates. Trace right pleural effusion. Other findings overall stable.   --Not meeting SIRS/sepsis criteria on arrival. Vitals stable. No hypotension or tachycardia. Afebrile. No leukocytosis.   --Respiratory PCR negative.   --Currently stable on baseline O2 requirement (2-3L). Continuous pulse oximetry ordered. Continue to titrate supplemental O2 to maintain SpO2 saturations > 90%.   --Plan to treat suspected COPDe with scheduled nebulized inhalants and IV solu-medrol. Placed empirically on Zosyn by attending provider.   --PRN Tessalon capsules for cough.   --Encourage incentive spirometry use.   --OPEP.   --Turn, cough, and deep breathing exercises.   --COPD educator consulted.     #Non-small cell lung cancer with metastatic disease to the liver status post chemotherapy/radiation, currently on palliative chemo; follows closely with hematology/oncology in the outpatient setting.   #Chronic non-pressure related right lower extremity wound; wound consult placed. Patient follows with wound care in the outpatient setting. Wound dressing in place to the RLE at the time of my exam.   #Chronic appearing normocytic anemia; Hgb appearing stable compared to baseline/trend. No new/recent bleeding reported. Continue to closely monitor Hgb. Plan to transfuse for Hgb < 7. Will repeat CBC in the AM.   #GERD;  reflux precautions.   #Hypothyroidism; check TSH. Review home levothyroxine dose once reconciled by pharmacy with plans to continue/adjust pending laboratory results.  #Sick sinus syndrome, history of intermittent second-degree type II AV block and intermittent third-degree anabelle; patient evaluated by EP in the recent past. Determined likely not a good candidate for permanent pacemaker implantation given his age and comorbidities. Continuous cardiac monitoring ordered.   #Moderate aortic stenosis; plan to update TTE to monitor. Worsening lower extremity edema could be due to his aortic stenosis versus his Keytruda treatments. 40 mg IV Lasix given in ED with good response. Monitor strict I's and O's, daily weights. Closely monitor renal function (currently intact).   #AAA; CT chest w/o contrast obtained in ED reveals infrarenal fusiform aortic aneurysm measuring up to 3.6 cm in diameter, unchanged.   #Arthritis; supportive care.   #Physical debility; PT/OT consults. Maintain fall precautions.   #Advanced age      ----------  -DVT prophylaxis: Subq heparin.   -Activity: As tolerated. Maintain fall precautions.   -Expected length of stay:   OBSERVATION status; however, if further evaluation or treatment plans warrant, status may change.  Based upon current information, I predict patient's care encounter to be less than or equal to 2 midnights   -Disposition plans to return home w/ spouse on discharge once medically stable. Will consult case management for assistance with discharge plans.     High risk secondary to suspected COPD exacerbation, worsening lower extremity edema despite Lasix use in the outpatient setting,     CODE STATUS: FULL CODE, discussed with patient and family members in .       DENICE Baldwin   05/04/25  03:58 EDT  Pager #[phone]  ---------------------------------------------------------------------------------------------------------------------

## 2025-05-04 NOTE — PLAN OF CARE
Goal Outcome Evaluation:   Pt resting in bed. No signs or symptoms of distress noted. I took over for Alejandrina JOHNSON, I agree with previous assessment. Plan of care on going.

## 2025-05-04 NOTE — PROGRESS NOTES
Interim Hospitalist Note    Patient seen and examined at bedside with family member present.  Patient was sitting up in bed eating breakfast when I entered the room. Patient states he is feeling well and currently has no complaints. When specifically asked about lower extremity swelling, patient states that hasn't changed much, and he is still slightly short of breath. Did discuss with patient the need for continued diuresis with Lasix and empiric antibiotic therapy for possible underlying pneumonia. Patient expressed his understanding and willingness to proceed with this plan of care. In regards to COPD exacerbation, will continue scheduled duonebs and solumedrol 40mg IV BID. In regards to pneumonia, will continue Zosyn.

## 2025-05-05 ENCOUNTER — READMISSION MANAGEMENT (OUTPATIENT)
Dept: CALL CENTER | Facility: HOSPITAL | Age: OVER 89
End: 2025-05-05
Payer: MEDICARE

## 2025-05-05 ENCOUNTER — TELEPHONE (OUTPATIENT)
Dept: ONCOLOGY | Facility: CLINIC | Age: OVER 89
End: 2025-05-05
Payer: MEDICARE

## 2025-05-05 VITALS
SYSTOLIC BLOOD PRESSURE: 112 MMHG | BODY MASS INDEX: 24.42 KG/M2 | TEMPERATURE: 98.2 F | RESPIRATION RATE: 18 BRPM | HEIGHT: 72 IN | OXYGEN SATURATION: 96 % | DIASTOLIC BLOOD PRESSURE: 52 MMHG | HEART RATE: 73 BPM | WEIGHT: 180.3 LBS

## 2025-05-05 LAB
ANION GAP SERPL CALCULATED.3IONS-SCNC: 8 MMOL/L (ref 5–15)
BUN SERPL-MCNC: 32 MG/DL (ref 8–23)
BUN/CREAT SERPL: 26.4 (ref 7–25)
CALCIUM SPEC-SCNC: 8.2 MG/DL (ref 8.2–9.6)
CHLORIDE SERPL-SCNC: 96 MMOL/L (ref 98–107)
CO2 SERPL-SCNC: 30 MMOL/L (ref 22–29)
CREAT SERPL-MCNC: 1.21 MG/DL (ref 0.76–1.27)
DEPRECATED RDW RBC AUTO: 42 FL (ref 37–54)
EGFRCR SERPLBLD CKD-EPI 2021: 56.9 ML/MIN/1.73
ERYTHROCYTE [DISTWIDTH] IN BLOOD BY AUTOMATED COUNT: 12.8 % (ref 12.3–15.4)
GLUCOSE SERPL-MCNC: 165 MG/DL (ref 65–99)
HCT VFR BLD AUTO: 28.2 % (ref 37.5–51)
HGB BLD-MCNC: 9 G/DL (ref 13–17.7)
MCH RBC QN AUTO: 28.5 PG (ref 26.6–33)
MCHC RBC AUTO-ENTMCNC: 31.9 G/DL (ref 31.5–35.7)
MCV RBC AUTO: 89.2 FL (ref 79–97)
PLATELET # BLD AUTO: 256 10*3/MM3 (ref 140–450)
PMV BLD AUTO: 10.4 FL (ref 6–12)
POTASSIUM SERPL-SCNC: 4.5 MMOL/L (ref 3.5–5.2)
RBC # BLD AUTO: 3.16 10*6/MM3 (ref 4.14–5.8)
SODIUM SERPL-SCNC: 134 MMOL/L (ref 136–145)
WBC NRBC COR # BLD AUTO: 8.19 10*3/MM3 (ref 3.4–10.8)

## 2025-05-05 PROCEDURE — 93010 ELECTROCARDIOGRAM REPORT: CPT | Performed by: STUDENT IN AN ORGANIZED HEALTH CARE EDUCATION/TRAINING PROGRAM

## 2025-05-05 PROCEDURE — 94664 DEMO&/EVAL PT USE INHALER: CPT

## 2025-05-05 PROCEDURE — 80048 BASIC METABOLIC PNL TOTAL CA: CPT | Performed by: INTERNAL MEDICINE

## 2025-05-05 PROCEDURE — G0378 HOSPITAL OBSERVATION PER HR: HCPCS

## 2025-05-05 PROCEDURE — 94761 N-INVAS EAR/PLS OXIMETRY MLT: CPT

## 2025-05-05 PROCEDURE — 97165 OT EVAL LOW COMPLEX 30 MIN: CPT

## 2025-05-05 PROCEDURE — 94799 UNLISTED PULMONARY SVC/PX: CPT

## 2025-05-05 PROCEDURE — 96372 THER/PROPH/DIAG INJ SC/IM: CPT

## 2025-05-05 PROCEDURE — 97162 PT EVAL MOD COMPLEX 30 MIN: CPT

## 2025-05-05 PROCEDURE — 25010000002 PIPERACILLIN SOD-TAZOBACTAM PER 1 G: Performed by: INTERNAL MEDICINE

## 2025-05-05 PROCEDURE — 85027 COMPLETE CBC AUTOMATED: CPT | Performed by: INTERNAL MEDICINE

## 2025-05-05 PROCEDURE — 96376 TX/PRO/DX INJ SAME DRUG ADON: CPT

## 2025-05-05 PROCEDURE — 93005 ELECTROCARDIOGRAM TRACING: CPT | Performed by: STUDENT IN AN ORGANIZED HEALTH CARE EDUCATION/TRAINING PROGRAM

## 2025-05-05 PROCEDURE — 25010000002 METHYLPREDNISOLONE PER 40 MG: Performed by: INTERNAL MEDICINE

## 2025-05-05 PROCEDURE — 25010000002 HEPARIN (PORCINE) PER 1000 UNITS: Performed by: INTERNAL MEDICINE

## 2025-05-05 RX ORDER — BUMETANIDE 2 MG/1
1 TABLET ORAL DAILY
Qty: 7 TABLET | Refills: 0 | Status: SHIPPED | OUTPATIENT
Start: 2025-05-05 | End: 2025-05-19

## 2025-05-05 RX ORDER — IPRATROPIUM BROMIDE AND ALBUTEROL SULFATE 2.5; .5 MG/3ML; MG/3ML
3 SOLUTION RESPIRATORY (INHALATION) EVERY 6 HOURS PRN
Qty: 360 ML | Refills: 0 | Status: SHIPPED | OUTPATIENT
Start: 2025-05-05

## 2025-05-05 RX ORDER — PREDNISONE 10 MG/1
TABLET ORAL
Qty: 20 TABLET | Refills: 0 | Status: SHIPPED | OUTPATIENT
Start: 2025-05-05 | End: 2025-05-13

## 2025-05-05 RX ADMIN — BUSPIRONE HYDROCHLORIDE 5 MG: 5 TABLET ORAL at 08:03

## 2025-05-05 RX ADMIN — Medication 10 ML: at 08:03

## 2025-05-05 RX ADMIN — LEVOTHYROXINE SODIUM 88 MCG: 0.09 TABLET ORAL at 08:03

## 2025-05-05 RX ADMIN — Medication 10 MG: at 01:11

## 2025-05-05 RX ADMIN — HEPARIN SODIUM 5000 UNITS: 5000 INJECTION INTRAVENOUS; SUBCUTANEOUS at 08:03

## 2025-05-05 RX ADMIN — PIPERACILLIN AND TAZOBACTAM 3.38 G: 3; .375 INJECTION, POWDER, FOR SOLUTION INTRAVENOUS at 10:50

## 2025-05-05 RX ADMIN — IPRATROPIUM BROMIDE AND ALBUTEROL SULFATE 3 ML: 2.5; .5 SOLUTION RESPIRATORY (INHALATION) at 07:25

## 2025-05-05 RX ADMIN — METHYLPREDNISOLONE SODIUM SUCCINATE 40 MG: 40 INJECTION, POWDER, LYOPHILIZED, FOR SOLUTION INTRAMUSCULAR; INTRAVENOUS at 03:50

## 2025-05-05 RX ADMIN — PIPERACILLIN AND TAZOBACTAM 3.38 G: 3; .375 INJECTION, POWDER, FOR SOLUTION INTRAVENOUS at 03:49

## 2025-05-05 RX ADMIN — IPRATROPIUM BROMIDE AND ALBUTEROL SULFATE 3 ML: 2.5; .5 SOLUTION RESPIRATORY (INHALATION) at 13:28

## 2025-05-05 NOTE — PLAN OF CARE
Goal Outcome Evaluation:   Pt is currently resting in bed. VSS with no complaints or S/S of distress. Will continue to follow plan.

## 2025-05-05 NOTE — DISCHARGE PLACEMENT REQUEST
"Ila Fonseca (89 y.o. Male)       Date of Birth   1934    Social Security Number       Address   [address] [facility] [address]    Home Phone   [phone]    MRN   [id number]       Caodaism   Voodoo    Marital Status                               Admission Date   5/3/2025    Admission Type   Emergency    Admitting Provider   Luisa Guido DO    Attending Provider   Rosie Lizarraga DO    Department, Room/Bed   [address], [address]       Discharge Date       Discharge Disposition   Home-Health Care [place]    Discharge Destination                                 Attending Provider: Rosie Lizarraga DO    Allergies: No Known Allergies    Isolation: None   Infection: None   Code Status: CPR    Ht: 182.9 cm (72.01\")   Wt: 81.8 kg (180 lb 4.8 oz)    Admission Cmt: None   Principal Problem: Dyspnea [R06.00]                   Active Insurance as of 5/3/2025       Primary Coverage       Payor Plan Insurance Group Employer/Plan Group    HUMANA MEDICARE REPLACEMENT HU[employer] MEDICARE ADVANTAGE GROUP [id number]       Payor Plan Address Payor Plan Phone Number Payor Plan Fax Number Effective Dates    [address] [address] [phone]  2015 - None Entered    [facility] [address]         Subscriber Name Subscriber Birth Date Member ID       ILA FONSECA 1934 [id number]                     Emergency Contacts        (Rel.) Home Phone Work Phone Mobile Phone    DILAN FONSECA (Spouse) [phone] -- [phone]    SHAYNE FONSECA (Grandchild) [phone] -- --    Hao Fonseca (Son)  [address]  [facility] [address]  Dept. Phone:  [phone]  Dept. Fax:  [phone] Date Ordered: May 5, 2025         Patient:  Ila Fonseca MRN:  [id number]   [address] [facility] [address] :  1934  SSN:    Phone: [phone] Sex:  M     Weight: 81.8 kg (180 lb 4.8 oz)         Ht Readings from Last 1 Encounters:   25 182.9 cm " "(72.01\")         Home Nebulizer          (Order ID: [id number])    Diagnosis:  Shortness of breath (R06.02 [ICD-10-CM] 786.05 [ICD-9-CM])  Metastatic non-small cell lung cancer (C34.90 [ICD-10-CM] 162.9 [ICD-9-CM])  Chronic obstructive pulmonary disease, unspecified COPD type (J44.9 [ICD-10-CM] 496 [ICD-9-CM])   Quantity:  1     Nebulizer Equipment:  Nebulizer w/ Compressor  Nebulizer Accessories:  Nebulizer Kit - Administration Set, Non-Disposable  Length of Need: 99 Months = Lifetime        Authorizing Provider's Phone: [phone]  Authorizing Provider:Rosie Lizarraga DO  Authorizing Provider's NPI: [id number]  Order Entered By: Rosie Lizarraga DO 2025  3:49 PM     Electronically signed by: Rosie Lizarraga DO 2025  3:49 PM       [address] [address]  Dept. Phone:  [phone]  Dept. Fax:  [phone] Date Ordered: May 5, 2025         Patient:  Kevin Fonseca MRN:  [id number]   [address] [facility] [address] :  1934  SSN:    Phone: [phone] Sex:  M     Weight: 81.8 kg (180 lb 4.8 oz)         Ht Readings from Last 1 Encounters:   25 182.9 cm (72.01\")         Home Nebulizer          (Order ID: [id number])    Diagnosis:  Shortness of breath (R06.02 [ICD-10-CM] 786.05 [ICD-9-CM])  Metastatic non-small cell lung cancer (C34.90 [ICD-10-CM] 162.9 [ICD-9-CM])  Chronic obstructive pulmonary disease, unspecified COPD type (J44.9 [ICD-10-CM] 496 [ICD-9-CM])   Quantity:  1     Nebulizer Equipment:  Nebulizer w/ Compressor  Nebulizer Accessories:  Nebulizer Kit - Administration Set, Non-Disposable  Length of Need: 99 Months = Lifetime        Authorizing Provider's Phone: [phone]  Authorizing Provider:Rosie Lizarraga DO  Authorizing Provider's NPI: [id number]  Order Entered By: Rosie Lizarraga DO 2025  3:49 PM     Electronically signed by: Rosie Lizarraga DO 2025  3:49 PM    [place] [address] " [facility] [address] [address]  Phone:  [phone]  Fax:  [phone] Date: May 5, 2025      Ambulatory Referral to Home Health     Patient:  Kevin Fonseca MRN:  [id number]   [address] [address] :  1934  SSN:    Phone: [phone] Sex:  M      INSURANCE PAYOR PLAN GROUP # SUBSCRIBER ID   Primary:    HU[employer] MEDICARE REPLACEMENT [id number]      Referring Provider Information:  NIDA COTA Phone: [phone] Fax: [phone]       Referral Information:   # Visits:  999 Referral Type: Home Health [42]   Urgency:  Routine Referral Reason: Specialty Services Required   Start Date: May 5, 2025 End Date:  To be determined by Insurer   Diagnosis: Chronic respiratory failure with hypoxia (J96.11 [ICD-10-CM] 518.83,799.02 [ICD-9-CM])  Shortness of breath (R06.02 [ICD-10-CM] 786.05 [ICD-9-CM])  Metastatic non-small cell lung cancer (C34.90 [ICD-10-CM] 162.9 [ICD-9-CM])  Chronic obstructive pulmonary disease, unspecified COPD type (J44.9 [ICD-10-CM] 496 [ICD-9-CM])  Debility (R53.81 [ICD-10-CM] 799.3 [ICD-9-CM])      Refer to Dept:   Refer to Provider:   Refer to Provider Phone:   Refer to Facility:       Face to Face Visit Date: 2025  Follow-up provider for Plan of Care? I treated the patient in an acute care facility and will not continue treatment after discharge.  Follow-up provider: GOMEZ MARCOS [[id number]]  Reason/Clinical Findings: debility, difficulty ambulating, dyspnea on exertion  Describe mobility limitations that make leaving home difficult: continuous supplemental oxygen use, generalized weakness, dyspnea on exertion  Nursing/Therapeutic Services Requested: Skilled Nursing  Nursing/Therapeutic Services Requested: Physical Therapy  Skilled nursing orders: CHF management  Skilled nursing orders: O2 instruction  Skilled nursing orders: COPD management  Skilled nursing orders: Cardiopulmonary assessments  Skilled nursing orders: Medication  education  PT orders: Transfer training  PT orders: Home safety assessment  PT orders: Strengthening  PT orders: Therapeutic exercise  Frequency: 1 Week 1     This document serves as a request of services and does not constitute Insurance authorization or approval of services.  To determine eligibility, please contact the members Insurance carrier to verify and review coverage.     If you have medical questions regarding this request for services. Please contact [address] at [phone] during normal business hours.        Authorizing Provider:Rosie Lizarraga DO  Authorizing Provider's NPI: [id number]  Order Entered By: Rosie Lizarraga DO 5/5/2025  3:49 PM     Electronically signed by: Rosie Lizarraga DO 5/5/2025  3:49 PM    -- -- [phone]    DAVIS JOVEL (Daughter) -- -- [phone]              Discharge Order (From admission, onward)       Start     Ordered    05/05/25 1537  Discharge patient  Once        Expected Discharge Date: 05/05/25   Discharge Disposition: Home-Health Care [facility]   Physician of Record for Attribution - Please select from Treatment Team: ROSIE LIZARRAGA [[id number]]   Review needed by CMO to determine Physician of Record: No      Question Answer Comment   Physician of Record for Attribution - Please select from Treatment Team ROSIE LIZARRAGA    Review needed by CMO to determine Physician of Record No        05/05/25 1547

## 2025-05-05 NOTE — CASE MANAGEMENT/SOCIAL WORK
Discharge Planning Assessment   Manny     Patient Name: Kevin Fonseca  MRN: [id number]  Today's Date: 5/5/2025    Admit Date: 5/3/2025    Plan: Pt to be discharged home on this date with DME rolling walker and nebulizer and home health.  Pt stated preference for [facility] and [facility].  SS made referral to [facility] at [phone] and DME will be delivered to pt's home.  SS made referral to [facility] at [phone] per Tracey.  Monique Almazan, DARYLW

## 2025-05-05 NOTE — PLAN OF CARE
Goal Outcome Evaluation:   Pt resting in bed with family at bedside. No signs or symptoms of  distress noted. Pt ambulated this shift on 2l NC oxygen dropping to 89%. Plan of care on going.                                          no

## 2025-05-05 NOTE — DISCHARGE PLACEMENT REQUEST
"Ila Fonseca (89 y.o. Male)       Date of Birth   1934    Social Security Number       Address   [address] [facility] [address]    Home Phone   [phone]    MRN   [id number]       Latter day   Pentecostal    Marital Status                               Admission Date   5/3/2025    Admission Type   Emergency    Admitting Provider   Luisa Guido DO    Attending Provider   Rosie Lizarraga DO    Department, Room/Bed   [address], [address]       Discharge Date       Discharge Disposition   Home-Health Care [facility]    Discharge Destination                                 Attending Provider: Rosie Lizarraga DO    Allergies: No Known Allergies    Isolation: None   Infection: None   Code Status: CPR    Ht: 182.9 cm (72.01\")   Wt: 81.8 kg (180 lb 4.8 oz)    Admission Cmt: None   Principal Problem: Dyspnea [R06.00]                   Active Insurance as of 5/3/2025       Primary Coverage       Payor Plan Insurance Group Employer/Plan Group    HUMANA MEDICARE REPLACEMENT HUMANA MEDICARE ADVANTAGE GROUP [id number]       Payor Plan Address Payor Plan Phone Number Payor Plan Fax Number Effective Dates    [address] [address] [phone]  1/1/2015 - None Entered    [facility] [address]         Subscriber Name Subscriber Birth Date Member ID       ILA FONSECA 1934 [id number]                     Emergency Contacts        (Rel.) Home Phone Work Phone Mobile Phone    DILAN FONSECA (Spouse) [phone] -- [phone]    SHAYNE FONSECA (Grandchild) [phone] -- --    Hao Fonseca (Son) -- -- [phone]    DAVIS FONSECA (Daughter) -- -- [phone]              Emergency Contact Information       Name Relation Home Work Mobile    DILAN FONSECA Spouse [phone]    SHAYNE FONSECA Grandchild [phone]      Hao Fonseca Son   [phone]          Other Contacts       Name Relation Home Work Mobile    DAVIS FONSECA Daughter   [phone]          Insurance " Information                  HUMANA MEDICARE REPLACEMENT/HUMANA MEDICARE ADVANTAGE GROUP Phone: [phone]    Subscriber: Kevin Fonseca Subscriber#: [id number]    Group#: [id number] Precert#: --    Authorization#: -- Effective Date: --          Problem List           Codes Noted - Resolved       Hospital    * (Principal) Dyspnea ICD-10-CM: R06.00  ICD-9-CM: 786.09 5/4/2025 - Present    Chronic respiratory failure with hypoxia ICD-10-CM: J96.11  ICD-9-CM: 518.83, 799.02 5/4/2025 - Present       Non-Hospital    Non-pressure chronic ulcer of other part of right lower leg with fat layer exposed ICD-10-CM: L97.812  ICD-9-CM: 707.19 2/7/2025 - Present    Open wound ICD-10-CM: T14.8XXA  ICD-9-CM: 879.8 2/5/2025 - Present    Bradycardia ICD-10-CM: R00.1  ICD-9-CM: 427.89 9/18/2024 - Present    Metastatic non-small cell lung cancer ICD-10-CM: C34.90  ICD-9-CM: 162.9 7/9/2024 - Present    Mass of right lung ICD-10-CM: R91.8  ICD-9-CM: 786.6 6/10/2024 - Present    Seasonal allergic rhinitis ICD-10-CM: J30.2  ICD-9-CM: 477.9 4/9/2024 - Present    Hypothyroidism ICD-10-CM: E03.9  ICD-9-CM: 244.9 7/4/2022 - Present    Exudative age-related macular degeneration, bilateral, with inactive scar ICD-10-CM: H35.3233  ICD-9-CM: 362.52 7/4/2022 - Present    Other specified peripheral vascular diseases ICD-10-CM: I73.89  ICD-9-CM: 443.89 7/4/2022 - Present    Sick sinus syndrome ICD-10-CM: I49.5  ICD-9-CM: 427.81 7/4/2022 - Present    BENNIE (obstructive sleep apnea) ICD-10-CM: G47.33  ICD-9-CM: 327.23 7/4/2022 - Present    Sinus pause ICD-10-CM: I45.5  ICD-9-CM: 426.6 1/25/2022 - Present    Chronic obstructive pulmonary disease ICD-10-CM: J44.9  ICD-9-CM: 496 8/3/2021 - Present    Former cigarette smoker ICD-10-CM: Z87.891  ICD-9-CM: V15.82 8/3/2021 - Present    Nocturnal hypoxia ICD-10-CM: G47.34  ICD-9-CM: 327.24 8/3/2021 - Present        History & Physical        Debby Bennett, DENICE at 05/04/25 020       Attestation signed by Luisa Guido  DO Tyrell at 25 0644      I have reviewed this documentation, written orders and agree.  Patient also seen with Debby in .  Wife and daughter present at bedside.  Patient with bilateral lower extremity edema and some blistering noted mostly on the left lower extremity.  On my exam, lungs are with mild bilateral wheezing and rhonchi.    Suspect acute COPD exacerbation present upon admission in the setting of non-small cell lung cancer with metastatic disease to the liver s/p chemotherapy and radiation and currently on Keytruda; patient also with worsening bilateral lower extremity peripheral edema of unclear etiology (?  Due to worsening aortic valve stenosis and/or side effect from immunotherapy).    Telemetry admit, continue supplemental oxygen and titrate for saturations 90 to 95%.  Given immunocompromised state and structural lung disease, will place patient on empiric antibiotic therapy with Zosyn for now.  Consider addition of doxycycline or azithromycin.  Agree with IV steroids and nebulized inhalants.  Will place patient on IV Lasix 40 mg twice daily and will update the patient's echocardiogram.                        [facility] Medicine Services  History & Physical    Patient Identification:  Name:  Kevin Fonseca  Age:  89 y.o.  Sex:  male  :  1934  MRN:  [id number]   Visit Number:  [id number]  Admit Date: 5/3/2025   Primary Care Physician:  Elissa Geronimo MD    Subjective     Chief complaint: Shortness of Breath    History of presenting illness:      Kevin Fonseca is a 89 y.o. 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 Patient was brought to [facility] Emergency Department via EMS on the night of 05/03/2025 due to shortness of breath. Patient states that shortness of breath has been progressively worse than usual for about 2-3 weeks. He also reports increasingly frequent productive cough with thick sputum. Denies fever or chills. States that he's also been experiencing lower extremity edema for the past 3 weeks or so. Outpatient provider recently placed patient on Lasix 20 mg daily; increased to 40 mg daily 3 days ago although swelling has not improved. Patient denies any chest discomfort. Spouse states that patient has been very fatigued and week. She states that he does not sleep well. Patient says that he now is short of breath with \"any little movement\" and has difficulty tolerating even light activity or performing ADLs. He reports using 2-3 L nasal cannula continuously at baseline. States that he is a former smoker. Lives at home with his spouse. Workup most consistent with COPD exacerbation so far. CT chest noted trace right pleural effusion but no significant edema. Auscultation of lung sounds revealed bilateral expiratory wheezing and decreased air movement along with rales. He is stable on baseline O2. Patient was given IV steroids and duo nebs in the ED which he states did help to improve his breathing. Just before my evaluation, ED provider had ordered 40 mg IV Lasix and patient was responding well with ~400 mL urine in bedside container (external cath in place). Patient will be admitted for further monitoring, evaluation, and treatment. Discussed plans with patient and family at bedside. They voiced agreement and understanding with no further questions or concerns at this time.      Upon arrival to the ED, vital signs were temperature 97.7, pulse 74, respirations 20, blood pressure 143/70 " lying, SpO2 saturation 100% on 2 L nasal cannula.  ABG with pH 7.334, pCO2 62.1, pO2 67.8, O2 saturation 91.9% on 2 L.  High-sensitivity troponin T 48 with a 0 numeric delta.  proBNP 3055.  CMP with sodium 134, chloride 95, CO2 29.2, glucose 119, albumin 3.4, otherwise unremarkable.  CBC with RBCs 3.57, hemoglobin 10.1, hematocrit 32.0, otherwise unremarkable.  Respiratory panel negative.  CT of the chest without contrast noted irregular lateral pleural thickening, 6 x 2 cm, with multifocal irregular scarring in the right upper lobe, unchanged from 2/26/2025.  Nodular pleural thickening along the inferior major fissure with adjacent scarring in the lingula, also unchanged.  Trace right pleural effusion.  No consolidative infiltrate or pneumothorax.  COPD.  Infrarenal fusiform aortic aneurysm measuring up to 3.6 cm in diameter, unchanged.  Stable 6.6 x 3.5 cm lobulated calcified mass in the right hepatic lobe, with a 2.2 cm new calcified lesion in the left hepatic lobe.    Known Emergency Department medications received prior to my evaluation included 10 mg IV Decadron, 40 mg IV Lasix, DuoNeb x 2. Emergency Department Room location at the time of my evaluation was 104. Discussed with admitting physician, Luisa Larson DO.     ---------------------------------------------------------------------------------------------------------------------   Review of Systems   Constitutional:  Positive for fatigue. Negative for fever.   Respiratory:  Positive for cough (w/ sputum production) and shortness of breath.    Cardiovascular:  Positive for leg swelling.   Gastrointestinal:  Negative for abdominal pain, constipation, diarrhea, nausea and vomiting.   Genitourinary:  Negative for difficulty urinating and hematuria.   Musculoskeletal:  Positive for gait problem (due to SOA).   Neurological:  Positive for tremors and weakness (generalized).   Psychiatric/Behavioral:  Negative for confusion.       ---------------------------------------------------------------------------------------------------------------------   Past Medical History:   Diagnosis Date    Arthritis     Asthma     Chronic bronchitis     Complete heart block     Emphysema lung     Gastritis     Heartburn     Macular degeneration     Macular degeneration, wet     Metastatic non-small cell lung cancer     Reflux esophagitis     Thyroid disease      Past Surgical History:   Procedure Laterality Date    INTRACAPSULAR CATARACT EXTRACTION      Dr. Lesly Gray. Dr. Neto Light.    LIVER BIOPSY       Family History   Problem Relation Age of Onset    Hypertension Mother     Other Mother     Cancer Father     Cancer Brother     Asthma Brother      Social History     Socioeconomic History    Marital status:    Tobacco Use    Smoking status: Former     Current packs/day: 0.00     Average packs/day: 1.5 packs/day for 44.0 years (66.0 ttl pk-yrs)     Types: Cigarettes     Start date:      Quit date:      Years since quittin.3     Passive exposure: Past    Smokeless tobacco: Never   Vaping Use    Vaping status: Never Used   Substance and Sexual Activity    Alcohol use: Not Currently     Comment: 2 week    Drug use: Never    Sexual activity: Defer     ---------------------------------------------------------------------------------------------------------------------   Allergies:  Patient has no known allergies.  ---------------------------------------------------------------------------------------------------------------------   Home medications:    Medications below are reported home medications pulling from within the system; at this time, these medications have not been reconciled unless otherwise specified and are in the verification process for further verifcation as current home medications.  Medications Prior to Admission   Medication Sig Dispense Refill Last Dose/Taking    azithromycin (Zithromax Z-Zafar) 250 MG tablet  Take 2 tablets the first day, then 1 tablet daily for 4 days. 6 tablet 0 Past Week    busPIRone (BUSPAR) 5 MG tablet Take 1 tablet by mouth Daily.   5/3/2025    levothyroxine (SYNTHROID, LEVOTHROID) 88 MCG tablet TAKE 1 TABLET BY MOUTH DAILY 90 tablet 0 5/3/2025       Hospital Scheduled Meds:  heparin (porcine), 5,000 Units, Subcutaneous, Q12H  ipratropium-albuterol, 3 mL, Nebulization, Q6H While Awake - RT  methylPREDNISolone sodium succinate, 40 mg, Intravenous, Q12H  piperacillin-tazobactam, 3.375 g, Intravenous, Once  piperacillin-tazobactam, 3.375 g, Intravenous, Q8H  sodium chloride, 10 mL, Intravenous, Q12H           Current listed hospital scheduled medications may not yet reflect those currently placed in orders that are signed and held awaiting patient's arrival to floor.   ---------------------------------------------------------------------------------------------------------------------     Objective     Vital Signs:  Temp:  [97.7 °F (36.5 °C)] 97.7 °F (36.5 °C)  Heart Rate:  [54-98] 80  Resp:  [20-28] 20  BP: (114-165)/(53-82) 141/69      05/03/25 2108   Weight: 122 kg (270 lb)     Body mass index is 36.62 kg/m².  ---------------------------------------------------------------------------------------------------------------------       Physical Exam  Vitals and nursing note reviewed.   Constitutional:       General: He is awake. He is not in acute distress.     Appearance: He is ill-appearing. He is not diaphoretic.      Interventions: Nasal cannula in place.      Comments: On 2 L nasal cannula at the time of my evaluation.   HENT:      Head: Normocephalic and atraumatic.      Mouth/Throat:      Mouth: Mucous membranes are moist.      Pharynx: Oropharynx is clear.   Eyes:      Extraocular Movements: Extraocular movements intact.      Pupils: Pupils are equal, round, and reactive to light.   Cardiovascular:      Rate and Rhythm: Normal rate. Rhythm irregular.      Pulses: Normal pulses.            Dorsalis pedis pulses are 2+ on the left side.      Heart sounds: Murmur heard.      Systolic murmur is present.      No friction rub.      Comments: Right lower extremity wrapped with wound dressing.  Appears to be similar in size to the left lower extremity.  LLE has 2+ pitting edema.  Pulmonary:      Effort: No accessory muscle usage or retractions.      Breath sounds: Wheezing and rales present.   Abdominal:      General: Bowel sounds are normal. There is no distension.      Palpations: Abdomen is soft.      Tenderness: There is no abdominal tenderness. There is no guarding.   Musculoskeletal:         General: Swelling present.      Cervical back: Neck supple. No rigidity.      Left lower le+ Pitting Edema present.   Skin:     General: Skin is warm and dry.      Capillary Refill: Capillary refill takes 2 to 3 seconds.      Coloration: Skin is pale.      Findings: Wound (RLE) present.   Neurological:      Mental Status: He is alert and oriented to person, place, and time. Mental status is at baseline.      Cranial Nerves: No dysarthria or facial asymmetry.      Sensory: Sensation is intact.      Motor: Weakness (Generalized) present. No tremor or seizure activity.   Psychiatric:         Attention and Perception: Attention normal.         Mood and Affect: Mood normal.         Speech: Speech normal.         Behavior: Behavior normal. Behavior is cooperative.         Thought Content: Thought content normal.         Cognition and Memory: Cognition normal.       ---------------------------------------------------------------------------------------------------------------------  EKG:  Pending formal Cardiology interpretation. Per my review, appears normal sinus rhythm with sinus arrhythmia.     Telemetry:  Appearing normal sinus rhythm w/ sinus arrhythmia 60-70s during my evaluation.   I have personally looked at both the EKG and the telemetry  "strips.  ---------------------------------------------------------------------------------------------------------------------   Results from last 7 days   Lab Units 05/03/25 2126   WBC 10*3/mm3 7.76   HEMOGLOBIN g/dL 10.1*   HEMATOCRIT % 32.0*   MCV fL 89.6   MCHC g/dL 31.6   PLATELETS 10*3/mm3 263     Results from last 7 days   Lab Units 05/03/25  2301   PH, ARTERIAL pH units 7.334*   PO2 ART mm Hg 67.8*   PCO2, ARTERIAL mm Hg 62.1*   HCO3 ART mmol/L 33.0*     Results from last 7 days   Lab Units 05/03/25 2126   SODIUM mmol/L 134*   POTASSIUM mmol/L 4.8   CHLORIDE mmol/L 95*   CO2 mmol/L 29.2*   BUN mg/dL 20   CREATININE mg/dL 0.93   CALCIUM mg/dL 8.7   GLUCOSE mg/dL 119*   ALBUMIN g/dL 3.4*   BILIRUBIN mg/dL 0.2   ALK PHOS U/L 76   AST (SGOT) U/L 20   ALT (SGPT) U/L 14   Estimated Creatinine Clearance: 71.2 mL/min (by C-G formula based on SCr of 0.93 mg/dL).  No results found for: \"AMMONIA\"  Results from last 7 days   Lab Units 05/03/25 2229 05/03/25 2126   HSTROP T ng/L 48* 48*     Results from last 7 days   Lab Units 05/03/25 2126   PROBNP pg/mL 3,055.0*     Lab Results   Component Value Date    HGBA1C 5.90 (H) 11/03/2022     Lab Results   Component Value Date    TSH 2.620 04/15/2025    FREET4 1.47 04/15/2025     No results found for: \"PREGTESTUR\", \"PREGSERUM\", \"HCG\", \"HCGQUANT\"  Pain Management Panel          Latest Ref Rng & Units 7/9/2024   Pain Management Panel   Creatinine, Urine mg/dL 31.8          ---------------------------------------------------------------------------------------------------------------------  Imaging Results (Last 7 Days)       Procedure Component Value Units Date/Time    CT Chest Without Contrast Diagnostic [[id number]] Collected: 05/04/25 0023     Updated: 05/04/25 0031    Narrative:      CLINICAL INDICATION: shortness of breath  CONTRAST: No Contrast  PROCEDURE DATE: 5/3/2025     CT CHEST W/O  Technique: Standard axial collimation through the chest without  intravenous " contrast.  Reconstructed images were submitted for  interpretation. Limited exposure control, adjustment of the mA and/or KV  according to patient size or use of iterative reconstruction technique  was utilized.     Comparison: CT chest 2/26/2025     Findings:     Lateral irregular pleural thickening, 6 x 2 cm, with multifocal  irregular scarring in the right upper lobe, unchanged from 2/26/2025.  Nodular pleural thickening along the inferior major fissure with  adjacent scarring in the lingula, also unchanged.  Noncontrast CT chest in 6 months is recommended.  Trace right pleural effusion.  No consolidative infiltrate or pneumothorax.  COPD and emphysematous changes with hyperinflation of both lungs.  Normal heart size without significant pericardial effusion.  Diffuse coronary atherosclerosis.  No thoracic aortic aneurysm.  No lymphadenopathy.  Infrarenal fusiform aortic aneurysm measuring up to 3.6 cm in diameter,  unchanged. The inferior portion of the aortic aneurysm is not included  on the images.  Chronic 80% L2 vertebral compression fracture, unchanged.  A stable 6.6 x 3.5 cm lobulated calcified mass in the right hepatic  lobe, with a 2.2 cm new calcified lesion in the left hepatic lobe.       Impression:      Impression:     1.  Irregular lateral pleural thickening, 6 x 2 cm, with multifocal  irregular scarring in the right upper lobe, unchanged from 2/26/2025.  2.  Nodular pleural thickening along the inferior major fissure with  adjacent scarring in the lingula, also unchanged.  3.  Trace right pleural effusion.  4.  No consolidative infiltrate or pneumothorax.  5.  COPD.  6.  Infrarenal fusiform aortic aneurysm measuring up to 3.6 cm in  diameter, unchanged.   7.  A stable 6.6 x 3.5 cm lobulated calcified mass in the right hepatic  lobe, with a 2.2 cm new calcified lesion in the left hepatic lobe.  Followup CT abdomen/pelvis using liver protocol is recommended.     This report was finalized on 5/4/2025  12:29 AM by Manohar Welsh MD.       XR Chest 1 View [[id number]] Resulted: 05/03/25 2127     Updated: 05/03/25 2136          Last echocardiogram:  Results for orders placed during the hospital encounter of 09/18/24    Adult Transthoracic Echo Complete W/ Cont if Necessary Per Protocol    Interpretation Summary    Left ventricular systolic function is normal. Estimated left ventricular EF = 55%    Normal right ventricular cavity size and systolic function noted.    Moderate aortic valve stenosis is present. MILA 1.2 cm2, V max of 2.3 m/sec, MG of 11 mmHg, DI of 0.4.    Estimated right ventricular systolic pressure from tricuspid regurgitation is mildly elevated (35-45 mmHg).    Mild to moderate pulmonary hypertension is present.    There is no evidence of pericardial effusion    As compared to the prior study from 5/11/2023 the degree of aortic stenosis is now worse.    I have personally reviewed the above radiology images and read the final radiology report on 05/04/25  ---------------------------------------------------------------------------------------------------------------------  Assessment / Plan     Active Hospital Problems    Diagnosis  POA    **Dyspnea [R06.00]  Yes    Chronic respiratory failure with hypoxia [J96.11]  Yes     ASSESSMENT/PLAN:  #COPD with suspected exacerbation on admission  #Chronic respiratory failure, currently stable on baseline O2  #History of BENNIE  #Former tobacco abuse  --89 y.o. chronically ill male patient presents to [facility] ED w/ 2-3 week hx of progressively worsening shortness of breath, cough, and sputum production. States that he can no longer tolerate even light activity or performing ADLs without becoming severely short of breath. Also reports worsening lower extremity edema x 2-3 weeks despite Lasix use.   --CT chest w/o contrast noted no evidence of consolidative infiltrates. Trace right pleural effusion. Other findings overall stable.   --Not meeting SIRS/sepsis criteria on  arrival. Vitals stable. No hypotension or tachycardia. Afebrile. No leukocytosis.   --Respiratory PCR negative.   --Currently stable on baseline O2 requirement (2-3L). Continuous pulse oximetry ordered. Continue to titrate supplemental O2 to maintain SpO2 saturations > 90%.   --Plan to treat suspected COPDe with scheduled nebulized inhalants and IV solu-medrol. Placed empirically on Zosyn by attending provider.   --PRN Tessalon capsules for cough.   --Encourage incentive spirometry use.   --OPEP.   --Turn, cough, and deep breathing exercises.   --COPD educator consulted.     #Non-small cell lung cancer with metastatic disease to the liver status post chemotherapy/radiation, currently on palliative chemo; follows closely with hematology/oncology in the outpatient setting.   #Chronic non-pressure related right lower extremity wound; wound consult placed. Patient follows with wound care in the outpatient setting. Wound dressing in place to the RLE at the time of my exam.   #Chronic appearing normocytic anemia; Hgb appearing stable compared to baseline/trend. No new/recent bleeding reported. Continue to closely monitor Hgb. Plan to transfuse for Hgb < 7. Will repeat CBC in the AM.   #GERD; reflux precautions.   #Hypothyroidism; check TSH. Review home levothyroxine dose once reconciled by pharmacy with plans to continue/adjust pending laboratory results.  #Sick sinus syndrome, history of intermittent second-degree type II AV block and intermittent third-degree anabelle; patient evaluated by EP in the recent past. Determined likely not a good candidate for permanent pacemaker implantation given his age and comorbidities. Continuous cardiac monitoring ordered.   #Moderate aortic stenosis; plan to update TTE to monitor. Worsening lower extremity edema could be due to his aortic stenosis versus his Keytruda treatments. 40 mg IV Lasix given in ED with good response. Monitor strict I's and O's, daily weights. Closely monitor renal  function (currently intact).   #AAA; CT chest w/o contrast obtained in ED reveals infrarenal fusiform aortic aneurysm measuring up to 3.6 cm in diameter, unchanged.   #Arthritis; supportive care.   #Physical debility; PT/OT consults. Maintain fall precautions.   #Advanced age      ----------  -DVT prophylaxis: Subq heparin.   -Activity: As tolerated. Maintain fall precautions.   -Expected length of stay:   OBSERVATION status; however, if further evaluation or treatment plans warrant, status may change.  Based upon current information, I predict patient's care encounter to be less than or equal to 2 midnights   -Disposition plans to return home w/ spouse on discharge once medically stable. Will consult case management for assistance with discharge plans.     High risk secondary to suspected COPD exacerbation, worsening lower extremity edema despite Lasix use in the outpatient setting,     CODE STATUS: FULL CODE, discussed with patient and family members in .       DENICE Baldwin   05/04/25  03:58 EDT  Pager #[phone]  ---------------------------------------------------------------------------------------------------------------------        Electronically signed by Luisa Guido DO at 05/04/25 0632       Vital Signs (last day)       Date/Time Temp Temp src Pulse Resp BP Patient Position SpO2    05/05/25 1549 98.2 (36.8) Oral 73 18 112/52 Sitting 96    05/05/25 1337 -- -- 73 18 -- -- 97    05/05/25 1328 -- -- 73 18 -- -- 95    05/05/25 1058 98.2 (36.8) Oral 70 18 127/55 Lying 95    05/05/25 0737 -- -- 63 18 -- -- 97    05/05/25 0726 -- -- 62 18 -- -- 95    05/05/25 0701 97.9 (36.6) Oral 59 18 130/54 Lying 96    05/05/25 0416 98 (36.7) Oral 56 18 108/55 Lying 95    05/04/25 2312 98.2 (36.8) Oral 71 18 111/54 Lying 97    05/04/25 1918 97.9 (36.6) Oral 65 18 117/51 Lying 97    05/04/25 1840 -- -- 70 18 -- -- 99    05/04/25 1832 -- -- 69 18 -- -- 95    05/04/25 1503 98.1 (36.7) Oral 67 18 115/51  Lying 96    05/04/25 1249 -- -- 53 20 -- -- 95    05/04/25 1142 98.4 (36.9) Oral 75 20 129/84 Sitting 95    05/04/25 0700 98.5 (36.9) Oral 90 20 113/53 Lying 94    05/04/25 0633 -- -- 76 20 -- -- 96    05/04/25 0320 98.6 (37) Oral 82 20 162/75 Lying 91    05/04/25 0245 -- -- 80 -- 141/69 -- 94    05/04/25 0200 -- -- 64 -- 134/64 -- 92    05/04/25 0130 -- -- 78 -- 115/53 -- --    05/04/25 0100 -- -- 79 -- 132/61 -- 97    05/04/25 0045 -- -- 76 -- 118/57 -- 98    05/04/25 0030 -- -- 79 -- 123/72 -- 96    05/04/25 0015 -- -- 88 -- 160/70 -- 92    05/04/25 0000 -- -- 73 -- 114/57 -- 99          Lines, Drains & Airways       Active LDAs       Name Placement date Placement time Site Days    Peripheral IV 05/03/25 2123 20 G Anterior;Left Forearm 05/03/25 2123  Forearm  1    External Urinary Catheter 05/04/25  0330  --  1                  Lab Results (most recent)       Procedure Component Value Units Date/Time    Basic Metabolic Panel [[id number]]  (Abnormal) Collected: 05/05/25 0134    Specimen: Blood Updated: 05/05/25 0256     Glucose 165 mg/dL      BUN 32 mg/dL      Creatinine 1.21 mg/dL      Sodium 134 mmol/L      Potassium 4.5 mmol/L      Chloride 96 mmol/L      CO2 30.0 mmol/L      Calcium 8.2 mg/dL      BUN/Creatinine Ratio 26.4     Anion Gap 8.0 mmol/L      eGFR 56.9 mL/min/1.73     Narrative:      GFR Categories in Chronic Kidney Disease (CKD)              GFR Category          GFR (mL/min/1.73)    Interpretation  G1                    90 or greater        Normal or high (1)  G2                    60-89                Mild decrease (1)  G3a                   45-59                Mild to moderate decrease  G3b                   30-44                Moderate to severe decrease  G4                    15-29                Severe decrease  G5                    14 or less           Kidney failure    (1)In the absence of evidence of kidney disease, neither GFR category G1 or G2 fulfill the criteria for CKD.    eGFR  calculation 2021 CKD-EPI creatinine equation, which does not include race as a factor    CBC (No Diff) [[id number]]  (Abnormal) Collected: 05/05/25 0134    Specimen: Blood Updated: 05/05/25 0249     WBC 8.19 10*3/mm3      RBC 3.16 10*6/mm3      Hemoglobin 9.0 g/dL      Hematocrit 28.2 %      MCV 89.2 fL      MCH 28.5 pg      MCHC 31.9 g/dL      RDW 12.8 %      RDW-SD 42.0 fl      MPV 10.4 fL      Platelets 256 10*3/mm3     Urine Drug Screen - Urine, Clean Catch [[id number]]  (Abnormal) Collected: 05/04/25 2314    Specimen: Urine, Clean Catch Updated: 05/04/25 2334     THC, Screen, Urine Negative     Phencyclidine (PCP), Urine Negative     Cocaine Screen, Urine Negative     Methamphetamine, Ur Negative     Opiate Screen Positive     Amphetamine Screen, Urine Negative     Benzodiazepine Screen, Urine Negative     Tricyclic Antidepressants Screen Negative     Methadone Screen, Urine Negative     Barbiturates Screen, Urine Negative     Oxycodone Screen, Urine Negative     Buprenorphine, Screen, Urine Negative    Narrative:      Cutoff For Drugs Screened:    Amphetamines               500 ng/ml  Barbiturates               200 ng/ml  Benzodiazepines            150 ng/ml  Cocaine                    150 ng/ml  Methadone                  200 ng/ml  Opiates                    100 ng/ml  Phencyclidine               25 ng/ml  THC                         50 ng/ml  Methamphetamine            500 ng/ml  Tricyclic Antidepressants  300 ng/ml  Oxycodone                  100 ng/ml  Buprenorphine               10 ng/ml    The normal value for all drugs tested is negative. This report includes unconfirmed screening results, with the cutoff values listed, to be used for medical treatment purposes only.  Unconfirmed results must not be used for non-medical purposes such as employment or legal testing.  Clinical consideration should be applied to any drug of abuse test, particularly when unconfirmed results are used.      Fentanyl, Urine -  Urine, Clean Catch [[id number]]  (Normal) Collected: 05/04/25 2314    Specimen: Urine, Clean Catch Updated: 05/04/25 2331     Fentanyl, Urine Negative    Narrative:      Negative Threshold:      Fentanyl 5 ng/mL     The normal value for the drug tested is negative. This report includes final unconfirmed screening results to be used for medical treatment purposes only. Unconfirmed results must not be used for non-medical purposes such as employment or legal testing. Clinical consideration should be applied to any drug of abuse test, particularly when unconfirmed results are used.           Urinalysis With Microscopic If Indicated (No Culture) - Urine, Clean Catch [[id number]]  (Normal) Collected: 05/04/25 1107    Specimen: Urine, Clean Catch Updated: 05/04/25 1118     Color, UA Yellow     Appearance, UA Clear     pH, UA 7.0     Specific Gravity, UA 1.007     Glucose, UA Negative     Ketones, UA Negative     Bilirubin, UA Negative     Blood, UA Negative     Protein, UA Negative     Leuk Esterase, UA Negative     Nitrite, UA Negative     Urobilinogen, UA 0.2 E.U./dL    Narrative:      Urine microscopic not indicated.    Comprehensive Metabolic Panel [[id number]]  (Abnormal) Collected: 05/04/25 0518    Specimen: Blood Updated: 05/04/25 0610     Glucose 204 mg/dL      BUN 20 mg/dL      Creatinine 0.98 mg/dL      Sodium 131 mmol/L      Potassium 4.5 mmol/L      Chloride 93 mmol/L      CO2 29.1 mmol/L      Calcium 8.5 mg/dL      Total Protein 6.8 g/dL      Albumin 3.1 g/dL      ALT (SGPT) 12 U/L      AST (SGOT) 19 U/L      Alkaline Phosphatase 72 U/L      Total Bilirubin 0.2 mg/dL      Globulin 3.7 gm/dL      A/G Ratio 0.8 g/dL      BUN/Creatinine Ratio 20.4     Anion Gap 8.9 mmol/L      eGFR 73.3 mL/min/1.73     Narrative:      GFR Categories in Chronic Kidney Disease (CKD)              GFR Category          GFR (mL/min/1.73)    Interpretation  G1                    90 or greater        Normal or high (1)  G2                     60-89                Mild decrease (1)  G3a                   45-59                Mild to moderate decrease  G3b                   30-44                Moderate to severe decrease  G4                    15-29                Severe decrease  G5                    14 or less           Kidney failure    (1)In the absence of evidence of kidney disease, neither GFR category G1 or G2 fulfill the criteria for CKD.    eGFR calculation 2021 CKD-EPI creatinine equation, which does not include race as a factor    CBC & Differential [[id number]]  (Abnormal) Collected: 05/04/25 0518    Specimen: Blood Updated: 05/04/25 0551    Narrative:      The following orders were created for panel order CBC & Differential.  Procedure                               Abnormality         Status                     ---------                               -----------         ------                     CBC Auto Differential[[id number]]        Abnormal            Final result                 Please view results for these tests on the individual orders.    CBC Auto Differential [[id number]]  (Abnormal) Collected: 05/04/25 0518    Specimen: Blood Updated: 05/04/25 0551     WBC 4.91 10*3/mm3      RBC 3.54 10*6/mm3      Hemoglobin 10.0 g/dL      Hematocrit 31.7 %      MCV 89.5 fL      MCH 28.2 pg      MCHC 31.5 g/dL      RDW 12.8 %      RDW-SD 42.1 fl      MPV 10.1 fL      Platelets 260 10*3/mm3      Neutrophil % 91.3 %      Lymphocyte % 5.7 %      Monocyte % 2.2 %      Eosinophil % 0.2 %      Basophil % 0.4 %      Immature Grans % 0.2 %      Neutrophils, Absolute 4.48 10*3/mm3      Lymphocytes, Absolute 0.28 10*3/mm3      Monocytes, Absolute 0.11 10*3/mm3      Eosinophils, Absolute 0.01 10*3/mm3      Basophils, Absolute 0.02 10*3/mm3      Immature Grans, Absolute 0.01 10*3/mm3      nRBC 0.0 /100 WBC     TSH [[id number]]  (Normal) Collected: 05/03/25 2229    Specimen: Blood from Arm, Right Updated: 05/04/25 0421     TSH 3.110 uIU/mL     Respiratory  Panel PCR w/COVID-19(SARS-CoV-2) [facility] In-House, NP Swab in UTM/VTM, 2 HR TAT - Swab, Nasopharynx [[id number]]  (Normal) Collected: 05/03/25 2229    Specimen: Swab from Nasopharynx Updated: 05/03/25 2322     ADENOVIRUS, PCR Not Detected     Coronavirus 229E Not Detected     Coronavirus HKU1 Not Detected     Coronavirus NL63 Not Detected     Coronavirus OC43 Not Detected     COVID19 Not Detected     Human Metapneumovirus Not Detected     Human Rhinovirus/Enterovirus Not Detected     Influenza A PCR Not Detected     Influenza B PCR Not Detected     Parainfluenza Virus 1 Not Detected     Parainfluenza Virus 2 Not Detected     Parainfluenza Virus 3 Not Detected     Parainfluenza Virus 4 Not Detected     RSV, PCR Not Detected     Bordetella pertussis pcr Not Detected     Bordetella parapertussis PCR Not Detected     Chlamydophila pneumoniae PCR Not Detected     Mycoplasma pneumo by PCR Not Detected    Narrative:      In the setting of a positive respiratory panel with a viral infection PLUS a negative procalcitonin without other underlying concern for bacterial infection, consider observing off antibiotics or discontinuation of antibiotics and continue supportive care. If the respiratory panel is positive for atypical bacterial infection (Bordetella pertussis, Chlamydophila pneumoniae, or Mycoplasma pneumoniae), consider antibiotic de-escalation to target atypical bacterial infection.    Blood Gas, Arterial With Co-Ox [[id number]]  (Abnormal) Collected: 05/03/25 2301    Specimen: Arterial Blood Updated: 05/03/25 2301     Site Right Brachial     Vikash's Test N/A     pH, Arterial 7.334 pH units      Comment: 84 Value below reference range        pCO2, Arterial 62.1 mm Hg      Comment: 86 Value above critical limit        pO2, Arterial 67.8 mm Hg      Comment: 84 Value below reference range        HCO3, Arterial 33.0 mmol/L      Comment: 83 Value above reference range        Base Excess, Arterial 5.6 mmol/L       O2 Saturation, Arterial 91.9 %      Comment: 84 Value below reference range        Hemoglobin, Blood Gas 11.0 g/dL      Comment: 84 Value below reference range        Hematocrit, Blood Gas 33.7 %      Comment: 84 Value below reference range        Oxyhemoglobin 91.1 %      Comment: 84 Value below reference range        Methemoglobin <-0.10 %      Comment: 94 Value below reportable range < _0.1        Carboxyhemoglobin 1.6 %      A-a DO2 51.8 mmHg      CO2 Content 34.9 mmol/L      Barometric Pressure for Blood Gas 722 mmHg      Modality Nasal Cannula     FIO2 28 %      Flow Rate 2.0 lpm      Ventilator Mode NA     Notified Who ER DR AND RN     Notified By [id number]     Notified Time 05/03/2025 23:06     Collected by [id number]     Comment: Meter: [id number]     :  [id number]        pH, Temp Corrected --     pCO2, Temperature Corrected --     pO2, Temperature Corrected --    High Sensitivity Troponin T 1Hr [[id number]]  (Abnormal) Collected: 05/03/25 2229    Specimen: Blood from Arm, Right Updated: 05/03/25 2252     HS Troponin T 48 ng/L      Troponin T Numeric Delta 0 ng/L      Troponin T % Delta 0    Narrative:      High Sensitive Troponin T Reference Range:  <14.0 ng/L- Negative Female for AMI  <22.0 ng/L- Negative Male for AMI  >=14 - Abnormal Female indicating possible myocardial injury.  >=22 - Abnormal Male indicating possible myocardial injury.   Clinicians would have to utilize clinical acumen, EKG, Troponin, and serial changes to determine if it is an Acute Myocardial Infarction or myocardial injury due to an underlying chronic condition.         Comprehensive Metabolic Panel [[id number]]  (Abnormal) Collected: 05/03/25 2126    Specimen: Blood from Arm, Left Updated: 05/03/25 2151     Glucose 119 mg/dL      BUN 20 mg/dL      Creatinine 0.93 mg/dL      Sodium 134 mmol/L      Potassium 4.8 mmol/L      Chloride 95 mmol/L      CO2 29.2 mmol/L      Calcium 8.7 mg/dL      Total Protein 7.3 g/dL      Albumin  3.4 g/dL      ALT (SGPT) 14 U/L      AST (SGOT) 20 U/L      Alkaline Phosphatase 76 U/L      Total Bilirubin 0.2 mg/dL      Globulin 3.9 gm/dL      A/G Ratio 0.9 g/dL      BUN/Creatinine Ratio 21.5     Anion Gap 9.8 mmol/L      eGFR 78.0 mL/min/1.73     Narrative:      GFR Categories in Chronic Kidney Disease (CKD)              GFR Category          GFR (mL/min/1.73)    Interpretation  G1                    90 or greater        Normal or high (1)  G2                    60-89                Mild decrease (1)  G3a                   45-59                Mild to moderate decrease  G3b                   30-44                Moderate to severe decrease  G4                    15-29                Severe decrease  G5                    14 or less           Kidney failure    (1)In the absence of evidence of kidney disease, neither GFR category G1 or G2 fulfill the criteria for CKD.    eGFR calculation 2021 CKD-EPI creatinine equation, which does not include race as a factor    High Sensitivity Troponin T [[id number]]  (Abnormal) Collected: 05/03/25 2126    Specimen: Blood from Arm, Left Updated: 05/03/25 2151     HS Troponin T 48 ng/L     Narrative:      High Sensitive Troponin T Reference Range:  <14.0 ng/L- Negative Female for AMI  <22.0 ng/L- Negative Male for AMI  >=14 - Abnormal Female indicating possible myocardial injury.  >=22 - Abnormal Male indicating possible myocardial injury.   Clinicians would have to utilize clinical acumen, EKG, Troponin, and serial changes to determine if it is an Acute Myocardial Infarction or myocardial injury due to an underlying chronic condition.         BNP [[id number]]  (Abnormal) Collected: 05/03/25 2126    Specimen: Blood from Arm, Left Updated: 05/03/25 2150     proBNP 3,055.0 pg/mL     Narrative:      This assay is used as an aid in the diagnosis of individuals suspected of having heart failure. It can be used as an aid in the diagnosis of acute decompensated heart failure (ADHF) in  patients presenting with signs and symptoms of ADHF to the emergency department (ED). In addition, NT-proBNP of <300 pg/mL indicates ADHF is not likely.    Age Range Result Interpretation  NT-proBNP Concentration (pg/mL:      <50             Positive            >450                   Gray                 300-450                    Negative             <300    50-75           Positive            >900                  Gray                300-900                  Negative            <300      >75             Positive            >1800                  Gray                300-1800                  Negative            <300    CBC & Differential [[id number]]  (Abnormal) Collected: 05/03/25 2126    Specimen: Blood from Arm, Left Updated: 05/03/25 2132    Narrative:      The following orders were created for panel order CBC & Differential.  Procedure                               Abnormality         Status                     ---------                               -----------         ------                     CBC Auto Differential[[id number]]        Abnormal            Final result                 Please view results for these tests on the individual orders.    CBC Auto Differential [[id number]]  (Abnormal) Collected: 05/03/25 2126    Specimen: Blood from Arm, Left Updated: 05/03/25 2132     WBC 7.76 10*3/mm3      RBC 3.57 10*6/mm3      Hemoglobin 10.1 g/dL      Hematocrit 32.0 %      MCV 89.6 fL      MCH 28.3 pg      MCHC 31.6 g/dL      RDW 12.8 %      RDW-SD 42.2 fl      MPV 9.9 fL      Platelets 263 10*3/mm3      Neutrophil % 65.3 %      Lymphocyte % 10.1 %      Monocyte % 16.0 %      Eosinophil % 7.3 %      Basophil % 0.9 %      Immature Grans % 0.4 %      Neutrophils, Absolute 5.07 10*3/mm3      Lymphocytes, Absolute 0.78 10*3/mm3      Monocytes, Absolute 1.24 10*3/mm3      Eosinophils, Absolute 0.57 10*3/mm3      Basophils, Absolute 0.07 10*3/mm3      Immature Grans, Absolute 0.03 10*3/mm3      nRBC 0.0 /100 WBC      [place] Draw [[id number]] Collected: 05/03/25 2126    Specimen: Blood from Arm, Left Updated: 05/03/25 2131    Narrative:      The following orders were created for panel order [place] Draw.  Procedure                               Abnormality         Status                     ---------                               -----------         ------                     Green Top (Gel)[[id number]]                                  Final result               Lavender Top[[id number]]                                     Final result               Gold Top - SST[[id number]]                                   Final result               Light Blue Top[[id number]]                                   Final result                 Please view results for these tests on the individual orders.    Green Top (Gel) [[id number]] Collected: 05/03/25 2126    Specimen: Blood from Arm, Left Updated: 05/03/25 2131     Extra Tube Hold for add-ons.     Comment: Auto resulted.       Lavender Top [[id number]] Collected: 05/03/25 2126    Specimen: Blood from Arm, Left Updated: 05/03/25 2131     Extra Tube hold for add-on     Comment: Auto resulted       Gold Top - SST [[id number]] Collected: 05/03/25 2126    Specimen: Blood from Arm, Left Updated: 05/03/25 2131     Extra Tube Hold for add-ons.     Comment: Auto resulted.       Light Blue Top [[id number]] Collected: 05/03/25 2126    Specimen: Blood from Arm, Left Updated: 05/03/25 2131     Extra Tube Hold for add-ons.     Comment: Auto resulted                Physician Progress Notes (most recent note)        Vikas Martinez DO at 05/04/25 1131          Interim Hospitalist Note    Patient seen and examined at bedside with family member present.  Patient was sitting up in bed eating breakfast when I entered the room. Patient states he is feeling well and currently has no complaints. When specifically asked about lower extremity swelling, patient states that hasn't changed much, and he is still slightly  short of breath. Did discuss with patient the need for continued diuresis with Lasix and empiric antibiotic therapy for possible underlying pneumonia. Patient expressed his understanding and willingness to proceed with this plan of care. In regards to COPD exacerbation, will continue scheduled duonebs and solumedrol 40mg IV BID. In regards to pneumonia, will continue Zosyn.    Electronically signed by Vikas Martinez DO at 25 [phone]       Consult Notes (most recent note)    No notes of this type exist for this encounter.       Physical Therapy Notes (most recent note)    No notes exist for this encounter.          Occupational Therapy Notes (most recent note)        Dia Peterson OT at 25 2529          Patient Name: Kevin Fonseca  : 1934    MRN: [id number]                              Today's Date: 2025       Admit Date: 5/3/2025    Visit Dx: debility    ICD-10-CM ICD-9-CM   1. Acute hypoxic respiratory failure  J96.01 518.81   2. Edema, unspecified type  R60.9 782.3     Patient Active Problem List   Diagnosis    Chronic obstructive pulmonary disease    Former cigarette smoker    Nocturnal hypoxia    Sinus pause    Hypothyroidism    Exudative age-related macular degeneration, bilateral, with inactive scar    Other specified peripheral vascular diseases    Sick sinus syndrome    BENNIE (obstructive sleep apnea)    Seasonal allergic rhinitis    Mass of right lung    Metastatic non-small cell lung cancer    Bradycardia    Open wound    Non-pressure chronic ulcer of other part of right lower leg with fat layer exposed    Dyspnea    Chronic respiratory failure with hypoxia     Past Medical History:   Diagnosis Date    Arthritis     Asthma     Chronic bronchitis     Complete heart block     Emphysema lung     Gastritis     Heartburn     Macular degeneration     Macular degeneration, wet     Metastatic non-small cell lung cancer     Reflux esophagitis     Thyroid disease      Past Surgical  History:   Procedure Laterality Date    INTRACAPSULAR CATARACT EXTRACTION      Dr. Lesly Gray. Dr. Neto Light.    LIVER BIOPSY        General Information       Row Name 05/05/25 1217          OT Time and Intention    Subjective Information complains of;weakness;fatigue;dyspnea  -LM     Document Type evaluation  -LM     Mode of Treatment individual therapy  -LM     Patient Effort good  -LM     Symptoms Noted During/After Treatment fatigue  -LM       Row Name 05/05/25 1217          General Information    Patient Profile Reviewed yes  -LM     Prior Level of Function max assist:;all household mobility;transfer;mod assist:;ADL's  patient reports progressive decline over past month  -LM     Existing Precautions/Restrictions fall  -LM     Barriers to Rehab medically complex;previous functional deficit  -LM       Row Name 05/05/25 1217          Living Environment    Current Living Arrangements home  -LM     People in Home spouse  family assist  -LM       Row Name 05/05/25 1217          Cognition    Orientation Status (Cognition) oriented x 3  -LM       Row Name 05/05/25 1217          Safety Issues/Impairments Affecting Functional Mobility    Impairments Affecting Function (Mobility) balance;coordination;endurance/activity tolerance;strength  -LM               User Key  (r) = Recorded By, (t) = Taken By, (c) = Cosigned By      Initials Name Provider Type    LM Dia Peterson, OT Occupational Therapist                     Mobility/ADL's       Row Name 05/05/25 1221          Transfers    Transfers toilet transfer  -LM       Row Name 05/05/25 1221          Toilet Transfer    Type (Toilet Transfer) stand pivot/stand step  -LM     [place] Level (Toilet Transfer) maximum assist (25% patient effort);moderate assist (50% patient effort)  -LM     Assistive Device (Toilet Transfer) commode, 3-in-1;walker, front-wheeled  -LM       Row Name 05/05/25 1221          Activities of Daily Living    BADL Assessment/Intervention  bathing;upper body dressing;lower body dressing;grooming;feeding;toileting  -LM       [facility] Name 05/05/25 1221          Bathing Assessment/Intervention    [place] Level (Bathing) maximum assist (25% patient effort)  -LM       Row Name 05/05/25 1221          Upper Body Dressing Assessment/Training    [place] Level (Upper Body Dressing) maximum assist (25% patient effort)  -LM       [facility] Name 05/05/25 1221          Lower Body Dressing Assessment/Training    [place] Level (Lower Body Dressing) dependent (less than 25% patient effort)  -LM       Row Name 05/05/25 1221          Grooming Assessment/Training    [place] Level (Grooming) minimum assist (75% patient effort)  -LM       [facility] Name 05/05/25 1221          Self-Feeding Assessment/Training    [place] Level (Feeding) set up  -LM       [facility] Name 05/05/25 1221          Toileting Assessment/Training    [place] Level (Toileting) dependent (less than 25% patient effort)  -LM               User Key  (r) = Recorded By, (t) = Taken By, (c) = Cosigned By      Initials Name Provider Type    LM Dia Peterson, OT Occupational Therapist                   Obj/Interventions       [facility] Name 05/05/25 1233          Sensory Assessment (Somatosensory)    Sensory Assessment (Somatosensory) sensation intact  -[facility] [place] Name 05/05/25 1233          Vision Assessment/Intervention    Visual Impairment/Limitations WFL  -[facility] [place] Name 05/05/25 1233          Range of Motion Comprehensive    General Range of Motion no range of motion deficits identified  -LM       Row Name 05/05/25 1233          Strength Comprehensive (MMT)    General Manual Muscle Testing (MMT) Assessment no strength deficits identified  -LM       Row Name 05/05/25 1233          Motor Skills    Motor Skills functional endurance  -LM     Functional Endurance P+  -LM               User Key  (r) = Recorded By, (t) = Taken By, (c) = Cosigned By      Initials Name Provider Type    Dia Boland, OT  Occupational Therapist                   Goals/Plan       Row Name 05/05/25 1246          Transfer Goal 1 (OT)    Activity/Assistive Device (Transfer Goal 1, OT) transfers, all;walker, rolling;commode, 3-in-1  -LM     [place] Level/Cues Needed (Transfer Goal 1, OT) minimum assist (75% or more patient effort)  -LM     Time Frame (Transfer Goal 1, OT) by discharge  -LM       Row Name 05/05/25 1246          Problem Specific Goal 1 (OT)    Problem Specific Goal 1 (OT) increased fxl activity tolerance to F- to assist with badl and fxl mobility  -LM       Row Name 05/05/25 1246          Therapy Assessment/Plan (OT)    Planned Therapy Interventions (OT) activity tolerance training;adaptive equipment training;BADL retraining;transfer/mobility retraining;strengthening exercise;ROM/therapeutic exercise;patient/caregiver education/training  -               User Key  (r) = Recorded By, (t) = Taken By, (c) = Cosigned By      Initials Name Provider Type    LM Dia Peterson, OT Occupational Therapist                   Clinical Impression       Row Name 05/05/25 1235          Pain Assessment    Pretreatment Pain Rating 0/10 - no pain  -LM     Posttreatment Pain Rating 0/10 - no pain  -LM       Row Name 05/05/25 1235          Plan of Care Review    Plan of Care Reviewed With patient;spouse;grandchild(kash)  -       Row Name 05/05/25 1235          Therapy Assessment/Plan (OT)    Patient/Family Therapy Goal Statement (OT) return to plof  -LM     Rehab Potential (OT) fair  -LM     Criteria for Skilled Therapeutic Interventions Met (OT) yes;meets criteria;skilled treatment is necessary  -LM     Therapy Frequency (OT) other (see comments)  prn and/or to monitor fxl progress and assist with discharge planning  -LM       Row Name 05/05/25 1235          Therapy Plan Review/Discharge Plan (OT)    Anticipated Discharge Disposition (OT) --  TBD  -LM       Row Name 05/05/25 1235          Positioning and Restraints    Post Treatment  Position bed  -LM     In Bed with family/caregiver;call light within reach;encouraged to call for assist;exit alarm on  -LM               User Key  (r) = Recorded By, (t) = Taken By, (c) = Cosigned By      Initials Name Provider Type    LM Dia Peterson OT Occupational Therapist                   Outcome Measures       Row Name 05/05/25 0800          How much help from another person do you currently need...    Turning from your back to your side while in flat bed without using bedrails? 3  -NT     Moving from lying on back to sitting on the side of a flat bed without bedrails? 3  -NT     Moving to and from a bed to a chair (including a wheelchair)? 3  -NT     Standing up from a chair using your arms (e.g., wheelchair, bedside chair)? 3  -NT     Climbing 3-5 steps with a railing? 2  -NT     To walk in hospital room? 2  -NT     AM-PAC 6 Clicks Score (PT) 16  -NT               User Key  (r) = Recorded By, (t) = Taken By, (c) = Cosigned By      Initials Name Provider Type    NT Maria Fernanda Carpenter, RN Registered Nurse                      OT Recommendation and Plan  Planned Therapy Interventions (OT): activity tolerance training, adaptive equipment training, BADL retraining, transfer/mobility retraining, strengthening exercise, ROM/therapeutic exercise, patient/caregiver education/training  Therapy Frequency (OT): other (see comments) (prn and/or to monitor fxl progress and assist with discharge planning)  Plan of Care Review  Plan of Care Reviewed With: patient, spouse, grandchild(kash)     Time Calculation:          Therapy Charges for Today       Code Description Service Date Service Provider Modifiers Qty    01336467729  OT EVAL LOW COMPLEXITY 4 5/5/2025 Dia Peterson OT GO 1                 Dia Peterson OT  5/5/2025    Electronically signed by Dia Peterson OT at 05/05/25 1249       Speech Language Pathology Notes (most recent note)    No notes exist for this encounter.         [place] [id number]  [facility] [place] [address]  Phone:  [phone]  Fax:  [phone] Date: May 5, 2025      Ambulatory Referral to Home Health     Patient:  Kevin Fonseca MRN:  [id number]   [address] [place] [address] :  1934  SSN:    Phone: [phone] Sex:  M      INSURANCE PAYOR PLAN GROUP # SUBSCRIBER ID   Primary:    [employer] MEDICARE REPLACEMENT [id number]      Referring Provider Information:  NIDA COTA Phone: [phone] Fax: [phone]       Referral Information:   # Visits:  999 Referral Type: Home Health [42]   Urgency:  Routine Referral Reason: Specialty Services Required   Start Date: May 5, 2025 End Date:  To be determined by Insurer   Diagnosis: Chronic respiratory failure with hypoxia (J96.11 [ICD-10-CM] 518.83,799.02 [ICD-9-CM])  Shortness of breath (R06.02 [ICD-10-CM] 786.05 [ICD-9-CM])  Metastatic non-small cell lung cancer (C34.90 [ICD-10-CM] 162.9 [ICD-9-CM])  Chronic obstructive pulmonary disease, unspecified COPD type (J44.9 [ICD-10-CM] 496 [ICD-9-CM])  Debility (R53.81 [ICD-10-CM] 799.3 [ICD-9-CM])      Refer to Dept:   Refer to Provider:   Refer to Provider Phone:   Refer to Facility:       Face to Face Visit Date: 2025  Follow-up provider for Plan of Care? I treated the patient in an acute care facility and will not continue treatment after discharge.  Follow-up provider: GOMEZ MARCOS [[id number]]  Reason/Clinical Findings: debility, difficulty ambulating, dyspnea on exertion  Describe mobility limitations that make leaving home difficult: continuous supplemental oxygen use, generalized weakness, dyspnea on exertion  Nursing/Therapeutic Services Requested: Skilled Nursing  Nursing/Therapeutic Services Requested: Physical Therapy  Skilled nursing orders: CHF management  Skilled nursing orders: O2 instruction  Skilled nursing orders: COPD management  Skilled nursing orders: Cardiopulmonary assessments  Skilled nursing orders: Medication  education  PT orders: Transfer training  PT orders: Home safety assessment  PT orders: Strengthening  PT orders: Therapeutic exercise  Frequency: 1 Week 1     This document serves as a request of services and does not constitute Insurance authorization or approval of services.  To determine eligibility, please contact the members Insurance carrier to verify and review coverage.     If you have medical questions regarding this request for services. Please contact [address] at [phone] during normal business hours.        Authorizing Provider:Rosie Lizarraga DO  Authorizing Provider's NPI: [id number]  Order Entered By: Rosie Lizarraga DO 5/5/2025  3:49 PM     Electronically signed by: Rosie Lizarraga DO 5/5/2025  3:49 PM     No notes of this type exist for this encounter.       Discharge Order (From admission, onward)       Start     Ordered    05/05/25 1537  Discharge patient  Once        Expected Discharge Date: 05/05/25   Discharge Disposition: Home-Health Care [facility]   Physician of Record for Attribution - Please select from Treatment Team: ROSIE LIZARRAGA [[id number]]   Review needed by CMO to determine Physician of Record: No      Question Answer Comment   Physician of Record for Attribution - Please select from Treatment Team ROSIE LIZARRAGA    Review needed by CMO to determine Physician of Record No        05/05/25 1548

## 2025-05-05 NOTE — TELEPHONE ENCOUNTER
Christel called to let Dr. Hinds know that Kevin was admitted on Saturday because he was not able to breathe, could not stand up, and had a lot of fluid/congestion in his lungs. She states he had around 20 pounds of water weight that had accumulated.   Kevin is scheduled for lab/OV and Tx tomorrow, 5/6, and Christel would like a nurse to call her back to discuss if Kevin is discharged today, would he be in good enough shape to do the infusion tomorrow. Her number is [phone].

## 2025-05-05 NOTE — CASE MANAGEMENT/SOCIAL WORK
Discharge Planning Assessment  [facility]     Patient Name: Kevin Fonseca  MRN: [id number]  Today's Date: 5/5/2025    Admit Date: 5/3/2025    Plan: SS received consult per Physician for discharge planning.  SS spoke with pt and spouse at bedside.  Pt resides at home with spouse, Faby, at [address] and plans to return home at discharge.  Pt currently does not utilize home health services.  Pt has home 02 and cane via [facility].  Pt requests rolling walker at discharge.  Pt's PCP is Elissa Geronimo.  SS will follow.   Discharge Needs Assessment       Row Name 05/05/25 1523       Living Environment    People in Home spouse    Name(s) of People in Home Lives with spouse Faby and granddtr Christel    Current Living Arrangements home    Primary Care Provided by spouse/significant other;other (see comments)    Family Caregiver if Needed spouse;grandchild(kash), adult    Quality of Family Relationships helpful;involved;supportive       Resource/Environmental Concerns    Resource/Environmental Concerns none       Transition Planning    Patient/Family Anticipates Transition to home with family    Patient/Family Anticipated Services at Transition none    Transportation Anticipated family or friend will provide       Discharge Needs Assessment    Equipment Currently Used at Home oxygen;cane, straight                   Discharge Plan       Row Name 05/05/25 1525       Plan    Plan SS received consult per Physician for discharge planning.  SS spoke with pt and spouse at bedside.  Pt resides at home with spouse, Faby, at [address] and plans to return home at discharge.  Pt currently does not utilize home health services.  Pt has home 02 and cane via [facility].  Pt requests rolling walker at discharge.  Pt's PCP is Elissa Geronimo.  SS will follow.                  Selected Continued Care - Episodes Includes continued care and service providers with selected services from the active  episodes listed below          Expected Discharge Date and Time       Expected Discharge Date Expected Discharge Time    May 5, 2025            Demographic Summary       Row Name 05/05/25 1523       General Information    Admission Type observation    Arrived From home    Referral Source nursing    Reason for Consult discharge planning    Preferred Language English                  Monique Almazan, DARYLW

## 2025-05-05 NOTE — OUTREACH NOTE
Prep Survey      Flowsheet Row Responses   [facility] patient discharged from? [facility]   Is LACE score < 7 ? No   Eligibility [facility]   Date of Admission 05/03/25   Date of Discharge 05/05/25   Discharge Disposition Home-Health Care Sv   Discharge diagnosis Dyspnea   Does the patient have one of the following disease processes/diagnoses(primary or secondary)? COPD   Does the patient have Home health ordered? Yes   What is the Home health agency?  [facility]   Is there a DME ordered? Yes   What DME was ordered? [facility]   Medication alerts for this patient see AVS   Prep survey completed? Yes            Alea BEAR - Registered Nurse              Alea BEAR - Registered Nurse

## 2025-05-05 NOTE — THERAPY EVALUATION
Acute Care - Physical Therapy Initial Evaluation   [facility]     Patient Name: Kevin Fonseca  : 1934  MRN: [id number]  Today's Date: 2025      Visit Dx:     ICD-10-CM ICD-9-CM   1. Acute hypoxic respiratory failure  J96.01 518.81   2. Edema, unspecified type  R60.9 782.3   3. Chronic respiratory failure with hypoxia  J96.11 518.83     799.02   4. Shortness of breath  R06.02 786.05   5. Metastatic non-small cell lung cancer  C34.90 162.9   6. Chronic obstructive pulmonary disease, unspecified COPD type  J44.9 496   7. Debility  R53.81 799.3     Patient Active Problem List   Diagnosis    Chronic obstructive pulmonary disease    Former cigarette smoker    Nocturnal hypoxia    Sinus pause    Hypothyroidism    Exudative age-related macular degeneration, bilateral, with inactive scar    Other specified peripheral vascular diseases    Sick sinus syndrome    BENNIE (obstructive sleep apnea)    Seasonal allergic rhinitis    Mass of right lung    Metastatic non-small cell lung cancer    Bradycardia    Open wound    Non-pressure chronic ulcer of other part of right lower leg with fat layer exposed    Dyspnea    Chronic respiratory failure with hypoxia     Past Medical History:   Diagnosis Date    Arthritis     Asthma     Chronic bronchitis     Complete heart block     Emphysema lung     Gastritis     Heartburn     Macular degeneration     Macular degeneration, wet     Metastatic non-small cell lung cancer     Reflux esophagitis     Thyroid disease      Past Surgical History:   Procedure Laterality Date    INTRACAPSULAR CATARACT EXTRACTION      Dr. Lesly Gray. Dr. Neto Light.    LIVER BIOPSY       PT Assessment (Last 12 Hours)       PT Evaluation and Treatment       Row Name 25 6658          Physical Therapy Time and Intention    Subjective Information complains of;weakness;fatigue;dyspnea  -KM     Document Type evaluation  -KM     Mode of Treatment physical therapy  -KM     Patient Effort good  -KM      Symptoms Noted During/After Treatment fatigue;shortness of breath  -KM       Row Name 05/05/25 1515          General Information    Patient Profile Reviewed yes  -KM     Patient Observations alert;cooperative;agree to therapy  -KM     Prior Level of Function mod assist:;all household mobility;max assist:;ADL's  pt. reports decline in functional mobility over last month  -KM     Existing Precautions/Restrictions fall  -KM     Risks Reviewed patient:;LOB;nausea/vomiting;dizziness;increased discomfort  -KM     Benefits Reviewed patient:;improve function;increase independence;increase strength;increase balance  -KM     Barriers to Rehab medically complex;previous functional deficit  -KM       Row Name 05/05/25 1515          Living Environment    Current Living Arrangements home  -KM     People in Home spouse  -KM     Primary Care Provided by spouse/significant other;other (see comments)  family  -KM       Row Name 05/05/25 1515          Home Use of Assistive/Adaptive Equipment    Equipment Currently Used at Home cane, quad tip;oxygen;commode;wheelchair  -KM       Row Name 05/05/25 1515          Pain    Pretreatment Pain Rating 0/10 - no pain  -KM     Posttreatment Pain Rating 0/10 - no pain  -KM       Row Name 05/05/25 1515          Cognition    Affect/Mental Status (Cognition) WFL  -KM     Orientation Status (Cognition) oriented x 3  -KM     Follows Commands (Cognition) WFL  -KM       Row Name 05/05/25 1515          Range of Motion (ROM)    Range of Motion bilateral lower extremities;ROM is WFL  -KM       Row Name 05/05/25 1515          Strength (Manual Muscle Testing)    Strength (Manual Muscle Testing) --  BLE strength >3/5  -KM       Row Name 05/05/25 1515          Bed Mobility    Bed Mobility bed mobility (all) activities  -KM     All Activities, [place] (Bed Mobility) minimum assist (75% patient effort)  -KM     Bed Mobility, Safety Issues decreased use of arms for pushing/pulling;decreased use of legs for  bridging/pushing  -KM     Assistive Device (Bed Mobility) bed rails;head of bed elevated  -KM       Row Name 05/05/25 1515          Transfers    Transfers sit-stand transfer;stand-sit transfer  -KM       Row Name 05/05/25 1515          Sit-Stand Transfer    Sit-Stand [place] (Transfers) minimum assist (75% patient effort)  -KM       Row Name 05/05/25 1515          Stand-Sit Transfer    Stand-Sit [place] (Transfers) minimum assist (75% patient effort)  -KM       Row Name 05/05/25 1515          Gait/Stairs (Locomotion)    Gait/Stairs Locomotion gait/ambulation independence;distance ambulated  -KM     [place] Level (Gait) minimum assist (75% patient effort)  -KM     Patient was able to Ambulate yes  -KM     Distance in Feet (Gait) 5  -KM     Pattern (Gait) step-to  -KM     Deviations/Abnormal Patterns (Gait) ataxic;base of support, narrow;gait speed decreased  -KM     Bilateral Gait Deviations forward flexed posture  -KM       Row Name 05/05/25 1515          Safety Issues/Impairments Affecting Functional Mobility    Impairments Affecting Function (Mobility) balance;coordination;endurance/activity tolerance;strength  -KM       Row Name 05/05/25 1515          Balance    Balance Assessment sitting static balance;standing static balance  -KM     Static Sitting Balance standby assist  -KM     Position, Sitting Balance sitting edge of bed  -KM     Static Standing Balance minimal assist  -KM       Row Name             Wound 05/04/25 0330 Left distal leg Other (Comments)    Wound - Properties Group Placement Date: 05/04/25  - Placement Time: 0330  -MC Present on Original Admission: Y  -MC Side: Left  -MC Orientation: distal  -MC Location: leg  -MC Primary Wound Type: Other  -MC, fluid filled blister     Retired Wound - Properties Group Placement Date: 05/04/25  - Placement Time: 0330  - Present on Original Admission: Y  -MC Side: Left  -MC Orientation: distal  -MC Location: leg  -MC    Retired Wound -  Properties Group Placement Date: 05/04/25  - Placement Time: 0330  -MC Present on Original Admission: Y  -MC Side: Left  -MC Orientation: distal  -MC Location: leg  -MC    Retired Wound - Properties Group Date first assessed: 05/04/25  -MC Time first assessed: 0330  -MC Present on Original Admission: Y  -MC Side: Left  -MC Location: leg  -MC      Row Name             Wound 05/04/25 0330 Left lateral ankle Pressure Injury    Wound - Properties Group Placement Date: 05/04/25  - Placement Time: 0330  -MC Present on Original Admission: Y  -MC Side: Left  -MC Orientation: lateral  -MC Location: ankle  -MC Primary Wound Type: Pressure Inj  -MC    Retired Wound - Properties Group Placement Date: 05/04/25  - Placement Time: 0330  -MC Present on Original Admission: Y  -MC Side: Left  -MC Orientation: lateral  -MC Location: ankle  -MC    Retired Wound - Properties Group Placement Date: 05/04/25  - Placement Time: 0330  -MC Present on Original Admission: Y  -MC Side: Left  -MC Orientation: lateral  -MC Location: ankle  -MC    Retired Wound - Properties Group Date first assessed: 05/04/25  - Time first assessed: 0330  -MC Present on Original Admission: Y  -MC Side: Left  -MC Location: ankle  -MC      Row Name 05/05/25 1515          Plan of Care Review    Plan of Care Reviewed With patient;spouse;grandchild(kash)  -KM     Outcome Evaluation Pt. evaluation completed during PT session. He was able to demonstrate functional mobility skills w/ Vera. He ambulated minimal distance w/ Vera. He tolerated limited activity d/t SOA. Pt. would benefit from PT services at this time.  -KM       Row Name 05/05/25 1515          Therapy Assessment/Plan (PT)    Patient/Family Therapy Goals Statement (PT) improve mobility skills  -KM     Functional Level at Time of Evaluation (PT) modA  -KM     PT Diagnosis (PT) decreased mobility skills  -KM     Rehab Potential (PT) fair  -KM     Criteria for Skilled Interventions Met (PT) yes;skilled  treatment is necessary  -     Therapy Frequency (PT) 2 times/wk  2-5x/wk  -KM     Predicted Duration of Therapy Intervention (PT) until discharge  -     Problem List (PT) problems related to;balance;mobility;strength  -     Activity Limitations Related to Problem List (PT) unable to ambulate safely;unable to transfer safely  -       Row Name 05/05/25 1515          Therapy Plan Review/Discharge Plan (PT)    Therapy Plan Review (PT) evaluation/treatment results reviewed;care plan/treatment goals reviewed;risks/benefits reviewed;patient;spouse/significant other  -       Row Name 05/05/25 1515          Physical Therapy Goals    Bed Mobility Goal Selection (PT) bed mobility, PT goal 1  -KM     Transfer Goal Selection (PT) transfer, PT goal 1  -KM     Gait Training Goal Selection (PT) gait training, PT goal 1  -KM       Row Name 05/05/25 1515          Bed Mobility Goal 1 (PT)    Activity/Assistive Device (Bed Mobility Goal 1, PT) bed mobility activities, all  -KM     [place] Level/Cues Needed (Bed Mobility Goal 1, PT) standby assist  -KM     Time Frame (Bed Mobility Goal 1, PT) by discharge  -       Row Name 05/05/25 1515          Transfer Goal 1 (PT)    Activity/Assistive Device (Transfer Goal 1, PT) transfers, all;walker, rolling  -KM     [place] Level/Cues Needed (Transfer Goal 1, PT) contact guard required  -KM     Time Frame (Transfer Goal 1, PT) by discharge  -[facility] Name 05/05/25 1515          Gait Training Goal 1 (PT)    Activity/Assistive Device (Gait Training Goal 1, PT) gait (walking locomotion);assistive device use;walker, rolling  -KM     [place] Level (Gait Training Goal 1, PT) contact guard required  -KM     Distance (Gait Training Goal 1, PT) 15'  -KM     Time Frame (Gait Training Goal 1, PT) by [facility]               User Key  (r) = Recorded By, (t) = Taken By, (c) = Cosigned By      Initials Name Provider Type    Adam Bolanos, PT Physical Therapist    ESTRELLA  Rylee Roa, RN Registered Nurse                    Physical Therapy Education       Title: PT OT SLP Therapies (Done)       Topic: Physical Therapy (Done)       Point: Mobility training (Done)       Learning Progress Summary            Patient Acceptance, E,TB, VU by  at 5/5/2025 1553                      Point: Home exercise program (Done)       Learning Progress Summary            Patient Acceptance, E,TB, VU by KM at 5/5/2025 1553                      Point: Body mechanics (Done)       Learning Progress Summary            Patient Acceptance, E,TB, VU by  at 5/5/2025 1553                      Point: Precautions (Done)       Learning Progress Summary            Patient Acceptance, E,TB, VU by  at 5/5/2025 1553                                      User Key       Initials Effective Dates Name Provider Type Discipline     05/24/22 -  Adam Haq, MAKSIM Physical Therapist PT                  PT Recommendation and Plan  Anticipated Discharge Disposition (PT): home with 24/7 care, home with home health  Planned Therapy Interventions (PT): balance training, bed mobility training, gait training, home exercise program, patient/family education, postural re-education, ROM (range of motion), stair training, stretching, transfer training  Therapy Frequency (PT): 2 times/wk (2-5x/wk)  Plan of Care Reviewed With: patient, spouse, grandchild(kash)  Outcome Evaluation: Pt. evaluation completed during PT session. He was able to demonstrate functional mobility skills w/ Vera. He ambulated minimal distance w/ Vera. He tolerated limited activity d/t SOA. Pt. would benefit from PT services at this time.       Time Calculation:    PT Charges       Row Name 05/05/25 1513             Time Calculation    PT Received On 05/05/25  -KM      PT Goal Re-Cert Due Date 05/19/25  -KM                User Key  (r) = Recorded By, (t) = Taken By, (c) = Cosigned By      Initials Name Provider Type    Adam Bolanos PT Physical Therapist                   Therapy Charges for Today       Code Description Service Date Service Provider Modifiers Qty    21891093946 HC PT EVAL MOD COMPLEXITY 4 5/5/2025 Adam Haq, PT GP 1            PT G-Codes  AM-PAC 6 Clicks Score (PT): 16    Adam Haq, PT  5/5/2025

## 2025-05-05 NOTE — TELEPHONE ENCOUNTER
RN spoke with Christel, she states that she is not sure if the patient will be discharged today. RN advised that if the patient is discharged today that they could keep the appt with Dr. Hinds tomorrow and decide if they should proceed with treatment during the visit. If the patient is not discharged today RN advised that we could reschedule his appointments when he is discharged. Christel verbalized understanding. No other questions or concerns at this time.

## 2025-05-05 NOTE — THERAPY EVALUATION
Patient Name: Kevin Fonseca  : 1934    MRN: [id number]                              Today's Date: 2025       Admit Date: 5/3/2025    Visit Dx: debility    ICD-10-CM ICD-9-CM   1. Acute hypoxic respiratory failure  J96.01 518.81   2. Edema, unspecified type  R60.9 782.3     Patient Active Problem List   Diagnosis    Chronic obstructive pulmonary disease    Former cigarette smoker    Nocturnal hypoxia    Sinus pause    Hypothyroidism    Exudative age-related macular degeneration, bilateral, with inactive scar    Other specified peripheral vascular diseases    Sick sinus syndrome    BENNIE (obstructive sleep apnea)    Seasonal allergic rhinitis    Mass of right lung    Metastatic non-small cell lung cancer    Bradycardia    Open wound    Non-pressure chronic ulcer of other part of right lower leg with fat layer exposed    Dyspnea    Chronic respiratory failure with hypoxia     Past Medical History:   Diagnosis Date    Arthritis     Asthma     Chronic bronchitis     Complete heart block     Emphysema lung     Gastritis     Heartburn     Macular degeneration     Macular degeneration, wet     Metastatic non-small cell lung cancer     Reflux esophagitis     Thyroid disease      Past Surgical History:   Procedure Laterality Date    INTRACAPSULAR CATARACT EXTRACTION      Dr. Lesly Gray. Dr. Neto Light.    LIVER BIOPSY        General Information       Row Name 25 1217          OT Time and Intention    Subjective Information complains of;weakness;fatigue;dyspnea  -LM     Document Type evaluation  -LM     Mode of Treatment individual therapy  -LM     Patient Effort good  -LM     Symptoms Noted During/After Treatment fatigue  -LM       Row Name 25 1216          General Information    Patient Profile Reviewed yes  -LM     Prior Level of Function max assist:;all household mobility;transfer;mod assist:;ADL's  patient reports progressive decline over past month  -LM     Existing Precautions/Restrictions  fall  -LM     Barriers to Rehab medically complex;previous functional deficit  -LM       Row Name 05/05/25 1217          Living Environment    Current Living Arrangements home  -LM     People in Home spouse  family assist  -       Row Name 05/05/25 1217          Cognition    Orientation Status (Cognition) oriented x 3  -LM       Row Name 05/05/25 1217          Safety Issues/Impairments Affecting Functional Mobility    Impairments Affecting Function (Mobility) balance;coordination;endurance/activity tolerance;strength  -LM               User Key  (r) = Recorded By, (t) = Taken By, (c) = Cosigned By      Initials Name Provider Type    LM Dia Peterson, OT Occupational Therapist                     Mobility/ADL's       Row Name 05/05/25 1221          Transfers    Transfers toilet transfer  -       Row Name 05/05/25 1221          Toilet Transfer    Type (Toilet Transfer) stand pivot/stand step  -     [place] (Toilet Transfer) maximum assist (25% patient effort);moderate assist (50% patient effort)  -LM     Assistive Device (Toilet Transfer) commode, 3-in-1;walker, front-wheeled  -       Row Name 05/05/25 1221          Activities of Daily Living    BADL Assessment/Intervention bathing;upper body dressing;lower body dressing;grooming;feeding;toileting  -       Row Name 05/05/25 1221          Bathing Assessment/Intervention    [place] (Bathing) maximum assist (25% patient effort)  -LM       Row Name 05/05/25 1221          Upper Body Dressing Assessment/Training    [place] (Upper Body Dressing) maximum assist (25% patient effort)  -       Row Name 05/05/25 1221          Lower Body Dressing Assessment/Training    [place] (Lower Body Dressing) dependent (less than 25% patient effort)  -LM       Row Name 05/05/25 1221          Grooming Assessment/Training    [place] (Grooming) minimum assist (75% patient effort)  -       Row Name 05/05/25 1221           Self-Feeding Assessment/Training    [place] Level (Feeding) set up  -       Row Name 05/05/25 1221          Toileting Assessment/Training    [place] Level (Toileting) dependent (less than 25% patient effort)  -LM               User Key  (r) = Recorded By, (t) = Taken By, (c) = Cosigned By      Initials Name Provider Type    LM Dia Peterson, OT Occupational Therapist                   Obj/Interventions       Row Name 05/05/25 1233          Sensory Assessment (Somatosensory)    Sensory Assessment (Somatosensory) sensation intact  -[facility] Name 05/05/25 1233          Vision Assessment/Intervention    Visual Impairment/Limitations WFL  -LM       [facility] Name 05/05/25 1233          Range of Motion Comprehensive    General Range of Motion no range of motion deficits identified  -[facility] Name 05/05/25 1233          Strength Comprehensive (MMT)    General Manual Muscle Testing (MMT) Assessment no strength deficits identified  -[facility] Name 05/05/25 1233          Motor Skills    Motor Skills functional endurance  -LM     Functional Endurance P+  -LM               User Key  (r) = Recorded By, (t) = Taken By, (c) = Cosigned By      Initials Name Provider Type    LM Dia Peterson, OT Occupational Therapist                   Goals/Plan       [facility] Name 05/05/25 1246          Transfer Goal 1 (OT)    Activity/Assistive Device (Transfer Goal 1, OT) transfers, all;walker, rolling;commode, 3-in-1  -LM     [place] Level/Cues Needed (Transfer Goal 1, OT) minimum assist (75% or more patient effort)  -     Time Frame (Transfer Goal 1, OT) by discharge  -[facility] Name 05/05/25 1246          Problem Specific Goal 1 (OT)    Problem Specific Goal 1 (OT) increased fxl activity tolerance to F- to assist with badl and fxl mobility  -LM       [facility] Name 05/05/25 1246          Therapy Assessment/Plan (OT)    Planned Therapy Interventions (OT) activity tolerance training;adaptive equipment training;BADL  retraining;transfer/mobility retraining;strengthening exercise;ROM/therapeutic exercise;patient/caregiver education/training  -LM               User Key  (r) = Recorded By, (t) = Taken By, (c) = Cosigned By      Initials Name Provider Type    Dia Boland, OT Occupational Therapist                   Clinical Impression       Row Name 05/05/25 1235          Pain Assessment    Pretreatment Pain Rating 0/10 - no pain  -LM     Posttreatment Pain Rating 0/10 - no pain  -LM       Row Name 05/05/25 1235          Plan of Care Review    Plan of Care Reviewed With patient;spouse;grandchild(kash)  -LM       Row Name 05/05/25 1235          Therapy Assessment/Plan (OT)    Patient/Family Therapy Goal Statement (OT) return to plof  -LM     Rehab Potential (OT) fair  -LM     Criteria for Skilled Therapeutic Interventions Met (OT) yes;meets criteria;skilled treatment is necessary  -LM     Therapy Frequency (OT) other (see comments)  prn and/or to monitor fxl progress and assist with discharge planning  -LM       Row Name 05/05/25 1235          Therapy Plan Review/Discharge Plan (OT)    Anticipated Discharge Disposition (OT) --  TBD  -LM       Row Name 05/05/25 1235          Positioning and Restraints    Post Treatment Position bed  -LM     In Bed with family/caregiver;call light within reach;encouraged to call for assist;exit alarm on  -LM               User Key  (r) = Recorded By, (t) = Taken By, (c) = Cosigned By      Initials Name Provider Type    Dia Boland, OT Occupational Therapist                   Outcome Measures       Row Name 05/05/25 0800          How much help from another person do you currently need...    Turning from your back to your side while in flat bed without using bedrails? 3  -NT     Moving from lying on back to sitting on the side of a flat bed without bedrails? 3  -NT     Moving to and from a bed to a chair (including a wheelchair)? 3  -NT     Standing up from a chair using your arms (e.g.,  wheelchair, bedside chair)? 3  -NT     Climbing 3-5 steps with a railing? 2  -NT     To walk in hospital room? 2  -NT     AM-PAC 6 Clicks Score (PT) 16  -NT               User Key  (r) = Recorded By, (t) = Taken By, (c) = Cosigned By      Initials Name Provider Type    NT Maria Fernanda Carpenter, RN Registered Nurse                      OT Recommendation and Plan  Planned Therapy Interventions (OT): activity tolerance training, adaptive equipment training, BADL retraining, transfer/mobility retraining, strengthening exercise, ROM/therapeutic exercise, patient/caregiver education/training  Therapy Frequency (OT): other (see comments) (prn and/or to monitor fxl progress and assist with discharge planning)  Plan of Care Review  Plan of Care Reviewed With: patient, spouse, grandchild(kash)     Time Calculation:          Therapy Charges for Today       Code Description Service Date Service Provider Modifiers Qty    [id number]  OT EVAL LOW COMPLEXITY 4 5/5/2025 Dia Peterson, OT GO 1                 Dia Peterson OT  5/5/2025

## 2025-05-05 NOTE — NURSING NOTE
WOCN consulted for left distal leg fluid filled blister, left lateral ankle pressure injury and left heel.  Patient assessed and found to have two intact blisters to the left distal leg (shin). There is a fluid filled intact blister measuring 1.2x1.2cm in the middle of the shin and a smaller fluid filled intact blister just below it measuring 0.4x0.4cm. Jasmyne wound is blistered, red, edematous and swollen. Order to assess every shift, gently cleanse with normal saline and leave open to air.    The left lateral ankle has an open wound that presents as a venous/arterial ulcer with a yellow slough covered base and rolled/closed edges with scant amount of yellow drainage noted. Jasmyne wound is red, warm, moist and firm. Order to cleanse with wound cleanser, pat dry and apply therahoney BID to the wound base, cover with 2x2 gauze pad and secure with a silicone bordered dressing.     Bilateral heels are noted to be very red. The left heel is boggy but blanching at this time. The right heel is soft and intact. Order to offload pressure to heels while in bed.     The left foot third toe, toenail is black; patient denies having hit or injured the toe. Per spouse, patient has had difficulty with fungus that she has been treating at home. Toenail appears to be dry and intact. Skin surrounding nail is dry, intact, red but blanching.  Recommend leaving open to air.    Educated patient and family on importance of elevating heels off of bed. WOCN demonstrated how to apply heel protector boots. Encouraged use of heel protector boots when in bed. Patient and family verbalized understanding.     Miguel score 20.      05/05/25 0830   Wound 05/04/25 0330 Left distal leg Other (Comments)   Placement Date/Time: 05/04/25 0330   Present on Original Admission: Yes  Side: Left  Orientation: distal  Location: leg  Primary Wound Type: (c) Other (Comments)   Dressing Appearance open to air   Closure None   Base other (see comments)  (intact fluid  filled blister x 2)   Periwound blistered;redness;edematous;swelling   Periwound Temperature warm   Periwound Skin Turgor soft   Edges rolled/closed   Wound Length (cm) 1.2 cm   Wound Width (cm) 1.2 cm   Wound Surface Area (cm^2) 1.13 cm^2   Drainage Amount none   Wound 05/04/25 0330 Left lateral ankle Pressure Injury   Placement Date/Time: 05/04/25 0330   Present on Original Admission: Yes  Side: Left  Orientation: lateral  Location: ankle  Primary Wound Type: Pressure Injury   Dressing Appearance intact;moist drainage   Closure None   Base yellow;slough;moist   Periwound redness;moist   Periwound Temperature warm   Periwound Skin Turgor firm   Edges rolled/closed   Drainage Characteristics/Odor yellow   Drainage Amount scant

## 2025-05-06 ENCOUNTER — TRANSITIONAL CARE MANAGEMENT TELEPHONE ENCOUNTER (OUTPATIENT)
Dept: CALL CENTER | Facility: HOSPITAL | Age: OVER 89
End: 2025-05-06
Payer: MEDICARE

## 2025-05-06 ENCOUNTER — PATIENT OUTREACH (OUTPATIENT)
Dept: CASE MANAGEMENT | Facility: OTHER | Age: OVER 89
End: 2025-05-06
Payer: MEDICARE

## 2025-05-06 ENCOUNTER — TELEPHONE (OUTPATIENT)
Dept: FAMILY MEDICINE CLINIC | Facility: CLINIC | Age: OVER 89
End: 2025-05-06
Payer: MEDICARE

## 2025-05-06 ENCOUNTER — TELEPHONE (OUTPATIENT)
Dept: TELEMETRY | Facility: HOSPITAL | Age: OVER 89
End: 2025-05-06
Payer: MEDICARE

## 2025-05-06 LAB
QT INTERVAL: 426 MS
QT INTERVAL: 444 MS
QTC INTERVAL: 446 MS
QTC INTERVAL: 475 MS

## 2025-05-06 RX ORDER — FUROSEMIDE 20 MG/1
20 TABLET ORAL DAILY
Qty: 10 TABLET | Refills: 0 | OUTPATIENT
Start: 2025-05-06

## 2025-05-06 NOTE — PLAN OF CARE
Problem: Cancer Treatment Phase  Goal: Manage Fatigue (Tiredness)  Outcome: Not Progressing  Goal: Optimal Care Coordination of Patient With Cancer: Treatment Phase  Outcome: Not Progressing     Problem: COPD  Goal: Optimal Care Coordination of a Patient Experiencing COPD  Outcome: Progressing

## 2025-05-06 NOTE — OUTREACH NOTE
Call Center TCM Note      Flowsheet Row Responses   [facility] patient discharged from? [facility]   Does the patient have one of the following disease processes/diagnoses(primary or secondary)? COPD   TCM attempt successful? Yes   Call start time 1026   Call end time 1030   Discharge diagnosis Dyspnea   Person spoke with today (if not patient) and relationship Wife   Meds reviewed with patient/caregiver? Yes   Is the patient having any side effects they believe may be caused by any medication additions or changes? No   Does the patient have all medications ordered at discharge? Yes   Is the patient taking all medications as directed (includes completed medication regime)? Yes   Comments 5/15/2025  2:15 PM   Does the patient have an appointment with their PCP within 7-14 days of discharge? Yes   What is the Home health agency?  [facility]   Has home health visited the patient within 72 hours of discharge? Call prior to 72 hours   Pulse Ox monitoring Intermittent   Pulse Ox device source Patient   O2 Sat comments 96%   O2 Sat: education provided Sat levels, When to seek care   O2 Sat education comments Maintain oxygen of 90% or above. If drops below 90% and does not rebound with supplemental oxygen, relaxation and deep breathing needs to seek medical attention immediately.   Psychosocial issues? Yes  [Having hallucinations]   Did the patient receive a copy of their discharge instructions? Yes   Nursing interventions Reviewed instructions with patient   What is the patient's perception of their health status since discharge? Improving   Is the patient/caregiver able to teach back the hierarchy of who to call/visit for symptoms/problems? PCP, Specialist, Home health nurse, Urgent Care, ED, 911 Yes   TCM call completed? Yes   Wrap up additional comments Pt doing well. Wife said pt was having hallucinations. Advised if continue to contact Dr to discuss. Pt has f/u appt scheduled.   Call end time 1030             Alana OLIVARES - Registered Nurse    5/6/2025, 10:31 EDT

## 2025-05-06 NOTE — OUTREACH NOTE
AMBULATORY CASE MANAGEMENT NOTE    Names and Relationships of Patient/Support Persons: Contact: BECKADILAN; Relationship: Emergency Contact -     Patient Outreach    Mrs. Fonseca reports patient is breathing a little better, continues to experience shortness of breath with mild exertion that is relieved with rest. Peripheral edema has increased since coming home, taking diuretic as prescribed. Home Health nurse is present at the time of call, state they need an order for physical therapy. RN-ACM made order request to Dr. Geronimo. No additional needs at this time.    Care Coordination    Dr. Elissa COLE  Ambulatory Case Management    5/6/2025, 15:29 EDT

## 2025-05-06 NOTE — CASE MANAGEMENT/SOCIAL WORK
Discharge Planning Assessment   [facility]     Patient Name: Kevin Fonseca  MRN: [id number]  Today's Date: 5/6/2025    Admit Date: 5/3/2025    Plan: Pt to be discharged home on this date with DME rolling walker and nebulizer and home health.  Pt stated preference for [facility] and [facility].  SS made referral to [facility] at [phone] and DME will be delivered to pt's home.  SS made referral to [facility] at [phone] per Tracey.       Discharge Plan       Row Name 05/06/25 1246       Plan    Final Discharge Disposition Code 06 - home with home health care    Final Note Pt discharged home with [facility]                  Selected Continued Care - Episodes Includes continued care and service providers with selected services from the active episodes listed below          Expected Discharge Date and Time       Expected Discharge Date Expected Discharge Time    May 5, 2025           DARYL ButlerW

## 2025-05-06 NOTE — TELEPHONE ENCOUNTER
Caller: [facility]     Relationship: Home Health    Best call back number: [phone]     What orders are you requesting (i.e. lab or imaging): PHYSICAL THERAPY HOME HEALTH    In what timeframe would the patient need to come in: TO BEGIN THIS WEEK    Where will you receive your lab/imaging services: [facility]    Additional notes: NEEDS ORDERS TO BEGIN PHYSICAL THERAPY HOME HEALTH . PLEASE FAX ORDERS TO [facility].

## 2025-05-07 NOTE — PROGRESS NOTES
"Kevin Fonseca     VITALS: Blood pressure 112/54, pulse (!) 44, temperature 97.5 °F (36.4 °C), temperature source Temporal, resp. rate 16, height 182.9 cm (72\"), weight 82.8 kg (182 lb 9.6 oz), SpO2 94%.    Subjective  Chief Complaint  Hypothyroidism, Anxiety, Balance Issues, Falls Asleep Easily, and Cough (INCREASED RECENTLY)    Subjective          History of Present Illness:    History of Present Illness  The patient is a 89-year-old male with medical conditions significant for metastatic non-small cell lung cancer, arthritis, sick sinus syndrome, BENNIE, and GERD, who presents to the clinic for medical follow-up.    Excessive sleepiness is reported, often falling asleep while sitting upright or leaning to the side. His heart rate was recorded as 44, which is lower than his usual readings at home. Shortness of breath is noted, particularly noticeable when rising from the couch to use the restroom, necessitating a period of rest before resuming activities. Mobility is primarily dependent on a wheelchair, although it is not used within the home environment. A persistent cough and frequent expectoration into a bucket, which requires regular emptying, are reported. Supplemental oxygen is utilized around the clock, but a CPAP machine has never been used.    Frequent urination is experienced, often waking up multiple times during the night to use the bathroom, although the volume of urine passed is minimal. A previous trial of medication for this issue resulted in side effects such as lightheadedness and dizziness, leading to its discontinuation. A fall occurred over the past weekend due to balance instability while attempting to use the restroom.    Significant leg swelling is present, with one leg more affected than the other. Blisters have developed on the legs, and weight gain is noted. He is currently under the care of a wound specialist for a leg wound, which is showing signs of improvement. The wound dressing is changed " "every three days, and a follow-up visit is scheduled in a month.    MiraLAX and Benefiber are being taken, and he inquires if they can be taken together.    No complaints regarding medications.     The following portions of the patient's history were reviewed and updated as appropriate: allergies, current medications, past family history, past medical history, past social history, past surgical history and problem list.    Past Medical History  Past Medical History:   Diagnosis Date    Arthritis     Asthma     Chronic bronchitis     Complete heart block     Emphysema lung     Gastritis     Heartburn     Macular degeneration     Macular degeneration, wet     Metastatic non-small cell lung cancer     Reflux esophagitis     Thyroid disease        Surgical History  Past Surgical History:   Procedure Laterality Date    INTRACAPSULAR CATARACT EXTRACTION      Dr. Lesly Gray. Dr. Neto Light.    LIVER BIOPSY         Family History  Family History   Problem Relation Age of Onset    Hypertension Mother     Other Mother     Cancer Father     Cancer Brother     Asthma Brother        Social History  Social History     Socioeconomic History    Marital status:    Tobacco Use    Smoking status: Former     Current packs/day: 0.00     Average packs/day: 1.5 packs/day for 44.0 years (66.0 ttl pk-yrs)     Types: Cigarettes     Start date:      Quit date:      Years since quittin.3     Passive exposure: Past    Smokeless tobacco: Never   Vaping Use    Vaping status: Never Used   Substance and Sexual Activity    Alcohol use: Not Currently     Comment: 2 week    Drug use: Never    Sexual activity: Defer       Objective   Vital Signs:   /54 (BP Location: Left arm, Patient Position: Sitting, Cuff Size: Adult)   Pulse (!) 44   Temp 97.5 °F (36.4 °C) (Temporal)   Resp 16   Ht 182.9 cm (72\")   Wt 82.8 kg (182 lb 9.6 oz)   SpO2 94% Comment: 2 LPM  BMI 24.77 kg/m²       Physical Exam     Physical " Exam  Extremities: Significant swelling in the legs with fluid accumulation and presence of blisters.      Gen: Patient in NAD. Pleasant and answers appropriately. A&Ox3. In wheelchair.    Skin: Warm and dry with normal turgor. No purpura, rashes, or unusual pigmentation noted. Hair is normal in appearance and distribution.    HEENT: NC/AT. No lesions noted. Conjunctiva clear, sclera nonicteric. PERRL. EOMI without nystagmus or strabismus. Fundi appear benign. No hemorrhages or exudates of eyes. Auditory canals are patent bilaterally without lesions. TMs intact,  nonerythematous, bulging without lesions. Nasal mucosa erythematous, and nonedematous. Frontal and maxillary sinuses are nontender. O/P erythematous and moist without exudate.    Neck: Supple without lymph nodes palpated.  Decreased ROM. No carotid bruits appreciated bilaterally.    Lungs: Decreased B/L without rales, rhonchi, crackles, or wheezes.    Heart: Distant.  RRR. S1 and S2 normal. No S3 or S4. No MRGT.    Abd: Soft, nontender, slightly distended. (+)BSx4 quadrants.  No HSM or masses.    Extrem: No CC. Radial pulses 2+/4 and equal B/L.     Neuro: No focal motor/sensory deficits.    Procedures    Result Review :   The following data was reviewed by: Elissa Geronimo MD on 04/22/2025:       Results  Labs April 2025   - Thyroid function test: Normal   - Kidney function test: Normal   - Liver function test: Normal           Assessment and Plan      Kevin Fonseca is a 89 y.o. here for medical followup.    Diagnoses and all orders for this visit:    1. Shortness of breath (Primary)  -     XR Chest 2 View  -     ECG 12 Lead    2. Dandruff  -     ketoconazole (NIZORAL) 2 % shampoo; Apply  topically to the appropriate area as directed 2 (Two) Times a Week.  Dispense: 360 mL; Refill: 0    3. Chronic cough  -     XR Chest 2 View    4. Bradycardia  -     XR Chest 2 View  -     ECG 12 Lead    5. Bilateral edema of lower extremity  -     XR Chest 2 View    6. BENNIE  (obstructive sleep apnea)    7. Frequent urination        Assessment & Plan  1. Bradycardia.  - Heart rate is notably low at 44 bpm, potentially contributing to fatigue.  - Labs are excellent, ruling out thyroid, kidney, and liver issues.  - Discussed potential impact of Keytruda on cardiac function.  - An EKG will be conducted today to evaluate heart function.    2. Shortness of breath.  - Experiences significant shortness of breath even with minimal exertion.  - Labs are excellent, ruling out thyroid, kidney, and liver issues.  - A chest x-ray will be performed today.  - A pulmonary function test will be scheduled during the next hospital visit for infusion therapy.    3. Sleep apnea.  - Does not use a CPAP machine and relies on oxygen 24 hours a day.  - Discussed possibility of sleep apnea contributing to fatigue.  - Further evaluation will be considered based on the results of the EKG and pulmonary function test.    4. Frequent urination.  - Experiences frequent urination, especially at night, possibly related to age and prostate condition.  - Previous medication for this issue caused lightheadedness and dizziness.  - No new medication will be started at this time.    5. Leg swelling.  - Significant leg swelling and blisters, likely due to fluid retention.  - Discussed possibility of heart failure.  - A diuretic will be prescribed to manage fluid retention, with caution due to potential impact on heart rate.    6. Medication management.  - Currently taking MiraLAX and Benefiber.  - Advised to continue Benefiber daily and use MiraLAX as needed.        BMI is within normal parameters. No other follow-up for BMI required.        Patient or patient representative verbalized consent for the use of Ambient Listening during the visit with  Elissa Geronimo MD for chart documentation. 5/6/2025  23:57 EDT        Follow Up   Return in about 3 weeks (around 5/13/2025).  Findings and plans discussed with patient who  verbalizes understanding and agreement. Will followup with patient once results are in. Patient was given instructions and counseling regarding his condition or for health maintenance advice. Please see specific information pulled into the AVS if appropriate.       Elissa Geronimo MD

## 2025-05-07 NOTE — TELEPHONE ENCOUNTER
Spoke with Arlyn she stated Home Health got there & had no orders for PT it wasn't listed on his discharge.They had ordered DME supplies walker,Nebulizer etc. But no PT.

## 2025-05-08 ENCOUNTER — APPOINTMENT (OUTPATIENT)
Dept: GENERAL RADIOLOGY | Facility: HOSPITAL | Age: OVER 89
End: 2025-05-08
Payer: MEDICARE

## 2025-05-08 ENCOUNTER — TELEPHONE (OUTPATIENT)
Dept: FAMILY MEDICINE CLINIC | Facility: CLINIC | Age: OVER 89
End: 2025-05-08
Payer: MEDICARE

## 2025-05-08 ENCOUNTER — HOSPITAL ENCOUNTER (EMERGENCY)
Facility: HOSPITAL | Age: OVER 89
Discharge: HOME | End: 2025-05-08
Attending: STUDENT IN AN ORGANIZED HEALTH CARE EDUCATION/TRAINING PROGRAM
Payer: MEDICARE

## 2025-05-08 VITALS
HEIGHT: 72 IN | DIASTOLIC BLOOD PRESSURE: 68 MMHG | WEIGHT: 170 LBS | BODY MASS INDEX: 23.03 KG/M2 | RESPIRATION RATE: 20 BRPM | SYSTOLIC BLOOD PRESSURE: 126 MMHG | OXYGEN SATURATION: 100 % | HEART RATE: 75 BPM | TEMPERATURE: 98.5 F

## 2025-05-08 DIAGNOSIS — R19.7 DIARRHEA IN ADULT PATIENT: Primary | ICD-10-CM

## 2025-05-08 LAB
ALBUMIN SERPL-MCNC: 3.7 G/DL (ref 3.5–5.2)
ALBUMIN/GLOB SERPL: 1 G/DL
ALP SERPL-CCNC: 68 U/L (ref 39–117)
ALT SERPL W P-5'-P-CCNC: 22 U/L (ref 1–41)
ANION GAP SERPL CALCULATED.3IONS-SCNC: 7.3 MMOL/L (ref 5–15)
AST SERPL-CCNC: 25 U/L (ref 1–40)
B PARAPERT DNA SPEC QL NAA+PROBE: NOT DETECTED
B PERT DNA SPEC QL NAA+PROBE: NOT DETECTED
BASOPHILS # BLD AUTO: 0.01 10*3/MM3 (ref 0–0.2)
BASOPHILS NFR BLD AUTO: 0.2 % (ref 0–1.5)
BILIRUB SERPL-MCNC: 0.3 MG/DL (ref 0–1.2)
BILIRUB UR QL STRIP: NEGATIVE
BUN SERPL-MCNC: 33 MG/DL (ref 8–23)
BUN/CREAT SERPL: 30.3 (ref 7–25)
C PNEUM DNA NPH QL NAA+NON-PROBE: NOT DETECTED
CALCIUM SPEC-SCNC: 8.6 MG/DL (ref 8.2–9.6)
CHLORIDE SERPL-SCNC: 101 MMOL/L (ref 98–107)
CLARITY UR: CLEAR
CO2 SERPL-SCNC: 32.7 MMOL/L (ref 22–29)
COLOR UR: YELLOW
CREAT SERPL-MCNC: 1.09 MG/DL (ref 0.76–1.27)
CRP SERPL-MCNC: 1.31 MG/DL (ref 0–0.5)
DEPRECATED RDW RBC AUTO: 42.8 FL (ref 37–54)
EGFRCR SERPLBLD CKD-EPI 2021: 64.5 ML/MIN/1.73
EOSINOPHIL # BLD AUTO: 0.12 10*3/MM3 (ref 0–0.4)
EOSINOPHIL NFR BLD AUTO: 1.8 % (ref 0.3–6.2)
ERYTHROCYTE [DISTWIDTH] IN BLOOD BY AUTOMATED COUNT: 13.1 % (ref 12.3–15.4)
FLUAV SUBTYP SPEC NAA+PROBE: NOT DETECTED
FLUBV RNA ISLT QL NAA+PROBE: NOT DETECTED
GEN 5 1HR TROPONIN T REFLEX: 50 NG/L
GLOBULIN UR ELPH-MCNC: 3.8 GM/DL
GLUCOSE SERPL-MCNC: 104 MG/DL (ref 65–99)
GLUCOSE UR STRIP-MCNC: NEGATIVE MG/DL
HADV DNA SPEC NAA+PROBE: NOT DETECTED
HCOV 229E RNA SPEC QL NAA+PROBE: NOT DETECTED
HCOV HKU1 RNA SPEC QL NAA+PROBE: NOT DETECTED
HCOV NL63 RNA SPEC QL NAA+PROBE: NOT DETECTED
HCOV OC43 RNA SPEC QL NAA+PROBE: NOT DETECTED
HCT VFR BLD AUTO: 38 % (ref 37.5–51)
HGB BLD-MCNC: 11.8 G/DL (ref 13–17.7)
HGB UR QL STRIP.AUTO: NEGATIVE
HMPV RNA NPH QL NAA+NON-PROBE: NOT DETECTED
HOLD SPECIMEN: NORMAL
HOLD SPECIMEN: NORMAL
HPIV1 RNA ISLT QL NAA+PROBE: NOT DETECTED
HPIV2 RNA SPEC QL NAA+PROBE: NOT DETECTED
HPIV3 RNA NPH QL NAA+PROBE: NOT DETECTED
HPIV4 P GENE NPH QL NAA+PROBE: NOT DETECTED
IMM GRANULOCYTES # BLD AUTO: 0.04 10*3/MM3 (ref 0–0.05)
IMM GRANULOCYTES NFR BLD AUTO: 0.6 % (ref 0–0.5)
KETONES UR QL STRIP: ABNORMAL
LEUKOCYTE ESTERASE UR QL STRIP.AUTO: NEGATIVE
LIPASE SERPL-CCNC: 16 U/L (ref 13–60)
LYMPHOCYTES # BLD AUTO: 0.55 10*3/MM3 (ref 0.7–3.1)
LYMPHOCYTES NFR BLD AUTO: 8.4 % (ref 19.6–45.3)
M PNEUMO IGG SER IA-ACNC: NOT DETECTED
MCH RBC QN AUTO: 28 PG (ref 26.6–33)
MCHC RBC AUTO-ENTMCNC: 31.1 G/DL (ref 31.5–35.7)
MCV RBC AUTO: 90.3 FL (ref 79–97)
MONOCYTES # BLD AUTO: 0.98 10*3/MM3 (ref 0.1–0.9)
MONOCYTES NFR BLD AUTO: 14.9 % (ref 5–12)
NEUTROPHILS NFR BLD AUTO: 4.87 10*3/MM3 (ref 1.7–7)
NEUTROPHILS NFR BLD AUTO: 74.1 % (ref 42.7–76)
NITRITE UR QL STRIP: NEGATIVE
NRBC BLD AUTO-RTO: 0 /100 WBC (ref 0–0.2)
NT-PROBNP SERPL-MCNC: 2516 PG/ML (ref 0–1800)
PH UR STRIP.AUTO: 5.5 [PH] (ref 5–8)
PLATELET # BLD AUTO: 321 10*3/MM3 (ref 140–450)
PMV BLD AUTO: 9.5 FL (ref 6–12)
POTASSIUM SERPL-SCNC: 3.7 MMOL/L (ref 3.5–5.2)
PROT SERPL-MCNC: 7.5 G/DL (ref 6–8.5)
PROT UR QL STRIP: ABNORMAL
QT INTERVAL: 372 MS
QTC INTERVAL: 415 MS
RBC # BLD AUTO: 4.21 10*6/MM3 (ref 4.14–5.8)
RHINOVIRUS RNA SPEC NAA+PROBE: NOT DETECTED
RSV RNA NPH QL NAA+NON-PROBE: NOT DETECTED
SARS-COV-2 RNA RESP QL NAA+PROBE: NOT DETECTED
SODIUM SERPL-SCNC: 141 MMOL/L (ref 136–145)
SP GR UR STRIP: 1.02 (ref 1–1.03)
TROPONIN T % DELTA: -6
TROPONIN T NUMERIC DELTA: -3 NG/L
TROPONIN T SERPL HS-MCNC: 53 NG/L
UROBILINOGEN UR QL STRIP: ABNORMAL
WBC NRBC COR # BLD AUTO: 6.57 10*3/MM3 (ref 3.4–10.8)
WHOLE BLOOD HOLD COAG: NORMAL
WHOLE BLOOD HOLD SPECIMEN: NORMAL

## 2025-05-08 PROCEDURE — 84484 ASSAY OF TROPONIN QUANT: CPT | Performed by: NURSE PRACTITIONER

## 2025-05-08 PROCEDURE — 83880 ASSAY OF NATRIURETIC PEPTIDE: CPT | Performed by: NURSE PRACTITIONER

## 2025-05-08 PROCEDURE — 25010000002 METHYLPREDNISOLONE PER 125 MG: Performed by: NURSE PRACTITIONER

## 2025-05-08 PROCEDURE — 71045 X-RAY EXAM CHEST 1 VIEW: CPT | Performed by: RADIOLOGY

## 2025-05-08 PROCEDURE — 86140 C-REACTIVE PROTEIN: CPT | Performed by: NURSE PRACTITIONER

## 2025-05-08 PROCEDURE — 93010 ELECTROCARDIOGRAM REPORT: CPT | Performed by: STUDENT IN AN ORGANIZED HEALTH CARE EDUCATION/TRAINING PROGRAM

## 2025-05-08 PROCEDURE — 0202U NFCT DS 22 TRGT SARS-COV-2: CPT | Performed by: NURSE PRACTITIONER

## 2025-05-08 PROCEDURE — 36415 COLL VENOUS BLD VENIPUNCTURE: CPT

## 2025-05-08 PROCEDURE — 94799 UNLISTED PULMONARY SVC/PX: CPT

## 2025-05-08 PROCEDURE — 93005 ELECTROCARDIOGRAM TRACING: CPT | Performed by: NURSE PRACTITIONER

## 2025-05-08 PROCEDURE — 83690 ASSAY OF LIPASE: CPT | Performed by: NURSE PRACTITIONER

## 2025-05-08 PROCEDURE — 99284 EMERGENCY DEPT VISIT MOD MDM: CPT

## 2025-05-08 PROCEDURE — 80053 COMPREHEN METABOLIC PANEL: CPT | Performed by: NURSE PRACTITIONER

## 2025-05-08 PROCEDURE — 96374 THER/PROPH/DIAG INJ IV PUSH: CPT

## 2025-05-08 PROCEDURE — 81003 URINALYSIS AUTO W/O SCOPE: CPT | Performed by: NURSE PRACTITIONER

## 2025-05-08 PROCEDURE — 94640 AIRWAY INHALATION TREATMENT: CPT

## 2025-05-08 PROCEDURE — 71045 X-RAY EXAM CHEST 1 VIEW: CPT

## 2025-05-08 PROCEDURE — 85025 COMPLETE CBC W/AUTO DIFF WBC: CPT | Performed by: NURSE PRACTITIONER

## 2025-05-08 RX ORDER — IPRATROPIUM BROMIDE AND ALBUTEROL SULFATE 2.5; .5 MG/3ML; MG/3ML
3 SOLUTION RESPIRATORY (INHALATION) ONCE
Status: COMPLETED | OUTPATIENT
Start: 2025-05-08 | End: 2025-05-08

## 2025-05-08 RX ORDER — SODIUM CHLORIDE 0.9 % (FLUSH) 0.9 %
10 SYRINGE (ML) INJECTION AS NEEDED
Status: DISCONTINUED | OUTPATIENT
Start: 2025-05-08 | End: 2025-05-08 | Stop reason: HOSPADM

## 2025-05-08 RX ORDER — LOPERAMIDE HYDROCHLORIDE 2 MG/1
2 CAPSULE ORAL ONCE
Status: COMPLETED | OUTPATIENT
Start: 2025-05-08 | End: 2025-05-08

## 2025-05-08 RX ADMIN — IPRATROPIUM BROMIDE AND ALBUTEROL SULFATE 3 ML: 2.5; .5 SOLUTION RESPIRATORY (INHALATION) at 13:12

## 2025-05-08 RX ADMIN — LOPERAMIDE HYDROCHLORIDE 2 MG: 2 CAPSULE ORAL at 17:18

## 2025-05-08 RX ADMIN — METHYLPREDNISOLONE SODIUM SUCCINATE 125 MG: 125 INJECTION INTRAMUSCULAR; INTRAVENOUS at 13:27

## 2025-05-08 NOTE — DISCHARGE INSTRUCTIONS
Please stop the antibiotics at this time.  Follow-up with your primary care physician.  If your symptoms acutely worsen return to the ER.

## 2025-05-08 NOTE — ED PROVIDER NOTES
Subjective   History of Present Illness  Patient is a 89 year old male with PMH significant for metastatic non small cell lung cancer, dyspnea, COPD, thyroid disease, macular degeneration, GERD.  He presents to the ED today for cough, chest congestion, chest pain, and diarrhea.  Patient denies any nausea, vomiting, abdominal pain, syncope, dizziness, headache or any other significant complaints.        Review of Systems   Constitutional:  Positive for fatigue. Negative for fever.   HENT:  Positive for congestion.    Eyes: Negative.    Respiratory:  Positive for cough.    Cardiovascular:  Positive for chest pain.   Gastrointestinal:  Positive for diarrhea. Negative for abdominal pain.   Endocrine: Negative.    Genitourinary: Negative.  Negative for dysuria.   Musculoskeletal: Negative.    Skin: Negative.    Allergic/Immunologic: Negative.    Neurological: Negative.    Hematological: Negative.    Psychiatric/Behavioral: Negative.     All other systems reviewed and are negative.      Past Medical History:   Diagnosis Date    Arthritis     Asthma     Chronic bronchitis     Complete heart block     Emphysema lung     Gastritis     Heartburn     Macular degeneration     Macular degeneration, wet     Metastatic non-small cell lung cancer     Reflux esophagitis     Thyroid disease        No Known Allergies    Past Surgical History:   Procedure Laterality Date    INTRACAPSULAR CATARACT EXTRACTION      Dr. Lesly Gray. Dr. Neto Light.    LIVER BIOPSY         Family History   Problem Relation Age of Onset    Hypertension Mother     Other Mother     Cancer Father     Cancer Brother     Asthma Brother        Social History     Socioeconomic History    Marital status:    Tobacco Use    Smoking status: Former     Current packs/day: 0.00     Average packs/day: 1.5 packs/day for 44.0 years (66.0 ttl pk-yrs)     Types: Cigarettes     Start date:      Quit date:      Years since quittin.3     Passive  exposure: Past    Smokeless tobacco: Never   Vaping Use    Vaping status: Never Used   Substance and Sexual Activity    Alcohol use: Not Currently     Comment: 2 week    Drug use: Never    Sexual activity: Defer           Objective   Physical Exam  Vitals and nursing note reviewed.   Constitutional:       General: He is not in acute distress.     Appearance: He is well-developed. He is ill-appearing. He is not diaphoretic.      Comments: Chronically ill-appearing   HENT:      Head: Normocephalic and atraumatic.      Right Ear: External ear normal.      Left Ear: External ear normal.      Nose: Nose normal.   Eyes:      Conjunctiva/sclera: Conjunctivae normal.      Pupils: Pupils are equal, round, and reactive to light.   Neck:      Vascular: No JVD.      Trachea: No tracheal deviation.   Cardiovascular:      Rate and Rhythm: Normal rate and regular rhythm.      Heart sounds: Normal heart sounds. No murmur heard.  Pulmonary:      Effort: Pulmonary effort is normal. No respiratory distress.      Breath sounds: Decreased breath sounds present. No wheezing, rhonchi or rales.   Abdominal:      General: Bowel sounds are normal.      Palpations: Abdomen is soft.      Tenderness: There is no abdominal tenderness.   Musculoskeletal:         General: No deformity. Normal range of motion.      Cervical back: Normal range of motion and neck supple.   Skin:     General: Skin is warm and dry.      Capillary Refill: Capillary refill takes less than 2 seconds.      Coloration: Skin is not pale.      Findings: No erythema or rash.   Neurological:      General: No focal deficit present.      Mental Status: He is alert and oriented to person, place, and time.      Cranial Nerves: No cranial nerve deficit.   Psychiatric:         Behavior: Behavior normal.         Thought Content: Thought content normal.         Procedures       Results for orders placed or performed during the hospital encounter of 05/08/25   ECG 12 Lead Chest Pain     Collection Time: 05/08/25 11:06 AM   Result Value Ref Range    QT Interval 372 ms    QTC Interval 415 ms   Comprehensive Metabolic Panel    Collection Time: 05/08/25 11:17 AM    Specimen: Blood   Result Value Ref Range    Glucose 104 (H) 65 - 99 mg/dL    BUN 33 (H) 8 - 23 mg/dL    Creatinine 1.09 0.76 - 1.27 mg/dL    Sodium 141 136 - 145 mmol/L    Potassium 3.7 3.5 - 5.2 mmol/L    Chloride 101 98 - 107 mmol/L    CO2 32.7 (H) 22.0 - 29.0 mmol/L    Calcium 8.6 8.2 - 9.6 mg/dL    Total Protein 7.5 6.0 - 8.5 g/dL    Albumin 3.7 3.5 - 5.2 g/dL    ALT (SGPT) 22 1 - 41 U/L    AST (SGOT) 25 1 - 40 U/L    Alkaline Phosphatase 68 39 - 117 U/L    Total Bilirubin 0.3 0.0 - 1.2 mg/dL    Globulin 3.8 gm/dL    A/G Ratio 1.0 g/dL    BUN/Creatinine Ratio 30.3 (H) 7.0 - 25.0    Anion Gap 7.3 5.0 - 15.0 mmol/L    eGFR 64.5 >60.0 mL/min/1.73   Lipase    Collection Time: 05/08/25 11:17 AM    Specimen: Blood   Result Value Ref Range    Lipase 16 13 - 60 U/L   High Sensitivity Troponin T    Collection Time: 05/08/25 11:17 AM    Specimen: Blood   Result Value Ref Range    HS Troponin T 53 (C) <22 ng/L   BNP    Collection Time: 05/08/25 11:17 AM    Specimen: Blood   Result Value Ref Range    proBNP 2,516.0 (H) 0.0 - 1,800.0 pg/mL   C-reactive Protein    Collection Time: 05/08/25 11:17 AM    Specimen: Blood   Result Value Ref Range    C-Reactive Protein 1.31 (H) 0.00 - 0.50 mg/dL   CBC Auto Differential    Collection Time: 05/08/25 11:17 AM    Specimen: Blood   Result Value Ref Range    WBC 6.57 3.40 - 10.80 10*3/mm3    RBC 4.21 4.14 - 5.80 10*6/mm3    Hemoglobin 11.8 (L) 13.0 - 17.7 g/dL    Hematocrit 38.0 37.5 - 51.0 %    MCV 90.3 79.0 - 97.0 fL    MCH 28.0 26.6 - 33.0 pg    MCHC 31.1 (L) 31.5 - 35.7 g/dL    RDW 13.1 12.3 - 15.4 %    RDW-SD 42.8 37.0 - 54.0 fl    MPV 9.5 6.0 - 12.0 fL    Platelets 321 140 - 450 10*3/mm3    Neutrophil % 74.1 42.7 - 76.0 %    Lymphocyte % 8.4 (L) 19.6 - 45.3 %    Monocyte % 14.9 (H) 5.0 - 12.0 %     Eosinophil % 1.8 0.3 - 6.2 %    Basophil % 0.2 0.0 - 1.5 %    Immature Grans % 0.6 (H) 0.0 - 0.5 %    Neutrophils, Absolute 4.87 1.70 - 7.00 10*3/mm3    Lymphocytes, Absolute 0.55 (L) 0.70 - 3.10 10*3/mm3    Monocytes, Absolute 0.98 (H) 0.10 - 0.90 10*3/mm3    Eosinophils, Absolute 0.12 0.00 - 0.40 10*3/mm3    Basophils, Absolute 0.01 0.00 - 0.20 10*3/mm3    Immature Grans, Absolute 0.04 0.00 - 0.05 10*3/mm3    nRBC 0.0 0.0 - 0.2 /100 WBC   Green Top (Gel)    Collection Time: 05/08/25 11:17 AM   Result Value Ref Range    Extra Tube Hold for add-ons.    Lavender Top    Collection Time: 05/08/25 11:17 AM   Result Value Ref Range    Extra Tube hold for add-on    Gold Top - SST    Collection Time: 05/08/25 11:17 AM   Result Value Ref Range    Extra Tube Hold for add-ons.    Light Blue Top    Collection Time: 05/08/25 11:17 AM   Result Value Ref Range    Extra Tube Hold for add-ons.    Urinalysis With Microscopic If Indicated (No Culture) - Urine, Clean Catch    Collection Time: 05/08/25 12:28 PM    Specimen: Urine, Clean Catch   Result Value Ref Range    Color, UA Yellow Yellow, Straw    Appearance, UA Clear Clear    pH, UA 5.5 5.0 - 8.0    Specific Gravity, UA 1.024 1.005 - 1.030    Glucose, UA Negative Negative    Ketones, UA Trace (A) Negative    Bilirubin, UA Negative Negative    Blood, UA Negative Negative    Protein, UA Trace (A) Negative    Leuk Esterase, UA Negative Negative    Nitrite, UA Negative Negative    Urobilinogen, UA 1.0 E.U./dL 0.2 - 1.0 E.U./dL   High Sensitivity Troponin T 1Hr    Collection Time: 05/08/25 12:28 PM    Specimen: Arm, Left; Blood   Result Value Ref Range    HS Troponin T 50 (H) <22 ng/L    Troponin T Numeric Delta -3 ng/L    Troponin T % Delta -6 Abnormal if >/= 20%   Respiratory Panel PCR w/COVID-19(SARS-CoV-2) [facility] In-House, NP Swab in UTM/VTM, 2 HR TAT - Swab, Nasopharynx    Collection Time: 05/08/25  1:13 PM    Specimen: Nasopharynx; Swab   Result Value Ref Range     ADENOVIRUS, PCR Not Detected Not Detected    Coronavirus 229E Not Detected Not Detected    Coronavirus HKU1 Not Detected Not Detected    Coronavirus NL63 Not Detected Not Detected    Coronavirus OC43 Not Detected Not Detected    COVID19 Not Detected Not Detected - Ref. Range    Human Metapneumovirus Not Detected Not Detected    Human Rhinovirus/Enterovirus Not Detected Not Detected    Influenza A PCR Not Detected Not Detected    Influenza B PCR Not Detected Not Detected    Parainfluenza Virus 1 Not Detected Not Detected    Parainfluenza Virus 2 Not Detected Not Detected    Parainfluenza Virus 3 Not Detected Not Detected    Parainfluenza Virus 4 Not Detected Not Detected    RSV, PCR Not Detected Not Detected    Bordetella pertussis pcr Not Detected Not Detected    Bordetella parapertussis PCR Not Detected Not Detected    Chlamydophila pneumoniae PCR Not Detected Not Detected    Mycoplasma pneumo by PCR Not Detected Not Detected      XR Chest 1 View   Final Result   No radiographic evidence of acute cardiac or pulmonary disease.               This report was finalized on 5/8/2025 2:43 PM by Dr. Vinicius Kidd MD.               ED Course  ED Course as of 05/08/25 1716   u May 08, 2025   1149 ECG 12 Lead Chest Pain  Normal sinus rhythm, rate 75, QTc 415, no acute ST or T changes [CW]   1356 Respiratory panel and GI panel pending. [MB]   1409 Report given to Dr. Romero [MB]   1700 XR Chest 1 View [GF]      ED Course User Index  [CW] Arthur Adan DO  [GF] Carlo Romero DO  [MB] Paloma Lee APRN                                                       Medical Decision Making  Patient is a 89 year old male with PMH significant for metastatic non small cell lung cancer, dyspnea, COPD, thyroid disease, macular degeneration, GERD.  He presents to the ED today for cough, chest congestion, chest pain, and diarrhea.  Patient denies any nausea, vomiting, abdominal pain, syncope, dizziness, headache or any  other significant complaints.      Case transitioned to me at change of shift.  Patient was unable to provide us with a stool sample.  Patient's history consistent with antibiotic induced diarrhea.  Discussed stopping the Augmentin at this time.  Patient's wife states that since he started the Augmentin his diarrhea started back up again.  Patient denies any fevers chills nausea vomiting at this time.  We discussed outpatient follow-up and ER precautions.  All questions answered patient is in agreement plan of care.      Problems Addressed:  Diarrhea in adult patient: complicated acute illness or injury    Amount and/or Complexity of Data Reviewed  Labs: ordered.  Radiology: ordered. Decision-making details documented in ED Course.  ECG/medicine tests: ordered. Decision-making details documented in ED Course.    Risk  Prescription drug management.        Final diagnoses:   Diarrhea in adult patient       ED Disposition  ED Disposition       ED Disposition   Discharge    Condition   Stable    Comment   --               Elissa Geronimo MD  [address] [place] [address]  [phone]    Schedule an appointment as soon as possible for a visit in 3 days      [facility] EMERGENCY DEPARTMENT  [address] [place] [id number] [phone]  Go to   If symptoms worsen         Medication List      No changes were made to your prescriptions during this visit.            Carlo Romero, DO  05/08/25 4502

## 2025-05-08 NOTE — TELEPHONE ENCOUNTER
"Faby called reports Kevin is on Augmentin & has had Diarrhea since about noon yesterday & chest discomfort,asked Kevin to describe his CP,questioned does it feel like something sitting on his chest,sharp pain,aching,radiating,he stated it is hard to describe but it is a steady pain that feels \"stingy\"Spoke with provider with orders to go to ER,Faby was notified & verbalized understanding.  "

## 2025-05-11 ENCOUNTER — HOSPITAL ENCOUNTER (OUTPATIENT)
Dept: CT IMAGING | Facility: HOSPITAL | Age: OVER 89
Discharge: HOME | End: 2025-05-11
Payer: MEDICARE

## 2025-05-11 DIAGNOSIS — C34.90 METASTATIC NON-SMALL CELL LUNG CANCER: ICD-10-CM

## 2025-05-11 PROCEDURE — 71260 CT THORAX DX C+: CPT | Performed by: RADIOLOGY

## 2025-05-11 PROCEDURE — 25510000001 IOPAMIDOL 61 % SOLUTION: Performed by: INTERNAL MEDICINE

## 2025-05-11 PROCEDURE — 74177 CT ABD & PELVIS W/CONTRAST: CPT | Performed by: RADIOLOGY

## 2025-05-11 PROCEDURE — 71260 CT THORAX DX C+: CPT

## 2025-05-11 PROCEDURE — 74177 CT ABD & PELVIS W/CONTRAST: CPT

## 2025-05-11 RX ORDER — IOPAMIDOL 612 MG/ML
100 INJECTION, SOLUTION INTRAVASCULAR
Status: COMPLETED | OUTPATIENT
Start: 2025-05-11 | End: 2025-05-11

## 2025-05-11 RX ADMIN — IOPAMIDOL 85 ML: 612 INJECTION, SOLUTION INTRAVENOUS at 12:19

## 2025-05-12 RX ORDER — SODIUM CHLORIDE 9 MG/ML
20 INJECTION, SOLUTION INTRAVENOUS ONCE
OUTPATIENT
Start: 2025-06-04

## 2025-05-13 ENCOUNTER — OFFICE VISIT (OUTPATIENT)
Dept: ONCOLOGY | Facility: CLINIC | Age: OVER 89
End: 2025-05-13
Payer: MEDICARE

## 2025-05-13 ENCOUNTER — INFUSION (OUTPATIENT)
Dept: ONCOLOGY | Facility: HOSPITAL | Age: OVER 89
End: 2025-05-13
Payer: MEDICARE

## 2025-05-13 ENCOUNTER — LAB (OUTPATIENT)
Dept: ONCOLOGY | Facility: CLINIC | Age: OVER 89
End: 2025-05-13
Payer: MEDICARE

## 2025-05-13 VITALS
BODY MASS INDEX: 22.75 KG/M2 | DIASTOLIC BLOOD PRESSURE: 68 MMHG | RESPIRATION RATE: 20 BRPM | HEIGHT: 72 IN | HEART RATE: 73 BPM | SYSTOLIC BLOOD PRESSURE: 125 MMHG | WEIGHT: 168 LBS | OXYGEN SATURATION: 95 % | TEMPERATURE: 98 F

## 2025-05-13 VITALS
DIASTOLIC BLOOD PRESSURE: 54 MMHG | HEART RATE: 69 BPM | OXYGEN SATURATION: 99 % | TEMPERATURE: 97.8 F | SYSTOLIC BLOOD PRESSURE: 119 MMHG | RESPIRATION RATE: 19 BRPM

## 2025-05-13 DIAGNOSIS — C34.90 METASTATIC NON-SMALL CELL LUNG CANCER: Primary | ICD-10-CM

## 2025-05-13 DIAGNOSIS — C34.90 METASTATIC NON-SMALL CELL LUNG CANCER: ICD-10-CM

## 2025-05-13 DIAGNOSIS — R53.81 DEBILITY: ICD-10-CM

## 2025-05-13 LAB
ALBUMIN SERPL-MCNC: 3.6 G/DL (ref 3.5–5.2)
ALBUMIN/GLOB SERPL: 1.1 G/DL
ALP SERPL-CCNC: 67 U/L (ref 39–117)
ALT SERPL W P-5'-P-CCNC: 60 U/L (ref 1–41)
ANION GAP SERPL CALCULATED.3IONS-SCNC: 7.9 MMOL/L (ref 5–15)
AST SERPL-CCNC: 39 U/L (ref 1–40)
BASOPHILS # BLD AUTO: 0.02 10*3/MM3 (ref 0–0.2)
BASOPHILS NFR BLD AUTO: 0.2 % (ref 0–1.5)
BILIRUB SERPL-MCNC: 0.4 MG/DL (ref 0–1.2)
BUN SERPL-MCNC: 21 MG/DL (ref 8–23)
BUN/CREAT SERPL: 22.6 (ref 7–25)
CALCIUM SPEC-SCNC: 8.6 MG/DL (ref 8.2–9.6)
CHLORIDE SERPL-SCNC: 100 MMOL/L (ref 98–107)
CO2 SERPL-SCNC: 31.1 MMOL/L (ref 22–29)
CREAT SERPL-MCNC: 0.93 MG/DL (ref 0.76–1.27)
DEPRECATED RDW RBC AUTO: 43.8 FL (ref 37–54)
EGFRCR SERPLBLD CKD-EPI 2021: 78 ML/MIN/1.73
EOSINOPHIL # BLD AUTO: 0.12 10*3/MM3 (ref 0–0.4)
EOSINOPHIL NFR BLD AUTO: 1.2 % (ref 0.3–6.2)
ERYTHROCYTE [DISTWIDTH] IN BLOOD BY AUTOMATED COUNT: 13.2 % (ref 12.3–15.4)
GLOBULIN UR ELPH-MCNC: 3.4 GM/DL
GLUCOSE SERPL-MCNC: 119 MG/DL (ref 65–99)
HCT VFR BLD AUTO: 37.4 % (ref 37.5–51)
HGB BLD-MCNC: 11.4 G/DL (ref 13–17.7)
IMM GRANULOCYTES # BLD AUTO: 0.07 10*3/MM3 (ref 0–0.05)
IMM GRANULOCYTES NFR BLD AUTO: 0.7 % (ref 0–0.5)
LYMPHOCYTES # BLD AUTO: 0.76 10*3/MM3 (ref 0.7–3.1)
LYMPHOCYTES NFR BLD AUTO: 7.4 % (ref 19.6–45.3)
MCH RBC QN AUTO: 27.7 PG (ref 26.6–33)
MCHC RBC AUTO-ENTMCNC: 30.5 G/DL (ref 31.5–35.7)
MCV RBC AUTO: 90.8 FL (ref 79–97)
MONOCYTES # BLD AUTO: 0.73 10*3/MM3 (ref 0.1–0.9)
MONOCYTES NFR BLD AUTO: 7.1 % (ref 5–12)
NEUTROPHILS NFR BLD AUTO: 8.56 10*3/MM3 (ref 1.7–7)
NEUTROPHILS NFR BLD AUTO: 83.4 % (ref 42.7–76)
NRBC BLD AUTO-RTO: 0 /100 WBC (ref 0–0.2)
PLATELET # BLD AUTO: 321 10*3/MM3 (ref 140–450)
PMV BLD AUTO: 9.6 FL (ref 6–12)
POTASSIUM SERPL-SCNC: 4.2 MMOL/L (ref 3.5–5.2)
PROT SERPL-MCNC: 7 G/DL (ref 6–8.5)
RBC # BLD AUTO: 4.12 10*6/MM3 (ref 4.14–5.8)
SODIUM SERPL-SCNC: 139 MMOL/L (ref 136–145)
T4 FREE SERPL-MCNC: 1.65 NG/DL (ref 0.92–1.68)
TSH SERPL DL<=0.05 MIU/L-ACNC: 5.98 UIU/ML (ref 0.27–4.2)
WBC NRBC COR # BLD AUTO: 10.26 10*3/MM3 (ref 3.4–10.8)

## 2025-05-13 PROCEDURE — 25010000002 PEMBROLIZUMAB 100 MG/4ML SOLUTION 4 ML VIAL: Performed by: INTERNAL MEDICINE

## 2025-05-13 PROCEDURE — 84443 ASSAY THYROID STIM HORMONE: CPT | Performed by: INTERNAL MEDICINE

## 2025-05-13 PROCEDURE — 85025 COMPLETE CBC W/AUTO DIFF WBC: CPT | Performed by: INTERNAL MEDICINE

## 2025-05-13 PROCEDURE — 84439 ASSAY OF FREE THYROXINE: CPT | Performed by: INTERNAL MEDICINE

## 2025-05-13 PROCEDURE — 96413 CHEMO IV INFUSION 1 HR: CPT

## 2025-05-13 PROCEDURE — 80053 COMPREHEN METABOLIC PANEL: CPT | Performed by: INTERNAL MEDICINE

## 2025-05-13 PROCEDURE — 25810000003 SODIUM CHLORIDE 0.9 % SOLUTION: Performed by: INTERNAL MEDICINE

## 2025-05-13 RX ORDER — AMOXICILLIN 250 MG
1 CAPSULE ORAL DAILY PRN
Qty: 30 TABLET | Refills: 3 | Status: SHIPPED | OUTPATIENT
Start: 2025-05-13

## 2025-05-13 RX ORDER — SODIUM CHLORIDE 9 MG/ML
20 INJECTION, SOLUTION INTRAVENOUS ONCE
Status: COMPLETED | OUTPATIENT
Start: 2025-05-13 | End: 2025-05-13

## 2025-05-13 RX ADMIN — SODIUM CHLORIDE 20 ML/HR: 9 INJECTION, SOLUTION INTRAVENOUS at 13:56

## 2025-05-13 RX ADMIN — SODIUM CHLORIDE 200 MG: 9 INJECTION, SOLUTION INTRAVENOUS at 13:55

## 2025-05-13 NOTE — PROGRESS NOTES
Venipuncture Blood Specimen Collection  Venipuncture performed in right arm by Angelica Davison MA with good hemostasis. Patient tolerated the procedure well without complications.   05/13/25   Angelica Davison MA

## 2025-05-13 NOTE — PROGRESS NOTES
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 ureters, bladder: normal.     Reproductive: normal.     GI tract/Bowel: Moderate amount of stool in the colon.  No acute  inflammatory abnormality.     Appendix: normal.     Peritoneum: normal, no free air or fluid.     Vascular: Infrarenal abdominal aortic aneurysm measures 3.3 x 3.2 cm.   The iliac arteries are atherosclerotic.     Lymph nodes: normal.     Musculoskeletal and abdominal wall: Right inguinal hernia contains  nonobstructed, non-strangulated loop of small bowel.  Compression  deformity at L2 is chronic.     IMPRESSION:     CT CHEST:  PATHOLOGIC FRACTURE IN THE LATERAL ASPECT OF THE RIGHT FIFTH RIB, WHICH  IS NOW ERODED AND INVADED BY THE OTHERWISE UNCHANGED RIGHT UPPER LOBE  MASS.     COMPARED TO 5/15/2024, NO CHANGE IN PERIPHERAL RIGHT UPPER LOBE MASS  WITH EXTENSION INTO THE RIGHT HILUM AND IRREGULARITY IN THE RIGHT  MAINSTEM BRONCHUS.     NO ADDITIONAL SIGNIFICANT ACUTE ABNORMALITY IN THE CHEST.     CT ABDOMEN AND PELVIS:  PROGRESSION IN HEPATIC METASTASES COMPARED TO 6/14/2024.    CT Head With Contrast (07/09/2024 09:47)   IMPRESSION:  1.  No acute intracranial findings identified.  2.  No enhancing brain parenchymal lesions identified.  3.  Diffuse cerebral atrophy with chronic small vessel ischemic disease.  4.  Focal encephalomalacia of the right frontal lobe.    CT Abdomen  Pelvis With Contrast (06/14/2024 14:55)   FINDINGS:  ABDOMEN: The lung bases are clear. The heart is normal in size. There  are multiple hepatic masses, the largest of which is in the right lobe  measuring 8.1 cm consistent with metastatic disease. The spleen is  homogenous. No adrenal mass is present. The pancreas is unremarkable.  The kidneys are normal. There is a 3.3 cm abdominal aortic aneurysm. The  mesenteric vascualture is patent. There is no free fluid or adenopathy.  The abdominal portions of the GI tract are unremarkable with no evidence  of obstruction.     PELVIS: The appendix is normal. The urinary bladder is unremarkable.  There is no significant free fluid or adenopathy. Bone windows reveal an  L2 compression fracture which is unchanged compared to the prior exam.  No new osseous abnormalities are identified. The extraperitoneal soft  tissues are unremarkable.     IMPRESSION:  1. Hepatic metastases not significantly changed compared to the prior  exam.  2. 3.3 cm abdominal aortic aneurysm.  3. Stable L2 compression fracture.    NM PET/CT Skull Base to Mid Thigh (05/30/2024 10:53)   FINDINGS:      HEAD:    BRAIN AND EXTRA-AXIAL SPACES:  Unremarkable as visualized.    NASOPHARYNX:  Unremarkable as visualized.      NECK:    HYPOPHARYNX:  Unremarkable as visualized.    LARYNX:  Unremarkable as visualized.    TRACHEA:  Unremarkable as visualized.    RETROPHARYNGEAL SPACE:  Unremarkable as visualized.    SUBMANDIBULAR/PAROTID GLANDS:  Unremarkable as visualized.  Glands are  normal in size.    THYROID:  Unremarkable as visualized.  No enlarged or calcified  nodules.      CHEST:    LUNGS AND PLEURAL SPACES:  Consolidative more central and elongated  masslike consolidation near the superior right hilum measures about 7 cm  and again shows PET hypermetabolism mSUV 21. The mass extends into the  right hilum.  A 8 mm right upper lobe spiculated pulmonary nodule shows  no PET hypermetabolism at this time.     HEART:  Unremarkable as visualized.  No cardiomegaly.  No significant  pericardial effusion.    MEDIASTINUM:  Unremarkable as visualized.  No mass.      ABDOMEN:    LIVER:  PET hypermetabolic liver masses are identified with the  largest in the right lobe measuring about 8.3 cm and with PET  hypermetabolism mSUV 14.2. A smaller liver masses noted in the left lobe  of the liver.    GALLBLADDER AND BILE DUCTS:  Unremarkable as visualized.  No calcified  stones.  No ductal dilation.    PANCREAS:  Unremarkable as visualized.  No ductal dilation.    SPLEEN:  Unremarkable as visualized.  No splenomegaly.    ADRENALS:  Unremarkable as visualized.  No mass.    KIDNEYS AND URETERS:  Unremarkable as visualized.    STOMACH AND BOWEL:  Unremarkable as visualized.  No obstruction.      PELVIS:    APPENDIX:  No findings to suggest acute appendicitis.    BLADDER:  Unremarkable as visualized.    REPRODUCTIVE:  Unremarkable as visualized.      WHOLE BODY:    INTRAPERITONEAL SPACE:  Unremarkable as visualized.  No significant  fluid collection.  No free air.    BONES/JOINTS:  Again there is a peripheral consolidated mass that  appears to invade the right upper ribs of the right upper lobe measuring  about 8.6 cm and with PET hypermetabolism mSUV 15.1.  Compression  fracture of L2 vertebral body is again noted.  No dislocation.    SOFT TISSUES:  Unremarkable as visualized.    VASCULATURE:  Unremarkable as visualized.  No aortic aneurysm.    LYMPH NODES:  Unremarkable as visualized.  No lymphadenopathy.  No  hypermetabolic activity.     IMPRESSION:  1.  Again there is a peripheral consolidated mass that appears to invade  the right upper ribs of the right upper lobe measuring about 8.6 cm and  with PET hypermetabolism mSUV 15.1.  2.  Consolidative more central and elongated masslike consolidation near  the superior right hilum measures about 7 cm and again shows PET  hypermetabolism mSUV 21. The mass extends into the right hilum.  3.   PET hypermetabolic liver masses are identified with the largest in  the right lobe measuring about 8.3 cm and with PET hypermetabolism mSUV  14.2. A smaller liver masses noted in the left lobe of the liver.  4.  Compression fracture of L2 vertebral body is again noted.  5.  A 8 mm right upper lobe spiculated pulmonary nodule shows no PET  hypermetabolism at this time.    CT Chest With Contrast Diagnostic (05/15/2024 14:24)   FINDINGS:    LUNGS AND PLEURAL SPACES:  Right upper lobe peripheral pleural-based  consolidative masslike opacity measuring about 2.9 x 7.8 cm with a more  central hilar mass on the right side measuring 5.1 x 4.4 cm and a  smaller right upper lobe nodule component measuring 2.6 cm. The  peripheral mass does appear to cause fourth rib erosion or destruction.   Emphysematous lungs noted.  Right upper lobe 1.2 cm ill-defined nodule  that could represent scarring.  No pneumothorax.  No significant  effusion.    HEART:  Unremarkable as visualized.  No cardiomegaly.  No significant  pericardial effusion.  No significant coronary artery calcifications.    BONES/JOINTS:  Compression fracture noted of probable L2 vertebral  body.  No dislocation.    SOFT TISSUES:  Unremarkable as visualized.    VASCULATURE:  Partially visualized infrarenal abdominal aortic  aneurysm measuring 3.4 cm.    LYMPH NODES:  Unremarkable as visualized.  No enlarged lymph nodes.    LIVER:  Right lobe of liver mass concerning for metastatic disease  measuring about 7 mm.    OTHER FINDINGS:  .     IMPRESSION:  1.  Right upper lobe peripheral pleural-based consolidative masslike  opacity measuring about 2.9 x 7.8 cm with a more central hilar mass on  the right side measuring 5.1 x 4.4 cm and a smaller right upper lobe  nodule component measuring 2.6 cm. The peripheral mass does appear to  cause fourth rib erosion or destruction.  2.  Right lobe of liver mass concerning for metastatic disease measuring  about 7 mm.  3.  Partially  visualized infrarenal abdominal aortic aneurysm measuring  3.4 cm.  4.  Compression fracture noted of probable L2 vertebral body.    XR Chest 2 View (04/23/2024 16:34)   FINDINGS:  LUNGS: Pleural-based consolidation periphery of the right lung. Mild  fullness in the right suprahilar region  HEART AND MEDIASTINUM: Heart and mediastinal contours are unremarkable  SKELETON: Bony and soft tissue structures are unremarkable.     IMPRESSION:  Pleural-based consolidation periphery of the right lung and fullness in  the right suprahilar region. Recommend CT        Pathology:   06/21/2024        NGS:      PATHOLOGY - SCAN - FINAL RESULTS, GUARDANT, 07.28.2024 (07/28/2024)           Assessment / Plan         Assessment and Plan   Kevin Fonseca is a 89 y.o. year old with history of     DeNovo metastatic non small cell carcinoma, squamous cell. PD-L1 TPS 5%. ERBB2 amplification. MSI-low. Negative EGFR, ALK, ROS1, BRAF, MET, RET, NTRK, KRAS  -Patient with ~1 year of decline in function, weight loss, fatigue, and intermittent hemoptysis. CT May 15 2024 with with concerning right upper lobe peripheral consolidation 2.9 x 7.8 cm with central hilar mass 5 x 4.4 cm, small right upper lobe nodule 2.6 cm, another right upper lobe 1.2 cm ill defined mass could represent scarring. Also noted liver mass concerning for metastatic disease. Follow up PET/CT 6/14/2024 with hepatic metastasis largest measuring 8.1 cm, and noted L2 compression fracture. 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  -Patient is aware treatment is palliative. After a thorough discussion, patient and family decided to proceed with single agent pembrolizumab q21d given his performance status and co-morbidities. I feel this is reasonable given his functional status.   -C3  9/3-tolerated well. Course complicated due to accident with abrasions and noted to have bradycardia with 2-1 AV block.   -C4 9/24- tolerated well  -C5 10/15- tolerated well  -C6 11/5- proceed today  -C7 11/26- completed  -C9 1/21/25- tolerated well  -C11 3/4/25- tolerated well  -C 13 4/15/25- tolerated well  -C14 5/13- proceed today  -Next CT chest/abdomen/pelvis 8/2025     2. Malignancy associated pain  -Currently denies     3. Nutrition  -Recommend patient take in 2-3 boost/day    4. Hyponatremia   - Improved     5. Erythema and pruritus - Improved   -Started after C1 of IO  -Recommend H1 blockers: loratidine 10 mg AM and benadryl 25 mg PM  -Increase topical steroid to clobestasol 0.05 BID    6. Hypoxia   - Portable oxygen provided; patient has been oxygen dependent since recent admission for bradycardia and heart block    7. Bradycardia and AV heart block  - Evaluated by EP at [facility], he is discharged home with a cardiac monitor   - recommend pacemaker implant, however cardiology did not think patient was a good candidate for procedure    8. Loose stool- resolved  - Recommend holding miralax and benefiber for now   - If continues to have loose stool, would recommend imodium PRN    9. RLE swelling  -Improvement of abrasions after fall, has surrounding erythema and swelling  -CT RLE with probable hematoma. Improvement in swelling    10. RLE wound - Improved  - R calf wound after injury, with intermittent drainage. Currently with small superficial bullae   -Refer to wound care and dermatology     11. Constipation   - Now with constipation for 5 days. Passing flatus   - Senna-colace ordered to be taken PRN    Discussed possible differential diagnoses, testing, treatment, recommended non-pharmacological interventions, risks, warning signs to monitor for that would indicate need for follow-up in clinic or ER. If no improvement with these regimens or you have new or worsening symptoms follow-up. Patient verbalizes  understanding and agreement with plan of care. Denies further needs or concerns.     Patient was given instructions and counseling regarding her condition and for health maintenance advised.       All questions were answered to his satisfaction.    BMI is within normal parameters. No other follow-up for BMI required.         ACO / MARY/Other  Quality measures  -  Kevin Fonseca did not receive 2024 flu vaccine.  This was recommended.  -  Kevin Fonseca reports a pain score of 0.  Given his pain assessment as noted, treatment options were discussed and the following options were decided upon as a follow-up plan to address the patient's pain: continuation of current treatment plan for pain.  -  Current outpatient and discharge medications have been reconciled for the patient.  Reviewed by: Jennifer Hinds MD        Meds ordered during this visit  New Medications Ordered This Visit   Medications    sennosides-docusate (PERICOLACE) 8.6-50 MG per tablet     Sig: Take 1 tablet by mouth Daily As Needed for Constipation.     Dispense:  30 tablet     Refill:  3       Visit Diagnoses    ICD-10-CM ICD-9-CM   1. Metastatic non-small cell lung cancer  C34.90 162.9   2. Debility  R53.81 799.3                           Follow Up   In 3 weeks with treatment        This document has been electronically signed by Jennifer Hinds MD   May 13, 2025 15:40 EDT    Dictated Utilizing Dragon Dictation: Part of this note may be an electronic transcription/translation of spoken language to printed text using the Dragon Dictation System.

## 2025-05-15 ENCOUNTER — OFFICE VISIT (OUTPATIENT)
Dept: FAMILY MEDICINE CLINIC | Facility: CLINIC | Age: OVER 89
End: 2025-05-15
Payer: MEDICARE

## 2025-05-15 VITALS
RESPIRATION RATE: 16 BRPM | DIASTOLIC BLOOD PRESSURE: 58 MMHG | TEMPERATURE: 97.3 F | WEIGHT: 172.8 LBS | OXYGEN SATURATION: 95 % | BODY MASS INDEX: 23.4 KG/M2 | HEART RATE: 73 BPM | HEIGHT: 72 IN | SYSTOLIC BLOOD PRESSURE: 114 MMHG

## 2025-05-15 DIAGNOSIS — J44.9 CHRONIC OBSTRUCTIVE PULMONARY DISEASE, UNSPECIFIED COPD TYPE: ICD-10-CM

## 2025-05-15 DIAGNOSIS — E03.9 HYPOTHYROIDISM, UNSPECIFIED TYPE: ICD-10-CM

## 2025-05-15 DIAGNOSIS — R60.0 BILATERAL EDEMA OF LOWER EXTREMITY: ICD-10-CM

## 2025-05-15 DIAGNOSIS — I50.9 CHRONIC CONGESTIVE HEART FAILURE, UNSPECIFIED HEART FAILURE TYPE: ICD-10-CM

## 2025-05-15 DIAGNOSIS — C34.90 METASTATIC NON-SMALL CELL LUNG CANCER: ICD-10-CM

## 2025-05-15 DIAGNOSIS — Z09 HOSPITAL DISCHARGE FOLLOW-UP: Primary | ICD-10-CM

## 2025-05-15 DIAGNOSIS — G47.33 OSA (OBSTRUCTIVE SLEEP APNEA): ICD-10-CM

## 2025-05-15 DIAGNOSIS — F41.9 ANXIETY: ICD-10-CM

## 2025-05-15 PROCEDURE — 83880 ASSAY OF NATRIURETIC PEPTIDE: CPT | Performed by: FAMILY MEDICINE

## 2025-05-15 PROCEDURE — 36415 COLL VENOUS BLD VENIPUNCTURE: CPT | Performed by: FAMILY MEDICINE

## 2025-05-15 RX ORDER — BUMETANIDE 2 MG/1
1 TABLET ORAL DAILY
Qty: 7 TABLET | Refills: 0 | Status: CANCELLED | OUTPATIENT
Start: 2025-05-15 | End: 2025-05-29

## 2025-05-15 RX ORDER — BUSPIRONE HYDROCHLORIDE 5 MG/1
TABLET ORAL
Qty: 180 TABLET | OUTPATIENT
Start: 2025-05-15

## 2025-05-15 RX ORDER — LEVOTHYROXINE SODIUM 88 UG/1
88 TABLET ORAL DAILY
Qty: 90 TABLET | Refills: 0 | Status: SHIPPED | OUTPATIENT
Start: 2025-05-15

## 2025-05-15 RX ORDER — MULTIPLE VITAMINS W/ MINERALS TAB 9MG-400MCG
1 TAB ORAL DAILY
COMMUNITY

## 2025-05-15 RX ORDER — BUSPIRONE HYDROCHLORIDE 5 MG/1
5 TABLET ORAL DAILY
Qty: 90 TABLET | Refills: 1 | Status: SHIPPED | OUTPATIENT
Start: 2025-05-15 | End: 2025-05-20 | Stop reason: SDUPTHER

## 2025-05-16 LAB — NT-PROBNP SERPL-MCNC: 1414 PG/ML (ref 0–1800)

## 2025-05-19 ENCOUNTER — TELEPHONE (OUTPATIENT)
Dept: FAMILY MEDICINE CLINIC | Facility: CLINIC | Age: OVER 89
End: 2025-05-19
Payer: MEDICARE

## 2025-05-19 DIAGNOSIS — F41.9 ANXIETY: ICD-10-CM

## 2025-05-19 NOTE — TELEPHONE ENCOUNTER
Oh yes, buspar also got changed to 5 mg daily at discharge instead of the 2 tablets, 1 tablet. How has he been taking it? How is his anxiety right now? I'm not sure why it had gotten changed at the hospital.

## 2025-05-19 NOTE — TELEPHONE ENCOUNTER
Yes, the bumex is too much. He was discharged on bumex 1 mg orally daily only. He's maintaining his weight, and his last labs were excellent. He needs to go to bumex 1 mg orally daily; that actually might make him feel better. Have them monitor the weight and if the weight goes to 172 lbs or if he starts having edema again to call.

## 2025-05-19 NOTE — PROGRESS NOTES
Transitional Care Follow Up Visit  Subjective     Kevin Fonseca is a 89 y.o. male who presents for a transitional care management visit.    Within 48 business hours after discharge our office contacted him via telephone to coordinate his care and needs.      I reviewed and discussed the details of that call along with the discharge summary, hospital problems, inpatient lab results, inpatient diagnostic studies, and consultation reports with Kevin.        5/5/2025     5:29 PM   Date of TCM Phone Call   [facility]   Date of Admission 5/3/2025   Date of Discharge 5/5/2025   [place] [facility] [place] Illness     History of Present Illness  The patient is a 89-year-old male with medical conditions significant for COPD, macular degeneration, hypothyroidism, sick sinus syndrome, and squamous cell carcinoma of the lung who presents to the clinic for a medical follow-up.    He reports an improvement in his respiratory function, attributing it to the recent hospital intervention. He was recently treated with amoxicillin for pneumonia. He was hospitalized on 05/03/2025 and discharged on 05/05/2025. The discharge diagnoses included COPD with suspected exacerbation on admission, chronic respiratory failure, history of BENNIE, and former tobacco abuse with non-small cell lung cancer with metastasis to the liver, status post chemotherapy and radiation, currently on palliative chemotherapy. He was at the ER complaining about shortness of breath, which had been progressively worse than usual for about 2 to 3 weeks. He was having frequent productive cough with thick sputum and lower extremity edema for the past 3 weeks. He was diagnosed with COPD exacerbation and admitted for the same. CT chest noted trace right pleural effusion but no significant edema. He had bilateral expiratory wheezing and decreased air movement along with rales. He was stable on baseline oxygen. He was  given IV steroids and DuoNebs in the emergency room. The ER had ordered 40 mg IV Lasix, and he responded well with 400 mL of urine at bedside. He was treated for COPD and discharged.    He has been managing his lower extremity edema by elevating his feet on a cushion, which he finds beneficial. He has been prescribed a diuretic, which he did not take today due to his clinic visit. Initially, he was taking a full tablet, but it was later recommended to halve the dose. He experiences frequent urination even without the diuretic. He recalls an episode of severe leg swelling that prevented him from wearing his shoes.    He has been [facility] A recent thyroid check was slightly abnormal, but subsequent hospital checks were normal.    He has a history of squamous cell carcinoma of the lung with metastasis to the liver and a compression fracture at L2. He was restarted on pembrolizumab and is advised to take 2 to 3 Boosts per day.    He also reports excessive sleepiness, confusion, and anxiety.    A recent CT scan revealed bowel backup, for which he was prescribed a laxative. However, he resumed MiraLAX and Benefiber due to uncertainty about the new laxative's effects. He also increased his fruit intake yesterday, resulting in a satisfactory bowel movement, the first since his diarrhea episode last Thursday.    Electrophysiology at [facility] is working on his AV heart block and bradycardia. They had recommended a pacemaker, but cardiology did not think he was a good candidate, which might be a reason for his fatigue.       The following portions of the patient's history were reviewed and updated as appropriate: allergies, current medications, past family history, past medical history, past social history, past surgical history, and problem list.    Objective   Physical Exam  Physical Exam  Respiratory: Bilateral expiratory wheezing, decreased air movement, rales    Gen:  Patient in NAD. Pleasant and answers appropriately. A&Ox3.  In wheelchair.    Skin: Warm and dry with normal turgor. No purpura, rashes, or unusual pigmentation noted. Hair is normal in appearance and distribution.    HEENT: NC/AT. No lesions noted. Conjunctiva clear, sclera nonicteric. PERRL. EOMI without nystagmus or strabismus. Fundi appear benign. No hemorrhages or exudates of eyes. Auditory canals are patent bilaterally without lesions. TMs intact,  nonerythematous, bulging without lesions. Nasal mucosa erythematous, and nonedematous. Frontal and maxillary sinuses are nontender. O/P erythematous and moist without exudate.    Neck: Supple without lymph nodes palpated. FROM.     Heart: Distant.  RRR. S1 and S2 normal. No S3 or S4. No MRGT.    Abd: Soft, nontender,nondistended. (+)BSx4 quadrants.     Extrem: No CCE. Radial pulses 2+/4 and equal B/L. FROMx4.  Positive joint tenderness noted.    Neuro: No focal motor/sensory deficits.    Results  Labs   - TSH May 13, 2025: 5.98  -T4 free May 13, 2025: 1.65    Imaging   - CT chest: 05/03/2025  1.  Irregular lateral pleural thickening, 6 x 2 cm, with multifocal  irregular scarring in the right upper lobe, unchanged from 2/26/2025.  2.  Nodular pleural thickening along the inferior major fissure with  adjacent scarring in the lingula, also unchanged.  3.  Trace right pleural effusion.  4.  No consolidative infiltrate or pneumothorax.  5.  COPD.  6.  Infrarenal fusiform aortic aneurysm measuring up to 3.6 cm in  diameter, unchanged.   7.  A stable 6.6 x 3.5 cm lobulated calcified mass in the right hepatic  lobe, with a 2.2 cm new calcified lesion in the left hepatic lobe.  Followup CT abdomen/pelvis using liver protocol is recommended.    Assessment & Plan   Diagnoses and all orders for this visit:    1. Hospital discharge follow-up (Primary)    2. BENNIE (obstructive sleep apnea)  -     BNP    3. Metastatic non-small cell lung cancer  -     Cancel: BNP; Future  -      BNP    4. Hypothyroidism, unspecified type  -     levothyroxine (SYNTHROID, LEVOTHROID) 88 MCG tablet; Take 1 tablet by mouth Daily.  Dispense: 90 tablet; Refill: 0  -     BNP    5. Anxiety  -     Discontinue: busPIRone (BUSPAR) 5 MG tablet; Take 1 tablet by mouth Daily.  Dispense: 90 tablet; Refill: 1  -     BNP    6. Chronic congestive heart failure, unspecified heart failure type  -     Cancel: BNP; Future  -     BNP    7. Chronic obstructive pulmonary disease, unspecified COPD type    8. Bilateral edema of lower extremity        Assessment & Plan  1. Chronic obstructive pulmonary disease (COPD).  - Recently hospitalized for a COPD exacerbation and treated with IV steroids and DuoNebs.  - Reports improved breathing since discharge.  - Continue current COPD management and monitor for any worsening symptoms.  - Stable on baseline O2.    2. Lower extremity edema.  - Leg swelling has improved with elevation and diuretic therapy.  - Continue taking Bumex as needed and monitor its effectiveness.  - If significant swelling occurs, double the dose of Bumex.  - Daily weight monitoring recommended; if weight increases by more than 2 pounds, take a Bumex tablet.    3. Hypothyroidism.  - Thyroid levels have been stable on the current dose of Synthroid.  - Continue Synthroid 88 mcg daily without any changes.  - Recent thyroid check by Dr. Borja showed slight abnormality, but hospital checks were normal.    4. Squamous cell carcinoma of the lung with metastasis to the liver.  - Currently on palliative chemotherapy with pembrolizumab.  - Continue current treatment and monitor for any side effects or progression of the disease.  - Reviewed recent hospitalization and treatment history.    5. Bradycardia and AV heart block.  - Electrophysiology at [facility] is managing the condition.  - Pacemaker recommended but deemed unsuitable due to cancer.  - Monitor heart rate and symptoms.  - Discussed the impact of heart rate on  overall health and symptoms.    6. Confusion and anxiety.  - Symptoms may be related to cancer or previous cancer treatments.  - Continue to monitor and manage symptoms as needed.  - Discussed potential causes and management strategies.    7. Constipation.  - Prescribed a laxative for bowel backup but prefers to use MiraLAX and Benefiber.  - Continue current regimen and monitor bowel movements.  - Recent improvement in bowel movements noted.        BMI is within normal parameters. No other follow-up for BMI required.       Patient or patient representative verbalized consent for the use of Ambient Listening during the visit with  Elissa Geronimo MD for chart documentation. 5/29/2025  23:39 EDT        Follow Up   Return in about 4 weeks (around 6/12/2025).  Findings and plans discussed with patient who verbalizes understanding and agreement. Will followup with patient once results are in. Patient was given instructions and counseling regarding his condition or for health maintenance advice. Please see specific information pulled into the AVS if appropriate.       Elissa Geronimo MD

## 2025-05-19 NOTE — TELEPHONE ENCOUNTER
Faby called stated Kevin just does not feel well,reports his BP 90-94/42-48 HR-64-68 reports his Bumex was sent in as 1 daily & he was taking 2 q am & 1 every evening & wanted to know if this was a mistake,also is questioning if he is to continue on Bumex,swelling is improved,she is going to weigh him  & call back with his weight.

## 2025-05-20 RX ORDER — BUSPIRONE HYDROCHLORIDE 5 MG/1
TABLET ORAL
Start: 2025-05-20

## 2025-05-20 NOTE — DISCHARGE SUMMARY
[facility] HOSPITALISTS DISCHARGE SUMMARY    Patient Identification:  Name:  Kevin Fonseca  Age:  89 y.o.  Sex:  male  :  1934  MRN:  [id number]  Visit Number:  [id number]    Date of Admission: 5/3/2025  Date of Discharge:  2025     PCP: Elissa Greonimo MD    DISCHARGE DIAGNOSIS   #COPD with acute exacerbation  #Chronic respiratory failure stable on baseline 2L O2  #History of obstructive sleep apnea  #Former tobacco use  #Non-small cell lung cancer with metastatic disease, currently on palliative chemotherapy  #Chronic non-pressure related right lower extremity wound  #Chronic appearing normocytic anemia  #GERD  #Hypothyroidism  #Sick sinus syndrome, history of intermittent second-degree type II AV block, and intermittent third degree AV block  #Moderate aortic stenosis  #AAA  #Arthritis  #Physical debility  #Advanced age    HOSPITAL COURSE  Patient is a 89 y.o. male presented to [facility] complaining of shortness of breath.  Please see the admitting history and physical for further details.      Patient was admitted for observation and treated with IV steroids, nebulizer treatments, and empiric antibiotics for an acute exacerbation of COPD. He remained stable on his home 2L O2. He had an echo done to evaluate cardiac function due to reports of worsening lower extremity edema. This revealed normal LV systolic function with EF 55-60%. Diastolic function was indeterminate. He received IV lasix at admission and had good diuresis and subsequent improvement of his lower extremity swelling. He was evaluated by PT/OT for debility and recommendation was home with home health and 24/7 care. It was felt that he had received maximum benefit of inpatient treatment and he was discharged home with family assistance to complete the course of steroids and antibiotics for COPD exacerbation. He was also prescribed bumex to take at discharge and will need reevaluation in the outpatient setting  to determine if diuretic therapy will need to be adjusted. Recommend repeat chemistry panel to recheck renal function and electrolytes at follow up as well.      Body mass index is 24.45 kg/m².  Wt Readings from Last 3 Encounters:   05/15/25 78.4 kg (172 lb 12.8 oz)   05/13/25 76.2 kg (168 lb)   05/08/25 77.1 kg (170 lb)       PHYSICAL EXAM:   Constitutional:  Well-developed and well-nourished.  No acute distress.      HENT:  Head:  Normocephalic and atraumatic.    Cardiovascular:  Normal rate, regular rhythm  Pulmonary/Chest:  Normal rate and effort. Breath sounds diminished with scattered wheezing on auscultation.  Abdominal:  Soft. No distension and no tenderness. Normal bowel sounds present.  Musculoskeletal:  No deformity.    Neurological: Awake, alert, no focal deficit on gross examination. No slurred speech or facial droop.   Skin:  Skin is warm and dry.   Peripheral vascular:  No cyanosis, trace lower extremity edema.    DISCHARGE DISPOSITION   Stable    DISCHARGE MEDICATIONS:     Your medication list        START taking these medications        Instructions Last Dose Given Next Dose Due   amoxicillin-clavulanate 875-125 MG per tablet  Commonly known as: AUGMENTIN      Take 1 tablet by mouth 2 (Two) Times a Day for 7 doses.       bumetanide 2 MG tablet  Commonly known as: BUMEX      Take 0.5 tablets by mouth Daily for 14 days.       ipratropium-albuterol 0.5-2.5 mg/3 ml nebulizer  Commonly known as: DUO-NEB      Take 3 mL by nebulization Every 6 (Six) Hours As Needed for Wheezing or Shortness of Air.       predniSONE 10 MG tablet  Commonly known as: DELTASONE  Start taking on: May 5, 2025      Take 4 tablets by mouth Daily for 2 days, THEN 3 tablets Daily for 2 days, THEN 2 tablets Daily for 2 days, THEN 1 tablet Daily for 2 days.              CONTINUE taking these medications        Instructions Last Dose Given Next Dose Due   busPIRone 5 MG tablet  Commonly known as: BUSPAR      Take 1 tablet by mouth  Daily.       levothyroxine 88 MCG tablet  Commonly known as: SYNTHROID, LEVOTHROID      TAKE 1 TABLET BY MOUTH DAILY              STOP taking these medications      azithromycin 250 MG tablet  Commonly known as: Zithromax Z-Zafar                  Where to Get Your Medications        These medications were sent to [facility] [id number] - [place], [place] - [address] - [phone] PH - [phone] FX  [address] [place] [address]      Phone: [phone]   amoxicillin-clavulanate 875-125 MG per tablet  bumetanide 2 MG tablet  ipratropium-albuterol 0.5-2.5 mg/3 ml nebulizer  predniSONE 10 MG tablet             Diet Instructions       Diet: Cardiac Diets; Healthy Heart (2-3 Na+); Regular (IDDSI 7); Thin (IDDSI 0)      Discharge Diet: Cardiac Diets    Cardiac Diet: Healthy Heart (2-3 Na+)    Texture: Regular (IDDSI 7)    Fluid Consistency: Thin (IDDSI 0)          Activity Instructions       Up WIth Assist            Additional Instructions for the Follow-ups that You Need to Schedule       Ambulatory Referral to Home Health   As directed      Face to Face Visit Date: 5/5/2025   Follow-up provider for Plan of Care?: I treated the patient in an acute care facility and will not continue treatment after discharge.   Follow-up provider: GOMEZ MARCOS [[id number]]   Reason/Clinical Findings: debility, difficulty ambulating, dyspnea on exertion   Describe mobility limitations that make leaving home difficult: continuous supplemental oxygen use, generalized weakness, dyspnea on exertion   Nursing/Therapeutic Services Requested: Skilled Nursing Physical Therapy   Skilled nursing orders: CHF management O2 instruction COPD management Cardiopulmonary assessments Medication education   PT orders: Transfer training Home safety assessment Strengthening Therapeutic exercise   Frequency: 1 Week 1        Discharge Follow-up with PCP   As directed       Currently Documented PCP:    Juanpablo  Elissa COLE MD    PCP Phone Number:    [phone]     Follow Up Details: within 1 week        Discharge Follow-up with Specialty: Oncology; 1 Week   As directed      Specialty: Oncology   Follow Up: 1 Week               Contact information for follow-up providers       Elissa Geronimo MD Follow up in 1 week(s).    Specialty: Family Medicine  Why: Will notify patient about follow-up appt. with Elissa Geronimo on 05/06/25.  Contact information:   [address] [place] [address]  [phone]                       Contact information for after-discharge care       Home Medical Care       [facility] .    Service: Home Health Services  Contact information:  [address]  [facility] [address] [phone]                                    CODE STATUS  Code Status and Medical Interventions: CPR (Attempt to Resuscitate); Full Support   Ordered at: 05/04/25 0336     Code Status (Patient has no pulse and is not breathing):    CPR (Attempt to Resuscitate)     Medical Interventions (Patient has pulse or is breathing):    Full Support     Level Of Support Discussed With:    Patient       Rosie LizarragaDO  [facility] Hospitalist  05/19/25  21:31 EDT    Please note that this discharge summary required more than 30 minutes to complete.

## 2025-05-20 NOTE — TELEPHONE ENCOUNTER
Faby returned call stated she doesn't know why they changed the Buspar he was doing good on 2 q am & 1 in the evening,anxious without it,as to the Bumex he has only 6 days left of it.

## 2025-05-20 NOTE — TELEPHONE ENCOUNTER
It might have been that they were so worried about the edema being multifactorial that they might have changed it just in case it was affecting anything. He can go back to 2 tablets every AM and 1 tablet in the PM. Before I send in the bumex, does he need the buspar sent in too? I'm okay with him going back to the prehospital dose.

## 2025-05-21 ENCOUNTER — READMISSION MANAGEMENT (OUTPATIENT)
Dept: CALL CENTER | Facility: HOSPITAL | Age: OVER 89
End: 2025-05-21
Payer: MEDICARE

## 2025-05-21 RX ORDER — BUMETANIDE 2 MG/1
1 TABLET ORAL DAILY
Qty: 45 TABLET | Refills: 1 | Status: SHIPPED | OUTPATIENT
Start: 2025-05-21

## 2025-05-21 NOTE — OUTREACH NOTE
COPD/PN Week 2 Survey      Flowsheet Row Responses   [facility]? [facility]   Does the patient have one of the following disease processes/diagnoses(primary or secondary)? COPD   Week 2 attempt successful? No   Unsuccessful attempts Attempt 1  [reached spouse but she was in the middle of something, told her we would call back at a different time]            Jolene BAIRES - Registered Nurse

## 2025-05-29 ENCOUNTER — READMISSION MANAGEMENT (OUTPATIENT)
Dept: CALL CENTER | Facility: HOSPITAL | Age: OVER 89
End: 2025-05-29
Payer: MEDICARE

## 2025-06-03 DIAGNOSIS — E87.1 HYPONATREMIA: ICD-10-CM

## 2025-06-03 DIAGNOSIS — R53.83 OTHER FATIGUE: ICD-10-CM

## 2025-06-03 DIAGNOSIS — C34.90 METASTATIC NON-SMALL CELL LUNG CANCER: Primary | ICD-10-CM

## 2025-06-04 ENCOUNTER — INFUSION (OUTPATIENT)
Dept: ONCOLOGY | Facility: HOSPITAL | Age: OVER 89
End: 2025-06-04
Payer: MEDICARE

## 2025-06-04 ENCOUNTER — LAB (OUTPATIENT)
Dept: ONCOLOGY | Facility: CLINIC | Age: OVER 89
End: 2025-06-04
Payer: MEDICARE

## 2025-06-04 ENCOUNTER — OFFICE VISIT (OUTPATIENT)
Dept: ONCOLOGY | Facility: CLINIC | Age: OVER 89
End: 2025-06-04
Payer: MEDICARE

## 2025-06-04 VITALS
WEIGHT: 173.5 LBS | RESPIRATION RATE: 18 BRPM | BODY MASS INDEX: 23.53 KG/M2 | HEART RATE: 70 BPM | DIASTOLIC BLOOD PRESSURE: 51 MMHG | TEMPERATURE: 97.5 F | OXYGEN SATURATION: 97 % | SYSTOLIC BLOOD PRESSURE: 107 MMHG

## 2025-06-04 VITALS
HEART RATE: 79 BPM | HEIGHT: 72 IN | BODY MASS INDEX: 23.54 KG/M2 | DIASTOLIC BLOOD PRESSURE: 64 MMHG | SYSTOLIC BLOOD PRESSURE: 114 MMHG | RESPIRATION RATE: 20 BRPM | TEMPERATURE: 98 F | OXYGEN SATURATION: 98 % | WEIGHT: 173.8 LBS

## 2025-06-04 DIAGNOSIS — C34.90 METASTATIC NON-SMALL CELL LUNG CANCER: Primary | ICD-10-CM

## 2025-06-04 DIAGNOSIS — R53.83 OTHER FATIGUE: ICD-10-CM

## 2025-06-04 DIAGNOSIS — C34.90 METASTATIC NON-SMALL CELL LUNG CANCER: ICD-10-CM

## 2025-06-04 DIAGNOSIS — E87.1 HYPONATREMIA: ICD-10-CM

## 2025-06-04 DIAGNOSIS — R53.81 DEBILITY: ICD-10-CM

## 2025-06-04 DIAGNOSIS — R21 RASH: ICD-10-CM

## 2025-06-04 LAB
ALBUMIN SERPL-MCNC: 3.4 G/DL (ref 3.5–5.2)
ALBUMIN/GLOB SERPL: 1 G/DL
ALP SERPL-CCNC: 78 U/L (ref 39–117)
ALT SERPL W P-5'-P-CCNC: 8 U/L (ref 1–41)
ANION GAP SERPL CALCULATED.3IONS-SCNC: 10.5 MMOL/L (ref 5–15)
AST SERPL-CCNC: 20 U/L (ref 1–40)
BILIRUB SERPL-MCNC: 0.3 MG/DL (ref 0–1.2)
BUN SERPL-MCNC: 28.4 MG/DL (ref 8–23)
BUN/CREAT SERPL: 26.5 (ref 7–25)
CALCIUM SPEC-SCNC: 8.5 MG/DL (ref 8.2–9.6)
CHLORIDE SERPL-SCNC: 101 MMOL/L (ref 98–107)
CO2 SERPL-SCNC: 26.5 MMOL/L (ref 22–29)
CREAT SERPL-MCNC: 1.07 MG/DL (ref 0.76–1.27)
DEPRECATED RDW RBC AUTO: 47.8 FL (ref 37–54)
EGFRCR SERPLBLD CKD-EPI 2021: 65.9 ML/MIN/1.73
EOSINOPHIL # BLD MANUAL: 0.62 10*3/MM3 (ref 0–0.4)
EOSINOPHIL NFR BLD MANUAL: 10 % (ref 0.3–6.2)
ERYTHROCYTE [DISTWIDTH] IN BLOOD BY AUTOMATED COUNT: 14.4 % (ref 12.3–15.4)
GLOBULIN UR ELPH-MCNC: 3.3 GM/DL
GLUCOSE SERPL-MCNC: 134 MG/DL (ref 65–99)
HCT VFR BLD AUTO: 32.8 % (ref 37.5–51)
HGB BLD-MCNC: 10.2 G/DL (ref 13–17.7)
HYPOCHROMIA BLD QL: ABNORMAL
LYMPHOCYTES # BLD MANUAL: 1.36 10*3/MM3 (ref 0.7–3.1)
LYMPHOCYTES NFR BLD MANUAL: 10 % (ref 5–12)
MCH RBC QN AUTO: 28.1 PG (ref 26.6–33)
MCHC RBC AUTO-ENTMCNC: 31.1 G/DL (ref 31.5–35.7)
MCV RBC AUTO: 90.4 FL (ref 79–97)
MONOCYTES # BLD: 0.62 10*3/MM3 (ref 0.1–0.9)
NEUTROPHILS # BLD AUTO: 3.59 10*3/MM3 (ref 1.7–7)
NEUTROPHILS NFR BLD MANUAL: 58 % (ref 42.7–76)
PLAT MORPH BLD: NORMAL
PLATELET # BLD AUTO: 274 10*3/MM3 (ref 140–450)
PMV BLD AUTO: 10.4 FL (ref 6–12)
POTASSIUM SERPL-SCNC: 4.5 MMOL/L (ref 3.5–5.2)
PROT SERPL-MCNC: 6.7 G/DL (ref 6–8.5)
RBC # BLD AUTO: 3.63 10*6/MM3 (ref 4.14–5.8)
SCAN SLIDE: NORMAL
SODIUM SERPL-SCNC: 138 MMOL/L (ref 136–145)
T4 FREE SERPL-MCNC: 1.39 NG/DL (ref 0.92–1.68)
TSH SERPL DL<=0.05 MIU/L-ACNC: 2.22 UIU/ML (ref 0.27–4.2)
VARIANT LYMPHS NFR BLD MANUAL: 22 % (ref 19.6–45.3)
WBC NRBC COR # BLD AUTO: 6.19 10*3/MM3 (ref 3.4–10.8)

## 2025-06-04 PROCEDURE — 96413 CHEMO IV INFUSION 1 HR: CPT

## 2025-06-04 PROCEDURE — 25010000002 PEMBROLIZUMAB 100 MG/4ML SOLUTION 4 ML VIAL: Performed by: INTERNAL MEDICINE

## 2025-06-04 PROCEDURE — 84443 ASSAY THYROID STIM HORMONE: CPT | Performed by: INTERNAL MEDICINE

## 2025-06-04 PROCEDURE — 85025 COMPLETE CBC W/AUTO DIFF WBC: CPT | Performed by: INTERNAL MEDICINE

## 2025-06-04 PROCEDURE — 84439 ASSAY OF FREE THYROXINE: CPT | Performed by: INTERNAL MEDICINE

## 2025-06-04 PROCEDURE — 25810000003 SODIUM CHLORIDE 0.9 % SOLUTION: Performed by: INTERNAL MEDICINE

## 2025-06-04 PROCEDURE — 85007 BL SMEAR W/DIFF WBC COUNT: CPT | Performed by: INTERNAL MEDICINE

## 2025-06-04 PROCEDURE — 80053 COMPREHEN METABOLIC PANEL: CPT | Performed by: INTERNAL MEDICINE

## 2025-06-04 RX ORDER — NYSTATIN 100000 [USP'U]/G
POWDER TOPICAL 4 TIMES DAILY
Qty: 1 EACH | Refills: 3 | Status: SHIPPED | OUTPATIENT
Start: 2025-06-04

## 2025-06-04 RX ORDER — SODIUM CHLORIDE 9 MG/ML
20 INJECTION, SOLUTION INTRAVENOUS ONCE
Status: COMPLETED | OUTPATIENT
Start: 2025-06-04 | End: 2025-06-04

## 2025-06-04 RX ADMIN — SODIUM CHLORIDE 200 MG: 9 INJECTION, SOLUTION INTRAVENOUS at 14:55

## 2025-06-04 RX ADMIN — SODIUM CHLORIDE 20 ML/HR: 9 INJECTION, SOLUTION INTRAVENOUS at 14:55

## 2025-06-04 NOTE — PROGRESS NOTES
Venipuncture Blood Specimen Collection  Venipuncture performed in right arm by Angelica Davison MA with good hemostasis. Patient tolerated the procedure well without complications.   06/04/25   Angelica Davison MA

## 2025-06-04 NOTE — PROGRESS NOTES
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 obstruction identified     Pleura: normal.     Lymph nodes: normal.     Musculoskeletal and chest wall: Erosion of the right fourth and fifth  ribs from the adjacent mass, progressed compared to the previous exam,  with a new pathologic fracture at the fifth rib.     Lower neck and upper abdomen: Thyroid gland is atrophic..        CT ABDOMEN AND PELVIS:     Hepatobiliary: Progression in the hepatic metastases, with a larger mass  in the left lateral segment measuring 3.8 x 3.4 cm, previously 1.7 x 1.5  cm..     Pancreas: normal.     Spleen: normal.     Adrenals: normal.      Kidneys, ureters, bladder: normal.     Reproductive: normal.     GI tract/Bowel: Moderate amount of stool in the colon.  No acute  inflammatory abnormality.     Appendix: normal.     Peritoneum: normal, no free air or fluid.     Vascular: Infrarenal abdominal aortic aneurysm measures 3.3 x 3.2 cm.   The iliac arteries are atherosclerotic.     Lymph nodes: normal.     Musculoskeletal and abdominal wall: Right inguinal hernia contains  nonobstructed, non-strangulated loop of small bowel.  Compression  deformity at L2 is chronic.     IMPRESSION:     CT CHEST:  PATHOLOGIC FRACTURE IN THE LATERAL ASPECT OF THE RIGHT FIFTH RIB, WHICH  IS NOW ERODED AND INVADED BY THE OTHERWISE UNCHANGED RIGHT UPPER  LOBE  MASS.     COMPARED TO 5/15/2024, NO CHANGE IN PERIPHERAL RIGHT UPPER LOBE MASS  WITH EXTENSION INTO THE RIGHT HILUM AND IRREGULARITY IN THE RIGHT  MAINSTEM BRONCHUS.     NO ADDITIONAL SIGNIFICANT ACUTE ABNORMALITY IN THE CHEST.     CT ABDOMEN AND PELVIS:  PROGRESSION IN HEPATIC METASTASES COMPARED TO 6/14/2024.    CT Head With Contrast (07/09/2024 09:47)   IMPRESSION:  1.  No acute intracranial findings identified.  2.  No enhancing brain parenchymal lesions identified.  3.  Diffuse cerebral atrophy with chronic small vessel ischemic disease.  4.  Focal encephalomalacia of the right frontal lobe.    CT Abdomen Pelvis With Contrast (06/14/2024 14:55)   FINDINGS:  ABDOMEN: The lung bases are clear. The heart is normal in size. There  are multiple hepatic masses, the largest of which is in the right lobe  measuring 8.1 cm consistent with metastatic disease. The spleen is  homogenous. No adrenal mass is present. The pancreas is unremarkable.  The kidneys are normal. There is a 3.3 cm abdominal aortic aneurysm. The  mesenteric vascualture is patent. There is no free fluid or adenopathy.  The abdominal portions of the GI tract are unremarkable with no evidence  of obstruction.     PELVIS: The appendix is normal. The urinary bladder is unremarkable.  There is no significant free fluid or adenopathy. Bone windows reveal an  L2 compression fracture which is unchanged compared to the prior exam.  No new osseous abnormalities are identified. The extraperitoneal soft  tissues are unremarkable.     IMPRESSION:  1. Hepatic metastases not significantly changed compared to the prior  exam.  2. 3.3 cm abdominal aortic aneurysm.  3. Stable L2 compression fracture.    NM PET/CT Skull Base to Mid Thigh (05/30/2024 10:53)   FINDINGS:      HEAD:    BRAIN AND EXTRA-AXIAL SPACES:  Unremarkable as visualized.    NASOPHARYNX:  Unremarkable as visualized.      NECK:    HYPOPHARYNX:  Unremarkable as visualized.    LARYNX:  Unremarkable  as visualized.    TRACHEA:  Unremarkable as visualized.    RETROPHARYNGEAL SPACE:  Unremarkable as visualized.    SUBMANDIBULAR/PAROTID GLANDS:  Unremarkable as visualized.  Glands are  normal in size.    THYROID:  Unremarkable as visualized.  No enlarged or calcified  nodules.      CHEST:    LUNGS AND PLEURAL SPACES:  Consolidative more central and elongated  masslike consolidation near the superior right hilum measures about 7 cm  and again shows PET hypermetabolism mSUV 21. The mass extends into the  right hilum.  A 8 mm right upper lobe spiculated pulmonary nodule shows  no PET hypermetabolism at this time.    HEART:  Unremarkable as visualized.  No cardiomegaly.  No significant  pericardial effusion.    MEDIASTINUM:  Unremarkable as visualized.  No mass.      ABDOMEN:    LIVER:  PET hypermetabolic liver masses are identified with the  largest in the right lobe measuring about 8.3 cm and with PET  hypermetabolism mSUV 14.2. A smaller liver masses noted in the left lobe  of the liver.    GALLBLADDER AND BILE DUCTS:  Unremarkable as visualized.  No calcified  stones.  No ductal dilation.    PANCREAS:  Unremarkable as visualized.  No ductal dilation.    SPLEEN:  Unremarkable as visualized.  No splenomegaly.    ADRENALS:  Unremarkable as visualized.  No mass.    KIDNEYS AND URETERS:  Unremarkable as visualized.    STOMACH AND BOWEL:  Unremarkable as visualized.  No obstruction.      PELVIS:    APPENDIX:  No findings to suggest acute appendicitis.    BLADDER:  Unremarkable as visualized.    REPRODUCTIVE:  Unremarkable as visualized.      WHOLE BODY:    INTRAPERITONEAL SPACE:  Unremarkable as visualized.  No significant  fluid collection.  No free air.    BONES/JOINTS:  Again there is a peripheral consolidated mass that  appears to invade the right upper ribs of the right upper lobe measuring  about 8.6 cm and with PET hypermetabolism mSUV 15.1.  Compression  fracture of L2 vertebral body is again noted.  No  dislocation.    SOFT TISSUES:  Unremarkable as visualized.    VASCULATURE:  Unremarkable as visualized.  No aortic aneurysm.    LYMPH NODES:  Unremarkable as visualized.  No lymphadenopathy.  No  hypermetabolic activity.     IMPRESSION:  1.  Again there is a peripheral consolidated mass that appears to invade  the right upper ribs of the right upper lobe measuring about 8.6 cm and  with PET hypermetabolism mSUV 15.1.  2.  Consolidative more central and elongated masslike consolidation near  the superior right hilum measures about 7 cm and again shows PET  hypermetabolism mSUV 21. The mass extends into the right hilum.  3.  PET hypermetabolic liver masses are identified with the largest in  the right lobe measuring about 8.3 cm and with PET hypermetabolism mSUV  14.2. A smaller liver masses noted in the left lobe of the liver.  4.  Compression fracture of L2 vertebral body is again noted.  5.  A 8 mm right upper lobe spiculated pulmonary nodule shows no PET  hypermetabolism at this time.    CT Chest With Contrast Diagnostic (05/15/2024 14:24)   FINDINGS:    LUNGS AND PLEURAL SPACES:  Right upper lobe peripheral pleural-based  consolidative masslike opacity measuring about 2.9 x 7.8 cm with a more  central hilar mass on the right side measuring 5.1 x 4.4 cm and a  smaller right upper lobe nodule component measuring 2.6 cm. The  peripheral mass does appear to cause fourth rib erosion or destruction.   Emphysematous lungs noted.  Right upper lobe 1.2 cm ill-defined nodule  that could represent scarring.  No pneumothorax.  No significant  effusion.    HEART:  Unremarkable as visualized.  No cardiomegaly.  No significant  pericardial effusion.  No significant coronary artery calcifications.    BONES/JOINTS:  Compression fracture noted of probable L2 vertebral  body.  No dislocation.    SOFT TISSUES:  Unremarkable as visualized.    VASCULATURE:  Partially visualized infrarenal abdominal aortic  aneurysm measuring 3.4 cm.     LYMPH NODES:  Unremarkable as visualized.  No enlarged lymph nodes.    LIVER:  Right lobe of liver mass concerning for metastatic disease  measuring about 7 mm.    OTHER FINDINGS:  .     IMPRESSION:  1.  Right upper lobe peripheral pleural-based consolidative masslike  opacity measuring about 2.9 x 7.8 cm with a more central hilar mass on  the right side measuring 5.1 x 4.4 cm and a smaller right upper lobe  nodule component measuring 2.6 cm. The peripheral mass does appear to  cause fourth rib erosion or destruction.  2.  Right lobe of liver mass concerning for metastatic disease measuring  about 7 mm.  3.  Partially visualized infrarenal abdominal aortic aneurysm measuring  3.4 cm.  4.  Compression fracture noted of probable L2 vertebral body.    XR Chest 2 View (04/23/2024 16:34)   FINDINGS:  LUNGS: Pleural-based consolidation periphery of the right lung. Mild  fullness in the right suprahilar region  HEART AND MEDIASTINUM: Heart and mediastinal contours are unremarkable  SKELETON: Bony and soft tissue structures are unremarkable.     IMPRESSION:  Pleural-based consolidation periphery of the right lung and fullness in  the right suprahilar region. Recommend CT        Pathology:   06/21/2024        NGS:      PATHOLOGY - SCAN - FINAL RESULTS, GUARDANT, 07.28.2024 (07/28/2024)           Assessment / Plan         Assessment and Plan   Kevin Fonseca is a 89 y.o. year old with history of     [place] metastatic non small cell carcinoma, squamous cell. PD-L1 TPS 5%. ERBB2 amplification. MSI-low. Negative EGFR, ALK, ROS1, BRAF, MET, RET, NTRK, KRAS  -Patient with ~1 year of decline in function, weight loss, fatigue, and intermittent hemoptysis. CT May 15 2024 with with concerning right upper lobe peripheral consolidation 2.9 x 7.8 cm with central hilar mass 5 x 4.4 cm, small right upper lobe nodule 2.6 cm, another right upper lobe 1.2 cm ill defined mass could represent scarring. Also noted liver mass concerning for  metastatic disease. Follow up PET/CT 6/14/2024 with hepatic metastasis largest measuring 8.1 cm, and noted L2 compression fracture. 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  -Patient is aware treatment is palliative. After a thorough discussion, patient and family decided to proceed with single agent pembrolizumab q21d given his performance status and co-morbidities. I feel this is reasonable given his functional status.   -C3 9/3-tolerated well. Course complicated due to accident with abrasions and noted to have bradycardia with 2-1 AV block.   -C4 9/24- tolerated well  -C5 10/15- tolerated well  -C6 11/5- proceed today  -C7 11/26- completed  -C9 1/21/25- tolerated well  -C11 3/4/25- tolerated well  -C 13 4/15/25- tolerated well  -C14 5/13- tolerated well  -C15 6/4-proceed today  -Next CT chest/abdomen/pelvis 8/2025     2. Malignancy associated pain  -Currently denies     3. Nutrition  -Recommend patient take in 2-3 boost/day    4. Hyponatremia   - Improved     5. Erythema and pruritus - Improved   -Started after C1 of IO  -Recommend H1 blockers: loratidine 10 mg AM and benadryl 25 mg PM  -Increase topical steroid to clobestasol 0.05 BID    6. Hypoxia   - Portable oxygen provided; patient has been oxygen dependent since recent admission for bradycardia and heart block    7. Bradycardia and AV heart block  - Evaluated by EP at [place], he is discharged home with a cardiac monitor   - recommend pacemaker implant, however cardiology did not think patient was a good candidate for procedure    8. Loose stool- resolved  - Recommend holding miralax and benefiber for now   - If continues to have loose stool, would recommend imodium PRN    9. RLE swelling  -Improvement of abrasions after fall, has surrounding erythema and  swelling  -CT RLE with probable hematoma. Improvement in swelling    10. RLE wound - Improved  - R calf wound after injury, with intermittent drainage. Currently with small superficial bullae   -Refer to wound care and dermatology     11. Constipation   - Now with constipation for 5 days. Passing flatus   - Senna-colace ordered to be taken PRN    12. Groin erythema/candida   - Nystatin powder ordered today. No signs/symptoms of infection     Discussed possible differential diagnoses, testing, treatment, recommended non-pharmacological interventions, risks, warning signs to monitor for that would indicate need for follow-up in clinic or ER. If no improvement with these regimens or you have new or worsening symptoms follow-up. Patient verbalizes understanding and agreement with plan of care. Denies further needs or concerns.     Patient was given instructions and counseling regarding her condition and for health maintenance advised.       All questions were answered to his satisfaction.    BMI is within normal parameters. No other follow-up for BMI required.         ACO / MARY/Other  Quality measures  -  Kevin Fonseca did not receive 2024 flu vaccine.  This was recommended.  -  Kevin Fonseca reports a pain score of 0.  Given his pain assessment as noted, treatment options were discussed and the following options were decided upon as a follow-up plan to address the patient's pain: continuation of current treatment plan for pain.  -  Current outpatient and discharge medications have been reconciled for the patient.  Reviewed by: Jennifer Hinds MD        Meds ordered during this visit  New Medications Ordered This Visit   Medications    nystatin (MYCOSTATIN) [id number] UNIT/GM powder     Sig: Apply  topically to the appropriate area as directed 4 (Four) Times a Day.     Dispense:  1 each     Refill:  3       Visit Diagnoses    ICD-10-CM ICD-9-CM   1. Metastatic non-small cell lung cancer  C34.90 162.9   2. Other fatigue  R53.83  780.79   3. Debility  R53.81 799.3   4. Rash  R21 782.1                             Follow Up   In 6 weeks with treatment        This document has been electronically signed by Jennifer Hinds MD   June 4, 2025 14:35 EDT    Dictated Utilizing Dragon Dictation: Part of this note may be an electronic transcription/translation of spoken language to printed text using the Dragon Dictation System.

## 2025-06-05 ENCOUNTER — PATIENT OUTREACH (OUTPATIENT)
Dept: CASE MANAGEMENT | Facility: OTHER | Age: OVER 89
End: 2025-06-05
Payer: MEDICARE

## 2025-06-05 NOTE — PLAN OF CARE
Problem: Cancer Treatment Phase  Goal: Manage Fatigue (Tiredness)  Outcome: Not Progressing  Goal: Optimal Care Coordination of Patient With Cancer: Treatment Phase  Outcome: Not Progressing     Problem: COPD  Goal: Optimal Care Coordination of a Patient Experiencing COPD  Outcome: Not Progressing

## 2025-06-05 NOTE — OUTREACH NOTE
AMBULATORY CASE MANAGEMENT NOTE    Names and Relationships of Patient/Support Persons: Contact: BECKADILAN; Relationship: Emergency Contact -     Patient Outreach    Mr. Fonseca reports he is feeling pretty well.  He reporpts he had a rough night due to coughing. Reports  coughing up clear phlegm. Mrs. Fonseca reports patient has a good appetite. [facility] is scheduled for today. No needs or concerns identified.      Arlyn COLE  Ambulatory Case Management    6/5/2025, 13:50 EDT

## 2025-06-10 ENCOUNTER — OFFICE VISIT (OUTPATIENT)
Dept: FAMILY MEDICINE CLINIC | Facility: CLINIC | Age: OVER 89
End: 2025-06-10
Payer: MEDICARE

## 2025-06-10 VITALS
RESPIRATION RATE: 16 BRPM | OXYGEN SATURATION: 97 % | SYSTOLIC BLOOD PRESSURE: 122 MMHG | BODY MASS INDEX: 24 KG/M2 | HEIGHT: 72 IN | WEIGHT: 177.2 LBS | HEART RATE: 93 BPM | DIASTOLIC BLOOD PRESSURE: 58 MMHG | TEMPERATURE: 97.1 F

## 2025-06-10 DIAGNOSIS — M25.50 ARTHRALGIA, UNSPECIFIED JOINT: ICD-10-CM

## 2025-06-10 DIAGNOSIS — L30.4 INTERTRIGO: ICD-10-CM

## 2025-06-10 DIAGNOSIS — J44.9 CHRONIC OBSTRUCTIVE PULMONARY DISEASE, UNSPECIFIED COPD TYPE: Primary | ICD-10-CM

## 2025-06-10 DIAGNOSIS — R05.9 COUGH, UNSPECIFIED TYPE: ICD-10-CM

## 2025-06-10 DIAGNOSIS — E03.9 HYPOTHYROIDISM, UNSPECIFIED TYPE: ICD-10-CM

## 2025-06-10 DIAGNOSIS — F41.9 ANXIETY: ICD-10-CM

## 2025-06-10 DIAGNOSIS — E55.9 VITAMIN D DEFICIENCY, UNSPECIFIED: ICD-10-CM

## 2025-06-10 DIAGNOSIS — R25.2 MUSCLE CRAMPING: ICD-10-CM

## 2025-06-10 PROCEDURE — 1160F RVW MEDS BY RX/DR IN RCRD: CPT | Performed by: FAMILY MEDICINE

## 2025-06-10 PROCEDURE — 1126F AMNT PAIN NOTED NONE PRSNT: CPT | Performed by: FAMILY MEDICINE

## 2025-06-10 PROCEDURE — 1159F MED LIST DOCD IN RCRD: CPT | Performed by: FAMILY MEDICINE

## 2025-06-10 PROCEDURE — 99214 OFFICE O/P EST MOD 30 MIN: CPT | Performed by: FAMILY MEDICINE

## 2025-06-10 PROCEDURE — G2211 COMPLEX E/M VISIT ADD ON: HCPCS | Performed by: FAMILY MEDICINE

## 2025-06-10 RX ORDER — GUAIFENESIN 600 MG/1
1200 TABLET, EXTENDED RELEASE ORAL DAILY
COMMUNITY

## 2025-06-10 RX ORDER — LEVOTHYROXINE SODIUM 88 UG/1
88 TABLET ORAL DAILY
Qty: 90 TABLET | Refills: 0 | OUTPATIENT
Start: 2025-06-10

## 2025-06-11 RX ORDER — ALBUTEROL SULFATE 90 UG/1
2 INHALANT RESPIRATORY (INHALATION) EVERY 4 HOURS PRN
Qty: 18 G | Refills: 2 | Status: SHIPPED | OUTPATIENT
Start: 2025-06-11

## 2025-06-18 ENCOUNTER — TELEPHONE (OUTPATIENT)
Dept: FAMILY MEDICINE CLINIC | Facility: CLINIC | Age: OVER 89
End: 2025-06-18
Payer: MEDICARE

## 2025-06-18 NOTE — TELEPHONE ENCOUNTER
Spoke with Faby she reports she was supposed to call & let you know how Kevin was doing after being off the Buspar,she reports she really cannot tell much of a difference she thinks he may be just a little less anxious.

## 2025-06-18 NOTE — TELEPHONE ENCOUNTER
Caller: DILAN JOVEL    Relationship: Emergency Contact    Best call back number: [phone]     What is the best time to reach you: ANYTIME     Who are you requesting to speak with (clinical staff, provider,  specific staff member): CLINICAL STAFF    What was the call regarding: PATIENT IS CALLING TO SPEAK WITH THE CLINICAL STAFF ABOUT THE PATIENT'S MEDICATIONS.     Is it okay if the provider responds through MyChart: YES

## 2025-06-19 NOTE — TELEPHONE ENCOUNTER
Spoke with eladio she said right now they will wait and see how he does and discuss at his next appointment.If she needs anything before the appointment she will call.

## 2025-06-19 NOTE — TELEPHONE ENCOUNTER
That's totally fine. I would have him discontinue it then. At his last appointment, we had talked about something for anxiety and depression. Would he like to try something? He would take it daily.

## 2025-06-21 DIAGNOSIS — E03.9 HYPOTHYROIDISM, UNSPECIFIED TYPE: ICD-10-CM

## 2025-06-23 RX ORDER — LEVOTHYROXINE SODIUM 88 UG/1
88 TABLET ORAL DAILY
Qty: 90 TABLET | Refills: 0 | OUTPATIENT
Start: 2025-06-23

## 2025-06-24 ENCOUNTER — INFUSION (OUTPATIENT)
Dept: ONCOLOGY | Facility: HOSPITAL | Age: OVER 89
End: 2025-06-24
Payer: MEDICARE

## 2025-06-24 ENCOUNTER — APPOINTMENT (OUTPATIENT)
Dept: ONCOLOGY | Facility: HOSPITAL | Age: OVER 89
End: 2025-06-24
Payer: MEDICARE

## 2025-06-24 VITALS
RESPIRATION RATE: 18 BRPM | DIASTOLIC BLOOD PRESSURE: 54 MMHG | BODY MASS INDEX: 24.01 KG/M2 | SYSTOLIC BLOOD PRESSURE: 121 MMHG | HEART RATE: 79 BPM | TEMPERATURE: 98.3 F | OXYGEN SATURATION: 96 % | WEIGHT: 177 LBS

## 2025-06-24 DIAGNOSIS — C34.90 METASTATIC NON-SMALL CELL LUNG CANCER: Primary | ICD-10-CM

## 2025-06-24 LAB
ALBUMIN SERPL-MCNC: 3.7 G/DL (ref 3.5–5.2)
ALBUMIN/GLOB SERPL: 1.1 G/DL
ALP SERPL-CCNC: 83 U/L (ref 39–117)
ALT SERPL W P-5'-P-CCNC: 10 U/L (ref 1–41)
ANION GAP SERPL CALCULATED.3IONS-SCNC: 10.9 MMOL/L (ref 5–15)
AST SERPL-CCNC: 20 U/L (ref 1–40)
BASOPHILS # BLD AUTO: 0.06 10*3/MM3 (ref 0–0.2)
BASOPHILS NFR BLD AUTO: 0.7 % (ref 0–1.5)
BILIRUB SERPL-MCNC: 0.3 MG/DL (ref 0–1.2)
BUN SERPL-MCNC: 32.2 MG/DL (ref 8–23)
BUN/CREAT SERPL: 29.5 (ref 7–25)
CALCIUM SPEC-SCNC: 8.5 MG/DL (ref 8.2–9.6)
CHLORIDE SERPL-SCNC: 99 MMOL/L (ref 98–107)
CO2 SERPL-SCNC: 26.1 MMOL/L (ref 22–29)
CREAT SERPL-MCNC: 1.09 MG/DL (ref 0.76–1.27)
DEPRECATED RDW RBC AUTO: 46.4 FL (ref 37–54)
EGFRCR SERPLBLD CKD-EPI 2021: 64.5 ML/MIN/1.73
EOSINOPHIL # BLD AUTO: 0.63 10*3/MM3 (ref 0–0.4)
EOSINOPHIL NFR BLD AUTO: 7.2 % (ref 0.3–6.2)
ERYTHROCYTE [DISTWIDTH] IN BLOOD BY AUTOMATED COUNT: 14.3 % (ref 12.3–15.4)
GLOBULIN UR ELPH-MCNC: 3.4 GM/DL
GLUCOSE SERPL-MCNC: 94 MG/DL (ref 65–99)
HCT VFR BLD AUTO: 33.4 % (ref 37.5–51)
HGB BLD-MCNC: 10.4 G/DL (ref 13–17.7)
IMM GRANULOCYTES # BLD AUTO: 0.02 10*3/MM3 (ref 0–0.05)
IMM GRANULOCYTES NFR BLD AUTO: 0.2 % (ref 0–0.5)
LYMPHOCYTES # BLD AUTO: 1.16 10*3/MM3 (ref 0.7–3.1)
LYMPHOCYTES NFR BLD AUTO: 13.3 % (ref 19.6–45.3)
MCH RBC QN AUTO: 27.8 PG (ref 26.6–33)
MCHC RBC AUTO-ENTMCNC: 31.1 G/DL (ref 31.5–35.7)
MCV RBC AUTO: 89.3 FL (ref 79–97)
MONOCYTES # BLD AUTO: 1.43 10*3/MM3 (ref 0.1–0.9)
MONOCYTES NFR BLD AUTO: 16.5 % (ref 5–12)
NEUTROPHILS NFR BLD AUTO: 5.39 10*3/MM3 (ref 1.7–7)
NEUTROPHILS NFR BLD AUTO: 62.1 % (ref 42.7–76)
NRBC BLD AUTO-RTO: 0 /100 WBC (ref 0–0.2)
PLATELET # BLD AUTO: 236 10*3/MM3 (ref 140–450)
PMV BLD AUTO: 10.4 FL (ref 6–12)
POTASSIUM SERPL-SCNC: 4.8 MMOL/L (ref 3.5–5.2)
PROT SERPL-MCNC: 7.1 G/DL (ref 6–8.5)
RBC # BLD AUTO: 3.74 10*6/MM3 (ref 4.14–5.8)
SODIUM SERPL-SCNC: 136 MMOL/L (ref 136–145)
WBC NRBC COR # BLD AUTO: 8.69 10*3/MM3 (ref 3.4–10.8)

## 2025-06-24 PROCEDURE — 82306 VITAMIN D 25 HYDROXY: CPT | Performed by: FAMILY MEDICINE

## 2025-06-24 PROCEDURE — 25010000002 PEMBROLIZUMAB 100 MG/4ML SOLUTION 4 ML VIAL: Performed by: NURSE PRACTITIONER

## 2025-06-24 PROCEDURE — 84550 ASSAY OF BLOOD/URIC ACID: CPT | Performed by: FAMILY MEDICINE

## 2025-06-24 PROCEDURE — 85025 COMPLETE CBC W/AUTO DIFF WBC: CPT

## 2025-06-24 PROCEDURE — 80053 COMPREHEN METABOLIC PANEL: CPT

## 2025-06-24 PROCEDURE — 25810000003 SODIUM CHLORIDE 0.9 % SOLUTION: Performed by: NURSE PRACTITIONER

## 2025-06-24 PROCEDURE — 96413 CHEMO IV INFUSION 1 HR: CPT

## 2025-06-24 PROCEDURE — 83735 ASSAY OF MAGNESIUM: CPT | Performed by: FAMILY MEDICINE

## 2025-06-24 RX ORDER — SODIUM CHLORIDE 9 MG/ML
20 INJECTION, SOLUTION INTRAVENOUS ONCE
Status: COMPLETED | OUTPATIENT
Start: 2025-06-24 | End: 2025-06-24

## 2025-06-24 RX ADMIN — SODIUM CHLORIDE 200 MG: 9 INJECTION, SOLUTION INTRAVENOUS at 14:43

## 2025-06-24 RX ADMIN — SODIUM CHLORIDE 20 ML/HR: 9 INJECTION, SOLUTION INTRAVENOUS at 14:42

## 2025-06-25 NOTE — PROGRESS NOTES
"Kevin Fonseca     VITALS: Blood pressure 122/58, pulse 93, temperature 97.1 °F (36.2 °C), temperature source Temporal, resp. rate 16, height 182.9 cm (72\"), weight 80.4 kg (177 lb 3.2 oz), SpO2 97%.    Subjective  Chief Complaint  Arthritis, Lung Cancer, Edema, and Cough    Subjective          History of Present Illness:    History of Present Illness  The patient is a 89-year-old male with medical conditions significant for COPD, macular degeneration, hypothyroidism, sick sinus syndrome, and squamous cell carcinoma of the lung who presents to the clinic for a medical follow-up.    He reports experiencing cramps in his hands, feet, and ankles, predominantly during the night. This discomfort has escalated to joint pain. A nurse suggested pickle juice as a potential remedy, which he found beneficial. He also experiences muscle jerks. His fluid intake primarily consists of Gatorade, with minimal water consumption.    He exhibits symptoms of irritability, confusion, and forgetfulness. The effectiveness of BuSpar in managing his anxiety remains uncertain.    He is currently on diuretic medication, which has resulted in frequent urination.    He has been dealing with a fungal infection in the groin area. On 06/04/2025, his doctor prescribed a powder, which has been helping. The condition is improving, but there is still some progress to be made.    He has been coughing since 06/09/2025. He took Mucinex, but it is unclear if it helped or if he is just having a day where he is not coughing. He is trying to cough up something. He has discontinued the use of inhalers and has an outdated albuterol inhaler at home.    He has a swollen thumb and some other joints are also swollen. There is a concern about the possibility of rheumatoid arthritis (RA). He does not drink as much water as he drinks Gatorade.    No complaints regarding medications.     The following portions of the patient's history were reviewed and updated as " "appropriate: allergies, current medications, past family history, past medical history, past social history, past surgical history and problem list.    Past Medical History  Past Medical History:   Diagnosis Date    Arthritis     Asthma     Chronic bronchitis     Complete heart block     Emphysema lung     Gastritis     Heartburn     Macular degeneration     Macular degeneration, wet     Metastatic non-small cell lung cancer     Reflux esophagitis     Thyroid disease        Surgical History  Past Surgical History:   Procedure Laterality Date    INTRACAPSULAR CATARACT EXTRACTION      Dr. Lesly Gray. Dr. Neto Light.    LIVER BIOPSY         Family History  Family History   Problem Relation Age of Onset    Hypertension Mother     Other Mother     Cancer Father     Cancer Brother     Asthma Brother        Social History  Social History     Socioeconomic History    Marital status:    Tobacco Use    Smoking status: Former     Current packs/day: 0.00     Average packs/day: 1.5 packs/day for 44.0 years (66.0 ttl pk-yrs)     Types: Cigarettes     Start date:      Quit date:      Years since quittin.4     Passive exposure: Past    Smokeless tobacco: Never   Vaping Use    Vaping status: Never Used   Substance and Sexual Activity    Alcohol use: Not Currently     Comment: 2 week    Drug use: Never    Sexual activity: Defer       Objective   Vital Signs:   /58 (BP Location: Right arm, Patient Position: Sitting, Cuff Size: Adult)   Pulse 93   Temp 97.1 °F (36.2 °C) (Temporal)   Resp 16   Ht 182.9 cm (72\")   Wt 80.4 kg (177 lb 3.2 oz)   SpO2 97% Comment: 3 lpm  BMI 24.03 kg/m²       Physical Exam     Physical Exam      Gen: Patient in NAD. Pleasant and answers appropriately. A&Ox3.  In wheelchair.    Skin: Warm and dry with normal turgor. No purpura, rashes, or unusual pigmentation noted. Hair is normal in appearance and distribution.  Intertrigo noted.    HEENT: NC/AT. No lesions noted. " Conjunctiva clear, sclera nonicteric. PERRL. EOMI without nystagmus or strabismus. Fundi appear benign. No hemorrhages or exudates of eyes. Auditory canals are patent bilaterally without lesions. TMs intact,  nonerythematous, bulging without lesions. Nasal mucosa erythematous, and nonedematous. Frontal and maxillary sinuses are nontender. O/P nonerythematous and moist without exudate.    Neck: Supple without lymph nodes palpated.  Decreased ROM. No carotid bruits appreciated bilaterally.    Lungs: Decreased B/L without rales, rhonchi, crackles, or wheezes.    Heart: Distant.  RRR. S1 and S2 normal. No S3 or S4. No MRGT.    Abd: Soft, nontender,nondistended. (+)BSx4 quadrants.     Extrem: No CC.  Trace edema bilateral lower extremities.  Radial pulses 2+/4 and equal B/L.  Positive joint tenderness noted.    Neuro: No focal motor/sensory deficits.    Procedures    Result Review :   The following data was reviewed by: Elissa Geronimo MD on 06/10/2025:       Results  Labs     Lab Results   Component Value Date    GLUCOSE 94 06/24/2025    BUN 32.2 (H) 06/24/2025    CREATININE 1.09 06/24/2025     06/24/2025    K 4.8 06/24/2025    CL 99 06/24/2025    CALCIUM 8.5 06/24/2025    PROTEINTOT 7.1 06/24/2025    ALBUMIN 3.7 06/24/2025    ALT 10 06/24/2025    AST 20 06/24/2025    ALKPHOS 83 06/24/2025    BILITOT 0.3 06/24/2025    GLOB 3.4 06/24/2025    AGRATIO 1.1 06/24/2025    BCR 29.5 (H) 06/24/2025    ANIONGAP 10.9 06/24/2025    EGFR 64.5 06/24/2025                Assessment and Plan      Kevin Fonseca is a 89 y.o. here for medical followup.    Diagnoses and all orders for this visit:    1. Chronic obstructive pulmonary disease, unspecified COPD type (Primary)  -     Comprehensive Metabolic Panel; Future  -     albuterol sulfate  (90 Base) MCG/ACT inhaler; Inhale 2 puffs Every 4 (Four) Hours As Needed for Shortness of Air.  Dispense: 18 g; Refill: 2    2. Arthralgia, unspecified joint  -     Comprehensive Metabolic  Panel; Future  -     Magnesium; Future  -     Vitamin D,25-Hydroxy; Future  -     Uric Acid; Future    3. Muscle cramping  -     Comprehensive Metabolic Panel; Future  -     Magnesium; Future  -     Vitamin D,25-Hydroxy; Future  -     Uric Acid; Future    4. Vitamin D deficiency, unspecified  -     Vitamin D,25-Hydroxy; Future    5. Anxiety    6. Intertrigo    7. Cough, unspecified type        Assessment & Plan  1. Muscle cramps.  - Experiencing crampy feelings in hands, feet, and ankles, primarily at night, with pain in the joints.  - Increased pain and limited mobility.  - Discussion about potential vitamin deficiencies and the impact of diuretic medication on potassium, calcium, and magnesium levels.  - Advised to continue consuming pickle juice and apply magnesium cream topically before bedtime. Laboratory tests to assess vitamin levels will be conducted during the next oncology appointment in 2 weeks.    2. Anxiety.  - Exhibiting irritability, confusion, and forgetfulness, potentially exacerbated by BuSpar.  - Symptoms of anxiety and irritability discussed.  - Decision to discontinue BuSpar and monitor for changes in symptoms. If anxiety intensifies, an additional medication may be introduced. If irritability persists but anxiety remains manageable, an alternative treatment will be explored.    3.  Intertrigo.  - Fungal infection in the groin area, currently being treated with a prescribed powder.  - Improvement noted with continued use of the powder.  - Advised to continue using the powder as directed.    4. Cough.  - Recent coughing episodes, potentially due to allergies or a side effect of Keytruda.  - Lung sounds are clear, no evidence of pneumonia.  - An albuterol inhaler will be prescribed for use as needed.    5. Joint pain.  - Swollen thumb and other joints, potentially a flare-up of arthritis or a side effect of Keytruda.  - Adequate hydration discussed.  - Advised to monitor for any changes in  symptoms.    6. Frequent urination.  - Frequent urination due to diuretic medication.  - Discussion about the impact of diuretic medication on potassium, calcium, and magnesium levels.      BMI is within normal parameters. No other follow-up for BMI required.     Patient or patient representative verbalized consent for the use of Ambient Listening during the visit with  Elissa Geronimo MD for chart documentation. 6/24/2025  23:58 EDT        Follow Up   Return (already has appt).  Findings and plans discussed with patient who verbalizes understanding and agreement. Will followup with patient once results are in. Patient was given instructions and counseling regarding his condition or for health maintenance advice. Please see specific information pulled into the AVS if appropriate.       Elissa Geronimo MD         Heterosexual

## 2025-07-07 ENCOUNTER — PATIENT OUTREACH (OUTPATIENT)
Dept: CASE MANAGEMENT | Facility: OTHER | Age: OVER 89
End: 2025-07-07
Payer: MEDICARE

## 2025-07-07 NOTE — OUTREACH NOTE
AMBULATORY CASE MANAGEMENT NOTE    Names and Relationships of Patient/Support Persons: Contact: [email or web address]; Relationship: Emergency Contact -     Patient Outreach    Mrs. Fonseca reports patient is doing as well as can be expected.  She reports he has good appetite and is hydrating as much as he can, liquids include coffee, Gatorade, water and milk. Cough remains unchanged,  reports he has had that cough for a long time. Denies diarrhea but does spend a lot of his time getting up and down to urinate. She reports BSC at bedside. Patient reports he continues to experience intermittent muscle cramps in his feet and ankles, drinking pickle juice with food results.  states he stay in a good humor most of the time.  She and Mr. Fonseca denies any needs or concerns at this time.      Arlyn COLE  Ambulatory Case Management    7/7/2025, 15:52 EDT

## 2025-07-10 DIAGNOSIS — C34.90 METASTATIC NON-SMALL CELL LUNG CANCER: Primary | ICD-10-CM

## 2025-07-10 RX ORDER — SODIUM CHLORIDE 9 MG/ML
20 INJECTION, SOLUTION INTRAVENOUS ONCE
OUTPATIENT
Start: 2025-07-15

## 2025-07-16 ENCOUNTER — INFUSION (OUTPATIENT)
Dept: ONCOLOGY | Facility: HOSPITAL | Age: OVER 89
End: 2025-07-16
Payer: MEDICARE

## 2025-07-16 ENCOUNTER — OFFICE VISIT (OUTPATIENT)
Dept: ONCOLOGY | Facility: CLINIC | Age: OVER 89
End: 2025-07-16
Payer: MEDICARE

## 2025-07-16 ENCOUNTER — APPOINTMENT (OUTPATIENT)
Dept: ONCOLOGY | Facility: HOSPITAL | Age: OVER 89
End: 2025-07-16
Payer: MEDICARE

## 2025-07-16 ENCOUNTER — HOSPITAL ENCOUNTER (OUTPATIENT)
Dept: MRI IMAGING | Facility: HOSPITAL | Age: OVER 89
Discharge: HOME | End: 2025-07-16
Payer: MEDICARE

## 2025-07-16 ENCOUNTER — LAB (OUTPATIENT)
Dept: ONCOLOGY | Facility: CLINIC | Age: OVER 89
End: 2025-07-16
Payer: MEDICARE

## 2025-07-16 VITALS
SYSTOLIC BLOOD PRESSURE: 119 MMHG | WEIGHT: 173.4 LBS | DIASTOLIC BLOOD PRESSURE: 61 MMHG | OXYGEN SATURATION: 90 % | TEMPERATURE: 98 F | BODY MASS INDEX: 23.49 KG/M2 | HEIGHT: 72 IN | RESPIRATION RATE: 20 BRPM | HEART RATE: 81 BPM

## 2025-07-16 VITALS
TEMPERATURE: 97.8 F | DIASTOLIC BLOOD PRESSURE: 52 MMHG | RESPIRATION RATE: 20 BRPM | OXYGEN SATURATION: 93 % | HEART RATE: 72 BPM | SYSTOLIC BLOOD PRESSURE: 109 MMHG

## 2025-07-16 DIAGNOSIS — C34.90 METASTATIC NON-SMALL CELL LUNG CANCER: Primary | ICD-10-CM

## 2025-07-16 DIAGNOSIS — C34.90 METASTATIC NON-SMALL CELL LUNG CANCER: ICD-10-CM

## 2025-07-16 DIAGNOSIS — R41.0 CONFUSION: ICD-10-CM

## 2025-07-16 DIAGNOSIS — R06.09 DYSPNEA ON EXERTION: ICD-10-CM

## 2025-07-16 LAB
ALBUMIN SERPL-MCNC: 3.5 G/DL (ref 3.5–5.2)
ALBUMIN/GLOB SERPL: 1.1 G/DL
ALP SERPL-CCNC: 77 U/L (ref 39–117)
ALT SERPL W P-5'-P-CCNC: 8 U/L (ref 1–41)
ANION GAP SERPL CALCULATED.3IONS-SCNC: 9.6 MMOL/L (ref 5–15)
AST SERPL-CCNC: 20 U/L (ref 1–40)
BASOPHILS # BLD AUTO: 0.06 10*3/MM3 (ref 0–0.2)
BASOPHILS NFR BLD AUTO: 0.8 % (ref 0–1.5)
BILIRUB SERPL-MCNC: 0.3 MG/DL (ref 0–1.2)
BUN SERPL-MCNC: 27.7 MG/DL (ref 8–23)
BUN/CREAT SERPL: 28.3 (ref 7–25)
CALCIUM SPEC-SCNC: 9 MG/DL (ref 8.2–9.6)
CHLORIDE SERPL-SCNC: 103 MMOL/L (ref 98–107)
CO2 SERPL-SCNC: 26.4 MMOL/L (ref 22–29)
CREAT SERPL-MCNC: 0.98 MG/DL (ref 0.76–1.27)
DEPRECATED RDW RBC AUTO: 46.7 FL (ref 37–54)
EGFRCR SERPLBLD CKD-EPI 2021: 73.3 ML/MIN/1.73
EOSINOPHIL # BLD AUTO: 0.56 10*3/MM3 (ref 0–0.4)
EOSINOPHIL NFR BLD AUTO: 7.8 % (ref 0.3–6.2)
ERYTHROCYTE [DISTWIDTH] IN BLOOD BY AUTOMATED COUNT: 14.2 % (ref 12.3–15.4)
GLOBULIN UR ELPH-MCNC: 3.3 GM/DL
GLUCOSE SERPL-MCNC: 117 MG/DL (ref 65–99)
HCT VFR BLD AUTO: 33.8 % (ref 37.5–51)
HGB BLD-MCNC: 10.4 G/DL (ref 13–17.7)
IMM GRANULOCYTES # BLD AUTO: 0.02 10*3/MM3 (ref 0–0.05)
IMM GRANULOCYTES NFR BLD AUTO: 0.3 % (ref 0–0.5)
LYMPHOCYTES # BLD AUTO: 1.12 10*3/MM3 (ref 0.7–3.1)
LYMPHOCYTES NFR BLD AUTO: 15.7 % (ref 19.6–45.3)
MAGNESIUM SERPL-MCNC: 2.1 MG/DL (ref 1.6–2.4)
MCH RBC QN AUTO: 27.7 PG (ref 26.6–33)
MCHC RBC AUTO-ENTMCNC: 30.8 G/DL (ref 31.5–35.7)
MCV RBC AUTO: 90.1 FL (ref 79–97)
MONOCYTES # BLD AUTO: 1.34 10*3/MM3 (ref 0.1–0.9)
MONOCYTES NFR BLD AUTO: 18.8 % (ref 5–12)
NEUTROPHILS NFR BLD AUTO: 4.04 10*3/MM3 (ref 1.7–7)
NEUTROPHILS NFR BLD AUTO: 56.6 % (ref 42.7–76)
NRBC BLD AUTO-RTO: 0 /100 WBC (ref 0–0.2)
PLATELET # BLD AUTO: 275 10*3/MM3 (ref 140–450)
PMV BLD AUTO: 9.8 FL (ref 6–12)
POTASSIUM SERPL-SCNC: 4.4 MMOL/L (ref 3.5–5.2)
PROT SERPL-MCNC: 6.8 G/DL (ref 6–8.5)
RBC # BLD AUTO: 3.75 10*6/MM3 (ref 4.14–5.8)
SODIUM SERPL-SCNC: 139 MMOL/L (ref 136–145)
T4 FREE SERPL-MCNC: 1.44 NG/DL (ref 0.92–1.68)
TSH SERPL DL<=0.05 MIU/L-ACNC: 1.27 UIU/ML (ref 0.27–4.2)
WBC NRBC COR # BLD AUTO: 7.14 10*3/MM3 (ref 3.4–10.8)

## 2025-07-16 PROCEDURE — 25810000003 SODIUM CHLORIDE 0.9 % SOLUTION: Performed by: INTERNAL MEDICINE

## 2025-07-16 PROCEDURE — 84439 ASSAY OF FREE THYROXINE: CPT | Performed by: INTERNAL MEDICINE

## 2025-07-16 PROCEDURE — 80053 COMPREHEN METABOLIC PANEL: CPT | Performed by: INTERNAL MEDICINE

## 2025-07-16 PROCEDURE — 83735 ASSAY OF MAGNESIUM: CPT | Performed by: INTERNAL MEDICINE

## 2025-07-16 PROCEDURE — 25010000002 PEMBROLIZUMAB 100 MG/4ML SOLUTION 4 ML VIAL: Performed by: INTERNAL MEDICINE

## 2025-07-16 PROCEDURE — 84443 ASSAY THYROID STIM HORMONE: CPT | Performed by: INTERNAL MEDICINE

## 2025-07-16 PROCEDURE — 96413 CHEMO IV INFUSION 1 HR: CPT

## 2025-07-16 PROCEDURE — 85025 COMPLETE CBC W/AUTO DIFF WBC: CPT | Performed by: INTERNAL MEDICINE

## 2025-07-16 RX ORDER — SODIUM CHLORIDE 9 MG/ML
20 INJECTION, SOLUTION INTRAVENOUS ONCE
OUTPATIENT
Start: 2025-08-06

## 2025-07-16 RX ORDER — SODIUM CHLORIDE 9 MG/ML
20 INJECTION, SOLUTION INTRAVENOUS ONCE
Status: COMPLETED | OUTPATIENT
Start: 2025-07-16 | End: 2025-07-16

## 2025-07-16 RX ADMIN — SODIUM CHLORIDE 20 ML/HR: 9 INJECTION, SOLUTION INTRAVENOUS at 14:56

## 2025-07-16 RX ADMIN — SODIUM CHLORIDE 200 MG: 9 INJECTION, SOLUTION INTRAVENOUS at 14:56

## 2025-07-16 NOTE — PROGRESS NOTES
Venipuncture Blood Specimen Collection  Venipuncture performed in right arm by Angelica Davison MA with good hemostasis. Patient tolerated the procedure well without complications.   07/16/25   Angelica Davison MA

## 2025-07-16 NOTE — PROGRESS NOTES
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 low-attenuation  lesions in the liver.       CT Chest With Contrast Diagnostic (08/26/2024 04:38)     FINDINGS:     CT CHEST:     Heart and mediastinal structures: Normal heart size.  Dense  calcifications in the aortic valve leaflets.  The aorta is  atherosclerotic and otherwise normal. Coronary artery calcifications are  present.     Lungs and airways: There is no significant change in the mass in the  periphery of the right upper lobe and the superior segment right lower  lobe measuring 2.9 x 7.6 cm, with extension into the hilum and a  adjacent satellite nodule in the right lung apex.  Lungs are  emphysematous.  There is irregularity in the right upper lobe bronchus,  as previously, without obstruction identified     Pleura: normal.     Lymph nodes: normal.     Musculoskeletal and chest wall: Erosion of the right fourth and fifth  ribs from the adjacent mass, progressed compared to the previous exam,  with a new pathologic fracture at the fifth rib.     Lower neck and upper abdomen: Thyroid gland is atrophic..        CT ABDOMEN AND PELVIS:     Hepatobiliary: Progression in the hepatic metastases, with a larger mass  in the left lateral segment measuring 3.8 x 3.4 cm, previously 1.7 x 1.5  cm..     Pancreas: normal.     Spleen: normal.     Adrenals: normal.       Kidneys, ureters, bladder: normal.     Reproductive: normal.     GI tract/Bowel: Moderate amount of stool in the colon.  No acute  inflammatory abnormality.     Appendix: normal.     Peritoneum: normal, no free air or fluid.     Vascular: Infrarenal abdominal aortic aneurysm measures 3.3 x 3.2 cm.   The iliac arteries are atherosclerotic.     Lymph nodes: normal.     Musculoskeletal and abdominal wall: Right inguinal hernia contains  nonobstructed, non-strangulated loop of small bowel.  Compression  deformity at L2 is chronic.     IMPRESSION:     CT CHEST:  PATHOLOGIC FRACTURE IN THE LATERAL ASPECT OF THE RIGHT FIFTH RIB, WHICH  IS NOW ERODED AND INVADED BY THE OTHERWISE UNCHANGED RIGHT UPPER LOBE  MASS.     COMPARED TO 5/15/2024, NO CHANGE IN PERIPHERAL RIGHT UPPER LOBE MASS  WITH EXTENSION INTO THE RIGHT HILUM AND IRREGULARITY IN THE RIGHT  MAINSTEM BRONCHUS.     NO ADDITIONAL SIGNIFICANT ACUTE ABNORMALITY IN THE CHEST.     CT ABDOMEN AND PELVIS:  PROGRESSION IN HEPATIC METASTASES COMPARED TO 6/14/2024.    CT Head With Contrast (07/09/2024 09:47)   IMPRESSION:  1.  No acute intracranial findings identified.  2.  No enhancing brain parenchymal lesions identified.  3.  Diffuse cerebral atrophy with chronic small vessel ischemic disease.  4.  Focal encephalomalacia of the right frontal lobe.    CT Abdomen Pelvis With Contrast (06/14/2024 14:55)   FINDINGS:  ABDOMEN: The lung bases are clear. The heart is normal in size. There  are multiple hepatic masses, the largest of which is in the right lobe  measuring 8.1 cm consistent with metastatic disease. The spleen is  homogenous. No adrenal mass is present. The pancreas is unremarkable.  The kidneys are normal. There is a 3.3 cm abdominal aortic aneurysm. The  mesenteric vascualture is patent. There is no free fluid or adenopathy.  The abdominal portions of the GI tract are unremarkable with no evidence  of obstruction.     PELVIS: The appendix is normal. The urinary  bladder is unremarkable.  There is no significant free fluid or adenopathy. Bone windows reveal an  L2 compression fracture which is unchanged compared to the prior exam.  No new osseous abnormalities are identified. The extraperitoneal soft  tissues are unremarkable.     IMPRESSION:  1. Hepatic metastases not significantly changed compared to the prior  exam.  2. 3.3 cm abdominal aortic aneurysm.  3. Stable L2 compression fracture.    NM PET/CT Skull Base to Mid Thigh (05/30/2024 10:53)   FINDINGS:      HEAD:    BRAIN AND EXTRA-AXIAL SPACES:  Unremarkable as visualized.    NASOPHARYNX:  Unremarkable as visualized.      NECK:    HYPOPHARYNX:  Unremarkable as visualized.    LARYNX:  Unremarkable as visualized.    TRACHEA:  Unremarkable as visualized.    RETROPHARYNGEAL SPACE:  Unremarkable as visualized.    SUBMANDIBULAR/PAROTID GLANDS:  Unremarkable as visualized.  Glands are  normal in size.    THYROID:  Unremarkable as visualized.  No enlarged or calcified  nodules.      CHEST:    LUNGS AND PLEURAL SPACES:  Consolidative more central and elongated  masslike consolidation near the superior right hilum measures about 7 cm  and again shows PET hypermetabolism mSUV 21. The mass extends into the  right hilum.  A 8 mm right upper lobe spiculated pulmonary nodule shows  no PET hypermetabolism at this time.    HEART:  Unremarkable as visualized.  No cardiomegaly.  No significant  pericardial effusion.    MEDIASTINUM:  Unremarkable as visualized.  No mass.      ABDOMEN:    LIVER:  PET hypermetabolic liver masses are identified with the  largest in the right lobe measuring about 8.3 cm and with PET  hypermetabolism mSUV 14.2. A smaller liver masses noted in the left lobe  of the liver.    GALLBLADDER AND BILE DUCTS:  Unremarkable as visualized.  No calcified  stones.  No ductal dilation.    PANCREAS:  Unremarkable as visualized.  No ductal dilation.    SPLEEN:  Unremarkable as visualized.  No splenomegaly.    ADRENALS:   Unremarkable as visualized.  No mass.    KIDNEYS AND URETERS:  Unremarkable as visualized.    STOMACH AND BOWEL:  Unremarkable as visualized.  No obstruction.      PELVIS:    APPENDIX:  No findings to suggest acute appendicitis.    BLADDER:  Unremarkable as visualized.    REPRODUCTIVE:  Unremarkable as visualized.      WHOLE BODY:    INTRAPERITONEAL SPACE:  Unremarkable as visualized.  No significant  fluid collection.  No free air.    BONES/JOINTS:  Again there is a peripheral consolidated mass that  appears to invade the right upper ribs of the right upper lobe measuring  about 8.6 cm and with PET hypermetabolism mSUV 15.1.  Compression  fracture of L2 vertebral body is again noted.  No dislocation.    SOFT TISSUES:  Unremarkable as visualized.    VASCULATURE:  Unremarkable as visualized.  No aortic aneurysm.    LYMPH NODES:  Unremarkable as visualized.  No lymphadenopathy.  No  hypermetabolic activity.     IMPRESSION:  1.  Again there is a peripheral consolidated mass that appears to invade  the right upper ribs of the right upper lobe measuring about 8.6 cm and  with PET hypermetabolism mSUV 15.1.  2.  Consolidative more central and elongated masslike consolidation near  the superior right hilum measures about 7 cm and again shows PET  hypermetabolism mSUV 21. The mass extends into the right hilum.  3.  PET hypermetabolic liver masses are identified with the largest in  the right lobe measuring about 8.3 cm and with PET hypermetabolism mSUV  14.2. A smaller liver masses noted in the left lobe of the liver.  4.  Compression fracture of L2 vertebral body is again noted.  5.  A 8 mm right upper lobe spiculated pulmonary nodule shows no PET  hypermetabolism at this time.    CT Chest With Contrast Diagnostic (05/15/2024 14:24)   FINDINGS:    LUNGS AND PLEURAL SPACES:  Right upper lobe peripheral pleural-based  consolidative masslike opacity measuring about 2.9 x 7.8 cm with a more  central hilar mass on the right  side measuring 5.1 x 4.4 cm and a  smaller right upper lobe nodule component measuring 2.6 cm. The  peripheral mass does appear to cause fourth rib erosion or destruction.   Emphysematous lungs noted.  Right upper lobe 1.2 cm ill-defined nodule  that could represent scarring.  No pneumothorax.  No significant  effusion.    HEART:  Unremarkable as visualized.  No cardiomegaly.  No significant  pericardial effusion.  No significant coronary artery calcifications.    BONES/JOINTS:  Compression fracture noted of probable L2 vertebral  body.  No dislocation.    SOFT TISSUES:  Unremarkable as visualized.    VASCULATURE:  Partially visualized infrarenal abdominal aortic  aneurysm measuring 3.4 cm.    LYMPH NODES:  Unremarkable as visualized.  No enlarged lymph nodes.    LIVER:  Right lobe of liver mass concerning for metastatic disease  measuring about 7 mm.    OTHER FINDINGS:  .     IMPRESSION:  1.  Right upper lobe peripheral pleural-based consolidative masslike  opacity measuring about 2.9 x 7.8 cm with a more central hilar mass on  the right side measuring 5.1 x 4.4 cm and a smaller right upper lobe  nodule component measuring 2.6 cm. The peripheral mass does appear to  cause fourth rib erosion or destruction.  2.  Right lobe of liver mass concerning for metastatic disease measuring  about 7 mm.  3.  Partially visualized infrarenal abdominal aortic aneurysm measuring  3.4 cm.  4.  Compression fracture noted of probable L2 vertebral body.    XR Chest 2 View (04/23/2024 16:34)   FINDINGS:  LUNGS: Pleural-based consolidation periphery of the right lung. Mild  fullness in the right suprahilar region  HEART AND MEDIASTINUM: Heart and mediastinal contours are unremarkable  SKELETON: Bony and soft tissue structures are unremarkable.     IMPRESSION:  Pleural-based consolidation periphery of the right lung and fullness in  the right suprahilar region. Recommend CT        Pathology:   06/21/2024        NGS:      PATHOLOGY - SCAN  - FINAL RESULTS, GUARDANT, 07.28.2024 (07/28/2024)           Assessment / Plan         Assessment and Plan   Kevin Fonseca is a 89 y.o. year old with history of     DeNovo metastatic non small cell carcinoma, squamous cell. PD-L1 TPS 5%. ERBB2 amplification. MSI-low. Negative EGFR, ALK, ROS1, BRAF, MET, RET, NTRK, KRAS  -Patient with ~1 year of decline in function, weight loss, fatigue, and intermittent hemoptysis. CT May 15 2024 with with concerning right upper lobe peripheral consolidation 2.9 x 7.8 cm with central hilar mass 5 x 4.4 cm, small right upper lobe nodule 2.6 cm, another right upper lobe 1.2 cm ill defined mass could represent scarring. Also noted liver mass concerning for metastatic disease. Follow up PET/CT 6/14/2024 with hepatic metastasis largest measuring 8.1 cm, and noted L2 compression fracture. 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  -Patient is aware treatment is palliative. After a thorough discussion, patient and family decided to proceed with single agent pembrolizumab q21d given his performance status and co-morbidities. I feel this is reasonable given his functional status.   -C3 9/3-tolerated well. Course complicated due to accident with abrasions and noted to have bradycardia with 2-1 AV block.   -C4 9/24- tolerated well  -C5 10/15- tolerated well  -C6 11/5- proceed today  -C7 11/26- completed  -C9 1/21/25- tolerated well  -C11 3/4/25- tolerated well  -C 13 4/15/25- tolerated well  -C14 5/13- tolerated well  -C15 6/4-tolerated well  -C17 7/16- proceed today  -Next CT chest/abdomen/pelvis 8/13/2025     2. Malignancy associated pain  -Currently denies. Takes ibuprofen as needed for muscle cramps      3. Nutrition  -Recommend patient take in 2-3 boost/day    4. Hyponatremia   - Improved     5.  Erythema and pruritus - Improved   -Started after C1 of IO  -Recommend H1 blockers: loratidine 10 mg AM and benadryl 25 mg PM  -Increase topical steroid to clobestasol 0.05 BID    6. Hypoxia   - Portable oxygen provided; patient has been oxygen dependent since recent admission for bradycardia and heart block    7. Bradycardia and AV heart block  - Evaluated by EP at [facility], he is discharged home with a cardiac monitor   - recommend pacemaker implant, however cardiology did not think patient was a good candidate for procedure    8. Loose stool- resolved  - Recommend holding miralax and benefiber for now   - If continues to have loose stool, would recommend imodium PRN    9. Intermittent confusion   - MRI brain ordered     10. Wheezing and dyspnea on exertion   - Ipratropium-albuterol BID PRN inhaler added to PRN albuterol inhaler   - CT chest scheduled 8/13     11. Leg cramps   - Recommend holding bumex, can use as needed for LE swelling   - Mag added to labs today   - Resume electrolytes by mouth aka pedialyte or gatorade     Discussed possible differential diagnoses, testing, treatment, recommended non-pharmacological interventions, risks, warning signs to monitor for that would indicate need for follow-up in clinic or ER. If no improvement with these regimens or you have new or worsening symptoms follow-up. Patient verbalizes understanding and agreement with plan of care. Denies further needs or concerns.     Patient was given instructions and counseling regarding her condition and for health maintenance advised.       All questions were answered to his satisfaction.    BMI is within normal parameters. No other follow-up for BMI required.         ACO / MARY/Other  Quality measures  -  Kevin Fonseca did not receive 2024 flu vaccine.  This was recommended.  -  Kevin Fonseca reports a pain score of 0.  Given his pain assessment as noted, treatment options were discussed and the following options were decided upon as a  follow-up plan to address the patient's pain: continuation of current treatment plan for pain.  -  Current outpatient and discharge medications have been reconciled for the patient.  Reviewed by: Jennifer Hinds MD        Meds ordered during this visit  New Medications Ordered This Visit   Medications    ipratropium-albuterol (COMBIVENT RESPIMAT)  MCG/ACT inhaler     Sig: Inhale 1 puff 2 (Two) Times a Day As Needed for Wheezing or Shortness of Air.     Dispense:  4 g     Refill:  11       Visit Diagnoses    ICD-10-CM ICD-9-CM   1. Metastatic non-small cell lung cancer  C34.90 162.9   2. Confusion  R41.0 298.9   3. Dyspnea on exertion  R06.09 786.09                               Follow Up   In 6 weeks with treatment  FU CT chest/abdomen/pelvis and MRI brain         This document has been electronically signed by Jennifer Hinds MD   July 16, 2025 14:34 EDT    Dictated Utilizing Dragon Dictation: Part of this note may be an electronic transcription/translation of spoken language to printed text using the Dragon Dictation System.

## 2025-07-24 ENCOUNTER — OFFICE VISIT (OUTPATIENT)
Dept: FAMILY MEDICINE CLINIC | Facility: CLINIC | Age: OVER 89
End: 2025-07-24
Payer: MEDICARE

## 2025-07-24 VITALS
HEART RATE: 72 BPM | BODY MASS INDEX: 24.16 KG/M2 | WEIGHT: 178.4 LBS | TEMPERATURE: 96.9 F | HEIGHT: 72 IN | DIASTOLIC BLOOD PRESSURE: 58 MMHG | SYSTOLIC BLOOD PRESSURE: 122 MMHG | OXYGEN SATURATION: 95 %

## 2025-07-24 DIAGNOSIS — M25.50 ARTHRALGIA, UNSPECIFIED JOINT: ICD-10-CM

## 2025-07-24 DIAGNOSIS — C34.90 METASTATIC NON-SMALL CELL LUNG CANCER: ICD-10-CM

## 2025-07-24 DIAGNOSIS — I50.9 CHRONIC CONGESTIVE HEART FAILURE, UNSPECIFIED HEART FAILURE TYPE: ICD-10-CM

## 2025-07-24 DIAGNOSIS — G47.33 OSA (OBSTRUCTIVE SLEEP APNEA): ICD-10-CM

## 2025-07-24 DIAGNOSIS — E03.9 HYPOTHYROIDISM, UNSPECIFIED TYPE: Primary | ICD-10-CM

## 2025-07-24 DIAGNOSIS — R41.3 MEMORY CHANGE: ICD-10-CM

## 2025-07-24 DIAGNOSIS — J44.9 CHRONIC OBSTRUCTIVE PULMONARY DISEASE, UNSPECIFIED COPD TYPE: ICD-10-CM

## 2025-07-24 DIAGNOSIS — R05.9 COUGH, UNSPECIFIED TYPE: ICD-10-CM

## 2025-08-01 ENCOUNTER — TELEPHONE (OUTPATIENT)
Dept: FAMILY MEDICINE CLINIC | Facility: CLINIC | Age: OVER 89
End: 2025-08-01
Payer: MEDICARE

## 2025-08-01 NOTE — TELEPHONE ENCOUNTER
It is hard to tell if it is appendicitis without doing a physical exam. I think it would be more constipation though and maybe he just have a rough BM. Have him use a heat pad over that area and see if it is better. Give him an appointment next week for followup so I can take a look. Thanks.

## 2025-08-01 NOTE — TELEPHONE ENCOUNTER
Spoke with Faby she reports he has complained of right sided lower abdominal pain x 2 today,reports it is sharp the dull,area is tender to touch where he is hurting,had a large BM day before yesterday,denies dysuria but reports his urine is dark orange,she stated he drinks orange Gatorade all day as much as he drinks water,she wanted to know if you thought a Pur Wick would benefit him?

## 2025-08-01 NOTE — TELEPHONE ENCOUNTER
Caller: DILAN JOVEL    Relationship: Emergency Contact    Best call back number: [phone]     What is the best time to reach you: ANYTIME, OK TO LEAVE VOICEMAIL.    Who are you requesting to speak with (clinical staff, provider,  specific staff member): CLINICAL STAFF    Do you know the name of the person who called: DILAN    What was the call regarding: PATIENT IS HAVING PAIN IN HIS SIDE AND SPOUSE WOULD LIKE TO KNOW IF IT COULD BE APPENDICITIS. PLEASE CALL BACK TO DISCUSS SYMPTOMS.    Is it okay if the provider responds through MyChart: NO CALL ONLY

## 2025-08-05 ENCOUNTER — TELEPHONE (OUTPATIENT)
Dept: FAMILY MEDICINE CLINIC | Facility: CLINIC | Age: OVER 89
End: 2025-08-05
Payer: MEDICARE

## 2025-08-05 ENCOUNTER — APPOINTMENT (OUTPATIENT)
Dept: ONCOLOGY | Facility: HOSPITAL | Age: OVER 89
End: 2025-08-05
Payer: MEDICARE

## 2025-08-05 ENCOUNTER — INFUSION (OUTPATIENT)
Dept: ONCOLOGY | Facility: HOSPITAL | Age: OVER 89
End: 2025-08-05
Payer: MEDICARE

## 2025-08-05 VITALS
BODY MASS INDEX: 23.8 KG/M2 | RESPIRATION RATE: 18 BRPM | HEART RATE: 78 BPM | DIASTOLIC BLOOD PRESSURE: 55 MMHG | SYSTOLIC BLOOD PRESSURE: 110 MMHG | OXYGEN SATURATION: 92 % | TEMPERATURE: 97.4 F | WEIGHT: 175.5 LBS

## 2025-08-05 DIAGNOSIS — C34.90 METASTATIC NON-SMALL CELL LUNG CANCER: Primary | ICD-10-CM

## 2025-08-05 LAB
ALBUMIN SERPL-MCNC: 3.7 G/DL (ref 3.5–5.2)
ALBUMIN/GLOB SERPL: 1.1 G/DL
ALP SERPL-CCNC: 84 U/L (ref 39–117)
ALT SERPL W P-5'-P-CCNC: 8 U/L (ref 1–41)
ANION GAP SERPL CALCULATED.3IONS-SCNC: 10.4 MMOL/L (ref 5–15)
AST SERPL-CCNC: 25 U/L (ref 1–40)
BASOPHILS # BLD AUTO: 0.08 10*3/MM3 (ref 0–0.2)
BASOPHILS NFR BLD AUTO: 1 % (ref 0–1.5)
BILIRUB SERPL-MCNC: 0.4 MG/DL (ref 0–1.2)
BUN SERPL-MCNC: 23.1 MG/DL (ref 8–23)
BUN/CREAT SERPL: 26.9 (ref 7–25)
CALCIUM SPEC-SCNC: 9.2 MG/DL (ref 8.2–9.6)
CHLORIDE SERPL-SCNC: 102 MMOL/L (ref 98–107)
CO2 SERPL-SCNC: 27.6 MMOL/L (ref 22–29)
CREAT SERPL-MCNC: 0.86 MG/DL (ref 0.76–1.27)
DEPRECATED RDW RBC AUTO: 47.9 FL (ref 37–54)
EGFRCR SERPLBLD CKD-EPI 2021: 82.3 ML/MIN/1.73
EOSINOPHIL # BLD AUTO: 0.67 10*3/MM3 (ref 0–0.4)
EOSINOPHIL NFR BLD AUTO: 8 % (ref 0.3–6.2)
ERYTHROCYTE [DISTWIDTH] IN BLOOD BY AUTOMATED COUNT: 14.5 % (ref 12.3–15.4)
GLOBULIN UR ELPH-MCNC: 3.5 GM/DL
GLUCOSE SERPL-MCNC: 90 MG/DL (ref 65–99)
HCT VFR BLD AUTO: 34.2 % (ref 37.5–51)
HGB BLD-MCNC: 10.7 G/DL (ref 13–17.7)
IMM GRANULOCYTES # BLD AUTO: 0.02 10*3/MM3 (ref 0–0.05)
IMM GRANULOCYTES NFR BLD AUTO: 0.2 % (ref 0–0.5)
LYMPHOCYTES # BLD AUTO: 1.49 10*3/MM3 (ref 0.7–3.1)
LYMPHOCYTES NFR BLD AUTO: 17.7 % (ref 19.6–45.3)
MCH RBC QN AUTO: 28.2 PG (ref 26.6–33)
MCHC RBC AUTO-ENTMCNC: 31.3 G/DL (ref 31.5–35.7)
MCV RBC AUTO: 90 FL (ref 79–97)
MONOCYTES # BLD AUTO: 1.42 10*3/MM3 (ref 0.1–0.9)
MONOCYTES NFR BLD AUTO: 16.9 % (ref 5–12)
NEUTROPHILS NFR BLD AUTO: 4.73 10*3/MM3 (ref 1.7–7)
NEUTROPHILS NFR BLD AUTO: 56.2 % (ref 42.7–76)
NRBC BLD AUTO-RTO: 0 /100 WBC (ref 0–0.2)
PLATELET # BLD AUTO: 246 10*3/MM3 (ref 140–450)
PMV BLD AUTO: 10.5 FL (ref 6–12)
POTASSIUM SERPL-SCNC: 4.9 MMOL/L (ref 3.5–5.2)
PROT SERPL-MCNC: 7.2 G/DL (ref 6–8.5)
RBC # BLD AUTO: 3.8 10*6/MM3 (ref 4.14–5.8)
SODIUM SERPL-SCNC: 140 MMOL/L (ref 136–145)
T4 FREE SERPL-MCNC: 1.47 NG/DL (ref 0.92–1.68)
TSH SERPL DL<=0.05 MIU/L-ACNC: 1.93 UIU/ML (ref 0.27–4.2)
WBC NRBC COR # BLD AUTO: 8.41 10*3/MM3 (ref 3.4–10.8)

## 2025-08-05 PROCEDURE — 25010000002 PEMBROLIZUMAB 100 MG/4ML SOLUTION 4 ML VIAL: Performed by: INTERNAL MEDICINE

## 2025-08-05 PROCEDURE — 85025 COMPLETE CBC W/AUTO DIFF WBC: CPT

## 2025-08-05 PROCEDURE — 80053 COMPREHEN METABOLIC PANEL: CPT

## 2025-08-05 PROCEDURE — 84443 ASSAY THYROID STIM HORMONE: CPT

## 2025-08-05 PROCEDURE — 96413 CHEMO IV INFUSION 1 HR: CPT

## 2025-08-05 PROCEDURE — 25810000003 SODIUM CHLORIDE 0.9 % SOLUTION: Performed by: INTERNAL MEDICINE

## 2025-08-05 PROCEDURE — 84439 ASSAY OF FREE THYROXINE: CPT

## 2025-08-05 RX ORDER — SODIUM CHLORIDE 9 MG/ML
20 INJECTION, SOLUTION INTRAVENOUS ONCE
Status: COMPLETED | OUTPATIENT
Start: 2025-08-05 | End: 2025-08-05

## 2025-08-05 RX ADMIN — SODIUM CHLORIDE 200 MG: 9 INJECTION, SOLUTION INTRAVENOUS at 14:58

## 2025-08-05 RX ADMIN — SODIUM CHLORIDE 20 ML/HR: 9 INJECTION, SOLUTION INTRAVENOUS at 14:57

## 2025-08-13 ENCOUNTER — HOSPITAL ENCOUNTER (OUTPATIENT)
Dept: CT IMAGING | Facility: HOSPITAL | Age: OVER 89
Discharge: HOME | End: 2025-08-13
Payer: MEDICARE

## 2025-08-13 DIAGNOSIS — C34.90 METASTATIC NON-SMALL CELL LUNG CANCER: ICD-10-CM

## 2025-08-13 PROCEDURE — 71260 CT THORAX DX C+: CPT

## 2025-08-13 PROCEDURE — 25510000001 IOPAMIDOL 61 % SOLUTION: Performed by: INTERNAL MEDICINE

## 2025-08-13 PROCEDURE — 74177 CT ABD & PELVIS W/CONTRAST: CPT

## 2025-08-13 RX ORDER — IOPAMIDOL 612 MG/ML
100 INJECTION, SOLUTION INTRAVASCULAR
Status: COMPLETED | OUTPATIENT
Start: 2025-08-13 | End: 2025-08-13

## 2025-08-13 RX ADMIN — IOPAMIDOL 100 ML: 612 INJECTION, SOLUTION INTRAVENOUS at 13:41

## 2025-08-19 ENCOUNTER — TELEPHONE (OUTPATIENT)
Dept: FAMILY MEDICINE CLINIC | Facility: CLINIC | Age: OVER 89
End: 2025-08-19
Payer: MEDICARE

## 2025-08-21 DIAGNOSIS — R30.0 DYSURIA: Primary | ICD-10-CM

## 2025-08-22 ENCOUNTER — PATIENT OUTREACH (OUTPATIENT)
Dept: ONCOLOGY | Facility: CLINIC | Age: OVER 89
End: 2025-08-22
Payer: MEDICARE

## 2025-08-22 DIAGNOSIS — C34.90 METASTATIC NON-SMALL CELL LUNG CANCER: Primary | ICD-10-CM

## 2025-08-25 ENCOUNTER — DOCUMENTATION (OUTPATIENT)
Dept: OTHER | Facility: HOSPITAL | Age: OVER 89
End: 2025-08-25
Payer: MEDICARE

## 2025-08-27 ENCOUNTER — OFFICE VISIT (OUTPATIENT)
Dept: ONCOLOGY | Facility: CLINIC | Age: OVER 89
End: 2025-08-27
Payer: MEDICARE

## 2025-08-27 ENCOUNTER — INFUSION (OUTPATIENT)
Dept: ONCOLOGY | Facility: HOSPITAL | Age: OVER 89
End: 2025-08-27
Payer: MEDICARE

## 2025-08-27 ENCOUNTER — LAB (OUTPATIENT)
Dept: ONCOLOGY | Facility: CLINIC | Age: OVER 89
End: 2025-08-27
Payer: MEDICARE

## 2025-08-27 ENCOUNTER — APPOINTMENT (OUTPATIENT)
Dept: ONCOLOGY | Facility: HOSPITAL | Age: OVER 89
End: 2025-08-27
Payer: MEDICARE

## 2025-08-27 VITALS
WEIGHT: 171 LBS | DIASTOLIC BLOOD PRESSURE: 57 MMHG | RESPIRATION RATE: 20 BRPM | BODY MASS INDEX: 23.19 KG/M2 | OXYGEN SATURATION: 96 % | SYSTOLIC BLOOD PRESSURE: 124 MMHG | HEART RATE: 73 BPM | TEMPERATURE: 98.3 F

## 2025-08-27 VITALS
TEMPERATURE: 98 F | BODY MASS INDEX: 23.22 KG/M2 | DIASTOLIC BLOOD PRESSURE: 62 MMHG | SYSTOLIC BLOOD PRESSURE: 109 MMHG | WEIGHT: 171.4 LBS | RESPIRATION RATE: 20 BRPM | OXYGEN SATURATION: 94 % | HEIGHT: 72 IN | HEART RATE: 79 BPM

## 2025-08-27 DIAGNOSIS — C34.90 METASTATIC NON-SMALL CELL LUNG CANCER: ICD-10-CM

## 2025-08-27 DIAGNOSIS — C34.90 METASTATIC NON-SMALL CELL LUNG CANCER: Primary | ICD-10-CM

## 2025-08-27 LAB
ALBUMIN SERPL-MCNC: 3.5 G/DL (ref 3.5–5.2)
ALBUMIN/GLOB SERPL: 1.1 G/DL
ALP SERPL-CCNC: 80 U/L (ref 39–117)
ALT SERPL W P-5'-P-CCNC: 7 U/L (ref 1–41)
ANION GAP SERPL CALCULATED.3IONS-SCNC: 9.1 MMOL/L (ref 5–15)
AST SERPL-CCNC: 20 U/L (ref 1–40)
BASOPHILS # BLD AUTO: 0.07 10*3/MM3 (ref 0–0.2)
BASOPHILS NFR BLD AUTO: 0.8 % (ref 0–1.5)
BILIRUB SERPL-MCNC: 0.3 MG/DL (ref 0–1.2)
BUN SERPL-MCNC: 29.6 MG/DL (ref 8–23)
BUN/CREAT SERPL: 28.2 (ref 7–25)
CALCIUM SPEC-SCNC: 9.2 MG/DL (ref 8.2–9.6)
CHLORIDE SERPL-SCNC: 103 MMOL/L (ref 98–107)
CO2 SERPL-SCNC: 25.9 MMOL/L (ref 22–29)
CREAT SERPL-MCNC: 1.05 MG/DL (ref 0.76–1.27)
DEPRECATED RDW RBC AUTO: 47 FL (ref 37–54)
EGFRCR SERPLBLD CKD-EPI 2021: 67.4 ML/MIN/1.73
EOSINOPHIL # BLD AUTO: 0.83 10*3/MM3 (ref 0–0.4)
EOSINOPHIL NFR BLD AUTO: 9.3 % (ref 0.3–6.2)
ERYTHROCYTE [DISTWIDTH] IN BLOOD BY AUTOMATED COUNT: 14.1 % (ref 12.3–15.4)
GLOBULIN UR ELPH-MCNC: 3.3 GM/DL
GLUCOSE SERPL-MCNC: 116 MG/DL (ref 65–99)
HCT VFR BLD AUTO: 36.4 % (ref 37.5–51)
HGB BLD-MCNC: 11.2 G/DL (ref 13–17.7)
IMM GRANULOCYTES # BLD AUTO: 0.04 10*3/MM3 (ref 0–0.05)
IMM GRANULOCYTES NFR BLD AUTO: 0.4 % (ref 0–0.5)
LYMPHOCYTES # BLD AUTO: 1.21 10*3/MM3 (ref 0.7–3.1)
LYMPHOCYTES NFR BLD AUTO: 13.6 % (ref 19.6–45.3)
MCH RBC QN AUTO: 28 PG (ref 26.6–33)
MCHC RBC AUTO-ENTMCNC: 30.8 G/DL (ref 31.5–35.7)
MCV RBC AUTO: 91 FL (ref 79–97)
MONOCYTES # BLD AUTO: 1.36 10*3/MM3 (ref 0.1–0.9)
MONOCYTES NFR BLD AUTO: 15.2 % (ref 5–12)
NEUTROPHILS NFR BLD AUTO: 5.41 10*3/MM3 (ref 1.7–7)
NEUTROPHILS NFR BLD AUTO: 60.7 % (ref 42.7–76)
NRBC BLD AUTO-RTO: 0 /100 WBC (ref 0–0.2)
PLATELET # BLD AUTO: 246 10*3/MM3 (ref 140–450)
PMV BLD AUTO: 10.3 FL (ref 6–12)
POTASSIUM SERPL-SCNC: 5 MMOL/L (ref 3.5–5.2)
PROT SERPL-MCNC: 6.8 G/DL (ref 6–8.5)
RBC # BLD AUTO: 4 10*6/MM3 (ref 4.14–5.8)
SODIUM SERPL-SCNC: 138 MMOL/L (ref 136–145)
T4 FREE SERPL-MCNC: 1.27 NG/DL (ref 0.92–1.68)
TSH SERPL DL<=0.05 MIU/L-ACNC: 2.35 UIU/ML (ref 0.27–4.2)
WBC NRBC COR # BLD AUTO: 8.92 10*3/MM3 (ref 3.4–10.8)

## 2025-08-27 PROCEDURE — 85025 COMPLETE CBC W/AUTO DIFF WBC: CPT | Performed by: INTERNAL MEDICINE

## 2025-08-27 PROCEDURE — 84443 ASSAY THYROID STIM HORMONE: CPT | Performed by: INTERNAL MEDICINE

## 2025-08-27 PROCEDURE — 84439 ASSAY OF FREE THYROXINE: CPT | Performed by: INTERNAL MEDICINE

## 2025-08-27 PROCEDURE — 80053 COMPREHEN METABOLIC PANEL: CPT | Performed by: INTERNAL MEDICINE

## 2025-08-27 PROCEDURE — 25810000003 SODIUM CHLORIDE 0.9 % SOLUTION: Performed by: NURSE PRACTITIONER

## 2025-08-27 PROCEDURE — 25010000002 PEMBROLIZUMAB 100 MG/4ML SOLUTION 4 ML VIAL: Performed by: NURSE PRACTITIONER

## 2025-08-27 PROCEDURE — 96413 CHEMO IV INFUSION 1 HR: CPT

## 2025-08-27 RX ORDER — SODIUM CHLORIDE 9 MG/ML
20 INJECTION, SOLUTION INTRAVENOUS ONCE
OUTPATIENT
Start: 2025-09-17

## 2025-08-27 RX ORDER — SODIUM CHLORIDE 9 MG/ML
20 INJECTION, SOLUTION INTRAVENOUS ONCE
Status: COMPLETED | OUTPATIENT
Start: 2025-08-27 | End: 2025-08-27

## 2025-08-27 RX ORDER — SODIUM CHLORIDE 9 MG/ML
20 INJECTION, SOLUTION INTRAVENOUS ONCE
OUTPATIENT
Start: 2025-10-08

## 2025-08-27 RX ADMIN — SODIUM CHLORIDE 200 MG: 9 INJECTION, SOLUTION INTRAVENOUS at 15:05

## 2025-08-27 RX ADMIN — SODIUM CHLORIDE 20 ML/HR: 9 INJECTION, SOLUTION INTRAVENOUS at 15:05
